# Patient Record
Sex: FEMALE | Race: BLACK OR AFRICAN AMERICAN | Employment: OTHER | URBAN - METROPOLITAN AREA
[De-identification: names, ages, dates, MRNs, and addresses within clinical notes are randomized per-mention and may not be internally consistent; named-entity substitution may affect disease eponyms.]

---

## 2018-03-06 ENCOUNTER — OFFICE VISIT (OUTPATIENT)
Dept: PULMONOLOGY | Age: 58
End: 2018-03-06
Payer: MEDICARE

## 2018-03-06 VITALS
TEMPERATURE: 98 F | BODY MASS INDEX: 25.3 KG/M2 | HEIGHT: 61 IN | OXYGEN SATURATION: 95 % | WEIGHT: 134 LBS | DIASTOLIC BLOOD PRESSURE: 28 MMHG | SYSTOLIC BLOOD PRESSURE: 130 MMHG | HEART RATE: 108 BPM

## 2018-03-06 DIAGNOSIS — F17.200 SMOKING: ICD-10-CM

## 2018-03-06 DIAGNOSIS — J44.9 CHRONIC OBSTRUCTIVE PULMONARY DISEASE, UNSPECIFIED COPD TYPE (HCC): Primary | ICD-10-CM

## 2018-03-06 DIAGNOSIS — R09.02 HYPOXIA: ICD-10-CM

## 2018-03-06 PROCEDURE — G8419 CALC BMI OUT NRM PARAM NOF/U: HCPCS | Performed by: INTERNAL MEDICINE

## 2018-03-06 PROCEDURE — G8926 SPIRO NO PERF OR DOC: HCPCS | Performed by: INTERNAL MEDICINE

## 2018-03-06 PROCEDURE — 99214 OFFICE O/P EST MOD 30 MIN: CPT | Performed by: INTERNAL MEDICINE

## 2018-03-06 PROCEDURE — 3023F SPIROM DOC REV: CPT | Performed by: INTERNAL MEDICINE

## 2018-03-06 PROCEDURE — 3017F COLORECTAL CA SCREEN DOC REV: CPT | Performed by: INTERNAL MEDICINE

## 2018-03-06 PROCEDURE — G8484 FLU IMMUNIZE NO ADMIN: HCPCS | Performed by: INTERNAL MEDICINE

## 2018-03-06 PROCEDURE — 1036F TOBACCO NON-USER: CPT | Performed by: INTERNAL MEDICINE

## 2018-03-06 PROCEDURE — G8427 DOCREV CUR MEDS BY ELIG CLIN: HCPCS | Performed by: INTERNAL MEDICINE

## 2018-03-06 PROCEDURE — 3014F SCREEN MAMMO DOC REV: CPT | Performed by: INTERNAL MEDICINE

## 2018-03-06 RX ORDER — ALBUTEROL SULFATE 90 UG/1
2 AEROSOL, METERED RESPIRATORY (INHALATION) EVERY 6 HOURS PRN
Qty: 1 INHALER | Refills: 3 | Status: SHIPPED | OUTPATIENT
Start: 2018-03-06 | End: 2020-04-03 | Stop reason: SDUPTHER

## 2018-03-06 RX ORDER — ALBUTEROL SULFATE 2.5 MG/3ML
2.5 SOLUTION RESPIRATORY (INHALATION) EVERY 6 HOURS PRN
Qty: 120 EACH | Refills: 5 | Status: SHIPPED | OUTPATIENT
Start: 2018-03-06 | End: 2020-07-09 | Stop reason: SDUPTHER

## 2018-03-06 RX ORDER — BUDESONIDE AND FORMOTEROL FUMARATE DIHYDRATE 160; 4.5 UG/1; UG/1
2 AEROSOL RESPIRATORY (INHALATION) 2 TIMES DAILY
Qty: 1 INHALER | Refills: 3 | Status: SHIPPED | OUTPATIENT
Start: 2018-03-06 | End: 2018-10-26 | Stop reason: SDUPTHER

## 2018-03-06 ASSESSMENT — ENCOUNTER SYMPTOMS
VOICE CHANGE: 0
VOMITING: 0
DIARRHEA: 0
EYE ITCHING: 0
SHORTNESS OF BREATH: 1
TROUBLE SWALLOWING: 0
ABDOMINAL PAIN: 0
SINUS PRESSURE: 0
SORE THROAT: 0
EYE DISCHARGE: 0
WHEEZING: 0
RHINORRHEA: 0
CHEST TIGHTNESS: 0
COUGH: 0
NAUSEA: 0

## 2018-06-11 ENCOUNTER — HOSPITAL ENCOUNTER (OUTPATIENT)
Dept: CT IMAGING | Age: 58
Discharge: HOME | End: 2018-06-13
Payer: MEDICARE

## 2018-06-11 DIAGNOSIS — Q65.89 DYSPLASIA OF ACETABULUM: ICD-10-CM

## 2018-06-11 PROCEDURE — 72192 CT PELVIS W/O DYE: CPT

## 2018-08-08 ENCOUNTER — HOSPITAL ENCOUNTER (OUTPATIENT)
Dept: PREADMISSION TESTING | Age: 58
Discharge: HOME | End: 2018-08-12
Payer: MEDICARE

## 2018-08-08 VITALS
HEIGHT: 61 IN | TEMPERATURE: 98.1 F | BODY MASS INDEX: 23.03 KG/M2 | HEART RATE: 97 BPM | OXYGEN SATURATION: 97 % | RESPIRATION RATE: 16 BRPM | DIASTOLIC BLOOD PRESSURE: 53 MMHG | SYSTOLIC BLOOD PRESSURE: 114 MMHG | WEIGHT: 122 LBS

## 2018-08-08 DIAGNOSIS — M14.60 CHARCOT'S ARTHROPATHY: ICD-10-CM

## 2018-08-08 LAB
ABO/RH: NORMAL
ANION GAP SERPL CALCULATED.3IONS-SCNC: 10 MEQ/L (ref 7–13)
ANTIBODY SCREEN: NORMAL
APTT: 24.3 SEC (ref 21.6–35.4)
BILIRUBIN URINE: NEGATIVE
BLOOD, URINE: NEGATIVE
BUN BLDV-MCNC: 9 MG/DL (ref 8–23)
CALCIUM SERPL-MCNC: 9.7 MG/DL (ref 8.6–10.2)
CHLORIDE BLD-SCNC: 96 MEQ/L (ref 98–107)
CLARITY: CLEAR
CO2: 29 MEQ/L (ref 22–29)
COLOR: YELLOW
CREAT SERPL-MCNC: 0.69 MG/DL (ref 0.5–0.9)
EKG ATRIAL RATE: 81 BPM
EKG P AXIS: 81 DEGREES
EKG P-R INTERVAL: 150 MS
EKG Q-T INTERVAL: 384 MS
EKG QRS DURATION: 78 MS
EKG QTC CALCULATION (BAZETT): 446 MS
EKG R AXIS: 83 DEGREES
EKG T AXIS: 56 DEGREES
EKG VENTRICULAR RATE: 81 BPM
GFR AFRICAN AMERICAN: >60
GFR NON-AFRICAN AMERICAN: >60
GLUCOSE BLD-MCNC: 116 MG/DL (ref 74–109)
GLUCOSE URINE: NEGATIVE MG/DL
HCT VFR BLD CALC: 46.9 % (ref 37–47)
HEMOGLOBIN: 15.8 G/DL (ref 12–16)
INR BLD: 1
KETONES, URINE: NEGATIVE MG/DL
LEUKOCYTE ESTERASE, URINE: NEGATIVE
MCH RBC QN AUTO: 33.4 PG (ref 27–31.3)
MCHC RBC AUTO-ENTMCNC: 33.7 % (ref 33–37)
MCV RBC AUTO: 99.1 FL (ref 82–100)
NITRITE, URINE: NEGATIVE
PDW BLD-RTO: 15.4 % (ref 11.5–14.5)
PH UA: 6 (ref 5–9)
PLATELET # BLD: 216 K/UL (ref 130–400)
POTASSIUM SERPL-SCNC: 4.3 MEQ/L (ref 3.5–5.1)
PROTEIN UA: NEGATIVE MG/DL
PROTHROMBIN TIME: 10.1 SEC (ref 9.6–12.3)
RBC # BLD: 4.73 M/UL (ref 4.2–5.4)
SODIUM BLD-SCNC: 135 MEQ/L (ref 132–144)
SPECIFIC GRAVITY UA: 1.01 (ref 1–1.03)
URINE REFLEX TO CULTURE: NORMAL
UROBILINOGEN, URINE: 1 E.U./DL
WBC # BLD: 6.1 K/UL (ref 4.8–10.8)

## 2018-08-08 PROCEDURE — 81003 URINALYSIS AUTO W/O SCOPE: CPT

## 2018-08-08 PROCEDURE — 85610 PROTHROMBIN TIME: CPT

## 2018-08-08 PROCEDURE — 86850 RBC ANTIBODY SCREEN: CPT

## 2018-08-08 PROCEDURE — 93005 ELECTROCARDIOGRAM TRACING: CPT

## 2018-08-08 PROCEDURE — 86901 BLOOD TYPING SEROLOGIC RH(D): CPT

## 2018-08-08 PROCEDURE — 86923 COMPATIBILITY TEST ELECTRIC: CPT

## 2018-08-08 PROCEDURE — 80048 BASIC METABOLIC PNL TOTAL CA: CPT

## 2018-08-08 PROCEDURE — 86900 BLOOD TYPING SEROLOGIC ABO: CPT

## 2018-08-08 PROCEDURE — 85027 COMPLETE CBC AUTOMATED: CPT

## 2018-08-08 PROCEDURE — 85730 THROMBOPLASTIN TIME PARTIAL: CPT

## 2018-08-08 RX ORDER — VITAMIN E (DL,TOCOPHERYL ACET) 180 MG
1 CAPSULE ORAL 2 TIMES DAILY
COMMUNITY

## 2018-08-08 RX ORDER — FLUTICASONE PROPIONATE 50 MCG
1 SPRAY, SUSPENSION (ML) NASAL DAILY
Status: ON HOLD | COMMUNITY
End: 2018-08-27 | Stop reason: HOSPADM

## 2018-08-08 RX ORDER — SODIUM CHLORIDE 0.9 % (FLUSH) 0.9 %
10 SYRINGE (ML) INJECTION EVERY 12 HOURS SCHEDULED
Status: CANCELLED | OUTPATIENT
Start: 2018-08-08

## 2018-08-08 RX ORDER — LIDOCAINE HYDROCHLORIDE 10 MG/ML
1 INJECTION, SOLUTION EPIDURAL; INFILTRATION; INTRACAUDAL; PERINEURAL
Status: CANCELLED | OUTPATIENT
Start: 2018-08-08 | End: 2018-08-08

## 2018-08-08 RX ORDER — SIMVASTATIN 10 MG
10 TABLET ORAL NIGHTLY
COMMUNITY

## 2018-08-08 RX ORDER — SODIUM CHLORIDE 0.9 % (FLUSH) 0.9 %
10 SYRINGE (ML) INJECTION PRN
Status: CANCELLED | OUTPATIENT
Start: 2018-08-08

## 2018-08-08 RX ORDER — OXYBUTYNIN CHLORIDE 10 MG/1
10 TABLET, EXTENDED RELEASE ORAL DAILY
COMMUNITY

## 2018-08-08 RX ORDER — DOCUSATE SODIUM 100 MG/1
200 CAPSULE, LIQUID FILLED ORAL 2 TIMES DAILY
COMMUNITY
End: 2019-05-20 | Stop reason: ALTCHOICE

## 2018-08-08 RX ORDER — SENNA PLUS 8.6 MG/1
1 TABLET ORAL DAILY
COMMUNITY
End: 2019-05-20 | Stop reason: ALTCHOICE

## 2018-08-08 RX ORDER — M-VIT,TX,IRON,MINS/CALC/FOLIC 27MG-0.4MG
1 TABLET ORAL DAILY
COMMUNITY

## 2018-08-08 RX ORDER — SODIUM CHLORIDE, SODIUM LACTATE, POTASSIUM CHLORIDE, CALCIUM CHLORIDE 600; 310; 30; 20 MG/100ML; MG/100ML; MG/100ML; MG/100ML
INJECTION, SOLUTION INTRAVENOUS CONTINUOUS
Status: CANCELLED | OUTPATIENT
Start: 2018-08-08

## 2018-08-08 NOTE — PATIENT CARE CONFERENCE
Contraindications to TXA Therapy- per patient  no  1) Hypersensitivity to tranexamic acid?    no  2) Defective color vision at baseline?    no  3) Chronic degenerative conjunctivitis with fibrin deposits?    no  4) Blood clot WITHIN the vessels of the eye?     no  5) Recent hx of thromboembolism (DVT, PE, MI, CABG, stents, CVA,    etc.)?     no  6) Heart valve disease?    no  7) Hemorrhage in the subarachnoid space of brain?  no  8) Fluid accumulation in the brain?  no  9) Recent hx of seizures (well controlled seizures on meds are not a    contraindication)?  no  10) Hypercoagulable state?  no  11) Kidney impairment? Patients with serum creat gr than 1.5 will receive ONLY a single dose of TXA in O.R      Additional question for DVT prophylaxis    no         Do you have a recent history of cancer? no         Recent chemotherapy?  no         Low Ejection Fraction? no         Pulmonary Hypertension?     Pt is on pain management- she is on Percocet 7.5 mg tabs form Dr Narayanan Mention   she does see pain management monthly and she is scheduled to see then om 8/17- we will call and discuss post op pain meds and rx- and reschedule pt for the following week   pt lives home alone- but she plans to stay with her boyfriend post op- and [facility] for therapy

## 2018-08-09 PROCEDURE — 93010 ELECTROCARDIOGRAM REPORT: CPT | Performed by: INTERNAL MEDICINE

## 2018-08-13 ENCOUNTER — HOSPITAL ENCOUNTER (INPATIENT)
Age: 58
LOS: 1 days | Discharge: NURSING, REHAB OR LONG-TERM CARE | DRG: 470 | End: 2018-08-14
Attending: ORTHOPAEDIC SURGERY | Admitting: ORTHOPAEDIC SURGERY
Payer: MEDICARE

## 2018-08-13 ENCOUNTER — APPOINTMENT (OUTPATIENT)
Dept: GENERAL RADIOLOGY | Age: 58
DRG: 470 | End: 2018-08-13
Attending: ORTHOPAEDIC SURGERY
Payer: MEDICARE

## 2018-08-13 ENCOUNTER — ANESTHESIA EVENT (OUTPATIENT)
Dept: OPERATING ROOM | Age: 58
DRG: 470 | End: 2018-08-13
Payer: MEDICARE

## 2018-08-13 ENCOUNTER — HOSPITAL ENCOUNTER (OUTPATIENT)
Dept: GENERAL RADIOLOGY | Age: 58
Setting detail: SURGERY ADMIT
Discharge: HOME | DRG: 470 | End: 2018-08-15
Attending: ORTHOPAEDIC SURGERY
Payer: MEDICARE

## 2018-08-13 ENCOUNTER — ANESTHESIA (OUTPATIENT)
Dept: OPERATING ROOM | Age: 58
DRG: 470 | End: 2018-08-13
Payer: MEDICARE

## 2018-08-13 VITALS — DIASTOLIC BLOOD PRESSURE: 58 MMHG | TEMPERATURE: 97 F | OXYGEN SATURATION: 100 % | SYSTOLIC BLOOD PRESSURE: 105 MMHG

## 2018-08-13 DIAGNOSIS — R52 PAIN: ICD-10-CM

## 2018-08-13 PROBLEM — M16.12 OSTEOARTHRITIS OF LEFT HIP: Status: ACTIVE | Noted: 2018-08-13

## 2018-08-13 PROCEDURE — 3700000001 HC ADD 15 MINUTES (ANESTHESIA): Performed by: ORTHOPAEDIC SURGERY

## 2018-08-13 PROCEDURE — C1776 JOINT DEVICE (IMPLANTABLE): HCPCS | Performed by: ORTHOPAEDIC SURGERY

## 2018-08-13 PROCEDURE — 6360000002 HC RX W HCPCS: Performed by: ANESTHESIOLOGY

## 2018-08-13 PROCEDURE — C1713 ANCHOR/SCREW BN/BN,TIS/BN: HCPCS | Performed by: ORTHOPAEDIC SURGERY

## 2018-08-13 PROCEDURE — 88311 DECALCIFY TISSUE: CPT

## 2018-08-13 PROCEDURE — 2580000003 HC RX 258: Performed by: NURSE PRACTITIONER

## 2018-08-13 PROCEDURE — 1210000000 HC MED SURG R&B

## 2018-08-13 PROCEDURE — 3209999900 FLUORO FOR SURGICAL PROCEDURES

## 2018-08-13 PROCEDURE — 6360000002 HC RX W HCPCS: Performed by: ORTHOPAEDIC SURGERY

## 2018-08-13 PROCEDURE — 2500000003 HC RX 250 WO HCPCS: Performed by: NURSE ANESTHETIST, CERTIFIED REGISTERED

## 2018-08-13 PROCEDURE — 7100000001 HC PACU RECOVERY - ADDTL 15 MIN: Performed by: ORTHOPAEDIC SURGERY

## 2018-08-13 PROCEDURE — 2709999900 HC NON-CHARGEABLE SUPPLY: Performed by: ORTHOPAEDIC SURGERY

## 2018-08-13 PROCEDURE — 6360000002 HC RX W HCPCS: Performed by: NURSE PRACTITIONER

## 2018-08-13 PROCEDURE — 0SRB0J9 REPLACEMENT OF LEFT HIP JOINT WITH SYNTHETIC SUBSTITUTE, CEMENTED, OPEN APPROACH: ICD-10-PCS | Performed by: ORTHOPAEDIC SURGERY

## 2018-08-13 PROCEDURE — 73501 X-RAY EXAM HIP UNI 1 VIEW: CPT

## 2018-08-13 PROCEDURE — 6370000000 HC RX 637 (ALT 250 FOR IP): Performed by: NURSE PRACTITIONER

## 2018-08-13 PROCEDURE — 7100000000 HC PACU RECOVERY - FIRST 15 MIN: Performed by: ORTHOPAEDIC SURGERY

## 2018-08-13 PROCEDURE — 73502 X-RAY EXAM HIP UNI 2-3 VIEWS: CPT

## 2018-08-13 PROCEDURE — 6370000000 HC RX 637 (ALT 250 FOR IP): Performed by: ORTHOPAEDIC SURGERY

## 2018-08-13 PROCEDURE — C1773 RET DEV, INSERTABLE: HCPCS | Performed by: ORTHOPAEDIC SURGERY

## 2018-08-13 PROCEDURE — 3600000014 HC SURGERY LEVEL 4 ADDTL 15MIN: Performed by: ORTHOPAEDIC SURGERY

## 2018-08-13 PROCEDURE — 2500000003 HC RX 250 WO HCPCS: Performed by: NURSE PRACTITIONER

## 2018-08-13 PROCEDURE — 2580000003 HC RX 258: Performed by: ORTHOPAEDIC SURGERY

## 2018-08-13 PROCEDURE — 6360000002 HC RX W HCPCS: Performed by: NURSE ANESTHETIST, CERTIFIED REGISTERED

## 2018-08-13 PROCEDURE — 3600000004 HC SURGERY LEVEL 4 BASE: Performed by: ORTHOPAEDIC SURGERY

## 2018-08-13 PROCEDURE — 2700000000 HC OXYGEN THERAPY PER DAY

## 2018-08-13 PROCEDURE — 3700000000 HC ANESTHESIA ATTENDED CARE: Performed by: ORTHOPAEDIC SURGERY

## 2018-08-13 PROCEDURE — 94760 N-INVAS EAR/PLS OXIMETRY 1: CPT

## 2018-08-13 PROCEDURE — 94640 AIRWAY INHALATION TREATMENT: CPT

## 2018-08-13 PROCEDURE — 88304 TISSUE EXAM BY PATHOLOGIST: CPT

## 2018-08-13 DEVICE — SCREW BNE L20MM DIA6.5MM ACET TI ALLY DOME 2 MOBILITY LO: Type: IMPLANTABLE DEVICE | Site: HIP | Status: FUNCTIONAL

## 2018-08-13 DEVICE — SCREW BNE L25MM DIA6.5MM TI ALLY DOME 2 MOBILITY LO PROF G7: Type: IMPLANTABLE DEVICE | Site: HIP | Status: FUNCTIONAL

## 2018-08-13 DEVICE — IMPLANTABLE DEVICE: Type: IMPLANTABLE DEVICE | Site: HIP | Status: FUNCTIONAL

## 2018-08-13 DEVICE — IMPL HIP VERSYS DISTAL CENTRALIZER 10MM: Type: IMPLANTABLE DEVICE | Site: HIP | Status: FUNCTIONAL

## 2018-08-13 DEVICE — SHELL ACET SZ G DIA58MM MH OSSEOTI 2 MOBILITY G7: Type: IMPLANTABLE DEVICE | Site: HIP | Status: FUNCTIONAL

## 2018-08-13 DEVICE — CEMENT BNE RADIOPAQUE FAST SET ACRYL RESIN HI VISC SIMPLEXHV: Type: IMPLANTABLE DEVICE | Site: HIP | Status: FUNCTIONAL

## 2018-08-13 DEVICE — IMPLANTABLE DEVICE
Type: IMPLANTABLE DEVICE | Site: HIP | Status: FUNCTIONAL
Brand: TRABECULAR METAL™

## 2018-08-13 DEVICE — HEAD FEMORAL COCR 12/14 28MM -3.5: Type: IMPLANTABLE DEVICE | Site: HIP | Status: FUNCTIONAL

## 2018-08-13 DEVICE — BEARING ACET OD46MM ID28MM HIP E1 2 MOBILITY ACT ARTC: Type: IMPLANTABLE DEVICE | Site: HIP | Status: FUNCTIONAL

## 2018-08-13 DEVICE — CEMENT BNE 40 GM RADIOPAQUE BA SIMPLEX P: Type: IMPLANTABLE DEVICE | Site: HIP | Status: FUNCTIONAL

## 2018-08-13 DEVICE — G7 DUAL MOBILITY LINER 46MM G: Type: IMPLANTABLE DEVICE | Site: HIP | Status: FUNCTIONAL

## 2018-08-13 RX ORDER — OXYCODONE HCL 10 MG/1
10 TABLET, FILM COATED, EXTENDED RELEASE ORAL ONCE
Status: COMPLETED | OUTPATIENT
Start: 2018-08-13 | End: 2018-08-13

## 2018-08-13 RX ORDER — FLUTICASONE PROPIONATE 50 MCG
1 SPRAY, SUSPENSION (ML) NASAL DAILY
Status: DISCONTINUED | OUTPATIENT
Start: 2018-08-13 | End: 2018-08-14 | Stop reason: HOSPADM

## 2018-08-13 RX ORDER — DULOXETIN HYDROCHLORIDE 60 MG/1
60 CAPSULE, DELAYED RELEASE ORAL DAILY
Status: DISCONTINUED | OUTPATIENT
Start: 2018-08-13 | End: 2018-08-14 | Stop reason: HOSPADM

## 2018-08-13 RX ORDER — M-VIT,TX,IRON,MINS/CALC/FOLIC 27MG-0.4MG
1 TABLET ORAL DAILY
Status: DISCONTINUED | OUTPATIENT
Start: 2018-08-13 | End: 2018-08-14 | Stop reason: HOSPADM

## 2018-08-13 RX ORDER — OXYCODONE HYDROCHLORIDE 5 MG/1
5 TABLET ORAL EVERY 4 HOURS PRN
Status: DISCONTINUED | OUTPATIENT
Start: 2018-08-13 | End: 2018-08-14 | Stop reason: HOSPADM

## 2018-08-13 RX ORDER — ASPIRIN 81 MG/1
81 TABLET ORAL 2 TIMES DAILY
Status: DISCONTINUED | OUTPATIENT
Start: 2018-08-14 | End: 2018-08-14 | Stop reason: HOSPADM

## 2018-08-13 RX ORDER — LIDOCAINE HYDROCHLORIDE 20 MG/ML
INJECTION, SOLUTION INFILTRATION; PERINEURAL PRN
Status: DISCONTINUED | OUTPATIENT
Start: 2018-08-13 | End: 2018-08-13 | Stop reason: SDUPTHER

## 2018-08-13 RX ORDER — PROPOFOL 10 MG/ML
INJECTION, EMULSION INTRAVENOUS PRN
Status: DISCONTINUED | OUTPATIENT
Start: 2018-08-13 | End: 2018-08-13 | Stop reason: SDUPTHER

## 2018-08-13 RX ORDER — TRAMADOL HYDROCHLORIDE 50 MG/1
50 TABLET ORAL EVERY 6 HOURS SCHEDULED
Status: DISCONTINUED | OUTPATIENT
Start: 2018-08-13 | End: 2018-08-14 | Stop reason: HOSPADM

## 2018-08-13 RX ORDER — ACETAMINOPHEN 500 MG
1000 TABLET ORAL ONCE
Status: COMPLETED | OUTPATIENT
Start: 2018-08-13 | End: 2018-08-13

## 2018-08-13 RX ORDER — ROCURONIUM BROMIDE 10 MG/ML
INJECTION, SOLUTION INTRAVENOUS PRN
Status: DISCONTINUED | OUTPATIENT
Start: 2018-08-13 | End: 2018-08-13 | Stop reason: SDUPTHER

## 2018-08-13 RX ORDER — ACETAMINOPHEN 325 MG/1
650 TABLET ORAL EVERY 6 HOURS
Status: DISCONTINUED | OUTPATIENT
Start: 2018-08-13 | End: 2018-08-14 | Stop reason: HOSPADM

## 2018-08-13 RX ORDER — SENNA PLUS 8.6 MG/1
1 TABLET ORAL DAILY
Status: DISCONTINUED | OUTPATIENT
Start: 2018-08-13 | End: 2018-08-13 | Stop reason: SDUPTHER

## 2018-08-13 RX ORDER — SIMVASTATIN 10 MG
10 TABLET ORAL NIGHTLY
Status: DISCONTINUED | OUTPATIENT
Start: 2018-08-13 | End: 2018-08-14 | Stop reason: HOSPADM

## 2018-08-13 RX ORDER — MAGNESIUM HYDROXIDE 1200 MG/15ML
LIQUID ORAL CONTINUOUS PRN
Status: COMPLETED | OUTPATIENT
Start: 2018-08-13 | End: 2018-08-13

## 2018-08-13 RX ORDER — HYDROCODONE BITARTRATE AND ACETAMINOPHEN 5; 325 MG/1; MG/1
2 TABLET ORAL PRN
Status: DISCONTINUED | OUTPATIENT
Start: 2018-08-13 | End: 2018-08-13 | Stop reason: HOSPADM

## 2018-08-13 RX ORDER — ONDANSETRON 2 MG/ML
4 INJECTION INTRAMUSCULAR; INTRAVENOUS
Status: DISCONTINUED | OUTPATIENT
Start: 2018-08-13 | End: 2018-08-13 | Stop reason: HOSPADM

## 2018-08-13 RX ORDER — FENTANYL CITRATE 50 UG/ML
50 INJECTION, SOLUTION INTRAMUSCULAR; INTRAVENOUS EVERY 10 MIN PRN
Status: DISCONTINUED | OUTPATIENT
Start: 2018-08-13 | End: 2018-08-13 | Stop reason: HOSPADM

## 2018-08-13 RX ORDER — GRANISETRON HYDROCHLORIDE 1 MG/ML
INJECTION INTRAVENOUS PRN
Status: DISCONTINUED | OUTPATIENT
Start: 2018-08-13 | End: 2018-08-13 | Stop reason: SDUPTHER

## 2018-08-13 RX ORDER — SODIUM CHLORIDE 0.9 % (FLUSH) 0.9 %
10 SYRINGE (ML) INJECTION PRN
Status: DISCONTINUED | OUTPATIENT
Start: 2018-08-13 | End: 2018-08-13 | Stop reason: HOSPADM

## 2018-08-13 RX ORDER — SODIUM CHLORIDE 9 MG/ML
INJECTION, SOLUTION INTRAVENOUS CONTINUOUS
Status: DISCONTINUED | OUTPATIENT
Start: 2018-08-13 | End: 2018-08-14 | Stop reason: HOSPADM

## 2018-08-13 RX ORDER — MEPERIDINE HYDROCHLORIDE 25 MG/ML
12.5 INJECTION INTRAMUSCULAR; INTRAVENOUS; SUBCUTANEOUS EVERY 5 MIN PRN
Status: DISCONTINUED | OUTPATIENT
Start: 2018-08-13 | End: 2018-08-13 | Stop reason: HOSPADM

## 2018-08-13 RX ORDER — LABETALOL HYDROCHLORIDE 5 MG/ML
INJECTION, SOLUTION INTRAVENOUS PRN
Status: DISCONTINUED | OUTPATIENT
Start: 2018-08-13 | End: 2018-08-13 | Stop reason: SDUPTHER

## 2018-08-13 RX ORDER — SODIUM CHLORIDE, SODIUM LACTATE, POTASSIUM CHLORIDE, CALCIUM CHLORIDE 600; 310; 30; 20 MG/100ML; MG/100ML; MG/100ML; MG/100ML
INJECTION, SOLUTION INTRAVENOUS CONTINUOUS
Status: DISCONTINUED | OUTPATIENT
Start: 2018-08-13 | End: 2018-08-13

## 2018-08-13 RX ORDER — SODIUM CHLORIDE 0.9 % (FLUSH) 0.9 %
10 SYRINGE (ML) INJECTION PRN
Status: DISCONTINUED | OUTPATIENT
Start: 2018-08-13 | End: 2018-08-14 | Stop reason: HOSPADM

## 2018-08-13 RX ORDER — ALBUTEROL SULFATE 90 UG/1
2 AEROSOL, METERED RESPIRATORY (INHALATION) EVERY 6 HOURS PRN
Status: DISCONTINUED | OUTPATIENT
Start: 2018-08-13 | End: 2018-08-14

## 2018-08-13 RX ORDER — SENNA AND DOCUSATE SODIUM 50; 8.6 MG/1; MG/1
1 TABLET, FILM COATED ORAL DAILY
Status: DISCONTINUED | OUTPATIENT
Start: 2018-08-13 | End: 2018-08-14 | Stop reason: HOSPADM

## 2018-08-13 RX ORDER — WOUND DRESSING ADHESIVE - LIQUID
LIQUID MISCELLANEOUS PRN
Status: DISCONTINUED | OUTPATIENT
Start: 2018-08-13 | End: 2018-08-13 | Stop reason: HOSPADM

## 2018-08-13 RX ORDER — OXYBUTYNIN CHLORIDE 5 MG/1
10 TABLET, EXTENDED RELEASE ORAL DAILY
Status: DISCONTINUED | OUTPATIENT
Start: 2018-08-13 | End: 2018-08-14 | Stop reason: HOSPADM

## 2018-08-13 RX ORDER — FENTANYL CITRATE 50 UG/ML
INJECTION, SOLUTION INTRAMUSCULAR; INTRAVENOUS PRN
Status: DISCONTINUED | OUTPATIENT
Start: 2018-08-13 | End: 2018-08-13 | Stop reason: SDUPTHER

## 2018-08-13 RX ORDER — MORPHINE SULFATE 4 MG/ML
4 INJECTION, SOLUTION INTRAMUSCULAR; INTRAVENOUS
Status: DISCONTINUED | OUTPATIENT
Start: 2018-08-13 | End: 2018-08-14

## 2018-08-13 RX ORDER — MORPHINE SULFATE 2 MG/ML
2 INJECTION, SOLUTION INTRAMUSCULAR; INTRAVENOUS
Status: DISCONTINUED | OUTPATIENT
Start: 2018-08-13 | End: 2018-08-14

## 2018-08-13 RX ORDER — KETOROLAC TROMETHAMINE 30 MG/ML
30 INJECTION, SOLUTION INTRAMUSCULAR; INTRAVENOUS EVERY 6 HOURS
Status: COMPLETED | OUTPATIENT
Start: 2018-08-13 | End: 2018-08-14

## 2018-08-13 RX ORDER — SODIUM CHLORIDE 0.9 % (FLUSH) 0.9 %
10 SYRINGE (ML) INJECTION EVERY 12 HOURS SCHEDULED
Status: DISCONTINUED | OUTPATIENT
Start: 2018-08-13 | End: 2018-08-13 | Stop reason: HOSPADM

## 2018-08-13 RX ORDER — DOCUSATE SODIUM 100 MG/1
100 CAPSULE, LIQUID FILLED ORAL 2 TIMES DAILY
Status: DISCONTINUED | OUTPATIENT
Start: 2018-08-13 | End: 2018-08-13 | Stop reason: SDUPTHER

## 2018-08-13 RX ORDER — DEXAMETHASONE SODIUM PHOSPHATE 10 MG/ML
INJECTION INTRAMUSCULAR; INTRAVENOUS PRN
Status: DISCONTINUED | OUTPATIENT
Start: 2018-08-13 | End: 2018-08-13 | Stop reason: SDUPTHER

## 2018-08-13 RX ORDER — ALBUTEROL SULFATE 2.5 MG/3ML
2.5 SOLUTION RESPIRATORY (INHALATION) EVERY 6 HOURS PRN
Status: DISCONTINUED | OUTPATIENT
Start: 2018-08-13 | End: 2018-08-14

## 2018-08-13 RX ORDER — METOCLOPRAMIDE HYDROCHLORIDE 5 MG/ML
10 INJECTION INTRAMUSCULAR; INTRAVENOUS
Status: DISCONTINUED | OUTPATIENT
Start: 2018-08-13 | End: 2018-08-13 | Stop reason: HOSPADM

## 2018-08-13 RX ORDER — CELECOXIB 200 MG/1
400 CAPSULE ORAL ONCE
Status: COMPLETED | OUTPATIENT
Start: 2018-08-13 | End: 2018-08-13

## 2018-08-13 RX ORDER — LIDOCAINE HYDROCHLORIDE 10 MG/ML
1 INJECTION, SOLUTION EPIDURAL; INFILTRATION; INTRACAUDAL; PERINEURAL
Status: COMPLETED | OUTPATIENT
Start: 2018-08-13 | End: 2018-08-13

## 2018-08-13 RX ORDER — DIPHENHYDRAMINE HYDROCHLORIDE 50 MG/ML
12.5 INJECTION INTRAMUSCULAR; INTRAVENOUS
Status: DISCONTINUED | OUTPATIENT
Start: 2018-08-13 | End: 2018-08-13 | Stop reason: HOSPADM

## 2018-08-13 RX ORDER — ONDANSETRON 2 MG/ML
4 INJECTION INTRAMUSCULAR; INTRAVENOUS EVERY 6 HOURS PRN
Status: DISCONTINUED | OUTPATIENT
Start: 2018-08-13 | End: 2018-08-14 | Stop reason: HOSPADM

## 2018-08-13 RX ORDER — SODIUM CHLORIDE 0.9 % (FLUSH) 0.9 %
10 SYRINGE (ML) INJECTION EVERY 12 HOURS SCHEDULED
Status: DISCONTINUED | OUTPATIENT
Start: 2018-08-13 | End: 2018-08-14 | Stop reason: HOSPADM

## 2018-08-13 RX ORDER — DIAZEPAM 5 MG/1
5 TABLET ORAL EVERY 6 HOURS PRN
Status: DISCONTINUED | OUTPATIENT
Start: 2018-08-13 | End: 2018-08-14 | Stop reason: HOSPADM

## 2018-08-13 RX ORDER — BACLOFEN 10 MG/1
10 TABLET ORAL 3 TIMES DAILY
Status: DISCONTINUED | OUTPATIENT
Start: 2018-08-13 | End: 2018-08-14 | Stop reason: HOSPADM

## 2018-08-13 RX ORDER — MELOXICAM 7.5 MG/1
15 TABLET ORAL DAILY
Status: DISCONTINUED | OUTPATIENT
Start: 2018-08-14 | End: 2018-08-14

## 2018-08-13 RX ORDER — MELOXICAM 15 MG/1
1 TABLET ORAL DAILY
Status: DISCONTINUED | OUTPATIENT
Start: 2018-08-13 | End: 2018-08-13 | Stop reason: SDUPTHER

## 2018-08-13 RX ORDER — DOCUSATE SODIUM 100 MG/1
200 CAPSULE, LIQUID FILLED ORAL 2 TIMES DAILY
Status: DISCONTINUED | OUTPATIENT
Start: 2018-08-13 | End: 2018-08-14 | Stop reason: HOSPADM

## 2018-08-13 RX ORDER — HYDROCODONE BITARTRATE AND ACETAMINOPHEN 5; 325 MG/1; MG/1
1 TABLET ORAL PRN
Status: DISCONTINUED | OUTPATIENT
Start: 2018-08-13 | End: 2018-08-13 | Stop reason: HOSPADM

## 2018-08-13 RX ADMIN — CEFAZOLIN SODIUM 2 G: 1 INJECTION, SOLUTION INTRAVENOUS at 22:13

## 2018-08-13 RX ADMIN — PHENYLEPHRINE HYDROCHLORIDE 40 MCG: 10 INJECTION INTRAVENOUS at 17:15

## 2018-08-13 RX ADMIN — OXYBUTYNIN CHLORIDE 10 MG: 5 TABLET, FILM COATED, EXTENDED RELEASE ORAL at 22:03

## 2018-08-13 RX ADMIN — Medication 2 PUFF: at 22:28

## 2018-08-13 RX ADMIN — Medication 10 ML: at 22:04

## 2018-08-13 RX ADMIN — SODIUM CHLORIDE: 9 INJECTION, SOLUTION INTRAVENOUS at 22:04

## 2018-08-13 RX ADMIN — DOCUSATE SODIUM 200 MG: 100 CAPSULE, LIQUID FILLED ORAL at 22:03

## 2018-08-13 RX ADMIN — HYDROMORPHONE HYDROCHLORIDE 0.5 MG: 1 INJECTION, SOLUTION INTRAMUSCULAR; INTRAVENOUS; SUBCUTANEOUS at 18:52

## 2018-08-13 RX ADMIN — PHENYLEPHRINE HYDROCHLORIDE 160 MCG: 10 INJECTION INTRAVENOUS at 16:37

## 2018-08-13 RX ADMIN — DEXAMETHASONE SODIUM PHOSPHATE 10 MG: 10 INJECTION INTRAMUSCULAR; INTRAVENOUS at 14:43

## 2018-08-13 RX ADMIN — CEFAZOLIN SODIUM 2 G: 1 INJECTION, SOLUTION INTRAVENOUS at 14:13

## 2018-08-13 RX ADMIN — LIDOCAINE HYDROCHLORIDE 0.1 ML: 10 INJECTION, SOLUTION EPIDURAL; INFILTRATION; INTRACAUDAL; PERINEURAL at 12:23

## 2018-08-13 RX ADMIN — LIDOCAINE HYDROCHLORIDE 60 MG: 20 INJECTION, SOLUTION INFILTRATION; PERINEURAL at 14:19

## 2018-08-13 RX ADMIN — ACETAMINOPHEN 650 MG: 325 TABLET ORAL at 22:03

## 2018-08-13 RX ADMIN — HYDROMORPHONE HYDROCHLORIDE 0.4 MG: 1 INJECTION, SOLUTION INTRAMUSCULAR; INTRAVENOUS; SUBCUTANEOUS at 16:22

## 2018-08-13 RX ADMIN — ACETAMINOPHEN 1000 MG: 500 TABLET ORAL at 12:25

## 2018-08-13 RX ADMIN — ALBUTEROL SULFATE 2.5 MG: 2.5 SOLUTION RESPIRATORY (INHALATION) at 22:28

## 2018-08-13 RX ADMIN — CELECOXIB 400 MG: 200 CAPSULE ORAL at 12:24

## 2018-08-13 RX ADMIN — GRANISETRON HYDROCHLORIDE 1 MG: 1 INJECTION INTRAVENOUS at 17:42

## 2018-08-13 RX ADMIN — SODIUM CHLORIDE, POTASSIUM CHLORIDE, SODIUM LACTATE AND CALCIUM CHLORIDE: 600; 310; 30; 20 INJECTION, SOLUTION INTRAVENOUS at 17:20

## 2018-08-13 RX ADMIN — HYDROMORPHONE HYDROCHLORIDE 0.4 MG: 1 INJECTION, SOLUTION INTRAMUSCULAR; INTRAVENOUS; SUBCUTANEOUS at 14:48

## 2018-08-13 RX ADMIN — ROCURONIUM BROMIDE 50 MG: 10 INJECTION INTRAVENOUS at 14:19

## 2018-08-13 RX ADMIN — HYDROMORPHONE HYDROCHLORIDE 0.2 MG: 1 INJECTION, SOLUTION INTRAMUSCULAR; INTRAVENOUS; SUBCUTANEOUS at 14:41

## 2018-08-13 RX ADMIN — SODIUM CHLORIDE, POTASSIUM CHLORIDE, SODIUM LACTATE AND CALCIUM CHLORIDE: 600; 310; 30; 20 INJECTION, SOLUTION INTRAVENOUS at 15:10

## 2018-08-13 RX ADMIN — TRANEXAMIC ACID 1000 MG: 1 INJECTION, SOLUTION INTRAVENOUS at 14:31

## 2018-08-13 RX ADMIN — Medication 5 MG: at 14:26

## 2018-08-13 RX ADMIN — HYDROMORPHONE HYDROCHLORIDE 0.5 MG: 1 INJECTION, SOLUTION INTRAMUSCULAR; INTRAVENOUS; SUBCUTANEOUS at 18:42

## 2018-08-13 RX ADMIN — PHENYLEPHRINE HYDROCHLORIDE 100 MCG: 10 INJECTION INTRAVENOUS at 16:35

## 2018-08-13 RX ADMIN — OXYCODONE HYDROCHLORIDE 10 MG: 10 TABLET, FILM COATED, EXTENDED RELEASE ORAL at 12:24

## 2018-08-13 RX ADMIN — HYDROMORPHONE HYDROCHLORIDE 0.5 MG: 1 INJECTION, SOLUTION INTRAMUSCULAR; INTRAVENOUS; SUBCUTANEOUS at 19:10

## 2018-08-13 RX ADMIN — KETOROLAC TROMETHAMINE 30 MG: 30 INJECTION, SOLUTION INTRAMUSCULAR at 22:04

## 2018-08-13 RX ADMIN — HYDROMORPHONE HYDROCHLORIDE 0.2 MG: 1 INJECTION, SOLUTION INTRAMUSCULAR; INTRAVENOUS; SUBCUTANEOUS at 14:43

## 2018-08-13 RX ADMIN — SENNOSIDES AND DOCUSATE SODIUM 1 TABLET: 8.6; 5 TABLET ORAL at 22:03

## 2018-08-13 RX ADMIN — PHENYLEPHRINE HYDROCHLORIDE 160 MCG: 10 INJECTION INTRAVENOUS at 16:39

## 2018-08-13 RX ADMIN — TRANEXAMIC ACID 1000 MG: 1 INJECTION, SOLUTION INTRAVENOUS at 18:01

## 2018-08-13 RX ADMIN — FENTANYL CITRATE 100 MCG: 50 INJECTION, SOLUTION INTRAMUSCULAR; INTRAVENOUS at 14:19

## 2018-08-13 RX ADMIN — PHENYLEPHRINE HYDROCHLORIDE 40 MCG: 10 INJECTION INTRAVENOUS at 16:42

## 2018-08-13 RX ADMIN — SODIUM CHLORIDE, POTASSIUM CHLORIDE, SODIUM LACTATE AND CALCIUM CHLORIDE: 600; 310; 30; 20 INJECTION, SOLUTION INTRAVENOUS at 16:10

## 2018-08-13 RX ADMIN — Medication 400 MG: at 22:03

## 2018-08-13 RX ADMIN — SODIUM CHLORIDE, POTASSIUM CHLORIDE, SODIUM LACTATE AND CALCIUM CHLORIDE: 600; 310; 30; 20 INJECTION, SOLUTION INTRAVENOUS at 12:24

## 2018-08-13 RX ADMIN — BACLOFEN 10 MG: 10 TABLET ORAL at 22:03

## 2018-08-13 RX ADMIN — SIMVASTATIN 10 MG: 10 TABLET, FILM COATED ORAL at 22:03

## 2018-08-13 RX ADMIN — PROPOFOL 200 MG: 10 INJECTION, EMULSION INTRAVENOUS at 14:19

## 2018-08-13 ASSESSMENT — PULMONARY FUNCTION TESTS
PIF_VALUE: 24
PIF_VALUE: 25
PIF_VALUE: 21
PIF_VALUE: 2
PIF_VALUE: 23
PIF_VALUE: 23
PIF_VALUE: 29
PIF_VALUE: 21
PIF_VALUE: 24
PIF_VALUE: 0
PIF_VALUE: 21
PIF_VALUE: 24
PIF_VALUE: 22
PIF_VALUE: 31
PIF_VALUE: 21
PIF_VALUE: 1
PIF_VALUE: 2
PIF_VALUE: 22
PIF_VALUE: 25
PIF_VALUE: 3
PIF_VALUE: 21
PIF_VALUE: 3
PIF_VALUE: 3
PIF_VALUE: 21
PIF_VALUE: 26
PIF_VALUE: 24
PIF_VALUE: 25
PIF_VALUE: 24
PIF_VALUE: 2
PIF_VALUE: 1
PIF_VALUE: 20
PIF_VALUE: 28
PIF_VALUE: 22
PIF_VALUE: 21
PIF_VALUE: 22
PIF_VALUE: 24
PIF_VALUE: 22
PIF_VALUE: 31
PIF_VALUE: 24
PIF_VALUE: 24
PIF_VALUE: 23
PIF_VALUE: 24
PIF_VALUE: 22
PIF_VALUE: 25
PIF_VALUE: 22
PIF_VALUE: 27
PIF_VALUE: 23
PIF_VALUE: 24
PIF_VALUE: 24
PIF_VALUE: 22
PIF_VALUE: 32
PIF_VALUE: 25
PIF_VALUE: 23
PIF_VALUE: 21
PIF_VALUE: 25
PIF_VALUE: 25
PIF_VALUE: 23
PIF_VALUE: 23
PIF_VALUE: 22
PIF_VALUE: 2
PIF_VALUE: 22
PIF_VALUE: 20
PIF_VALUE: 25
PIF_VALUE: 21
PIF_VALUE: 31
PIF_VALUE: 22
PIF_VALUE: 23
PIF_VALUE: 21
PIF_VALUE: 23
PIF_VALUE: 26
PIF_VALUE: 23
PIF_VALUE: 21
PIF_VALUE: 21
PIF_VALUE: 24
PIF_VALUE: 28
PIF_VALUE: 23
PIF_VALUE: 25
PIF_VALUE: 3
PIF_VALUE: 23
PIF_VALUE: 24
PIF_VALUE: 24
PIF_VALUE: 1
PIF_VALUE: 2
PIF_VALUE: 25
PIF_VALUE: 21
PIF_VALUE: 27
PIF_VALUE: 21
PIF_VALUE: 24
PIF_VALUE: 0
PIF_VALUE: 24
PIF_VALUE: 5
PIF_VALUE: 22
PIF_VALUE: 23
PIF_VALUE: 23
PIF_VALUE: 32
PIF_VALUE: 22
PIF_VALUE: 24
PIF_VALUE: 24
PIF_VALUE: 33
PIF_VALUE: 25
PIF_VALUE: 22
PIF_VALUE: 30
PIF_VALUE: 24
PIF_VALUE: 23
PIF_VALUE: 22
PIF_VALUE: 2
PIF_VALUE: 26
PIF_VALUE: 22
PIF_VALUE: 21
PIF_VALUE: 22
PIF_VALUE: 25
PIF_VALUE: 22
PIF_VALUE: 28
PIF_VALUE: 23
PIF_VALUE: 12
PIF_VALUE: 21
PIF_VALUE: 25
PIF_VALUE: 32
PIF_VALUE: 22
PIF_VALUE: 24
PIF_VALUE: 22
PIF_VALUE: 29
PIF_VALUE: 16
PIF_VALUE: 23
PIF_VALUE: 25
PIF_VALUE: 24
PIF_VALUE: 22
PIF_VALUE: 22
PIF_VALUE: 21
PIF_VALUE: 2
PIF_VALUE: 21
PIF_VALUE: 22
PIF_VALUE: 1
PIF_VALUE: 24
PIF_VALUE: 22
PIF_VALUE: 25
PIF_VALUE: 22
PIF_VALUE: 21
PIF_VALUE: 24
PIF_VALUE: 24
PIF_VALUE: 23
PIF_VALUE: 27
PIF_VALUE: 24
PIF_VALUE: 23
PIF_VALUE: 23
PIF_VALUE: 27
PIF_VALUE: 29
PIF_VALUE: 22
PIF_VALUE: 23
PIF_VALUE: 21
PIF_VALUE: 23
PIF_VALUE: 21
PIF_VALUE: 23
PIF_VALUE: 26
PIF_VALUE: 22
PIF_VALUE: 21
PIF_VALUE: 20
PIF_VALUE: 25
PIF_VALUE: 18
PIF_VALUE: 23
PIF_VALUE: 19
PIF_VALUE: 22
PIF_VALUE: 27
PIF_VALUE: 26
PIF_VALUE: 28
PIF_VALUE: 2
PIF_VALUE: 25
PIF_VALUE: 16
PIF_VALUE: 24
PIF_VALUE: 24
PIF_VALUE: 23
PIF_VALUE: 23
PIF_VALUE: 24
PIF_VALUE: 21
PIF_VALUE: 22
PIF_VALUE: 20
PIF_VALUE: 23
PIF_VALUE: 27
PIF_VALUE: 21
PIF_VALUE: 24
PIF_VALUE: 22
PIF_VALUE: 24
PIF_VALUE: 22
PIF_VALUE: 26
PIF_VALUE: 23
PIF_VALUE: 1
PIF_VALUE: 21
PIF_VALUE: 22
PIF_VALUE: 23
PIF_VALUE: 24
PIF_VALUE: 23
PIF_VALUE: 26
PIF_VALUE: 25
PIF_VALUE: 22
PIF_VALUE: 21
PIF_VALUE: 24
PIF_VALUE: 21
PIF_VALUE: 24
PIF_VALUE: 21
PIF_VALUE: 21
PIF_VALUE: 24
PIF_VALUE: 25
PIF_VALUE: 22
PIF_VALUE: 21
PIF_VALUE: 24
PIF_VALUE: 27
PIF_VALUE: 25
PIF_VALUE: 23
PIF_VALUE: 21
PIF_VALUE: 23

## 2018-08-13 ASSESSMENT — PAIN SCALES - GENERAL
PAINLEVEL_OUTOF10: 6
PAINLEVEL_OUTOF10: 6
PAINLEVEL_OUTOF10: 7
PAINLEVEL_OUTOF10: 8
PAINLEVEL_OUTOF10: 6
PAINLEVEL_OUTOF10: 7
PAINLEVEL_OUTOF10: 4
PAINLEVEL_OUTOF10: 4

## 2018-08-13 ASSESSMENT — PAIN DESCRIPTION - PAIN TYPE: TYPE: SURGICAL PAIN

## 2018-08-13 ASSESSMENT — PAIN DESCRIPTION - ORIENTATION: ORIENTATION: LEFT

## 2018-08-13 ASSESSMENT — PAIN DESCRIPTION - LOCATION: LOCATION: HIP

## 2018-08-13 NOTE — ANESTHESIA POSTPROCEDURE EVALUATION
Department of Anesthesiology  Postprocedure Note    Patient: Pippa Amezcua  MRN: [id number]  YOB: 1960  Date of evaluation: 8/13/2018  Time:  6:03 PM     Procedure Summary     Date:  08/13/18 Room / Location:  [place] [address] / [facility] OR    Anesthesia Start:  5170 Anesthesia Stop:  1800    Procedure:  LEFT HIP TOTAL HIP ARTHROPLASTY (Left Hip) Diagnosis:  (LEFT HIP [address])    Surgeon:  John Hayes MD Responsible Provider:  Maryfrances Phoenix, MD    Anesthesia Type:  spinal ASA Status:  3          Anesthesia Type: spinal    Lesa Phase I: Lesa Score: 8    Lesa Phase II:      Last vitals: Reviewed and per EMR flowsheets.        Anesthesia Post Evaluation    Patient location during evaluation: PACU  Patient participation: complete - patient participated  Level of consciousness: sleepy but conscious  Airway patency: patent  Nausea & Vomiting: no nausea and no vomiting  Complications: no  Cardiovascular status: blood pressure returned to baseline and hemodynamically stable  Respiratory status: acceptable and face mask  Hydration status: euvolemic

## 2018-08-13 NOTE — ANESTHESIA PRE PROCEDURE
times daily 3/6/18  Yes Sharda Hunt MD   albuterol (PROVENTIL) (2.5 MG/3ML) 0.083% nebulizer solution Take 3 mLs by nebulization every 6 hours as needed for Wheezing 3/6/18 8/13/18 Yes Sharda Hunt MD   DULoxetine (CYMBALTA) 60 MG capsule  5/2/16  Yes Historical Provider, MD   naproxen (NAPROSYN) 500 MG tablet TAKE 1 TABLET BY MOUTH TWICE DAILY WITH MEALS 6/8/18   Sridevi Gayle MD   meloxicam (MOBIC) 15 MG tablet TAKE 1 TABLET BY MOUTH DAILY 4/2/18 7/1/18  Sridevi Gayle MD   meloxicam (MOBIC) 15 MG tablet TAKE 1 TABLET BY MOUTH DAILY 2/2/18 5/3/18  Sridevi Gayle MD       Current medications:    Current Facility-Administered Medications   Medication Dose Route Frequency Provider Last Rate Last Dose    tranexamic acid (CYKLOKAPRON) 1,000 mg in sodium chloride 0.9 % 50 mL IVPB  1,000 mg Intravenous On Call to [address] [facility] [address], APRJAVI - CNP        tranexamic acid (CYKLOKAPRON) 1,000 mg in sodium chloride 0.9 % 50 mL IVPB  1,000 mg Intravenous On Call to [address] [facility] [address], APRJAVI - CNP        lactated ringers infusion   Intravenous Continuous CHIRAG Amanda  mL/hr at 08/13/18 1224      sodium chloride flush 0.9 % injection 10 mL  10 mL Intravenous 2 times per day CHIRAG Renee - CNP        sodium chloride flush 0.9 % injection 10 mL  10 mL Intravenous PRN CHIRAG Amanda CNP        ceFAZolin (ANCEF) 2 g in dextrose 5 % 100 mL IVPB  2 g Intravenous Once CHIRAG Renee CNP           Allergies:  No Known Allergies    Problem List:    Patient Active Problem List   Diagnosis Code    Cervical spondylosis without myelopathy M47.812    Osteoarthrosis involving lower leg M17.10    Spinal stenosis, lumbar region, without neurogenic claudication M48.061    Chronic pain syndrome G89.4    Charcot's arthropathy: left hip M14.60       Past Medical History:        Diagnosis Date    Arthritis     left hip    COPD (chronic obstructive pulmonary PROT 6.4 05/16/2016    CALCIUM 9.7 08/08/2018    BILITOT 0.4 05/16/2016    ALKPHOS 88 05/16/2016    AST 17 05/16/2016    ALT 11 05/16/2016       POC Tests: No results for input(s): POCGLU, POCNA, POCK, POCCL, POCBUN, POCHEMO, POCHCT in the last 72 hours. Coags:   Lab Results   Component Value Date    PROTIME 10.1 08/08/2018    INR 1.0 08/08/2018    APTT 24.3 08/08/2018       HCG (If Applicable): No results found for: PREGTESTUR, PREGSERUM, HCG, HCGQUANT     ABGs: No results found for: PHART, PO2ART, CKY4IPR, TFZ2EAV, BEART, T2CEWDXU     Type & Screen (If Applicable):  No results found for: LABABO, [address]    Anesthesia Evaluation  Patient summary reviewed and Nursing notes reviewed no history of anesthetic complications:   Airway: Mallampati: II  TM distance: >3 FB   Neck ROM: full  Mouth opening: > = 3 FB Dental:    (+) partials      Pulmonary:   (+) COPD:                             Cardiovascular:Negative CV ROS          ECG reviewed               Beta Blocker:  Not on Beta Blocker         Neuro/Psych:   Negative Neuro/Psych ROS              GI/Hepatic/Renal: Neg GI/Hepatic/Renal ROS            Endo/Other:    (+) : arthritis: OA., .                 Abdominal:           Vascular:                                      Anesthesia Plan      spinal     ASA 3           MIPS: Postoperative opioids intended and Prophylactic antiemetics administered. Anesthetic plan and risks discussed with patient. Plan discussed with CRNA.     Attending anesthesiologist reviewed and agrees with Nubia Lima MD   8/13/2018

## 2018-08-13 NOTE — OP NOTE
Orthopaedic Surgery Operative Note         Date of Surgery: 8/13/2018    Admit Date: 8/13/2018    Location: [place] [address]    Performing Service: Orthopaedic Surgery    Patient Name:  Lawrence Sanchez    Preoperative Diagnosis:  Left hip osteoarthritis, possible Charcot arthropathy    Postoperative Diagnosis:  Same    Operative Procedure:  Left total hip arthroplasty    Surgeon:  Juan Campbell MD    Assistant:  Karson Delgado PA-C    Anesthesia: General    Estimated Blood Loss: 550 mL    Specimens:   ID Type Source Tests Collected by Time Destination   A :  Tissue Joint, Hip SURGICAL PATHOLOGY Fei Santiago MD 8/13/2018 1451    B :  Tissue Joint, Hip SURGICAL PATHOLOGY Fei Santiago MD 8/13/2018 1501    C :  Tissue Joint, Hip SURGICAL PATHOLOGY Fei Santiago MD 8/13/2018 1633         Drains: None    Implants:   Implant Name Type Inv.  Item Serial No.  Lot No. LRB No. Used Action   [id number] - [id number]   [id number]  [id number] Left 1 Implanted   [id number] - [id number]   [id number]  [id number] Left 1 Implanted   IMPL CEMENT SIMPLEX HV Shoulder/Arm/Wrist/Hand IMPL CEMENT SIMPLEX HV  KENNEDY: ORTHOPAEDICS [id number] Left 1 Implanted   IMPL HIP ACET AUG TRABECULAR MTL [id number] - [id number] Hip IMPL HIP ACET AUG TRABECULAR MTL [id number] [employer] [id number] Left 1 Implanted   SCREW 40MM 6.5MM SELF   TAP HGP II HIP - [id number] Screw/Plate/Nail/Eric SCREW 40MM 6.5MM SELF   TAP HGP II HIP [id number] [employer] [id number] Left 1 Implanted   [id number] - [id number]   [id number]  [id number] Left 1 Implanted   IMPL HIP HGP II ACETABULAR CUP BONE SCREW SELF TAP 6.5X30MM - [id number] Hip IMPL HIP HGP II ACETABULAR CUP BONE SCREW SELF TAP 6.5X30MM [id number] [employer] [id number] Left 1 Implanted   [id number] - [id number]   [id number] [employer] [id number] Left 1 Implanted   [id number] - [id number]   [id number] [employer] [id number] Left 1 Implanted   [id number] - [id number]   [id number] [employer] [id number] Left 2 Implanted patient was identified in the preoperative holding area where the surgical site was marked with indelible ink. All preoperative questions were addressed and answered to the patient's satisfaction. The patient was then transferred to the operating room and positioned appropriately on the operative bed. General anesthetic was administered by the anesthesiologist. Preoperative antibiotics were given for prophylaxis. Tranexamic acid was provided preoperatively. The patient was placed into the right lateral decubitus position and bony prominences were well-padded. An axillary roll was placed. The pelvis was secured. The left lower extremity was prepped and draped in a standard sterile fashion. Preoperative timeout was called and correct patient, correct procedure, correct operative site, and correct surgeon were confirmed by all present. Posterior approach to the hip was utilized. Dissected sharply through skin and Bovie electrocautery was used for hemostasis. The fascia was encountered and split in line with the incision. Charnley retractor was placed. External rotators were identified and dissected off the femur and a posterior capsulotomy was performed. A tag suture was placed. The hip was dislocated. There was significant erosion of the femoral head with significant flattening and marginal osteophyte formation. Femoral neck cut was performed using the cutting guide. There were multiple cysts located within the femoral neck extending into the intertrochanteric region. Version of the femur did appear to be normal.  The femoral head was sent as a pathology specimen in addition to surrounding capsular tissue. The acetabulum was then exposed and the anterior and posterior inferior acetabular retractors were placed. The labrum and foveal tissue were removed. A large portion of the posterior superior rim was a large separate fragment which was debrided.   Due to the significant wear, there was no superior dome

## 2018-08-14 ENCOUNTER — HOSPITAL ENCOUNTER (INPATIENT)
Age: 58
LOS: 14 days | Discharge: HOME HEALTH | DRG: 560 | End: 2018-08-28
Attending: PHYSICAL MEDICINE & REHABILITATION | Admitting: PHYSICAL MEDICINE & REHABILITATION
Payer: MEDICARE

## 2018-08-14 VITALS
HEIGHT: 61 IN | SYSTOLIC BLOOD PRESSURE: 113 MMHG | RESPIRATION RATE: 18 BRPM | BODY MASS INDEX: 23.03 KG/M2 | DIASTOLIC BLOOD PRESSURE: 45 MMHG | HEART RATE: 79 BPM | WEIGHT: 122 LBS | OXYGEN SATURATION: 100 % | TEMPERATURE: 98.2 F

## 2018-08-14 DIAGNOSIS — G89.4 CHRONIC PAIN SYNDROME: ICD-10-CM

## 2018-08-14 DIAGNOSIS — M48.061 SPINAL STENOSIS, LUMBAR REGION, WITHOUT NEUROGENIC CLAUDICATION: ICD-10-CM

## 2018-08-14 DIAGNOSIS — M47.812 CERVICAL SPONDYLOSIS WITHOUT MYELOPATHY: ICD-10-CM

## 2018-08-14 PROBLEM — R26.9 ABNORMALITY OF GAIT AND MOBILITY: Status: ACTIVE | Noted: 2018-08-14

## 2018-08-14 PROBLEM — G56.93 NEUROPATHY OF BOTH UPPER EXTREMITIES: Status: ACTIVE | Noted: 2018-08-14

## 2018-08-14 PROBLEM — J44.9 COPD (CHRONIC OBSTRUCTIVE PULMONARY DISEASE) (HCC): Status: ACTIVE | Noted: 2018-08-14

## 2018-08-14 PROBLEM — Z90.710 H/O: HYSTERECTOMY: Status: ACTIVE | Noted: 2018-08-14

## 2018-08-14 PROBLEM — G56.03 CARPAL TUNNEL SYNDROME, BILATERAL: Status: ACTIVE | Noted: 2018-08-14

## 2018-08-14 PROBLEM — Z87.891 FORMER SMOKER: Status: ACTIVE | Noted: 2017-09-08

## 2018-08-14 PROBLEM — E78.5 HYPERLIPIDEMIA: Status: ACTIVE | Noted: 2018-08-14

## 2018-08-14 PROBLEM — Q28.8 AVM (ARTERIOVENOUS MALFORMATION) SPINE: Status: ACTIVE | Noted: 2018-08-14

## 2018-08-14 PROBLEM — F34.1 DYSTHYMIA: Status: ACTIVE | Noted: 2018-08-14

## 2018-08-14 PROBLEM — M19.90 OA (OSTEOARTHRITIS): Status: ACTIVE | Noted: 2018-08-14

## 2018-08-14 PROBLEM — E66.9 OBESITY: Status: ACTIVE | Noted: 2018-08-14

## 2018-08-14 PROBLEM — M54.12 CERVICAL MYELOPATHY WITH CERVICAL RADICULOPATHY (HCC): Status: ACTIVE | Noted: 2018-08-14

## 2018-08-14 PROBLEM — Z98.1 HISTORY OF FUSION OF CERVICAL SPINE: Status: ACTIVE | Noted: 2018-08-14

## 2018-08-14 PROBLEM — D12.6 HIGH GRADE DYSPLASIA IN COLONIC ADENOMA: Status: ACTIVE | Noted: 2017-12-13

## 2018-08-14 PROBLEM — G95.9 CERVICAL MYELOPATHY WITH CERVICAL RADICULOPATHY (HCC): Status: ACTIVE | Noted: 2018-08-14

## 2018-08-14 PROBLEM — N32.81 OAB (OVERACTIVE BLADDER): Status: ACTIVE | Noted: 2018-08-14

## 2018-08-14 PROBLEM — I10 HYPERTENSION: Status: ACTIVE | Noted: 2018-08-14

## 2018-08-14 PROBLEM — M21.371 FOOT DROP, RIGHT: Status: ACTIVE | Noted: 2018-08-14

## 2018-08-14 LAB
ANION GAP SERPL CALCULATED.3IONS-SCNC: 11 MEQ/L (ref 7–13)
BILIRUBIN URINE: NEGATIVE
BLOOD, URINE: NEGATIVE
BUN BLDV-MCNC: 10 MG/DL (ref 8–23)
CALCIUM SERPL-MCNC: 8.2 MG/DL (ref 8.6–10.2)
CHLORIDE BLD-SCNC: 108 MEQ/L (ref 98–107)
CLARITY: CLEAR
CO2: 24 MEQ/L (ref 22–29)
COLOR: YELLOW
CREAT SERPL-MCNC: 0.58 MG/DL (ref 0.5–0.9)
GFR AFRICAN AMERICAN: >60
GFR NON-AFRICAN AMERICAN: >60
GLUCOSE BLD-MCNC: 152 MG/DL (ref 74–109)
GLUCOSE URINE: NEGATIVE MG/DL
HCT VFR BLD CALC: 33.3 % (ref 37–47)
HEMOGLOBIN: 10.9 G/DL (ref 12–16)
KETONES, URINE: NEGATIVE MG/DL
LEUKOCYTE ESTERASE, URINE: NEGATIVE
MCH RBC QN AUTO: 32.7 PG (ref 27–31.3)
MCHC RBC AUTO-ENTMCNC: 32.7 % (ref 33–37)
MCV RBC AUTO: 100 FL (ref 82–100)
NITRITE, URINE: NEGATIVE
PDW BLD-RTO: 14.4 % (ref 11.5–14.5)
PH UA: 6 (ref 5–9)
PLATELET # BLD: 180 K/UL (ref 130–400)
POTASSIUM REFLEX MAGNESIUM: 4.4 MEQ/L (ref 3.5–5.1)
PROTEIN UA: NEGATIVE MG/DL
RBC # BLD: 3.33 M/UL (ref 4.2–5.4)
SODIUM BLD-SCNC: 143 MEQ/L (ref 132–144)
SPECIFIC GRAVITY UA: 1 (ref 1–1.03)
UROBILINOGEN, URINE: 0.2 E.U./DL
WBC # BLD: 11.2 K/UL (ref 4.8–10.8)

## 2018-08-14 PROCEDURE — 94761 N-INVAS EAR/PLS OXIMETRY MLT: CPT

## 2018-08-14 PROCEDURE — 6360000002 HC RX W HCPCS: Performed by: ORTHOPAEDIC SURGERY

## 2018-08-14 PROCEDURE — 94664 DEMO&/EVAL PT USE INHALER: CPT

## 2018-08-14 PROCEDURE — G8988 SELF CARE GOAL STATUS: HCPCS

## 2018-08-14 PROCEDURE — 85027 COMPLETE CBC AUTOMATED: CPT

## 2018-08-14 PROCEDURE — 2700000000 HC OXYGEN THERAPY PER DAY

## 2018-08-14 PROCEDURE — 97535 SELF CARE MNGMENT TRAINING: CPT

## 2018-08-14 PROCEDURE — G8978 MOBILITY CURRENT STATUS: HCPCS

## 2018-08-14 PROCEDURE — 97167 OT EVAL HIGH COMPLEX 60 MIN: CPT

## 2018-08-14 PROCEDURE — 2580000003 HC RX 258: Performed by: INTERNAL MEDICINE

## 2018-08-14 PROCEDURE — 80048 BASIC METABOLIC PNL TOTAL CA: CPT

## 2018-08-14 PROCEDURE — 87086 URINE CULTURE/COLONY COUNT: CPT

## 2018-08-14 PROCEDURE — 6370000000 HC RX 637 (ALT 250 FOR IP): Performed by: NURSE PRACTITIONER

## 2018-08-14 PROCEDURE — 97110 THERAPEUTIC EXERCISES: CPT

## 2018-08-14 PROCEDURE — G8979 MOBILITY GOAL STATUS: HCPCS

## 2018-08-14 PROCEDURE — 97162 PT EVAL MOD COMPLEX 30 MIN: CPT

## 2018-08-14 PROCEDURE — 6370000000 HC RX 637 (ALT 250 FOR IP): Performed by: ORTHOPAEDIC SURGERY

## 2018-08-14 PROCEDURE — 36415 COLL VENOUS BLD VENIPUNCTURE: CPT

## 2018-08-14 PROCEDURE — 2580000003 HC RX 258: Performed by: PHYSICIAN ASSISTANT

## 2018-08-14 PROCEDURE — 81003 URINALYSIS AUTO W/O SCOPE: CPT

## 2018-08-14 PROCEDURE — 94640 AIRWAY INHALATION TREATMENT: CPT

## 2018-08-14 PROCEDURE — 1180000000 HC REHAB R&B

## 2018-08-14 PROCEDURE — G8987 SELF CARE CURRENT STATUS: HCPCS

## 2018-08-14 RX ORDER — TRAMADOL HYDROCHLORIDE 50 MG/1
50 TABLET ORAL EVERY 6 HOURS SCHEDULED
Status: DISCONTINUED | OUTPATIENT
Start: 2018-08-15 | End: 2018-08-28 | Stop reason: HOSPADM

## 2018-08-14 RX ORDER — ALBUTEROL SULFATE 2.5 MG/3ML
2.5 SOLUTION RESPIRATORY (INHALATION)
Status: DISCONTINUED | OUTPATIENT
Start: 2018-08-14 | End: 2018-08-14 | Stop reason: HOSPADM

## 2018-08-14 RX ORDER — SIMVASTATIN 10 MG
10 TABLET ORAL NIGHTLY
Status: CANCELLED | OUTPATIENT
Start: 2018-08-14

## 2018-08-14 RX ORDER — FLUTICASONE PROPIONATE 50 MCG
1 SPRAY, SUSPENSION (ML) NASAL DAILY
Status: DISCONTINUED | OUTPATIENT
Start: 2018-08-15 | End: 2018-08-28 | Stop reason: HOSPADM

## 2018-08-14 RX ORDER — OXYBUTYNIN CHLORIDE 5 MG/1
10 TABLET, EXTENDED RELEASE ORAL DAILY
Status: DISCONTINUED | OUTPATIENT
Start: 2018-08-15 | End: 2018-08-28 | Stop reason: HOSPADM

## 2018-08-14 RX ORDER — ASPIRIN 81 MG/1
81 TABLET ORAL 2 TIMES DAILY
Status: DISCONTINUED | OUTPATIENT
Start: 2018-08-14 | End: 2018-08-28 | Stop reason: HOSPADM

## 2018-08-14 RX ORDER — SENNA AND DOCUSATE SODIUM 50; 8.6 MG/1; MG/1
1 TABLET, FILM COATED ORAL DAILY
Status: CANCELLED | OUTPATIENT
Start: 2018-08-15

## 2018-08-14 RX ORDER — ONDANSETRON 2 MG/ML
4 INJECTION INTRAMUSCULAR; INTRAVENOUS EVERY 6 HOURS PRN
Status: CANCELLED | OUTPATIENT
Start: 2018-08-14

## 2018-08-14 RX ORDER — ACETAMINOPHEN 325 MG/1
650 TABLET ORAL EVERY 6 HOURS
Status: CANCELLED | OUTPATIENT
Start: 2018-08-14

## 2018-08-14 RX ORDER — ALBUTEROL SULFATE 2.5 MG/3ML
2.5 SOLUTION RESPIRATORY (INHALATION)
Status: CANCELLED | OUTPATIENT
Start: 2018-08-14

## 2018-08-14 RX ORDER — BACLOFEN 10 MG/1
10 TABLET ORAL 3 TIMES DAILY
Status: CANCELLED | OUTPATIENT
Start: 2018-08-14

## 2018-08-14 RX ORDER — M-VIT,TX,IRON,MINS/CALC/FOLIC 27MG-0.4MG
1 TABLET ORAL DAILY
Status: DISCONTINUED | OUTPATIENT
Start: 2018-08-15 | End: 2018-08-28 | Stop reason: HOSPADM

## 2018-08-14 RX ORDER — ALBUTEROL SULFATE 2.5 MG/3ML
2.5 SOLUTION RESPIRATORY (INHALATION) EVERY 4 HOURS PRN
Status: DISCONTINUED | OUTPATIENT
Start: 2018-08-14 | End: 2018-08-28 | Stop reason: HOSPADM

## 2018-08-14 RX ORDER — DULOXETIN HYDROCHLORIDE 60 MG/1
60 CAPSULE, DELAYED RELEASE ORAL DAILY
Status: DISCONTINUED | OUTPATIENT
Start: 2018-08-15 | End: 2018-08-28 | Stop reason: HOSPADM

## 2018-08-14 RX ORDER — FLUTICASONE PROPIONATE 50 MCG
1 SPRAY, SUSPENSION (ML) NASAL DAILY
Status: CANCELLED | OUTPATIENT
Start: 2018-08-15

## 2018-08-14 RX ORDER — ASPIRIN 81 MG/1
81 TABLET ORAL 2 TIMES DAILY
Status: CANCELLED | OUTPATIENT
Start: 2018-08-14

## 2018-08-14 RX ORDER — OXYCODONE HYDROCHLORIDE 5 MG/1
5 TABLET ORAL EVERY 4 HOURS PRN
Status: CANCELLED | OUTPATIENT
Start: 2018-08-14

## 2018-08-14 RX ORDER — DULOXETIN HYDROCHLORIDE 60 MG/1
60 CAPSULE, DELAYED RELEASE ORAL DAILY
Status: CANCELLED | OUTPATIENT
Start: 2018-08-15

## 2018-08-14 RX ORDER — M-VIT,TX,IRON,MINS/CALC/FOLIC 27MG-0.4MG
1 TABLET ORAL DAILY
Status: CANCELLED | OUTPATIENT
Start: 2018-08-15

## 2018-08-14 RX ORDER — ALBUTEROL SULFATE 2.5 MG/3ML
2.5 SOLUTION RESPIRATORY (INHALATION) 3 TIMES DAILY
Status: CANCELLED | OUTPATIENT
Start: 2018-08-14

## 2018-08-14 RX ORDER — OXYCODONE HYDROCHLORIDE 5 MG/1
5 TABLET ORAL EVERY 4 HOURS PRN
Status: DISCONTINUED | OUTPATIENT
Start: 2018-08-14 | End: 2018-08-28 | Stop reason: HOSPADM

## 2018-08-14 RX ORDER — 0.9 % SODIUM CHLORIDE 0.9 %
500 INTRAVENOUS SOLUTION INTRAVENOUS ONCE
Status: COMPLETED | OUTPATIENT
Start: 2018-08-14 | End: 2018-08-14

## 2018-08-14 RX ORDER — ONDANSETRON 2 MG/ML
4 INJECTION INTRAMUSCULAR; INTRAVENOUS EVERY 6 HOURS PRN
Status: DISCONTINUED | OUTPATIENT
Start: 2018-08-14 | End: 2018-08-28 | Stop reason: HOSPADM

## 2018-08-14 RX ORDER — ALBUTEROL SULFATE 2.5 MG/3ML
2.5 SOLUTION RESPIRATORY (INHALATION) 3 TIMES DAILY
Status: DISCONTINUED | OUTPATIENT
Start: 2018-08-14 | End: 2018-08-14

## 2018-08-14 RX ORDER — TRAMADOL HYDROCHLORIDE 50 MG/1
50 TABLET ORAL EVERY 6 HOURS SCHEDULED
Status: CANCELLED | OUTPATIENT
Start: 2018-08-14

## 2018-08-14 RX ORDER — DOCUSATE SODIUM 100 MG/1
200 CAPSULE, LIQUID FILLED ORAL 2 TIMES DAILY
Status: CANCELLED | OUTPATIENT
Start: 2018-08-14

## 2018-08-14 RX ORDER — DOCUSATE SODIUM 100 MG/1
200 CAPSULE, LIQUID FILLED ORAL 2 TIMES DAILY
Status: DISCONTINUED | OUTPATIENT
Start: 2018-08-14 | End: 2018-08-28 | Stop reason: HOSPADM

## 2018-08-14 RX ORDER — ALBUTEROL SULFATE 2.5 MG/3ML
2.5 SOLUTION RESPIRATORY (INHALATION)
Status: DISCONTINUED | OUTPATIENT
Start: 2018-08-14 | End: 2018-08-14

## 2018-08-14 RX ORDER — SIMVASTATIN 5 MG
10 TABLET ORAL NIGHTLY
Status: DISCONTINUED | OUTPATIENT
Start: 2018-08-14 | End: 2018-08-28 | Stop reason: HOSPADM

## 2018-08-14 RX ORDER — SENNA AND DOCUSATE SODIUM 50; 8.6 MG/1; MG/1
1 TABLET, FILM COATED ORAL DAILY
Status: DISCONTINUED | OUTPATIENT
Start: 2018-08-15 | End: 2018-08-28 | Stop reason: HOSPADM

## 2018-08-14 RX ORDER — BACLOFEN 10 MG/1
10 TABLET ORAL 3 TIMES DAILY
Status: DISCONTINUED | OUTPATIENT
Start: 2018-08-14 | End: 2018-08-28 | Stop reason: HOSPADM

## 2018-08-14 RX ORDER — ALBUTEROL SULFATE 2.5 MG/3ML
2.5 SOLUTION RESPIRATORY (INHALATION) 3 TIMES DAILY
Status: DISCONTINUED | OUTPATIENT
Start: 2018-08-14 | End: 2018-08-14 | Stop reason: HOSPADM

## 2018-08-14 RX ORDER — OXYBUTYNIN CHLORIDE 5 MG/1
10 TABLET, EXTENDED RELEASE ORAL DAILY
Status: CANCELLED | OUTPATIENT
Start: 2018-08-15

## 2018-08-14 RX ORDER — ACETAMINOPHEN 325 MG/1
650 TABLET ORAL EVERY 6 HOURS
Status: DISCONTINUED | OUTPATIENT
Start: 2018-08-14 | End: 2018-08-28 | Stop reason: HOSPADM

## 2018-08-14 RX ADMIN — ALBUTEROL SULFATE 2.5 MG: 2.5 SOLUTION RESPIRATORY (INHALATION) at 05:32

## 2018-08-14 RX ADMIN — SODIUM CHLORIDE: 9 INJECTION, SOLUTION INTRAVENOUS at 12:53

## 2018-08-14 RX ADMIN — Medication 400 MG: at 22:03

## 2018-08-14 RX ADMIN — ASPIRIN 81 MG: 81 TABLET, COATED ORAL at 22:03

## 2018-08-14 RX ADMIN — DOCUSATE SODIUM 200 MG: 100 CAPSULE, LIQUID FILLED ORAL at 22:03

## 2018-08-14 RX ADMIN — OXYBUTYNIN CHLORIDE 10 MG: 5 TABLET, FILM COATED, EXTENDED RELEASE ORAL at 09:00

## 2018-08-14 RX ADMIN — ACETAMINOPHEN 650 MG: 325 TABLET ORAL at 09:00

## 2018-08-14 RX ADMIN — TRAMADOL HYDROCHLORIDE 50 MG: 50 TABLET, FILM COATED ORAL at 06:39

## 2018-08-14 RX ADMIN — KETOROLAC TROMETHAMINE 30 MG: 30 INJECTION, SOLUTION INTRAMUSCULAR at 04:12

## 2018-08-14 RX ADMIN — DOCUSATE SODIUM 200 MG: 100 CAPSULE, LIQUID FILLED ORAL at 09:00

## 2018-08-14 RX ADMIN — MULTIPLE VITAMINS W/ MINERALS TAB 1 TABLET: TAB at 09:00

## 2018-08-14 RX ADMIN — ALBUTEROL SULFATE 2.5 MG: 2.5 SOLUTION RESPIRATORY (INHALATION) at 13:38

## 2018-08-14 RX ADMIN — TRAMADOL HYDROCHLORIDE 50 MG: 50 TABLET, FILM COATED ORAL at 12:51

## 2018-08-14 RX ADMIN — TRAMADOL HYDROCHLORIDE 50 MG: 50 TABLET, FILM COATED ORAL at 18:08

## 2018-08-14 RX ADMIN — ASPIRIN 81 MG: 81 TABLET, COATED ORAL at 09:00

## 2018-08-14 RX ADMIN — CEFAZOLIN SODIUM 2 G: 1 INJECTION, SOLUTION INTRAVENOUS at 06:39

## 2018-08-14 RX ADMIN — SODIUM CHLORIDE: 9 INJECTION, SOLUTION INTRAVENOUS at 03:04

## 2018-08-14 RX ADMIN — OXYCODONE HYDROCHLORIDE 5 MG: 5 TABLET ORAL at 22:05

## 2018-08-14 RX ADMIN — Medication 400 MG: at 09:00

## 2018-08-14 RX ADMIN — OXYCODONE HYDROCHLORIDE 5 MG: 5 TABLET ORAL at 09:53

## 2018-08-14 RX ADMIN — TRAMADOL HYDROCHLORIDE 50 MG: 50 TABLET, FILM COATED ORAL at 00:35

## 2018-08-14 RX ADMIN — SODIUM CHLORIDE 500 ML: 9 INJECTION, SOLUTION INTRAVENOUS at 02:07

## 2018-08-14 RX ADMIN — ACETAMINOPHEN 650 MG: 325 TABLET ORAL at 22:04

## 2018-08-14 RX ADMIN — SIMVASTATIN 10 MG: 5 TABLET, FILM COATED ORAL at 22:04

## 2018-08-14 RX ADMIN — OXYCODONE HYDROCHLORIDE 5 MG: 5 TABLET ORAL at 15:13

## 2018-08-14 RX ADMIN — ACETAMINOPHEN 650 MG: 325 TABLET ORAL at 04:12

## 2018-08-14 RX ADMIN — BACLOFEN 10 MG: 10 TABLET ORAL at 15:13

## 2018-08-14 RX ADMIN — DULOXETINE HYDROCHLORIDE 60 MG: 60 CAPSULE, DELAYED RELEASE ORAL at 09:00

## 2018-08-14 RX ADMIN — BACLOFEN 10 MG: 10 TABLET ORAL at 22:03

## 2018-08-14 RX ADMIN — Medication 2 PUFF: at 05:32

## 2018-08-14 RX ADMIN — BACLOFEN 10 MG: 10 TABLET ORAL at 09:00

## 2018-08-14 RX ADMIN — ACETAMINOPHEN 650 MG: 325 TABLET ORAL at 15:16

## 2018-08-14 ASSESSMENT — PAIN SCALES - GENERAL
PAINLEVEL_OUTOF10: 3
PAINLEVEL_OUTOF10: 3
PAINLEVEL_OUTOF10: 6
PAINLEVEL_OUTOF10: 3
PAINLEVEL_OUTOF10: 7
PAINLEVEL_OUTOF10: 3
PAINLEVEL_OUTOF10: 4
PAINLEVEL_OUTOF10: 4
PAINLEVEL_OUTOF10: 6
PAINLEVEL_OUTOF10: 4
PAINLEVEL_OUTOF10: 4
PAINLEVEL_OUTOF10: 3
PAINLEVEL_OUTOF10: 2
PAINLEVEL_OUTOF10: 3

## 2018-08-14 ASSESSMENT — PAIN DESCRIPTION - PAIN TYPE
TYPE: SURGICAL PAIN
TYPE: SURGICAL PAIN

## 2018-08-14 ASSESSMENT — PAIN DESCRIPTION - ORIENTATION
ORIENTATION: LEFT
ORIENTATION: LEFT

## 2018-08-14 ASSESSMENT — PAIN DESCRIPTION - FREQUENCY: FREQUENCY: CONTINUOUS

## 2018-08-14 ASSESSMENT — PAIN DESCRIPTION - LOCATION
LOCATION: HIP
LOCATION: HIP

## 2018-08-14 NOTE — CARE COORDINATION
8/14/18 HELEN LOPRESTI CALLED STATING PT REHAB ROOM CHANGED  AND MARKIE SAID SHE IS CALLING THE UNIT TO TELL THEM.

## 2018-08-14 NOTE — DISCHARGE INSTR - DIET

## 2018-08-14 NOTE — PROGRESS NOTES
[facility] Respiratory Therapy Evaluation   Current Order:  ALBUTEROL 2 PUFFS Q6 PRN,ALBUTEROL NEB Q6 PRN      Home Regimen: PRN      Ordering Physician: DOLORES  Re-evaluation Date:  8/17     Diagnosis: LEFT TOTAL HIP ARTHROPLASTY     Patient Status: Stable / Unstable + Physician notified    The following MDI Criteria must be met in order to convert aerosol to MDI with spacer.  If unable to meet, MDI will be converted to aerosol:  []  Patient able to demonstrate the ability to use MDI effectively  []  Patient alert and cooperative  []  Patient able to take deep breath with 5-10 second hold  []  Medication(s) available in this delivery method   []  Peak flow greater than or equal to 200 ml/min            Current Order Substituted To  (same drug, same frequency)   Aerosol to MDI [] Albuterol Sulfate 0.083% unit dose by aerosol Albuterol Sulfate MDI 2 puffs by inhalation with spacer    [] Levalbuterol 1.25 mg unit dose by aerosol Levalbuterol MDI 2 puffs by inhalation with spacer    [] Levalbuterol 0.63 mg unit dose by aerosol Levalbuterol MDI 2 puffs by inhalation with spacer    [] Ipratropium Bromide 0.02% unit dose by aerosol Ipratropium Bromide MDI 2 puffs by inhalation with spacer    [] Duoneb (Ipratropium + Albuterol) unit dose by aerosol Ipratropium MDI + Albuterol MDI 2 puffs by inhalation w/spacer   MDI to Aerosol [x] Albuterol Sulfate MDI Albuterol Sulfate 0.083% unit dose by aerosol    [] Levalbuterol MDI 2 puffs by inhalation Levalbuterol 1.25 mg unit dose by aerosol    [] Ipratropium Bromide MDI by inhalation Ipratropium Bromide 0.02% unit dose by aerosol    [] Combivent (Ipratropium + Albuterol) MDI by inhalation Duoneb (Ipratropium + Albuterol) unit dose by aerosol   Treatment Assessment [Frequency/Schedule]:  Change frequency to: ____ALBUTEROL  TID AND Q2 PRN______________________________________________per Protocol, P&T, [facility]      Points 0 1 2 3 4   Pulmonary Status  Non-Smoker  []   Smoking history   <

## 2018-08-14 NOTE — PROGRESS NOTES
site with as few architectural barriers as possible. Treatment Plan will consist of:   [x] ADL Training   [x] Strengthening   [x] Endurance   [x] Transfer Training   [x]  DME ed      [x] HEP  [] Manual Therapy   [] AROM/PROM    [] Coordination   [] Cognitive Training   [x]Safety training   [] Other :    Patient Goal: Rehab then home  Discussed and agreed upon: [x] Yes   [] No Comment:     Assessment/Discharge Disposition:     Performance deficits / Impairments: Decreased functional mobility , Decreased ADL status, Decreased safe awareness, Decreased strength, Decreased endurance, Decreased high-level IADLs, Decreased balance, Decreased coordination  Prognosis: Good  Discharge Recommendations: Continue to assess pending progress  History: multi comorb  Exam: 8 deficits  Assistance / Modification: Max A      Barriers to Improvement:  25% WB      Discharge Recommendations:  Therapy (R)    Six Click Score  How much help for putting on and taking off regular lower body clothing?: Total  How much help for Bathing?: A Lot  How much help for Toileting?: A Little  How much help for putting on and taking off regular upper body clothing?: A Little  How much help for taking care of personal grooming?: A Little  How much help for eating meals?: None  AM-[facility] Inpatient Daily Activity Raw Score: 16  AM-PAC Inpatient ADL T-Scale Score : 35.96  ADL Inpatient CMS 0-100% Score: 53.32    Recommended DME:  [x] W/W   [] Martin Segovia   [] Rollator   [] W/C   [x] Santos Carmona  [] Shower Chair   [x]Dressing AD []  BSC  [] Other:    Plan:Times per week: 1-3x,      G-Codes:  OT G-codes  Functional Limitation: Self care  Self Care Current Status (): At least 60 percent but less than 80 percent impaired, limited or restricted  Self Care Goal Status ():  At least 1 percent but less than 20 percent impaired, limited or restricted    Time in:  9:30  Time out:  10:10  Total minutes:  40  Timed treatment minutes:  10  Tx :Pt instructed in AD needs and use for safety in bathroom transfers and LE ADLs secondary to [facility].   Pt reported fait understanding of AD needs and will bemefit from further OT training    Electronically signed by:    RICH Arango/L  8/14/2018, 10:10 AM

## 2018-08-14 NOTE — PROGRESS NOTES
Patient arrived to  via bed. Patient lethargic hard to keep awake. Bp low [place] Mcfar[place] stated to change NS to 100/hr and monitor patient. Family at patients bedside. Patient hooked up to Q15 min vitals will continue to monitor patient.

## 2018-08-14 NOTE — PROGRESS NOTES
Department of Internal Medicine  Progress Note      SUBJECTIVE: No complains  No acute events overnight.        ROS:  All 12 systems reviewed and negative except mentioned in HPI and Assessment and plan    MEDICATIONS:  Current Facility-Administered Medications   Medication Dose Route Frequency Provider Last Rate Last Dose    albuterol (PROVENTIL) nebulizer solution 2.5 mg  2.5 mg Nebulization Q2H PRN Adela Hoffmann MD        albuterol (PROVENTIL) nebulizer solution 2.5 mg  2.5 mg Nebulization TID Adela Hoffmann MD   2.5 mg at 08/14/18 1338    DULoxetine (CYMBALTA) extended release capsule 60 mg  60 mg Oral Daily Derald Day, MD   60 mg at 08/14/18 0900    mometasone-formoterol (DULERA) 200-5 MCG/ACT inhaler 2 puff  2 puff Inhalation BID Derald Day, MD   2 puff at 08/14/18 0532    baclofen (LIORESAL) tablet 10 mg  10 mg Oral TID Derald Day, MD   10 mg at 08/14/18 0900    therapeutic multivitamin-minerals 1 tablet  1 tablet Oral Daily Derald Day, MD   1 tablet at 08/14/18 0900    magnesium oxide (MAG-OX) tablet 400 mg  400 mg Oral BID Derald Day, MD   400 mg at 08/14/18 0900    docusate sodium (COLACE) capsule 200 mg  200 mg Oral BID Derald Day, MD   200 mg at 08/14/18 0900    oxybutynin (DITROPAN-XL) extended release tablet 10 mg  10 mg Oral Daily Derald Day, MD   10 mg at 08/14/18 0900    simvastatin (ZOCOR) tablet 10 mg  10 mg Oral Nightly Adela Hoffmann MD   10 mg at 08/13/18 2203    fluticasone (FLONASE) 50 MCG/ACT nasal spray 1 spray  1 spray Nasal Daily Adela Hoffmann MD   Stopped at 08/13/18 2202    0.9 % sodium chloride infusion   Intravenous Continuous SCOTT Bustillos 100 mL/hr at 08/14/18 1253      sodium chloride flush 0.9 % injection 10 mL  10 mL Intravenous 2 times per day Adela Hoffmann MD   Stopped at 08/14/18 0901    sodium chloride flush 0.9 % injection 10 mL  10 mL Intravenous PRN dAela Hoffmann MD        acetaminophen (TYLENOL) tablet 650 mg cervical spine [Z98.1] 08/14/2018    H/O: hysterectomy [Z90.710] 08/14/2018    BMI 23.0-23.9, adult [facility] 08/14/2018    Abnormality of gait and mobility do to Left Hip Osteoarthritis S/P Left Total Hip Arthroplasty. [facility] admit 08/14/18.  [R26.9] 08/14/2018    Osteoarthritis of left hip [M16.12] 08/13/2018    Charcot's arthropathy: left hip [M14.60] 08/08/2018    Chronic pain syndrome [G89.4] 09/30/2015    Spinal stenosis, lumbar region, without neurogenic claudication [M48.061] 09/30/2015     S/p left ROSMERY per Dr. Arturo Guerrero on 8/13/18  COPD without exacerbation: duo nebs prn, o2 therapy prn  HLD    DVT prophylaxis and pain management as per primary    Shena Moura MD  Pager : [phone]

## 2018-08-14 NOTE — PROGRESS NOTES
Physical Therapy Med Surg Daily Treatment Note  Fac[facility]/Department: [facility]  Room: N223/N223-       NAME: Savanna Cui  : 1960 (59 y.o.)  MRN: [id number]  CODE STATUS: Full Code    Date of Service: 2018    Patient Diagnosis(es): Osteoarthritis of left hip, unspecified osteoarthritis type [M16.12]   No chief complaint on file. Patient Active Problem List    Diagnosis Date Noted    COPD (chronic obstructive pulmonary disease) ([facility] [address] 2018    Hyperlipidemia 2018    OA (osteoarthritis) 2018    History of fusion of cervical spine 2018    H/O: hysterectomy 2018    BMI 23.0-23.9, adult 2018    Abnormality of gait and mobility do to Left Hip Osteoarthritis S/P Left Total Hip Arthroplasty.   [facility] admit 18. 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 2017    Former smoker 2017    Cervical spondylosis without myelopathy 2015    Osteoarthrosis involving lower leg 2015    Spinal stenosis, lumbar region, without neurogenic claudication 2015    Chronic pain syndrome 2015    Open wound of right elbow 2015    Constipation, chronic 2014        Past Medical History:   Diagnosis Date    Arthritis     left hip    COPD (chronic obstructive pulmonary disease) (HCC)     uses inhalers x 4 yrs    Hyperlipidemia     on meds x 10yrs    Hypertension 2018     Past Surgical History:   Procedure Laterality Date    BREAST BIOPSY Right     benign    CERVICAL FUSION  2016    CERVICAL SPINE SURGERY  2004    AVM removal, fusion, embolism removal      SECTION  1979    COLONOSCOPY  2018    HYSTERECTOMY            Restrictions:  Restrictions/Precautions: Fall Risk  Position Activity Restriction  Hip Precautions: Posterior hip precautions  Other position/activity restrictions: Partial Weight Bearing L LE    SUBJECTIVE:  Subjective  Subjective: I've been waiting for you all day. I just got back into bed. General Comment  Comments: POD #1 L ROSMERY; Post Hip Precautions; 25% PWB    Pre Pain Assessment:  Pre Treatment Pain Screening  Pain at present: 3  Intervention List: Patient able to continue with treatment          Post Pain Assessment:   Pain Assessment  Pain Level: 3  Pain Type: Surgical pain  Pain Location: Hip  Pain Orientation: Left       OBJECTIVE:         Bed mobility  Rolling to Right: Contact guard assistance  Supine to Sit: Contact guard assistance  Sit to Supine: Minimal assistance  Comment: vc's for step by step sequencing; vc's for hip precaution adherence prior to mobility    Transfers  Sit to Stand: Contact guard assistance  Stand to sit: Contact guard assistance  Comment: vc's for technique and hand placement; demo for improved technique and hip precautions; STS x 4; extensive cueing for PWB status     Ambulation 1  Comments: NT - d/t increased fatigue this session          Exercises  Quad Sets: x10  Gluteal Sets: x10  Ankle Pumps: x10  Comments: Written HEP given and performed                     ASSESSMENT:  Body structures, Functions, Activity limitations: Decreased functional mobility ; Decreased balance;Decreased coordination;Decreased strength;Decreased ADL status; Decreased ROM    Assessment: Good participation, 1/3 hip precautions recalled;  Reviewed hip precaution and PWB status; increased fatigue    Activity Tolerance  Activity Tolerance: Patient Tolerated treatment well;Patient limited by fatigue       Discharge

## 2018-08-14 NOTE — PROGRESS NOTES
Physical Therapy Med Surg Initial Assessment  Facility/Department: [facility]  Room: N223/N223-01       NAME: Ary Dumont  : 1960 (59 y.o.)  MRN: [id number]  CODE STATUS: Full Code    Date of Service: 2018    Patient Diagnosis(es): Osteoarthritis of left hip, unspecified osteoarthritis type [M16.12]   No chief complaint on file. Patient Active Problem List    Diagnosis Date Noted    Osteoarthritis of left hip 2018    Charcot's arthropathy: left hip 2018    Cervical spondylosis without myelopathy 2015    Osteoarthrosis involving lower leg 2015    Spinal stenosis, lumbar region, without neurogenic claudication 2015    Chronic pain syndrome 2015        Past Medical History:   Diagnosis Date    Arthritis     left hip    COPD (chronic obstructive pulmonary disease) ([facility] [address]     uses inhalers x 4 yrs    Hyperlipidemia     on meds x 10yrs     Past Surgical History:   Procedure Laterality Date    BREAST BIOPSY Right 2003    benign    CERVICAL FUSION  2016    CERVICAL SPINE SURGERY  2004    AVM removal, fusion, embolism removal      SECTION  1979    COLONOSCOPY  2018    HYSTERECTOMY         Chart Reviewed: Yes  Family / Caregiver Present: No  General Comment  Comments: Pt resting in bed - agreeable to PT evaluation    Restrictions:  Restrictions/Precautions: Fall Risk  Position Activity Restriction  Hip Precautions: Posterior hip precautions  Other position/activity restrictions: Partial Weight Bearing L LE     SUBJECTIVE: Subjective: \"I'm been dozing in and out. \"  Pre Treatment Pain Screening  Pain at present: 4  Scale Used: Numeric Score  Intervention List: Patient able to continue with treatment  Comments / Details: hip    Post Treatment Pain Screening:   Pain Assessment  Pain Assessment:  (6/10 )    Prior Level of Function:  Social/Functional History  Lives With: Significant other  Type of Home: House  Home Layout: One level  Home Access: Stairs to enter with rails  Entrance Stairs - Number of Steps: 6  Home Equipment: Rolling walker, Cane, Wheelchair-manual (BSC, leg )  ADL Assistance: Independent  Ambulation Assistance: Independent (with [employer])  Transfer Assistance: Independent  Additional Comments: Multiple neck surgeries with residual spasticity R UE/LE    OBJECTIVE:   Vision/Hearing:  Vision: Within Functional Limits  Hearing: Within functional limits    Cognition:  Overall Orientation Status: Within Functional Limits  Follows Commands: Within Functional Limits    Observation/Palpation  Observation: no acute distress noted; ABD pillow removed for assessment; incision dressed and intact    ROM:  RLE PROM: WFL  RLE General PROM: within hip precuation range; pain limiting  LLE PROM: WFL    Strength:  Strength RLE  Strength RLE: WFL  Strength LLE  Comment: pain limiting  Strength RUE  Strength RUE: WFL  Strength LUE  Strength LUE: WFL  Strength Other  Other: Functionally assessed. Trunk limited by pain, however pt able to partially lift L LE up against gravity. Neuro:  Balance  Comments: Limited assessment d/t orthopedic restrictions. Pt demonstrates awareness of her limitations. Motor Control  Gross Motor?:  (noted spasticity R LE)  Sensation  Overall Sensation Status: WFL    Bed mobility  Bridging: Minimal assistance  Rolling to Left: Unable to assess  Rolling to Right: Unable to assess  Supine to Sit: Minimal assistance  Scooting: Minimal assistance  Comment: Step by step sequencing for effective completion. Pt instructed to bridge and scoot to EOB utilizing R LE. Pt following through well, however attempting to sit up premaurely requiring added guarding for L hip flexion and IR. Pt then able to safely pivot to EOB utilizing UEs     Transfers  Sit to Stand: Minimal Assistance  Stand to sit: Contact guard assistance  Bed to Chair: Contact guard assistance;Minimal assistance  Comment: Steadying and lifting assist provided.  Pt instructed in safe completion while maintaining WBS and hip precautions. Ambulation 1  Device: Rolling Walker  Assistance: Minimal assistance  Quality of Gait: Pt tends to lag L LE during advancement, resulting in IR of L hip. Guarded for safety. Steadying assist occasionally. Pt instructed in safe positioning and holding to maintain 25% weight bearing ([facility] at this time)  Distance: 3ft  Stairs/Curb  Stairs?: No    Activity Tolerance  Activity Tolerance: Patient Tolerated treatment well    Exercises  Comments: Instructed in seated AP/LAQ/GS. Instruction provided for form, frequency, and pacing. ASSESSMENT:   Body structures, Functions, Activity limitations: Decreased functional mobility ; Decreased balance;Decreased coordination;Decreased strength;Decreased ADL status; Decreased ROM  Decision Making: Medium Complexity  History: High  Exam: Med  Clinical Presentation: Med    Prognosis: Good  Patient Education: PT POC; DC recs; role of PT in the hosp; therex/transfers/gait training as above  Barriers to Learning: None identified    DISCHARGE RECOMMENDATIONS:  Discharge Recommendations: Continue to assess pending progress, Patient would benefit from continued therapy after discharge    Assessment: Continued PT indicated to progress/instruct in safe functional mobility, educate in HEP, and improve balance and strength to ensure optimal postoperative outcomes and DC at highest level of indep and safety. Pt with mutliple comorbidities and limitations which contribute to her functional decline at this time. Rec therapy stay prior to DC home for safest DC plan. nursing notified.    REQUIRES PT FOLLOW UP: Yes      PLAN OF CARE:  Plan  Times per week: 5-7  Times per day: Twice a day  Current Treatment Recommendations: Strengthening, Transfer Training, Neuromuscular Re-education, Patient/Caregiver Education & Training, Equipment Evaluation, Education, & procurement, Home Exercise Program, Safety Education & Training, Functional Mobility Training, Balance Training, ADL/Self-care Training, Gait Training, Stair training, Cognitive/Perceptual Training  Safety Devices  Type of devices: All fall risk precautions in place    G-Code:  PT G-Codes  Functional Assessment Tool Used: Clinical Judgement  Functional Limitation: Mobility: Walking and moving around  Mobility: Walking and Moving Around Current Status (): At least 40 percent but less than 60 percent impaired, limited or restricted  Mobility: Walking and Moving Around Goal Status ():  At least 1 percent but less than 20 percent impaired, limited or restricted    Goals:  Short term goals  Short term goal 1: Pt to complete HEP with indep  Short term goal 2: Pt to state and maintain all hip precautions and 25% weight bearing L LE during following activities without cueing  Long term goals  Long term goal 1: Pt to complete all bed mobility with indep  Long term goal 2: Pt to complete all transfers with indep  Long term goal 3: Pt to ambulate 50ft with [employer] and supervision  Long term goal 4: Pt to manage 4 steps with HR and [place]    AMPAC (6 CLICK) BASIC MOBILITY  AM-PAC Inpatient Mobility Raw Score : 16     Therapy Time:   Individual   Time In 0920   Time Out 1000   Minutes 40   Timed Code Treatment Minutes: 23 Minutes (gait 5min; therex 5min; tranfers 13min)       Benny Harris PT, 08/14/18 at 10:12 AM

## 2018-08-14 NOTE — CONSULTS
Inpatient consult to Hospitalist  Consult performed by: Opal Leach ordered by: Javed HERNANDEZ        Consult Note    Reason for Consult:  Management of OA, COPD, and HLD    Requesting Physician:  Winston Lezama MD    HISTORY OF PRESENT ILLNESS:    The patient is a 59 y.o. female who is s/p left ROSMERY per Dr. Desire Carreno on 18. Patient is lethargic, quickly arouses to verbal stimuli. Denies cp sob ha rash anx n v d f      Past Medical History:   Diagnosis Date    Arthritis     left hip    COPD (chronic obstructive pulmonary disease) ([address]     uses inhalers x 4 yrs    Hyperlipidemia     on meds x 10yrs       Past Surgical History:   Procedure Laterality Date    BREAST BIOPSY Right 2003    benign    CERVICAL FUSION  2016    CERVICAL SPINE SURGERY  2004    AVM removal, fusion, embolism removal      SECTION  1979    COLONOSCOPY  2018   [address]       Prior to Admission medications    Medication Sig Start Date End Date Taking? Authorizing Provider   Multiple Vitamins-Minerals (THERAPEUTIC MULTIVITAMIN-MINERALS) tablet Take 1 tablet by mouth daily   Yes Historical Provider, MD   Magnesium Oxide 500 MG CAPS Take 1 capsule by mouth 2 times daily   Yes Historical Provider, MD   docusate sodium (COLACE) 100 MG capsule Take 200 mg by mouth 2 times daily   Yes Historical Provider, MD   oxybutynin (DITROPAN-XL) 10 MG extended release tablet Take 10 mg by mouth daily   Yes Historical Provider, MD   simvastatin (ZOCOR) 10 MG tablet Take 10 mg by mouth nightly   Yes Historical Provider, MD   fluticasone (FLONASE) 50 MCG/ACT nasal spray 1 spray by Nasal route daily   Yes Historical Provider, MD   senna (SENOKOT) 8.6 MG tablet Take 1 tablet by mouth daily   Yes Historical Provider, MD   Oxygen Concentrator Inhale 2 L into the lungs nightly   Yes Historical Provider, MD   oxyCODONE-acetaminophen (PERCOCET) 7.5-325 MG per tablet 1tab PO Q6-8 hrs PRN pain for 30 days. . 18 Yes chloride flush, oxyCODONE, morphine **OR** morphine, magnesium hydroxide, ondansetron, diazepam    No Known Allergies    Social History     Social History    Marital status: Single     Spouse name: N/A    Number of children: N/A    Years of education: N/A     Occupational History    Not on file.      Social History Main Topics    Smoking status: Current Every Day Smoker     Packs/day: 0.50     Years: 1.00     Start date: 8/8/2017    Smokeless tobacco: Never Used      Comment: smoked at 15yrs old x 30 yrs 0.5ppd     Alcohol use No    Drug use: No    Sexual activity: Not on file     Other Topics Concern    Not on file     Social History Narrative    No narrative on file       Family History   Problem Relation Age of Onset    Breast Cancer Mother     Cancer Mother         liver ca    No Known Problems Brother     Obesity Son        Review Of Systems:   CONSTITUTIONAL:  negative for  fevers, chills and sweats  HEENT:  negative for  hearing loss, tinnitus and ear drainage  RESPIRATORY:  negative for  dry cough, cough with sputum and dyspnea  CARDIOVASCULAR:  negative for  chest pain, dyspnea, palpitations  GASTROINTESTINAL:  negative for nausea, vomiting and change in bowel habits  MUSCULOSKELETAL:  negative for  myalgias, arthralgias and pain  NEUROLOGICAL:  negative for headaches, dizziness and seizures  BEHAVIOR/PSYCH:  negative for poor appetite, increased appetite and decreased sleep    Physical Exam:  Vitals:    08/13/18 1855 08/13/18 1900 08/13/18 1915 08/13/18 1920   BP: (!) 119/56 (!) 135/55 135/70 137/61   Pulse: 81 77 72 72   Resp: 15 12 12 14   Temp:       TempSrc:       SpO2: 95% 99% 100% 98%   Weight:       Height:           CONSTITUTIONAL:  awake, alert, cooperative, no apparent distress, and appears stated age  NECK:  Supple, symmetrical, trachea midline, no adenopathy, thyroid symmetric, not enlarged and no tenderness, skin normal  BACK:  Symmetric, no curvature, spinous processes are

## 2018-08-15 PROBLEM — N39.46 MIXED STRESS AND URGE URINARY INCONTINENCE: Status: ACTIVE | Noted: 2018-08-15

## 2018-08-15 PROBLEM — E11.65 TYPE 2 DIABETES MELLITUS WITH HYPERGLYCEMIA, WITHOUT LONG-TERM CURRENT USE OF INSULIN (HCC): Status: ACTIVE | Noted: 2018-08-15

## 2018-08-15 LAB
BLOOD BANK DISPENSE STATUS: NORMAL
BLOOD BANK DISPENSE STATUS: NORMAL
BLOOD BANK PRODUCT CODE: NORMAL
BLOOD BANK PRODUCT CODE: NORMAL
BPU ID: NORMAL
BPU ID: NORMAL
DESCRIPTION BLOOD BANK: NORMAL
DESCRIPTION BLOOD BANK: NORMAL

## 2018-08-15 PROCEDURE — 97110 THERAPEUTIC EXERCISES: CPT

## 2018-08-15 PROCEDURE — 1180000000 HC REHAB R&B

## 2018-08-15 PROCEDURE — 6370000000 HC RX 637 (ALT 250 FOR IP): Performed by: NURSE PRACTITIONER

## 2018-08-15 PROCEDURE — 6370000000 HC RX 637 (ALT 250 FOR IP): Performed by: PHYSICAL MEDICINE & REHABILITATION

## 2018-08-15 PROCEDURE — 97162 PT EVAL MOD COMPLEX 30 MIN: CPT

## 2018-08-15 PROCEDURE — 99223 1ST HOSP IP/OBS HIGH 75: CPT | Performed by: PHYSICAL MEDICINE & REHABILITATION

## 2018-08-15 PROCEDURE — 97535 SELF CARE MNGMENT TRAINING: CPT

## 2018-08-15 PROCEDURE — 2700000000 HC OXYGEN THERAPY PER DAY

## 2018-08-15 PROCEDURE — 97116 GAIT TRAINING THERAPY: CPT

## 2018-08-15 PROCEDURE — 94640 AIRWAY INHALATION TREATMENT: CPT

## 2018-08-15 PROCEDURE — 6360000002 HC RX W HCPCS: Performed by: PHYSICAL MEDICINE & REHABILITATION

## 2018-08-15 PROCEDURE — 97167 OT EVAL HIGH COMPLEX 60 MIN: CPT

## 2018-08-15 PROCEDURE — 97140 MANUAL THERAPY 1/> REGIONS: CPT

## 2018-08-15 RX ORDER — SODIUM CHLORIDE 9 MG/ML
INJECTION, SOLUTION INTRAVENOUS CONTINUOUS
Status: DISPENSED | OUTPATIENT
Start: 2018-08-15 | End: 2018-08-16

## 2018-08-15 RX ORDER — FENTANYL 25 UG/H
1 PATCH TRANSDERMAL
Status: DISCONTINUED | OUTPATIENT
Start: 2018-08-15 | End: 2018-08-16

## 2018-08-15 RX ORDER — BISACODYL 10 MG
10 SUPPOSITORY, RECTAL RECTAL DAILY PRN
Status: DISCONTINUED | OUTPATIENT
Start: 2018-08-15 | End: 2018-08-20 | Stop reason: SDUPTHER

## 2018-08-15 RX ORDER — ACETAMINOPHEN 325 MG/1
650 TABLET ORAL EVERY 4 HOURS PRN
Status: DISCONTINUED | OUTPATIENT
Start: 2018-08-15 | End: 2018-08-28 | Stop reason: HOSPADM

## 2018-08-15 RX ORDER — POLYETHYLENE GLYCOL 3350 17 G/17G
17 POWDER, FOR SOLUTION ORAL DAILY PRN
Status: DISCONTINUED | OUTPATIENT
Start: 2018-08-15 | End: 2018-08-28 | Stop reason: HOSPADM

## 2018-08-15 RX ORDER — SODIUM PHOSPHATE, DIBASIC AND SODIUM PHOSPHATE, MONOBASIC 7; 19 G/133ML; G/133ML
1 ENEMA RECTAL
Status: DISPENSED | OUTPATIENT
Start: 2018-08-15 | End: 2018-08-15

## 2018-08-15 RX ORDER — OXYMETAZOLINE HYDROCHLORIDE 0.05 G/100ML
2 SPRAY NASAL 2 TIMES DAILY
Status: DISCONTINUED | OUTPATIENT
Start: 2018-08-15 | End: 2018-08-28 | Stop reason: HOSPADM

## 2018-08-15 RX ORDER — ERGOCALCIFEROL 1.25 MG/1
50000 CAPSULE ORAL DAILY
Status: COMPLETED | OUTPATIENT
Start: 2018-08-15 | End: 2018-08-17

## 2018-08-15 RX ADMIN — Medication 400 MG: at 21:18

## 2018-08-15 RX ADMIN — TRAMADOL HYDROCHLORIDE 50 MG: 50 TABLET, FILM COATED ORAL at 00:16

## 2018-08-15 RX ADMIN — DULOXETINE HYDROCHLORIDE 60 MG: 60 CAPSULE, DELAYED RELEASE ORAL at 11:09

## 2018-08-15 RX ADMIN — OXYMETAZOLINE HYDROCHLORIDE 2 SPRAY: 5 SPRAY NASAL at 21:17

## 2018-08-15 RX ADMIN — ASPIRIN 81 MG: 81 TABLET, COATED ORAL at 11:09

## 2018-08-15 RX ADMIN — TRAMADOL HYDROCHLORIDE 50 MG: 50 TABLET, FILM COATED ORAL at 13:36

## 2018-08-15 RX ADMIN — BACLOFEN 10 MG: 10 TABLET ORAL at 11:09

## 2018-08-15 RX ADMIN — BACLOFEN 10 MG: 10 TABLET ORAL at 17:29

## 2018-08-15 RX ADMIN — DOCUSATE SODIUM 200 MG: 100 CAPSULE, LIQUID FILLED ORAL at 21:18

## 2018-08-15 RX ADMIN — OXYCODONE HYDROCHLORIDE 5 MG: 5 TABLET ORAL at 02:46

## 2018-08-15 RX ADMIN — DOCUSATE SODIUM 200 MG: 100 CAPSULE, LIQUID FILLED ORAL at 11:11

## 2018-08-15 RX ADMIN — ERGOCALCIFEROL 50000 UNITS: 1.25 CAPSULE ORAL at 11:09

## 2018-08-15 RX ADMIN — ACETAMINOPHEN 650 MG: 325 TABLET ORAL at 17:30

## 2018-08-15 RX ADMIN — MULTIPLE VITAMINS W/ MINERALS TAB 1 TABLET: TAB at 11:09

## 2018-08-15 RX ADMIN — SENNOSIDES AND DOCUSATE SODIUM 1 TABLET: 8.6; 5 TABLET ORAL at 11:08

## 2018-08-15 RX ADMIN — TRAMADOL HYDROCHLORIDE 50 MG: 50 TABLET, FILM COATED ORAL at 06:50

## 2018-08-15 RX ADMIN — TRAMADOL HYDROCHLORIDE 50 MG: 50 TABLET, FILM COATED ORAL at 17:30

## 2018-08-15 RX ADMIN — ACETAMINOPHEN 650 MG: 325 TABLET ORAL at 13:35

## 2018-08-15 RX ADMIN — SIMVASTATIN 10 MG: 5 TABLET, FILM COATED ORAL at 21:18

## 2018-08-15 RX ADMIN — Medication 2 PUFF: at 16:22

## 2018-08-15 RX ADMIN — Medication 400 MG: at 11:09

## 2018-08-15 RX ADMIN — OXYBUTYNIN CHLORIDE 10 MG: 5 TABLET, EXTENDED RELEASE ORAL at 11:08

## 2018-08-15 RX ADMIN — ASPIRIN 81 MG: 81 TABLET, COATED ORAL at 21:18

## 2018-08-15 RX ADMIN — ALBUTEROL SULFATE 2.5 MG: 2.5 SOLUTION RESPIRATORY (INHALATION) at 11:14

## 2018-08-15 RX ADMIN — ACETAMINOPHEN 650 MG: 325 TABLET ORAL at 06:50

## 2018-08-15 RX ADMIN — BACLOFEN 10 MG: 10 TABLET ORAL at 21:18

## 2018-08-15 RX ADMIN — OXYCODONE HYDROCHLORIDE 5 MG: 5 TABLET ORAL at 11:12

## 2018-08-15 RX ADMIN — Medication 2 PUFF: at 05:12

## 2018-08-15 ASSESSMENT — PAIN DESCRIPTION - PAIN TYPE
TYPE: SURGICAL PAIN;CHRONIC PAIN
TYPE: SURGICAL PAIN
TYPE: SURGICAL PAIN

## 2018-08-15 ASSESSMENT — PAIN DESCRIPTION - ORIENTATION
ORIENTATION: LEFT

## 2018-08-15 ASSESSMENT — PAIN SCALES - GENERAL
PAINLEVEL_OUTOF10: 4
PAINLEVEL_OUTOF10: 2
PAINLEVEL_OUTOF10: 6
PAINLEVEL_OUTOF10: 6
PAINLEVEL_OUTOF10: 4
PAINLEVEL_OUTOF10: 0
PAINLEVEL_OUTOF10: 7
PAINLEVEL_OUTOF10: 3
PAINLEVEL_OUTOF10: 7
PAINLEVEL_OUTOF10: 4
PAINLEVEL_OUTOF10: 3

## 2018-08-15 ASSESSMENT — PAIN DESCRIPTION - LOCATION
LOCATION: HIP
LOCATION: HIP
LOCATION: HIP;BACK;KNEE
LOCATION: HIP

## 2018-08-15 ASSESSMENT — ENCOUNTER SYMPTOMS
FACIAL SWELLING: 0
EYE PAIN: 0
EYE REDNESS: 0
CHEST TIGHTNESS: 0
CONSTIPATION: 1
CHOKING: 0
ANAL BLEEDING: 0
VOMITING: 0
TROUBLE SWALLOWING: 0
ABDOMINAL DISTENTION: 0
NAUSEA: 0
COLOR CHANGE: 0
WHEEZING: 0
SHORTNESS OF BREATH: 0
PHOTOPHOBIA: 0
BLOOD IN STOOL: 0
COUGH: 0
ABDOMINAL PAIN: 0

## 2018-08-15 ASSESSMENT — PAIN DESCRIPTION - FREQUENCY: FREQUENCY: CONTINUOUS

## 2018-08-15 ASSESSMENT — PULMONARY FUNCTION TESTS: PEFR_L/MIN: 18

## 2018-08-15 NOTE — PROGRESS NOTES
Occupational Therapy   Occupational Therapy Initial Assessment  Date: 8/15/2018   Patient Name: Murali Childress  MRN: [id number]     : 1960    Date of Service: 8/15/2018    Discharge Recommendations:  Continue to assess pending progress         Patient Diagnosis(es): There were no encounter diagnoses. has a past medical history of Arthritis; COPD (chronic obstructive pulmonary disease) ([facility] [address] Hyperlipidemia; Hypertension; and Type 2 diabetes mellitus with hyperglycemia, without long-term current use of insulin ([facility] [address] has a past surgical history that includes Colonoscopy ();  section (); Hysterectomy (); Breast biopsy (Right, ); Cervical spine surgery (); cervical fusion (); and pr total hip arthroplasty (Left, 2018). Treatment Diagnosis: L hip osteoarthritis s/p L ROSMERY      Restrictions  Restrictions/Precautions  Restrictions/Precautions: Fall Risk  Position Activity Restriction  Hip Precautions: Posterior hip precautions  Other position/activity restrictions: 25% Partial Weight Bearing L LE, JUAN hose and abdominal binder     Subjective   Pain Assessment  Pain Assessment:  (4/10 unchanged)  Pain Level: 6  Pain Type: Surgical pain, Chronic pain  Pain Location: Hip, Back, Knee  Pain Orientation: Left  Pain Frequency: Continuous  Pain Intervention(s): Repositioned  Response to Pain Intervention: Patient Satisfied  Pre Treatment Pain Screening  Intervention List: Patient able to continue with treatment  Comments / Details: Nursing in to give meds at end of session  Social/Functional History  Social/Functional History  Lives With: Significant other (Living with significant other following d/c from hospital.  )  Type of Home: House (Pt will be living with sig other following d/c, but will be returning to her own home which is a single story home with 5 steps to enter, and a tub/shower combo.   Pt is taking all her equipment to sig other's home. )  Home Layout: One level  Home assistance  UE Dressing: Setup  LE Dressing: Maximum assistance  Toileting: Unable to assess(comment) (Toileting did not occur )  Tone RUE  RUE Tone: Normotonic  Tone LUE  LUE Tone: Normotonic  Coordination  Coordination and Movement description: Gross motor impairments;Decreased speed;Decreased accuracy  Quality of Movement Other  Comment: Spasticity in RLE affecting gait and coordinated movement with ambulation and transfers. Cognition  Overall Cognitive Status: WNL  Perception  Overall Perceptual Status: WFL     Sensation  Overall Sensation Status: Impaired (Numbness/tingling in B UE and B LUEs)        LUE AROM (degrees)  LUE AROM : Exceptions (Limited shoulder internal rotation- pt states when donning a bra she clasps in the front and rotates around her body.)  RUE AROM (degrees)  RUE AROM : Exceptions (Limited shoulder internal rotation- pt states when donning a bra she clasps in the front and rotates around her body.)  LUE Strength  L Hand Grasp: 4/5  L Hand Release: 4/5  RUE Strength  R Hand Grasp: 3+/5  R Hand Release: 3+/5     Hand Dominance  Hand Dominance: Right            Assessment   Performance deficits / Impairments: Decreased functional mobility ; Decreased ADL status; Decreased ROM; Decreased strength;Decreased endurance;Decreased sensation;Decreased balance;Decreased vision/visual deficit; Decreased high-level IADLs;Decreased coordination  Assessment: Pt is a 59year old female, s/p L ROSMERY as a result of L hip osteoarthritis. Pts hip pain had been increasing and interfering with her mobility and ADL's. Pt was using a manual w/c at home d/t pain. Pt lives alone, but will be staying with sig other following d/c from rehab for additonal assistance. Pt has a shower/tub at home as well as sig other's home with a shower chair. Pt presents with decreased mobility of RLE d/t past neck surgeries (presents as spasticity). Pt is 25% WB on LLE.  Pt presents with decreased functional mobility and

## 2018-08-15 NOTE — PROGRESS NOTES
Attempted two IV sticks unsuccessful. Will have ICU nurse come and try when available. Will notify following RN.   Electronically signed by Kassie Galvan RN on 8/15/2018 at 6:43 PM

## 2018-08-15 NOTE — CONSULTS
Consult to Hospitalist  Consult performed by: Reji Pal ordered by: Griffith Cranker        Consult Note    Reason for Consult:  Management of DM2, HTN, HLD and COPD    Requesting Physician:  Jerson Shankar DO    HISTORY OF PRESENT ILLNESS:    The patient is a 59 y.o. female who is admitted to [facility] rehab floor for gait instability and immobility secondary to left hip arthoplasty. Voicing no new complaints. Past Medical History:   Diagnosis Date    Arthritis     left hip    COPD (chronic obstructive pulmonary disease) ([address]     uses inhalers x 4 yrs    Hyperlipidemia     on meds x 10yrs    Hypertension 2018    Type 2 diabetes mellitus with hyperglycemia, without long-term current use of insulin ([address] 8/15/2018       Past Surgical History:   Procedure Laterality Date    BREAST BIOPSY Right 2003    benign    CERVICAL FUSION  2016    CERVICAL SPINE SURGERY  2004    AVM removal, fusion, embolism removal      SECTION  1979    COLONOSCOPY  2018    HYSTERECTOMY  1997    ID TOTAL HIP ARTHROPLASTY Left 2018    LEFT HIP TOTAL HIP ARTHROPLASTY performed [facility] Judith Rivas [facility]       Prior to Admission medications    Medication Sig Start Date End Date Taking?  Authorizing Provider   Multiple Vitamins-Minerals (THERAPEUTIC MULTIVITAMIN-MINERALS) tablet Take 1 tablet by mouth daily    Historical Provider, MD   Magnesium Oxide 500 MG CAPS Take 1 capsule by mouth 2 times daily    Historical Provider, MD   docusate sodium (COLACE) 100 MG capsule Take 200 mg by mouth 2 times daily    Historical Provider, MD   oxybutynin (DITROPAN-XL) 10 MG extended release tablet Take 10 mg by mouth daily    Historical Provider, MD   simvastatin (ZOCOR) 10 MG tablet Take 10 mg by mouth nightly    Historical Provider, MD   fluticasone (FLONASE) 50 MCG/ACT nasal spray 1 spray by Nasal route daily    Historical Provider, MD   senna (SENOKOT) 8.6 MG tablet Take 1 tablet by mouth daily Historical Provider, MD   Oxygen Concentrator Inhale 2 L into the lungs nightly    Historical Provider, MD   oxyCODONE-acetaminophen (PERCOCET) 7.5-325 MG per tablet 1tab PO Q6-8 hrs PRN pain for 30 days. . 7/18/18 8/18/18  Jenna Peña MD   baclofen (LIORESAL) 10 MG tablet Take 1 tablet by mouth 3 times daily 7/18/18   Jenna Peña MD   naproxen (NAPROSYN) 500 MG tablet TAKE 1 TABLET BY MOUTH TWICE DAILY WITH MEALS 6/8/18   Jenna Peña MD   naproxen (NAPROSYN) 500 MG tablet TAKE 1 TABLET BY MOUTH TWICE DAILY WITH MEALS 5/16/18   Jenna Peña MD   meloxicam (MOBIC) 15 MG tablet TAKE 1 TABLET BY MOUTH DAILY 4/2/18 7/1/18  Jenna Peña MD   albuterol sulfate HFA (VENTOLIN HFA) 108 (90 Base) MCG/ACT inhaler Inhale 2 puffs into the lungs every 6 hours as needed for Wheezing 3/6/18   Kelsy Prado MD   budesonide-formoterol (SYMBICORT) 160-4.5 MCG/ACT AERO Inhale 2 puffs into the lungs 2 times daily 3/6/18   Kelsy Prado MD   albuterol (PROVENTIL) (2.5 MG/3ML) 0.083% nebulizer solution Take 3 mLs by nebulization every 6 hours as needed for Wheezing 3/6/18 8/13/18  Kelsy Prado MD   meloxicam (MOBIC) 15 MG tablet TAKE 1 TABLET BY MOUTH DAILY 2/2/18 5/3/18  Jenna Peña MD   DULoxetine (CYMBALTA) 60 MG capsule  5/2/16   Historical Provider, MD       Scheduled Meds:   oxymetazoline  2 spray Each Nare BID    vitamin D  50,000 Units Oral Daily    fentaNYL  1 patch Transdermal Q72H    acetaminophen  650 mg Oral Q6H    aspirin  81 mg Oral BID    sennosides-docusate sodium  1 tablet Oral Daily    baclofen  10 mg Oral TID    docusate sodium  200 mg Oral BID    DULoxetine  60 mg Oral Daily    fluticasone  1 spray Nasal Daily    magnesium oxide  400 mg Oral BID    mometasone-formoterol  2 puff Inhalation BID    oxybutynin  10 mg Oral Daily    simvastatin  10 mg Oral Nightly    therapeutic multivitamin-minerals  1 tablet Oral Daily    traMADol  50 mg Oral 4 times per day     Continuous Infusions:  PRN Meds:acetaminophen, bisacodyl, fleet, polyethylene glycol, magnesium hydroxide, ondansetron, oxyCODONE, albuterol    No Known Allergies    Social History     Social History    Marital status: Single     Spouse name: N/A    Number of children: N/A    Years of education: N/A     Occupational History    Not on file.      Social History Main Topics    Smoking status: Current Every Day Smoker     Packs/day: 0.50     Years: 1.00     Start date: 8/8/2017    Smokeless tobacco: Never Used      Comment: smoked at 15yrs old x 30 yrs 0.5ppd     Alcohol use No    Drug use: No    Sexual activity: Not on file     Other Topics Concern    Not on file     Social History Narrative         Lives With: Significant other    Type of Home: House    Home Layout: One level    Home Access: Stairs to enter with rails    Entrance Stairs - Number of Steps: 6    Home Equipment: Rolling walker, Cane, Wheelchair-manual (BSC, leg )    ADL Assistance: Independent    Ambulation Assistance: Independent (with [employer])    Transfer Assistance: Independent    Additional Comments: Multiple neck surgeries with residual spasticity R UE/LE           Family History   Problem Relation Age of Onset    Breast Cancer Mother     Cancer Mother         liver ca    No Known Problems Brother     Obesity Son        Review Of Systems:   CONSTITUTIONAL:  negative for  fevers, chills and sweats  HEENT:  negative for  hearing loss, tinnitus and ear drainage  RESPIRATORY:  negative for  dry cough, cough with sputum and dyspnea  CARDIOVASCULAR:  negative for  chest pain, dyspnea, palpitations  GASTROINTESTINAL:  negative for nausea, vomiting and diarrhea  MUSCULOSKELETAL:  negative for  myalgias, arthralgias and stiff joints  NEUROLOGICAL:  negative for headaches, dizziness and seizures  BEHAVIOR/PSYCH:  negative for poor appetite, increased appetite and decreased sleep    Physical Exam:  Vitals:    08/14/18 2349 08/15/18 1182 08/15/18 0518 08/15/18 1226   BP: 127/69  (!) 155/72 105/63   Pulse: 96  99 109   Resp: 16 16 18   Temp: 97 °F (36.1 °C)  98 °F (36.7 °C) 97 °F (36.1 °C)   TempSrc:    Oral   SpO2: 96% 96% 93% 93%   Weight:       Height:           CONSTITUTIONAL:  awake, alert, cooperative, no apparent distress, and appears stated age  NECK:  Supple, symmetrical, trachea midline, no adenopathy, thyroid symmetric, not enlarged and no tenderness, skin normal  BACK:  Symmetric, no curvature, spinous processes are non-tender on palpation, paraspinous muscles are non-tender on palpation, no costal vertebral tenderness  LUNGS:  No increased work of breathing, good air exchange, clear to auscultation bilaterally, no crackles or wheezing  CARDIOVASCULAR:  Normal apical impulse, regular rate and rhythm, normal S1 and S2, no S3 or S4, and no murmur noted  ABDOMEN:  No scars, normal bowel sounds, soft, non-distended, non-tender, no masses palpated, no hepatosplenomegally  MUSCULOSKELETAL:  There is no redness, warmth, or swelling of the joints. Full range of motion noted. Motor strength is 5 out of 5 all extremities bilaterally. Tone is normal.  NEUROLOGIC:  Awake, alert, oriented to name, place and time. Cranial nerves II-XII are grossly intact. Motor is 5 out of 5 bilaterally. Cerebellar finger to nose, heel to shin intact. Sensory is intact.   Babinski down going, Romberg negative, and gait is normal.  SKIN:  no bruising or bleeding, no lesions and no jaundice    Labs:  Recent Results (from the past 24 hour(s))   Urinalysis    Collection Time: 08/14/18 10:02 PM   Result Value Ref Range    Color, UA Yellow Straw/Yellow    Clarity, UA Clear Clear    Glucose, Ur Negative Negative mg/dL    Bilirubin Urine Negative Negative    Ketones, Urine Negative Negative mg/dL    Specific Gravity, UA 1.004 1.005 - 1.030    Blood, Urine Negative Negative    pH, UA 6.0 5.0 - 9.0    Protein, UA Negative Negative mg/dL    Urobilinogen, Urine 0.2 <2.0 E. U./dL    Nitrite, Urine Negative Negative    Leukocyte Esterase, Urine Negative Negative     Assessment/Plan:  1. Gait instability and immobility secondary to left total hip arthoplasty on 8/13/18  2. DM2-accuchecks with ss coverage, dm diet  3. HTN-continue antihypertensive meds  4. HLD  5. COPD without exac  6.  OA    Electronically signed by SCOTT Lucas on 8/15/18 at 1:44 PM

## 2018-08-15 NOTE — PROGRESS NOTES
ROM;Decreased endurance  Assessment: reviewed hip precautions. patient able to recall 3/3. requires cuing to avoid breaking hip precautions with transfers on/off toilet. Prognosis: Good  Patient Education: PT POC; DC recs; role of PT in the hosp; transfers/gait training as above    PLAN OF CARE/Safety:   Safety Devices  Type of devices:  All fall risk precautions in place      Therapy Time:   Individual   Time In 1400   Time Out 1500   Minutes 60     Minutes:60      Transfer/Bed mobility training:15      Gait trainin      Neuro re education:     Therapeutic ex:25      Markos Paris PTA, 08/15/18 at 4:00 PM

## 2018-08-15 NOTE — PROGRESS NOTES
[address] [facility]  Rehabilitation  RECREATIONAL THERAPY  Initial Evaluation    Date:  8/15/2018        Patient Name: Maribell Wyatt       MRN: [id number]        YOB: 1960 (59 y.o.)       Gender: female          RESTRICTIONS/PRECAUTIONS:  Restrictions/Precautions: Fall Risk2  Vision: Impaired  Hearing: Within functional limits    Patient seen at: 1330 20 min  [x]Referral received  [x]Chart reviewed   []Visitor present     SUBJECTIVE: Patient reports the reason for their presence on the acute rehab unit as her hip. Prior to admission, patient engaged in leisure/recreation on a regular basis:  []always  [x]sometimes  []never []rarely []Not applicable  Patient reports he/she is comfortable in:  [] socializing with others  [] spending time alone [x]comfortable with both scenarios   Patient reports pain is a  [x]new current issue  []chronic issue  []Not an issue at the moment []other:      OBJECTIVE:  Pt is [x]up in wheelchair []standard sitting chair  []in bed.      Oxygen []yes   [x]no      Ability to provide information regarding leisure and recreation interests:   [x]No issues              []Issues with cognition (memory, confusion)              []Issues with communication (speech, hearing)              []Issues with mood    Leisure Interest Survey:  Solitaire              Sports/Physical Activity      Community    [] Creative Arts   [] Exercise bike at home  [] [employer]    [] Jessenia   [] Swimming/water exercise    [x] Shopping    [x] Computers/Internet  [] [place] Grand Prix Championship                         [] Clubs/Organizations    [] Gardening/yardwork          [] Golf               [x] Darrion Robles    [] HealthRally    [] Baby World Language            [] Oriental orthodox/Cheondoism activities    [] Reading    []Exercise class               []  Gambling/Casino    [x] Word searches/Crosswords      Ramon Bhat                        [] Volunteer Work              [] [address] puzzles                      [] Bicycling         [] Movie Theatre       [x] Likes music                          [] Walking                       [] Musicals/Theater    [x] Likes animals                        []Billiards/Darts              [] Museums     [] Relaxation Techniques         []Outdoor activities         [][employer]   [] Writing                                []Yoga/Stewart Chi                 [x]Local webb         [] [employer]                      []Home exercise routine   [x]Concerts         [x]Video games    [x]Tablet/smartphone   [x]Television  []indoor/outdoor plants   []Spending time outdoors     Social Activities   [x] Card games            [x] Board games   [] Games (other)   [] [employer] games   [] Holiday/Special events   [] Sight-seeing/Car rides   []  [employer]    [x] Family/friend visits   [] Dancing    [] Traveling    Other interests: enjoys going to [employer], likes magazines, enjoys going to [employer], has a dog named Nils Rordiguez, likes playing games on her tablet like Trist, spends time with her grandkids and they play games  Past interests:   Future interests:     Comments:     Emotional   Observed/noted:    [] Homesick  [] Anger   [] Depressed   [] Anxious    [x] Cooperative   [] Isolated   [x] Pleasant       [] Agitated    [] Poor self-image   [] Excessive emotional response            [] Withholds emotional response   [] Patient spoke about their feelings        [] Critical of self or others  Affect:   [x]Appropriate [] Bright    [] Flat [] Labile  [] Superficial [] Bizarre[] Blunted  Comments:     [employer] [employer] Session included:  (Check ALL that apply)    [x] Increased Socialization     [x] Provided active listening   [] Offered emotional support                                                                                [x] Benefits of the patient's leisure and recreation pursuits being utilized as stress relievers    Recommendations to utilize Recreation Therapy services and its supported services and [x]COMPLETE []ONGOING  Patient will be provided with an independent leisure activity/coping skills tool for in room use.   []COMPLETE []ONGOING Patient encouraged to use their own personal independent leisure activity for in room use. He/she brought    [x]COMPLETE []ONGOING  Patient will be provided with a cognitive/orientation/puzzle handout 5x/week.   []COMPLETE []ONGOING  Patient will be provided with local community resources to maintain a healthy  active lifestyle after discharge.   []COMPLETE []ONGOING  Patient will be educated on adaptive leisure/recreation equipment. [x]COMPLETE []ONGOING Patient will be educated on various alternative pain management techniques which  can include both techniques done independently and/or in group programs.    [x]COMPLETE []ONGOING Patient will express interest in participating in Music Therapy during his hospitalization.  []COMPLETE []ONGOING Patient will identify the recreation therapy supported groups he has interest in attending.  Yanet Osman []ONGOING Patient will be    []COMPLETE []ONGOING Patient will     Patient provided with the following accessible and independently used recreation/leisure resources when in hospital room:   [x] 5 days week large print orientation/puzzle activity handout  [x] Word searches, crossword puzzles  [] Creative art independent use kit  [] Journaling kit  [] Creative writing kit  [] Deck of Cards  []     EDUCATION   Was new education provided:  [x] Yes     [] No - Continued review of prior education  Learner:    [x] Patient     [] Spouse/Significant Other     [] Family     [] Other:  Education provided:     [x][facility] Support [facility]   []Community Resources:    []Doximity Adaptive Sports and Recreation Program   []Stress Management/Relaxation techniques   []Other:  Method of Education: [x] Discussion      [] Demonstration     [] Handouts     [] Other  Evaluation of Patients Response to Education:   [x] Verbalized Understanding            []

## 2018-08-15 NOTE — PROGRESS NOTES
Ortho VS done and patient is orthostatic. Patient denies lightheadedness. Has thigh paulino hose in room, so I will get abd binder.
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
## 2018-08-15 NOTE — PROGRESS NOTES
continue to vault with B UEs for offload, however limited follow through  Distance: 15ft  Stairs/Curb  Stairs?: No (safety concerns)  Propulsion 1  Propulsion: Manual  Level: Level Tile  Level of Assistance: Modified independent  Description/ Details: No concerns. Increased time to complete  Distance: 100ft straight path    Activity Tolerance  Activity Tolerance: Patient Tolerated treatment well;Patient limited by fatigue          Car transfers: Not tested  Walk 10 ft: Contact Guard Assist  Walk 50 ft with 2 turns: Not tested  Walk 150 ft in corridor: Not tested  Walking 10 ft on unlevel surface: Not tested  Picking up objects: Not tested  Wheelchair Mobility: Yes     ASSESSMENT:  Body structures, Functions, Activity limitations: Decreased functional mobility ; Decreased balance;Decreased coordination;Decreased strength;Decreased ADL status; Decreased ROM; Decreased endurance  Decision Making: Medium Complexity  History: High  Exam: Med  Clinical Presentation: Med    Prognosis: Good  Patient Education: PT POC; DC recs; role of PT in the hosp; transfers/gait training as above  Barriers to Learning: none identied    CLINICAL IMPRESSION: Continued PT indicated in order to progress/instruct in safe functional mobility, improve strength L LE for optimal postoperative outcomes and ensure DC at highest level of indep and safety. D/t pt's orthopedic restrictions at this time, WC will be primary mode of locomotion upon DC, however small distance ambulation will be focused for strengthening, balance, and ease of managing home environment.      PLAN OF CARE:  Frequency: 1-2 treatment sessions per day, 5-7 days per week     Current Treatment Recommendations: Strengthening, Neuromuscular Re-education, Patient/Caregiver Education & Training, Equipment Evaluation, Education, & procurement, Home Exercise Program, Safety Education & Training, Functional Mobility Training, Balance Training, ADL/Self-care Training, Gait Training, Stair training, Cognitive/Perceptual Training, Wheelchair Mobility Training, Endurance Training, Transfer Training, Modalities, Pain Management, Positioning    Patient's Goal:  Get better so I can go home    GOALS:  Short term goals  Short term goal 1: Pt to complete HEP with indep  Short term goal 2: Pt to state and maintain all hip precautions and 25% weight bearing L LE during following activities without cueing  Long term goals  Long term goal 1: Pt to complete all bed mobility with indep  Long term goal 2: Pt to complete all transfers with indep  Long term goal 3: Pt to ambulate 25-50ft with [employer] and indep to manage short distances within home  Long term goal 4: Pt to manage 4 steps with HR and CGA  Long term goal 5: Pt to manage and propel WC 150ft with indep    ELOS:   Plan weeks: 1.5    Therapy Time:    Individual   Time In 0900   Time Out 0945   Minutes 45     23 Minutes (transfers 15min; gait 12min)       Claudine Serrano, PT, 08/15/18 at 1:16 PM

## 2018-08-16 LAB
HCT VFR BLD CALC: 27.4 % (ref 37–47)
HEMOGLOBIN: 9.3 G/DL (ref 12–16)
MCH RBC QN AUTO: 33.2 PG (ref 27–31.3)
MCHC RBC AUTO-ENTMCNC: 33.8 % (ref 33–37)
MCV RBC AUTO: 98.2 FL (ref 82–100)
PDW BLD-RTO: 14.2 % (ref 11.5–14.5)
PLATELET # BLD: 179 K/UL (ref 130–400)
RBC # BLD: 2.79 M/UL (ref 4.2–5.4)
URINE CULTURE, ROUTINE: NORMAL
WBC # BLD: 7.5 K/UL (ref 4.8–10.8)

## 2018-08-16 PROCEDURE — 97116 GAIT TRAINING THERAPY: CPT

## 2018-08-16 PROCEDURE — 6360000002 HC RX W HCPCS: Performed by: PHYSICAL MEDICINE & REHABILITATION

## 2018-08-16 PROCEDURE — 1180000000 HC REHAB R&B

## 2018-08-16 PROCEDURE — 94760 N-INVAS EAR/PLS OXIMETRY 1: CPT

## 2018-08-16 PROCEDURE — 85027 COMPLETE CBC AUTOMATED: CPT

## 2018-08-16 PROCEDURE — 97535 SELF CARE MNGMENT TRAINING: CPT

## 2018-08-16 PROCEDURE — 94640 AIRWAY INHALATION TREATMENT: CPT

## 2018-08-16 PROCEDURE — 99233 SBSQ HOSP IP/OBS HIGH 50: CPT | Performed by: PHYSICAL MEDICINE & REHABILITATION

## 2018-08-16 PROCEDURE — 94761 N-INVAS EAR/PLS OXIMETRY MLT: CPT

## 2018-08-16 PROCEDURE — 97110 THERAPEUTIC EXERCISES: CPT

## 2018-08-16 PROCEDURE — 97112 NEUROMUSCULAR REEDUCATION: CPT

## 2018-08-16 PROCEDURE — 36415 COLL VENOUS BLD VENIPUNCTURE: CPT

## 2018-08-16 PROCEDURE — 6370000000 HC RX 637 (ALT 250 FOR IP): Performed by: PHYSICAL MEDICINE & REHABILITATION

## 2018-08-16 PROCEDURE — 2700000000 HC OXYGEN THERAPY PER DAY

## 2018-08-16 PROCEDURE — 6370000000 HC RX 637 (ALT 250 FOR IP): Performed by: NURSE PRACTITIONER

## 2018-08-16 RX ORDER — ONDANSETRON 2 MG/ML
4 INJECTION INTRAMUSCULAR; INTRAVENOUS EVERY 8 HOURS SCHEDULED
Status: DISCONTINUED | OUTPATIENT
Start: 2018-08-16 | End: 2018-08-17

## 2018-08-16 RX ADMIN — ACETAMINOPHEN 650 MG: 325 TABLET ORAL at 00:10

## 2018-08-16 RX ADMIN — ACETAMINOPHEN 650 MG: 325 TABLET ORAL at 17:19

## 2018-08-16 RX ADMIN — BACLOFEN 10 MG: 10 TABLET ORAL at 07:54

## 2018-08-16 RX ADMIN — ACETAMINOPHEN 650 MG: 325 TABLET ORAL at 06:07

## 2018-08-16 RX ADMIN — ASPIRIN 81 MG: 81 TABLET, COATED ORAL at 07:54

## 2018-08-16 RX ADMIN — ERGOCALCIFEROL 50000 UNITS: 1.25 CAPSULE ORAL at 07:52

## 2018-08-16 RX ADMIN — Medication 400 MG: at 07:53

## 2018-08-16 RX ADMIN — DOCUSATE SODIUM 200 MG: 100 CAPSULE, LIQUID FILLED ORAL at 21:54

## 2018-08-16 RX ADMIN — SENNOSIDES AND DOCUSATE SODIUM 1 TABLET: 8.6; 5 TABLET ORAL at 07:54

## 2018-08-16 RX ADMIN — ACETAMINOPHEN 650 MG: 325 TABLET ORAL at 12:28

## 2018-08-16 RX ADMIN — ACETAMINOPHEN 650 MG: 325 TABLET ORAL at 23:51

## 2018-08-16 RX ADMIN — Medication 2 PUFF: at 17:53

## 2018-08-16 RX ADMIN — SIMVASTATIN 10 MG: 5 TABLET, FILM COATED ORAL at 21:56

## 2018-08-16 RX ADMIN — TRAMADOL HYDROCHLORIDE 50 MG: 50 TABLET, FILM COATED ORAL at 23:51

## 2018-08-16 RX ADMIN — Medication 400 MG: at 22:00

## 2018-08-16 RX ADMIN — Medication 2 PUFF: at 04:59

## 2018-08-16 RX ADMIN — BACLOFEN 10 MG: 10 TABLET ORAL at 21:56

## 2018-08-16 RX ADMIN — BACLOFEN 10 MG: 10 TABLET ORAL at 12:28

## 2018-08-16 RX ADMIN — MULTIPLE VITAMINS W/ MINERALS TAB 1 TABLET: TAB at 07:53

## 2018-08-16 RX ADMIN — OXYBUTYNIN CHLORIDE 10 MG: 5 TABLET, EXTENDED RELEASE ORAL at 07:53

## 2018-08-16 RX ADMIN — TRAMADOL HYDROCHLORIDE 50 MG: 50 TABLET, FILM COATED ORAL at 00:11

## 2018-08-16 RX ADMIN — ONDANSETRON 4 MG: 2 INJECTION INTRAMUSCULAR; INTRAVENOUS at 22:00

## 2018-08-16 RX ADMIN — TRAMADOL HYDROCHLORIDE 50 MG: 50 TABLET, FILM COATED ORAL at 12:28

## 2018-08-16 RX ADMIN — DOCUSATE SODIUM 200 MG: 100 CAPSULE, LIQUID FILLED ORAL at 07:54

## 2018-08-16 RX ADMIN — TRAMADOL HYDROCHLORIDE 50 MG: 50 TABLET, FILM COATED ORAL at 06:06

## 2018-08-16 RX ADMIN — TRAMADOL HYDROCHLORIDE 50 MG: 50 TABLET, FILM COATED ORAL at 17:19

## 2018-08-16 RX ADMIN — ACETAMINOPHEN 650 MG: 325 TABLET ORAL at 00:11

## 2018-08-16 RX ADMIN — DULOXETINE HYDROCHLORIDE 60 MG: 60 CAPSULE, DELAYED RELEASE ORAL at 07:54

## 2018-08-16 RX ADMIN — ASPIRIN 81 MG: 81 TABLET, COATED ORAL at 21:56

## 2018-08-16 ASSESSMENT — PAIN SCALES - GENERAL
PAINLEVEL_OUTOF10: 4
PAINLEVEL_OUTOF10: 2
PAINLEVEL_OUTOF10: 0
PAINLEVEL_OUTOF10: 0
PAINLEVEL_OUTOF10: 4
PAINLEVEL_OUTOF10: 4
PAINLEVEL_OUTOF10: 0
PAINLEVEL_OUTOF10: 3

## 2018-08-16 ASSESSMENT — PAIN DESCRIPTION - ORIENTATION: ORIENTATION: LEFT

## 2018-08-16 ASSESSMENT — PAIN DESCRIPTION - DESCRIPTORS: DESCRIPTORS: CONSTANT

## 2018-08-16 ASSESSMENT — PAIN DESCRIPTION - LOCATION: LOCATION: HIP

## 2018-08-16 NOTE — PROGRESS NOTES
Occupational Therapy  Fac[facility]/Department: [facility]  Daily Treatment Note  NAME: Rashmi Handy  : 1960  MRN: [id number]    Date of Service: 2018    Discharge Recommendations:  Continue to assess pending progress       Patient Diagnosis(es): There were no encounter diagnoses. has a past medical history of Arthritis; COPD (chronic obstructive pulmonary disease) ([facility] [address] Hyperlipidemia; Hypertension; and Type 2 diabetes mellitus with hyperglycemia, without long-term current use of insulin ([facility] [address] has a past surgical history that includes Colonoscopy ();  section (); Hysterectomy (); Breast biopsy (Right, ); Cervical spine surgery (); cervical fusion (); and pr total hip arthroplasty (Left, 2018). Restrictions  Restrictions/Precautions  Restrictions/Precautions: Fall Risk  Position Activity Restriction  Hip Precautions: Posterior hip precautions  Other position/activity restrictions: 25% Partial Weight Bearing L LE, JUAN hose and abdominal binder     Subjective \"I would love education on equipment to help me get dressed. \"   General  Chart Reviewed: Yes  Patient assessed for rehabilitation services?: Yes  Response to previous treatment: Patient with no complaints from previous session  Family / Caregiver Present: No  Pain Assessment  Patient Currently in Pain: No  Pain Assessment: 0-10  Pain Level: 0  Vital Signs  Patient Currently in Pain: No     Orientation  Orientation  Overall Orientation Status: Within Normal Limits    Objective    ADL  Additional Comments: Educated pt on AE provided in hip kit. Provided pt with physical demonstration on how to correctly use sock aid to don B socks, how to use reacher and dressing stick to doff B socks, how to use reacher to thread legs into pants/undear, how to use shoe horn to don tennis shoes, role and purpse of elastic shoe laces, and use of long handled bath sponge within parameters of hip precautions.  Pt independently

## 2018-08-16 NOTE — PROGRESS NOTES
Physical Therapy Rehab Treatment Note  Facility/Department: [facility]  [facility]       NAME: Angelica Loomis  : 1960 (59 y.o.)  MRN: [id number]  CODE STATUS: Full Code    Date of Service: 2018    Chart Reviewed: Yes  Family / Caregiver Present: No  General Comment  Comments: PT initiated in room. Pt sound asleep however agreeable to PT tx once awakened    Restrictions:  Restrictions/Precautions: Fall Risk  Position Activity Restriction  Hip Precautions: Posterior hip precautions  Other position/activity restrictions: 25% Partial Weight Bearing L LE, JUAN hose and abdominal binder      SUBJECTIVE: Subjective: \"I just can't sleep at night. But don't change my schedule. I like it how it is. \"     Pre Treatment Pain Screening  Comments / Details: My pain is not bad right now. I'm good. Post Treatment Pain Screening:  Pain Assessment  Pain Assessment:  (unchanged)    OBJECTIVE:      Neuromuscular Education  Neuromuscular Comments: Focus on neuromuscular control of R knee flexion in seated with ball roll outs and slides. Muscle tapping for improved function. Pt encouraged to complete on her own. Bed mobility  Rolling to Left: Supervision  Rolling to Right: Supervision  Supine to Sit: Stand by assistance  Sit to Supine: Stand by assistance  Scooting: Stand by assistance  Comment: Pt completes en block motion - verbal cues for maintaining 90degree restrictions when scooting    Transfers  Sit to Stand: Contact guard assistance;Minimal Assistance  Stand to sit: Contact guard assistance  Bed to Chair: Contact guard assistance  Comment: pt with at times requiring anterior weight shift d/t inadequate R foot placement. Block practice for accurate positioning with improved performance. Ambulation 1  Device: Rolling Walker  Assistance: Contact guard assistance;Stand by assistance  Quality of Gait: good ability to maintain PWB on L LE, continues to require cuing to avoid internal rotation.  Increased time and effort to cover distance. Still reluctant to place full foot on floor. Distance: 15feetPropulsion 1  Propulsion: Manual  Method: RUE;ROHANE  Level of Assistance: Modified independent  Description/ Details: No concerns  Distance: 100ft straight path on tile and carpet surfaces and in busy environment  Stairs  # Steps : 1  Stairs Height: 6\"  Rails: Right ascending  Device: Crutches  Assistance: Dependent/Total  Comment: +2 assist. Significant lifting assist to ascend step with pt unable to extend to full stand. Pt with poor control of R LE and inability to manipulate on/off step without assist.     Activity Tolerance  Activity Tolerance: Patient Tolerated treatment well;Patient limited by fatigue        ASSESSMENT:  Assessment: Reviewed plan for steps with pt stating that her S.O. could assist. Will continue to follow. Pt limited mainly by poor neuromotor function of R LE    PLAN OF CARE:   Plan Comment: cont per POC  Safety Devices  Type of devices:  All fall risk precautions in place    Short term goals  Short term goal 1: Pt to complete HEP with indep  Short term goal 2: Pt to state and maintain all hip precautions and 25% weight bearing L LE during following activities without cueing  Long term goals  Long term goal 1: Pt to complete all bed mobility with indep  Long term goal 2: Pt to complete all transfers with indep  Long term goal 3: Pt to ambulate 25-50ft with [employer] and indep to manage short distances within home  Long term goal 4: Pt to manage 4 steps with HR and CGA  Long term goal 5: Pt to manage and propel WC 150ft with indep    Therapy Time:   Individual   Time In 0900   Time Out 1000   Minutes 60     Timed Code Treatment Minutes: 60 Minutes (15min NMR; 5min WC; 15min transfers; 25min gait)         Joy Juan, PT, 08/16/18 at 12:43 PM

## 2018-08-16 NOTE — PROGRESS NOTES
home care PT OT RN 8 and  with a rolling versus willed walker as well as a wheelchair for long distances. Her 25% weightbearing is her biggest obstacle as well as the spasticity in her right lower extremity as a result of previous spinal cord injuries. Weekly team meeting every  Thursday's to assess progress towards goals, discuss and address social, psychological and medical comorbidities and to address difficulties they may be having progressing in therapy. Patient and family education is in progress. The patient is to follow-up with their family physician after discharge. Complex Active General Medical Issues that complicate care Assess & Plan:    1. 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 Osteoarthrosis involving lower leg,Spinal stenosis, lumbar region, without neurogenic claudication, Chronic pain syndrome  3. COPD (chronic obstructive pulmonary disease) -pulse ox checks every shift titrate nasal cannula O2-add Xolair and Flonase, and Proventil  4. Hyperlipidemia,   Hypertension-vital signs every shift, titrate Zocor-add antihypertensive as needed, consult medical hospitalist for backup medical  5. History of fusion of cervical spine Cervical myelopathy with cervical myelopathy with residual right-sided spasticity  6. Constipation, chronic-with active exacerbation secondary to increased opiate use, titrate mag oxide, Colace  7. Dysthymia-add rec therapy rehabilitation psychology and Cymbalta  8. Neuropathy of both upper extremities-avoid toxic medications titrate membrane stabilizing medication such as Cymbalta and/or Topamax  9.    OAB (overactive bladder) Mixed stress and urge urinary incontinence-Ditropan and monitor postvoid residuals, add frequent

## 2018-08-16 NOTE — PROGRESS NOTES
Occupational Therapy  Facility/Department: [facility]  Daily Treatment Note  NAME: Kala Connelly  : 1960  MRN: [id number]    Date of Service: 2018    Discharge Recommendations:  Continue to assess pending progress       Patient Diagnosis(es): There were no encounter diagnoses. has a past medical history of Arthritis; COPD (chronic obstructive pulmonary disease) ([facility] [address] Hyperlipidemia; Hypertension; and Type 2 diabetes mellitus with hyperglycemia, without long-term current use of insulin ([facility] [address] has a past surgical history that includes Colonoscopy ();  section (); Hysterectomy (); Breast biopsy (Right, ); Cervical spine surgery (); cervical fusion (); and pr total hip arthroplasty (Left, 2018). Restrictions  Restrictions/Precautions  Restrictions/Precautions: Fall Risk  Position Activity Restriction  Hip Precautions: Posterior hip precautions  Other position/activity restrictions: 25% Partial Weight Bearing L LE, JUAN hose and abdominal binder   Subjective   General  Chart Reviewed: Yes  Patient assessed for rehabilitation services?: Yes  Response to previous treatment: Patient with no complaints from previous session  Family / Caregiver Present: No  Pre Treatment Pain Screening  Pain at present: 2  Intervention List: Patient able to continue with treatment  Pain Assessment  Pain Level: 4  Pain Location: Hip  Pain Orientation: Left  Pain Descriptors: Constant   Orientation     Objective      Tx session began 10 minutes late 2° pt on phone taking care of personal matter upon therapist arrival    ADL  Grooming: Independent  UE Bathing: Setup  LE Bathing: Moderate assistance  UE Dressing: Setup  LE Dressing: Maximum assistance        Shower Transfers  Shower - Transfer From: [facility] - Transfer Type: To and From  Shower - Transfer To:  Shower seat with back  Shower - Technique: Ambulating  Shower Transfers: Minimal assistance            Assessment      Activity Tolerance  Activity Tolerance: Patient Tolerated treatment well  Safety Devices  Safety Devices in place: Yes  Type of devices:  All fall risk precautions in place          Plan   Plan  Times per week: 5-7x/week\, 15-60 minutes per day   Times per day: Daily  Plan weeks: 2 weeks   Current Treatment Recommendations: Strengthening, ROM, Balance Training, Functional Mobility Training, Endurance Training, Stair training, Pain Management, Safety Education & Training, Patient/Caregiver Education & Training, Equipment Evaluation, Education, & procurement, Self-Care / ADL, Home Management Training  Plan Comment: Continue per OT POC   G-Code     OutComes Score                                           AM-PAC Score             Goals  Patient Goals   Patient goals : \"I want to be more mobile\"       Therapy Time   Individual Concurrent Group Co-treatment   Time In 1040         Time Out 1135         Minutes 55              Tx session began 10 minutes late 2° pt on phone taking care of personal matter upon therapist arrival    Electronically signed by ANA Zimmer on 8/16/2018 at 1:23 PM  ANA Zimmer

## 2018-08-16 NOTE — PROGRESS NOTES
Physical Therapy Rehab Treatment Note  Facility[facility]Department: [facility]  [facility]       NAME: Golden Fry  : 1960 (59 y.o.)  MRN: [id number]  CODE STATUS: Full Code    Date of Service: 2018  Chart Reviewed: Yes  Patient assessed for rehabilitation services?: Yes  Family / Caregiver Present: No  General Comment  Comments: PT initiated in room. Pt sound asleep however agreeable to PT tx once awakened    Restrictions:  Restrictions/Precautions: Fall Risk  Position Activity Restriction  Hip Precautions: Posterior hip precautions  Other position/activity restrictions: 25% Partial Weight Bearing L LE, JUAN hose and abdominal binder        SUBJECTIVE: Subjective: I'm good on pain right now, no pain 0/10. Post Treatment Pain Screenin/10       OBJECTIVE:                      Bed mobility  Rolling to Left: Supervision  Rolling to Right: Supervision  Supine to Sit: Stand by assistance  Sit to Supine: Stand by assistance  Scooting: Stand by assistance  Comment: Extra time needed to complete with pt able to perfome Bed mobility with Supervision/SBA     Transfers  Sit to Stand: Contact guard assistance;Minimal Assistance  Stand to sit: Contact guard assistance  Bed to Chair: Contact guard assistance  Comment: Pt completed sit-stand from variety of surfaces with good carry over and proper sequencing with good follow through during this tx session. Ambulation  Ambulation?: Yes  Ambulation 1  Surface: carpet  Device: Rolling Walker  Assistance: Contact guard assistance;Stand by assistance  Quality of Gait: Pt able to maintain PWB restricitons this tx session, minimal cues needed as pt able to perfom safely at this time.    Distance: 15 ft x3        Activity Tolerance  Activity Tolerance: Patient Tolerated treatment well;Patient limited by fatigue    Exercises  Hamstring Sets: x15  Quad Sets: x15  Heelslides: x15  Gluteal Sets: x15  Knee Long Arc Quad: x15  Knee Short Arc Quad: x15  Ankle Pumps:

## 2018-08-17 PROCEDURE — 97116 GAIT TRAINING THERAPY: CPT

## 2018-08-17 PROCEDURE — 97530 THERAPEUTIC ACTIVITIES: CPT

## 2018-08-17 PROCEDURE — 97150 GROUP THERAPEUTIC PROCEDURES: CPT

## 2018-08-17 PROCEDURE — 99232 SBSQ HOSP IP/OBS MODERATE 35: CPT | Performed by: PHYSICAL MEDICINE & REHABILITATION

## 2018-08-17 PROCEDURE — 94640 AIRWAY INHALATION TREATMENT: CPT

## 2018-08-17 PROCEDURE — 97535 SELF CARE MNGMENT TRAINING: CPT

## 2018-08-17 PROCEDURE — 6370000000 HC RX 637 (ALT 250 FOR IP): Performed by: NURSE PRACTITIONER

## 2018-08-17 PROCEDURE — 94761 N-INVAS EAR/PLS OXIMETRY MLT: CPT

## 2018-08-17 PROCEDURE — 97110 THERAPEUTIC EXERCISES: CPT

## 2018-08-17 PROCEDURE — 6370000000 HC RX 637 (ALT 250 FOR IP): Performed by: PHYSICAL MEDICINE & REHABILITATION

## 2018-08-17 PROCEDURE — 1180000000 HC REHAB R&B

## 2018-08-17 RX ORDER — OXYCODONE HCL 10 MG/1
10 TABLET, FILM COATED, EXTENDED RELEASE ORAL EVERY 12 HOURS SCHEDULED
Status: DISCONTINUED | OUTPATIENT
Start: 2018-08-17 | End: 2018-08-28 | Stop reason: HOSPADM

## 2018-08-17 RX ADMIN — BACLOFEN 10 MG: 10 TABLET ORAL at 08:56

## 2018-08-17 RX ADMIN — Medication 400 MG: at 08:55

## 2018-08-17 RX ADMIN — TRAMADOL HYDROCHLORIDE 50 MG: 50 TABLET, FILM COATED ORAL at 05:46

## 2018-08-17 RX ADMIN — OXYBUTYNIN CHLORIDE 10 MG: 5 TABLET, EXTENDED RELEASE ORAL at 08:55

## 2018-08-17 RX ADMIN — SIMVASTATIN 10 MG: 5 TABLET, FILM COATED ORAL at 19:37

## 2018-08-17 RX ADMIN — ACETAMINOPHEN 650 MG: 325 TABLET ORAL at 23:34

## 2018-08-17 RX ADMIN — DOCUSATE SODIUM 200 MG: 100 CAPSULE, LIQUID FILLED ORAL at 19:37

## 2018-08-17 RX ADMIN — ASPIRIN 81 MG: 81 TABLET, COATED ORAL at 19:37

## 2018-08-17 RX ADMIN — Medication 2 PUFF: at 04:51

## 2018-08-17 RX ADMIN — NALOXEGOL OXALATE 12.5 MG: 12.5 TABLET, FILM COATED ORAL at 15:08

## 2018-08-17 RX ADMIN — POLYETHYLENE GLYCOL 3350 17 G: 17 POWDER, FOR SOLUTION ORAL at 20:06

## 2018-08-17 RX ADMIN — OXYCODONE HYDROCHLORIDE 5 MG: 5 TABLET ORAL at 08:55

## 2018-08-17 RX ADMIN — ASPIRIN 81 MG: 81 TABLET, COATED ORAL at 08:55

## 2018-08-17 RX ADMIN — OXYCODONE HYDROCHLORIDE 5 MG: 5 TABLET ORAL at 15:07

## 2018-08-17 RX ADMIN — DOCUSATE SODIUM 200 MG: 100 CAPSULE, LIQUID FILLED ORAL at 08:55

## 2018-08-17 RX ADMIN — TRAMADOL HYDROCHLORIDE 50 MG: 50 TABLET, FILM COATED ORAL at 12:17

## 2018-08-17 RX ADMIN — ACETAMINOPHEN 650 MG: 325 TABLET ORAL at 05:47

## 2018-08-17 RX ADMIN — SENNOSIDES AND DOCUSATE SODIUM 1 TABLET: 8.6; 5 TABLET ORAL at 08:55

## 2018-08-17 RX ADMIN — DULOXETINE HYDROCHLORIDE 60 MG: 60 CAPSULE, DELAYED RELEASE ORAL at 08:55

## 2018-08-17 RX ADMIN — ACETAMINOPHEN 650 MG: 325 TABLET ORAL at 18:27

## 2018-08-17 RX ADMIN — MULTIPLE VITAMINS W/ MINERALS TAB 1 TABLET: TAB at 08:55

## 2018-08-17 RX ADMIN — Medication 2 PUFF: at 16:28

## 2018-08-17 RX ADMIN — TRAMADOL HYDROCHLORIDE 50 MG: 50 TABLET, FILM COATED ORAL at 18:27

## 2018-08-17 RX ADMIN — ACETAMINOPHEN 650 MG: 325 TABLET ORAL at 12:17

## 2018-08-17 RX ADMIN — BACLOFEN 10 MG: 10 TABLET ORAL at 19:38

## 2018-08-17 RX ADMIN — BACLOFEN 10 MG: 10 TABLET ORAL at 15:08

## 2018-08-17 RX ADMIN — ERGOCALCIFEROL 50000 UNITS: 1.25 CAPSULE ORAL at 08:55

## 2018-08-17 RX ADMIN — TRAMADOL HYDROCHLORIDE 50 MG: 50 TABLET, FILM COATED ORAL at 23:36

## 2018-08-17 RX ADMIN — Medication 400 MG: at 19:38

## 2018-08-17 RX ADMIN — OXYCODONE HYDROCHLORIDE 10 MG: 10 TABLET, FILM COATED, EXTENDED RELEASE ORAL at 19:38

## 2018-08-17 ASSESSMENT — PAIN DESCRIPTION - ORIENTATION
ORIENTATION: RIGHT;LEFT;LOWER
ORIENTATION: LEFT
ORIENTATION: RIGHT;LEFT;LOWER
ORIENTATION: LEFT
ORIENTATION: RIGHT;LEFT;LOWER
ORIENTATION: LEFT
ORIENTATION: RIGHT;LEFT;LOWER

## 2018-08-17 ASSESSMENT — PAIN DESCRIPTION - PAIN TYPE
TYPE: SURGICAL PAIN
TYPE: SURGICAL PAIN;ACUTE PAIN

## 2018-08-17 ASSESSMENT — PAIN DESCRIPTION - LOCATION
LOCATION: BACK;HIP;KNEE
LOCATION: HIP;BACK;KNEE
LOCATION: HIP
LOCATION: BACK;HIP
LOCATION: BACK;HIP;KNEE
LOCATION: BACK;HIP;KNEE
LOCATION: HIP
LOCATION: BACK;HIP;KNEE
LOCATION: HIP

## 2018-08-17 ASSESSMENT — PAIN SCALES - GENERAL
PAINLEVEL_OUTOF10: 2
PAINLEVEL_OUTOF10: 4
PAINLEVEL_OUTOF10: 4
PAINLEVEL_OUTOF10: 5
PAINLEVEL_OUTOF10: 4
PAINLEVEL_OUTOF10: 2
PAINLEVEL_OUTOF10: 3

## 2018-08-17 ASSESSMENT — PAIN DESCRIPTION - FREQUENCY
FREQUENCY: CONTINUOUS
FREQUENCY: CONTINUOUS

## 2018-08-17 ASSESSMENT — PAIN DESCRIPTION - DESCRIPTORS
DESCRIPTORS: ACHING

## 2018-08-17 NOTE — PROGRESS NOTES
Physical Therapy Rehab Treatment Note  [facility]  Room: I795/C478-12       NAME: Makayla Vidales  : 1960 (59 y.o.)  MRN: [id number]  CODE STATUS: Full Code    Date of Service: 2018  Chart Reviewed: Yes  Family / Caregiver Present: No    Restrictions:  Restrictions/Precautions: Fall Risk  Position Activity Restriction  Hip Precautions: Posterior hip precautions  Other position/activity restrictions: 25% Partial Weight Bearing L LE, JUAN hose and abdominal binder        SUBJECTIVE: Subjective: Pt stated she is very fatigue at this time from the morning therapy session but willing to try and see what she can do. Pain level reported as 5/10 at this time. Response To Previous Treatment: Patient reporting fatigue but able to participate. Pain Screening  Patient Currently in Pain: Yes       Post Treatment Pain Screenin/10  Pain Assessment  Pain Assessment: 0-10  Pain Level: 5  Pain Type: Surgical pain  Pain Location: Back;Hip;Knee  Pain Orientation: Right;Left;Lower    OBJECTIVE:                      Bed mobility  Rolling to Left: Supervision  Rolling to Right: Supervision  Supine to Sit: Stand by assistance  Sit to Supine: Stand by assistance  Scooting: Stand by assistance  Comment: Not Tested in PM tx session. Transfers  Sit to Stand: Stand by assistance;Contact guard assistance  Stand to sit: Stand by assistance  Bed to Chair: Contact guard assistance  Stand Pivot Transfers: Contact guard assistance  Comment: Sit-stand incrased to CGA in PM due to increased fatigue levels. Ambulation  Ambulation?: Yes  Ambulation 1  Surface: carpet  Device: Rolling Walker  Assistance: Contact guard assistance;Stand by assistance  Quality of Gait: Pt increased step to gait pattern with maintaining PWB status. VCs to avoid hyper extension of R knee with gait cycle with good carry over.    Distance: 30ft x2   Stairs/Curb  Stairs?: Yes   Stairs  # Steps : 6  Stairs Height: 4\"  Assistance: Minimal

## 2018-08-17 NOTE — PROGRESS NOTES
incorporated into activity. Assessment   Activity Tolerance  Activity Tolerance: Patient Tolerated treatment well  Safety Devices  Safety Devices in place: Yes  Type of devices:  All fall risk precautions in place          Plan   Plan  Times per week: 5-7x/week\, 15-60 minutes per day   Times per day: Daily  Plan weeks: 2 weeks   Current Treatment Recommendations: Strengthening, ROM, Balance Training, Functional Mobility Training, Endurance Training, Stair training, Pain Management, Safety Education & Training, Patient/Caregiver Education & Training, Equipment Evaluation, Education, & procurement, Self-Care / ADL, Home Management Training  Plan Comment: Continue per OT POC for d/c on 8-28-18    Goals  Patient Goals   Patient goals : \"I want to be more mobile\"       Therapy Time   Individual Concurrent Group Co-treatment   Time In 1130         Time Out 1200         Minutes 30           Electronically signed by ANA Tillman on 8/17/2018 at 12:00 PM  ANA Tillman

## 2018-08-17 NOTE — PROGRESS NOTES
X 10 reps in various planes. Pt socially appropriate helping the other pt find the bingo matches on her board and initiating and keeping count for the exercises for the other pt. Assessment      Activity Tolerance  Activity Tolerance: Patient Tolerated treatment well  Safety Devices  Safety Devices in place: Yes  Type of devices:  All fall risk precautions in place          Plan   Plan  Times per week: 5-7x/week\, 15-60 minutes per day   Times per day: Daily  Plan weeks: 2 weeks   Current Treatment Recommendations: Strengthening, ROM, Balance Training, Functional Mobility Training, Endurance Training, Stair training, Pain Management, Safety Education & Training, Patient/Caregiver Education & Training, Equipment Evaluation, Education, & procurement, Self-Care / ADL, Home Management Training  Plan Comment: Continue per OT POC for d/c on 8-28-18  G-Code     OutComes Score                                           AM-PAC Score             Goals  Patient Goals   Patient goals : \"I want to be more mobile\"       Therapy Time   Individual Concurrent Group Co-treatment   Time In    1330     Time Out    1400     Minutes    30           Electronically signed by ANA Chou on 8/17/2018 at [address] [facility]

## 2018-08-17 NOTE — PLAN OF CARE
Problem: Falls - Risk of:  Goal: Absence of physical injury  Absence of physical injury   Outcome: Ongoing      Problem: Risk for Impaired Skin Integrity  Goal: Tissue integrity - skin and mucous membranes  Structural intactness and normal physiological function of skin and  mucous membranes.    Outcome: Ongoing

## 2018-08-17 NOTE — PROGRESS NOTES
Physical Therapy Rehab Treatment Note  Facility/Department: [facility]  Room: [facility]/N660-43       NAME: Juan Carlos Johnson  : 1960 (59 y.o.)  MRN: [id number]  CODE STATUS: Full Code    Date of Service: 2018  Chart Reviewed: Yes  Family / Caregiver Present: No    Restrictions:  Restrictions/Precautions: Fall Risk  Position Activity Restriction  Hip Precautions: Posterior hip precautions  Other position/activity restrictions: 25% Partial Weight Bearing L LE, JUAN hose and abdominal binder        SUBJECTIVE: Subjective: Pt reports L hip pain of 4/10 in L Hip, didn't have a very good night of sleep. Pain Screening  Patient Currently in Pain: Yes       Post Treatment Pain Screenin/10  Pain Assessment  Pain Assessment: 0-10  Pain Level: 4  Pain Type: Surgical pain  Pain Location: Hip  Pain Orientation: Left    OBJECTIVE:                      Bed mobility  Rolling to Left: Supervision  Rolling to Right: Supervision  Supine to Sit: Stand by assistance  Sit to Supine: Stand by assistance  Scooting: Stand by assistance  Comment: SBA for maintaining hip percautions only. Transfers  Sit to Stand: Stand by assistance  Stand to sit: Stand by assistance  Bed to Chair: Contact guard assistance  Stand Pivot Transfers: Contact guard assistance  Comment: CGA for improved safety with turns    Ambulation  Ambulation?: Yes  Ambulation 1  Surface: carpet  Device: Rolling Walker  Assistance: Contact guard assistance;Stand by assistance  Quality of Gait: Pt maintainn PWB with ambualtion as pt walks with toe touch and flexed knee on L LE, R knee hyper extends with ambulation with pt limited active hip and knee flexion.    Distance: 15 ft x3        Activity Tolerance  Activity Tolerance: Patient Tolerated treatment well    Exercises  Hamstring Sets: x15  Quad Sets: x15  Heelslides: x15, Eccentric control focus of R LE heel slides  Gluteal Sets: x15  Knee Long Arc Quad: x15  Knee Short Arc Quad: x15  Ankle Pumps: x15  Other

## 2018-08-17 NOTE — PROGRESS NOTES
Subjective: The patient complains of severe  acute  left hip pain partially relieved by  rest, medications, ice and Lidoderm and exacerbated by palpation range of motion weightbearing. I am concerned about patients  frail bony structure therefore only 25% weightbearing. She also complains of active chronic right-sided spasticity only partially relieved with baclofen. I am also concerned about her severe nausea and vomiting likely secondary to Duragesic. Her pain has gone weight higher now that she is off the Duragesic patch. She would like to trial oxycodone products a higher dose as she notes she tolerates them and it works. Promises she is severely constipated she states that she can go 6 days at home without having a bowel movement. I will add his stool motility stimulator--as this is probably a chronic problempossibly  from her spinal cord injuries and I will also add continue the stool softener. I will also discontinue her Hep-Lock. ROS x10: The patient also complains of severely impaired mobility and activities of daily living. Otherwise no new problems with vision, hearing, nose, mouth, throat, dermal, cardiovascular, GI, , pulmonary, musculoskeletal, psychiatric or neurological. See Rehab H&P on Rehab chart dated . Vital signs:  BP (!) 154/83   Pulse 106   Temp 98 °F (36.7 °C) (Oral)   Resp 18   Ht 5' 1\" (1.549 m)   Wt 129 lb 3 oz (58.6 kg)   LMP 08/08/1997   SpO2 96%   BMI 24.41 kg/m²   I/O:   PO/Intake:  Good to fair PO intake,   no nausea and vomiting    Bowel/Bladder:  continent,  active chronic constipation  General:  Patient is well developed, adequately nourished, non-obese and     well kempt. HEENT:    PERRLA, hearing intact to loud voice, external inspection of ear     and nose benign. Inspection of lips, tongue and gums benign  Musculoskeletal: No significant change in strength or tone. All joints stable.       Inspection and palpation of digits and nails rehabilitation program including PT/OT to improve balance, ambulation, ADLs, and to improve the P/AROM. Therapeutic modifications regarding activities in therapies, place, amount of time per day and intensity of therapy made daily. In bed therapies or bedside therapies prn.   2. Bowel Severe opiate-related constipation and Bladder dysfunction:  frequent toileting, ambulate to bathroom with assistance, check post void residuals. Check for C.difficile x1 if >2 loose stools in 24 hours, continue bowel & bladder program.  Monitor bowel and bladder function. Lactinex 2 PO every AC. MOM prn, Brown Bomb prn, Glycerin suppository prn, enema prn. Movantyk  3. Severe postop left hip pain, active chronic neck mid back and low back pain generalized OA pain: reassess pain every shift and prior to and after each therapy session, give prn Tylenol and  Norco, modalities prn in therapy, Lidoderm, K-pad prn. Trial Duragesic-DC'd due to nausea-trial low-dose OxyContin and titrate. 4. Skin healing left hip incision and breakdown risk especially bilateral heels and sacrum:  continue pressure relief program.  Daily skin exams and reports from nursing. Add egg crate mattress and side-to-side turns  5. Severe Fatigue due to nutritional and hydration deficiency:  continue to monitor I&Os, calorie counts prn, dietary consult prn.  6. Acute episodic insomnia with situational adjustment disorder:  prn Ambien, monitor for day time sedation. 7. Falls risk elevated:  patient to use call light to get nursing assistance to get up, bed and chair alarm. 8. Elevated DVT risk: progressive activities in PT, continue prophylaxis JUAN hose, elevation. 9. Complex discharge planning:  Discharge 8/28/18 home with her significant other and home care PT OT RN 8 and  with a rolling versus willed walker as well as a wheelchair for long distances.   Her 25% weightbearing is her biggest obstacle as well as the spasticity in her right lower extremity as a result of previous spinal cord injuries. Weekly team meeting every  Thursday's to assess progress towards goals, discuss and address social, psychological and medical comorbidities and to address difficulties they may be having progressing in therapy. Patient and family education is in progress. The patient is to follow-up with their family physician after discharge. Complex Active General Medical Issues that complicate care Assess & Plan:    1. 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 Osteoarthrosis involving lower leg,Spinal stenosis, lumbar region, without neurogenic claudication, Chronic pain syndrome  3. COPD (chronic obstructive pulmonary disease) -pulse ox checks every shift titrate nasal cannula O2-add Xolair and Flonase, and Proventil  4. Hyperlipidemia,   Hypertension-vital signs every shift, titrate Zocor-add antihypertensive as needed, consult medical hospitalist for backup medical  5. History of fusion of cervical spine Cervical myelopathy with cervical myelopathy with residual right-sided spasticity  6. Constipation, chronic-with active exacerbation secondary to increased opiate use, titrate mag oxide, Colace  7. Dysthymia-add rec therapy rehabilitation psychology and Cymbalta  8. Neuropathy of both upper extremities-avoid toxic medications titrate membrane stabilizing medication such as Cymbalta and/or Topamax  9. OAB (overactive bladder) Mixed stress and urge urinary incontinence-Ditropan and monitor postvoid residuals, add frequent toileting  10. Type 2 diabetes mellitus with hyperglycemia, without long-term current use of insulin (HCC)  11.  Nausea  severe-IV fluids and scheduled transition to as needed as needed, dose Zofran scheduled transition to

## 2018-08-18 PROCEDURE — 97110 THERAPEUTIC EXERCISES: CPT

## 2018-08-18 PROCEDURE — 6370000000 HC RX 637 (ALT 250 FOR IP): Performed by: PHYSICAL MEDICINE & REHABILITATION

## 2018-08-18 PROCEDURE — 94760 N-INVAS EAR/PLS OXIMETRY 1: CPT

## 2018-08-18 PROCEDURE — 1180000000 HC REHAB R&B

## 2018-08-18 PROCEDURE — 97530 THERAPEUTIC ACTIVITIES: CPT

## 2018-08-18 PROCEDURE — 97116 GAIT TRAINING THERAPY: CPT

## 2018-08-18 PROCEDURE — 94640 AIRWAY INHALATION TREATMENT: CPT

## 2018-08-18 PROCEDURE — 97535 SELF CARE MNGMENT TRAINING: CPT

## 2018-08-18 PROCEDURE — 99232 SBSQ HOSP IP/OBS MODERATE 35: CPT | Performed by: PHYSICAL MEDICINE & REHABILITATION

## 2018-08-18 PROCEDURE — 6370000000 HC RX 637 (ALT 250 FOR IP): Performed by: NURSE PRACTITIONER

## 2018-08-18 RX ADMIN — ASPIRIN 81 MG: 81 TABLET, COATED ORAL at 21:46

## 2018-08-18 RX ADMIN — TRAMADOL HYDROCHLORIDE 50 MG: 50 TABLET, FILM COATED ORAL at 23:48

## 2018-08-18 RX ADMIN — OXYCODONE HYDROCHLORIDE 10 MG: 10 TABLET, FILM COATED, EXTENDED RELEASE ORAL at 07:56

## 2018-08-18 RX ADMIN — BACLOFEN 10 MG: 10 TABLET ORAL at 07:57

## 2018-08-18 RX ADMIN — TRAMADOL HYDROCHLORIDE 50 MG: 50 TABLET, FILM COATED ORAL at 17:33

## 2018-08-18 RX ADMIN — OXYBUTYNIN CHLORIDE 10 MG: 5 TABLET, EXTENDED RELEASE ORAL at 07:56

## 2018-08-18 RX ADMIN — SENNOSIDES AND DOCUSATE SODIUM 1 TABLET: 8.6; 5 TABLET ORAL at 07:57

## 2018-08-18 RX ADMIN — Medication 2 PUFF: at 18:38

## 2018-08-18 RX ADMIN — ACETAMINOPHEN 650 MG: 325 TABLET ORAL at 17:33

## 2018-08-18 RX ADMIN — DULOXETINE HYDROCHLORIDE 60 MG: 60 CAPSULE, DELAYED RELEASE ORAL at 07:56

## 2018-08-18 RX ADMIN — MAGESIUM CITRATE 296 ML: 1.75 LIQUID ORAL at 17:34

## 2018-08-18 RX ADMIN — BACLOFEN 10 MG: 10 TABLET ORAL at 12:50

## 2018-08-18 RX ADMIN — Medication 400 MG: at 21:47

## 2018-08-18 RX ADMIN — DOCUSATE SODIUM 200 MG: 100 CAPSULE, LIQUID FILLED ORAL at 07:56

## 2018-08-18 RX ADMIN — OXYCODONE HYDROCHLORIDE 10 MG: 10 TABLET, FILM COATED, EXTENDED RELEASE ORAL at 21:46

## 2018-08-18 RX ADMIN — Medication 2 PUFF: at 05:21

## 2018-08-18 RX ADMIN — ACETAMINOPHEN 650 MG: 325 TABLET ORAL at 12:50

## 2018-08-18 RX ADMIN — TRAMADOL HYDROCHLORIDE 50 MG: 50 TABLET, FILM COATED ORAL at 12:50

## 2018-08-18 RX ADMIN — Medication 400 MG: at 07:57

## 2018-08-18 RX ADMIN — TRAMADOL HYDROCHLORIDE 50 MG: 50 TABLET, FILM COATED ORAL at 05:37

## 2018-08-18 RX ADMIN — DOCUSATE SODIUM 200 MG: 100 CAPSULE, LIQUID FILLED ORAL at 21:47

## 2018-08-18 RX ADMIN — SIMVASTATIN 10 MG: 5 TABLET, FILM COATED ORAL at 21:46

## 2018-08-18 RX ADMIN — BACLOFEN 10 MG: 10 TABLET ORAL at 21:46

## 2018-08-18 RX ADMIN — ACETAMINOPHEN 650 MG: 325 TABLET ORAL at 05:35

## 2018-08-18 RX ADMIN — MULTIPLE VITAMINS W/ MINERALS TAB 1 TABLET: TAB at 07:57

## 2018-08-18 RX ADMIN — ASPIRIN 81 MG: 81 TABLET, COATED ORAL at 07:56

## 2018-08-18 RX ADMIN — NALOXEGOL OXALATE 12.5 MG: 12.5 TABLET, FILM COATED ORAL at 07:56

## 2018-08-18 RX ADMIN — ACETAMINOPHEN 650 MG: 325 TABLET ORAL at 23:48

## 2018-08-18 ASSESSMENT — PAIN SCALES - GENERAL
PAINLEVEL_OUTOF10: 4
PAINLEVEL_OUTOF10: 4
PAINLEVEL_OUTOF10: 6
PAINLEVEL_OUTOF10: 4
PAINLEVEL_OUTOF10: 3
PAINLEVEL_OUTOF10: 4
PAINLEVEL_OUTOF10: 3
PAINLEVEL_OUTOF10: 0
PAINLEVEL_OUTOF10: 3
PAINLEVEL_OUTOF10: 4

## 2018-08-18 ASSESSMENT — PAIN DESCRIPTION - FREQUENCY
FREQUENCY: CONTINUOUS
FREQUENCY: CONTINUOUS

## 2018-08-18 ASSESSMENT — PAIN DESCRIPTION - DESCRIPTORS
DESCRIPTORS: ACHING

## 2018-08-18 ASSESSMENT — PAIN DESCRIPTION - ORIENTATION
ORIENTATION: LEFT

## 2018-08-18 ASSESSMENT — PAIN DESCRIPTION - LOCATION
LOCATION: HIP
LOCATION: HIP;BACK
LOCATION: HIP;BACK

## 2018-08-18 ASSESSMENT — PAIN DESCRIPTION - PAIN TYPE
TYPE: SURGICAL PAIN

## 2018-08-18 NOTE — PROGRESS NOTES
Physical Therapy Rehab Treatment Note  Facility/Department: [facility]  Room: G837/O987-56       NAME: Leslye Reyes  : 1960 (59 y.o.)  MRN: [id number]  CODE STATUS: Full Code    Date of Service: 2018     Restrictions:     SUBJECTIVE: Subjective: Pt reports she is fatigued, and R LE is sore from yesterdays treatment, but is willing to participate  Response To Previous Treatment: Patient reporting fatigue but able to participate. Pain Screening  Patient Currently in Pain: Yes     Post Treatment Pain Screenin/10  Pain Assessment  Pain Assessment: 0-10  Pain Level: 3  Pain Type: Surgical pain  Pain Location: Hip  Pain Orientation: Left  Pain Descriptors: Aching  Pain Frequency: Continuous    OBJECTIVE:                 Transfers  Sit to Stand: Contact guard assistance;Stand by assistance  Stand to sit: Stand by assistance  Stand Pivot Transfers: Contact guard assistance  Comment: vcs for safe stand pivot transfer to toilet, decreased safety awareness as pt was rushing    Ambulation 1  Surface: carpet  Device: Rolling Walker  Assistance: Contact guard assistance;Stand by assistance  Quality of Gait: pt required cueing to prevent R knee hyperextension, good ability to maintain WB status  Distance: 30ftx2   Stairs  Comment: not tested am 2nd to pt reporting fatigue and increased R knee pain, will attempt in PM    Activity Tolerance  Activity Tolerance: Patient Tolerated treatment well;Patient limited by fatigue;Patient limited by pain  Activity Tolerance: pt reporting increasing R knee pain as treatment session continues    Exercises  Heelslides: HS curls YTB x20  Gluteal Sets: x20  Hip Flexion:  R hip x20  Hip Abduction: YTB x20, add with ball x20  Knee Long Arc Quad: x20  Ankle Pumps: x20  Other exercises  Other exercises 2: sit to stand x5, with focus on safety, pt able to stand 1 min x2     ASSESSMENT/COMMENTS:     PLAN OF CARE/Safety:   Safety Devices  Type of devices:  All fall risk precautions in

## 2018-08-18 NOTE — PROGRESS NOTES
nasal cannula O2-add Xolair and Flonase, and Proventil  4. Hyperlipidemia,   Hypertension-vital signs every shift, titrate Zocor-add antihypertensive as needed, consult medical hospitalist for backup medical  5. History of fusion of cervical spine Cervical myelopathy with cervical myelopathy with residual right-sided spasticity  6. Constipation, chronic-with active exacerbation secondary to increased opiate use, titrate mag oxide, Colace  7. Dysthymia-add rec therapy rehabilitation psychology and Cymbalta  8. Neuropathy of both upper extremities-avoid toxic medications titrate membrane stabilizing medication such as Cymbalta and/or Topamax  9. OAB (overactive bladder) Mixed stress and urge urinary incontinence-Ditropan and monitor postvoid residuals, add frequent toileting  10. Type 2 diabetes mellitus with hyperglycemia, without long-term current use of insulin ([facility])  11.  Nausea  severe-IV fluids and scheduled transition to as needed as needed, dose Zofran scheduled transition to as needed             Jackie Londono D.O., PM&R     Attending    [address]

## 2018-08-18 NOTE — PROGRESS NOTES
Occupational Therapy  Facility/Department: [facility]  Daily Treatment Note  NAME: Christi Contreras  : 1960  MRN: [id number]    Date of Service: 2018    Discharge Recommendations:  Continue to assess pending progress       Patient Diagnosis(es): There were no encounter diagnoses. has a past medical history of Arthritis; COPD (chronic obstructive pulmonary disease) ([facility] [address] Hyperlipidemia; Hypertension; and Type 2 diabetes mellitus with hyperglycemia, without long-term current use of insulin ([facility] [address] has a past surgical history that includes Colonoscopy ();  section (); Hysterectomy (); Breast biopsy (Right, ); Cervical spine surgery (); cervical fusion (); and pr total hip arthroplasty (Left, 2018). Restrictions  Restrictions/Precautions  Restrictions/Precautions: Fall Risk  Position Activity Restriction  Hip Precautions: Posterior hip precautions  Other position/activity restrictions: 25% Partial Weight Bearing L ANNEMARIE, JUAN hose and abdominal binder   Subjective   General  Chart Reviewed: Yes  Patient assessed for rehabilitation services?: Yes  Response to previous treatment: Patient with no complaints from previous session  Family / Caregiver Present: No  Pre Treatment Pain Screening  Pain at present: 4  Scale Used: Numeric Score  Intervention List: Patient able to continue with treatment  Pain Assessment  Patient Currently in Pain: Yes  Pain Assessment: 0-10  Pain Level: 4  Pain Type: Surgical pain  Pain Location: Hip;Back  Pain Orientation: Left  Pain Descriptors: Aching  Vital Signs  Patient Currently in Pain: Yes   Orientation     Objective  Pt continued with 1# wt therabar wrist flex/ext, and shoulder circumduction in forward and reverse motion, shoulder press, and shoulder ab/adduction 20 reps each with shoulder exercises 10 reps x2. Pt worked on placing and removing wt. Clothespins on and off leveled metal dowel rods.  Pt opened/closed household containers

## 2018-08-18 NOTE — PROGRESS NOTES
Occupational Therapy  Facility/Department: [facility]  Daily Treatment Note  NAME: Kala Connelly  : 1960  MRN: [id number]    Date of Service: 2018    Discharge Recommendations:  Continue to assess pending progress       Patient Diagnosis(es): There were no encounter diagnoses. has a past medical history of Arthritis; COPD (chronic obstructive pulmonary disease) ([facility] [address] Hyperlipidemia; Hypertension; and Type 2 diabetes mellitus with hyperglycemia, without long-term current use of insulin ([facility] [address] has a past surgical history that includes Colonoscopy ();  section (); Hysterectomy (); Breast biopsy (Right, ); Cervical spine surgery (); cervical fusion (); and pr total hip arthroplasty (Left, 2018). Restrictions  Restrictions/Precautions  Restrictions/Precautions: Fall Risk  Position Activity Restriction  Hip Precautions: Posterior hip precautions  Other position/activity restrictions: 25% Partial Weight Bearing L LE, JUAN hose and abdominal binder   Subjective   General  Chart Reviewed: Yes  Patient assessed for rehabilitation services?: Yes  Response to previous treatment: Patient with no complaints from previous session  Family / Caregiver Present: No      Pt reported 6/10 pain in back and hips initial; 4/10 post treatment pain following hot pack application    Orientation   OX3    Objective     Applied hot pack to lumbar and hip region for pain reduction x10 minutes. Pt performed upper extremity conditioning exercises 3 sets of 10 through all joint motions using a 1 pound dowel. Assessment    Pt tolerated and completed all exercises well without unnecessary fatigue. Pt tolerated the hot pack well.              Plan   Plan  Times per week: 5-7x/week\, 15-60 minutes per day   Times per day: Daily  Plan weeks: 2 weeks   Current Treatment Recommendations: Strengthening, ROM, Balance Training, Functional Mobility Training, Endurance Training, Stair training, Pain Management, Safety Education & Training, Patient/Caregiver Education & Training, Equipment Evaluation, Education, & procurement, Self-Care / ADL, Home Management Training  Plan Comment: Continue per OT POC for d/c on 8-28-18  G-Code     OutComes Score                                           AM-PAC Score             Goals  Patient Goals   Patient goals : \"I want to be more mobile\"       Therapy Time   Individual Concurrent Group Co-treatment   Time In  1000         Time Out  1030         Minutes  Sadie 1, OT

## 2018-08-19 PROCEDURE — 94640 AIRWAY INHALATION TREATMENT: CPT

## 2018-08-19 PROCEDURE — 97116 GAIT TRAINING THERAPY: CPT

## 2018-08-19 PROCEDURE — 97535 SELF CARE MNGMENT TRAINING: CPT

## 2018-08-19 PROCEDURE — 6370000000 HC RX 637 (ALT 250 FOR IP): Performed by: NURSE PRACTITIONER

## 2018-08-19 PROCEDURE — 94760 N-INVAS EAR/PLS OXIMETRY 1: CPT

## 2018-08-19 PROCEDURE — 6370000000 HC RX 637 (ALT 250 FOR IP): Performed by: PHYSICAL MEDICINE & REHABILITATION

## 2018-08-19 PROCEDURE — 94761 N-INVAS EAR/PLS OXIMETRY MLT: CPT

## 2018-08-19 PROCEDURE — 1180000000 HC REHAB R&B

## 2018-08-19 RX ADMIN — Medication 2 PUFF: at 19:07

## 2018-08-19 RX ADMIN — SENNOSIDES AND DOCUSATE SODIUM 1 TABLET: 8.6; 5 TABLET ORAL at 10:17

## 2018-08-19 RX ADMIN — OXYCODONE HYDROCHLORIDE 10 MG: 10 TABLET, FILM COATED, EXTENDED RELEASE ORAL at 21:46

## 2018-08-19 RX ADMIN — Medication 400 MG: at 21:47

## 2018-08-19 RX ADMIN — TRAMADOL HYDROCHLORIDE 50 MG: 50 TABLET, FILM COATED ORAL at 18:07

## 2018-08-19 RX ADMIN — MAGESIUM CITRATE 296 ML: 1.75 LIQUID ORAL at 18:20

## 2018-08-19 RX ADMIN — ASPIRIN 81 MG: 81 TABLET, COATED ORAL at 10:17

## 2018-08-19 RX ADMIN — NALOXEGOL OXALATE 12.5 MG: 12.5 TABLET, FILM COATED ORAL at 06:16

## 2018-08-19 RX ADMIN — Medication 2 PUFF: at 04:47

## 2018-08-19 RX ADMIN — ACETAMINOPHEN 650 MG: 325 TABLET ORAL at 18:06

## 2018-08-19 RX ADMIN — ACETAMINOPHEN 650 MG: 325 TABLET ORAL at 06:16

## 2018-08-19 RX ADMIN — Medication 400 MG: at 10:17

## 2018-08-19 RX ADMIN — MULTIPLE VITAMINS W/ MINERALS TAB 1 TABLET: TAB at 10:17

## 2018-08-19 RX ADMIN — DOCUSATE SODIUM 200 MG: 100 CAPSULE, LIQUID FILLED ORAL at 21:44

## 2018-08-19 RX ADMIN — BACLOFEN 10 MG: 10 TABLET ORAL at 21:47

## 2018-08-19 RX ADMIN — TRAMADOL HYDROCHLORIDE 50 MG: 50 TABLET, FILM COATED ORAL at 06:16

## 2018-08-19 RX ADMIN — SIMVASTATIN 10 MG: 5 TABLET, FILM COATED ORAL at 21:44

## 2018-08-19 RX ADMIN — ACETAMINOPHEN 650 MG: 325 TABLET ORAL at 12:44

## 2018-08-19 RX ADMIN — ASPIRIN 81 MG: 81 TABLET, COATED ORAL at 21:47

## 2018-08-19 RX ADMIN — BACLOFEN 10 MG: 10 TABLET ORAL at 15:20

## 2018-08-19 RX ADMIN — BACLOFEN 10 MG: 10 TABLET ORAL at 10:16

## 2018-08-19 RX ADMIN — OXYBUTYNIN CHLORIDE 10 MG: 5 TABLET, EXTENDED RELEASE ORAL at 10:16

## 2018-08-19 RX ADMIN — DOCUSATE SODIUM 200 MG: 100 CAPSULE, LIQUID FILLED ORAL at 10:17

## 2018-08-19 RX ADMIN — OXYCODONE HYDROCHLORIDE 5 MG: 5 TABLET ORAL at 12:44

## 2018-08-19 RX ADMIN — DULOXETINE HYDROCHLORIDE 60 MG: 60 CAPSULE, DELAYED RELEASE ORAL at 10:17

## 2018-08-19 RX ADMIN — OXYCODONE HYDROCHLORIDE 10 MG: 10 TABLET, FILM COATED, EXTENDED RELEASE ORAL at 10:16

## 2018-08-19 ASSESSMENT — PAIN SCALES - GENERAL
PAINLEVEL_OUTOF10: 7
PAINLEVEL_OUTOF10: 2
PAINLEVEL_OUTOF10: 7
PAINLEVEL_OUTOF10: 5
PAINLEVEL_OUTOF10: 0
PAINLEVEL_OUTOF10: 3

## 2018-08-19 ASSESSMENT — PAIN DESCRIPTION - LOCATION: LOCATION: HIP;BACK

## 2018-08-19 ASSESSMENT — PAIN DESCRIPTION - DESCRIPTORS: DESCRIPTORS: ACHING

## 2018-08-19 ASSESSMENT — PAIN DESCRIPTION - ORIENTATION: ORIENTATION: LEFT

## 2018-08-19 NOTE — PROGRESS NOTES
one arm only  Dressing-Lower: 4 - Requires assist with buttons/zippers/shoelaces and/or assist with shoes only  Toiletin - Requires steadying assistance only  Toilet Transfer: 4 - Requires steadying assistance only < 25% assist  Tub Transfer:  (sponge bath)  Shower Transfer:  (sponge bath),  , Assessment: Pt is a 59year old female, s/p L ROSMERY as a result of L hip osteoarthritis. Pts hip pain had been increasing and interfering with her mobility and ADL's. Pt was using a manual w/c at home d/t pain. Pt lives alone, but will be staying with sig other following d/c from rehab for additonal assistance. Pt has a shower/tub at home as well as sig other's home with a shower chair. Pt presents with decreased mobility of RLE d/t past neck surgeries (presents as spasticity). Pt is 25% WB on LLE. Pt presents with decreased functional mobility and ADL/IADLs and would benefit from occupational therapy services to address areas of need and improve independence decreased burden of care upon d/c.     Speech therapy: FIMS: Comprehension: 5 - Patient understands basic needs (hungry/hot/pain)  Expression: 5 - Expresses basic ideas/needs only (hungry/hot/pain)  Social Interaction: 5 - Patient is appropriate with supervision/cues  Problem Solvin - Patient able to solve simple/routine tasks  Memory: 5 - Patient requires prompting with stress/unfamiliar situations      Lab/X-ray studies reviewed, analyzed and discussed with patient and staff:   No results found for this or any previous visit (from the past 24 hour(s)). Xr Hip Left (1 View) Result Date: 2018   UNREMARKABLE LEFT HIP RADIOGRAPHS DURING AND AFTER TOTAL LEFT HIP ARTHROPLASTY PLACEMENT. Xr Hip Left (2-3 Views) Result Date: 2018   UNREMARKABLE LEFT HIP RADIOGRAPHS DURING AND AFTER TOTAL LEFT HIP ARTHROPLASTY PLACEMENT. Previous extensive, complex labs, notes and diagnostics reviewed and analyzed.      ALLERGIES:    Allergies as of

## 2018-08-19 NOTE — PROGRESS NOTES
Functions, Activity limitations: Decreased functional mobility ; Decreased balance;Decreased coordination;Decreased strength;Decreased ADL status; Decreased ROM; Decreased endurance  Assessment: Pt able to recall 3/3 hip precautions with prompting. Frequent VCs to improve gait quality as pt demo's Rt knee hyperextension, VCs for appropriate WBing and Lt foot placement. Fatigues quickly with standing activities. Prognosis: Good  REQUIRES PT FOLLOW UP: Yes    PLAN OF CARE/Safety:   Safety Devices  Type of devices:  All fall risk precautions in place      Therapy Time:   Individual   Time In 1101   Time Out 1136   Minutes 35     Minutes:      Transfer/Bed mobility training: 15      Gait training: 10      Neuro re education:      Therapeutic ex: [address] KIARRA Aleman, 08/19/18 at 12:30 PM     Electronically signed by Farhad Mckeon PTA on 8/19/2018 at 12:30 PM

## 2018-08-20 PROCEDURE — 94761 N-INVAS EAR/PLS OXIMETRY MLT: CPT

## 2018-08-20 PROCEDURE — 6370000000 HC RX 637 (ALT 250 FOR IP): Performed by: PHYSICAL MEDICINE & REHABILITATION

## 2018-08-20 PROCEDURE — 1180000000 HC REHAB R&B

## 2018-08-20 PROCEDURE — 6370000000 HC RX 637 (ALT 250 FOR IP): Performed by: NURSE PRACTITIONER

## 2018-08-20 PROCEDURE — 97110 THERAPEUTIC EXERCISES: CPT

## 2018-08-20 PROCEDURE — 97150 GROUP THERAPEUTIC PROCEDURES: CPT

## 2018-08-20 PROCEDURE — 97112 NEUROMUSCULAR REEDUCATION: CPT

## 2018-08-20 PROCEDURE — 94640 AIRWAY INHALATION TREATMENT: CPT

## 2018-08-20 PROCEDURE — 97542 WHEELCHAIR MNGMENT TRAINING: CPT

## 2018-08-20 PROCEDURE — 2700000000 HC OXYGEN THERAPY PER DAY

## 2018-08-20 PROCEDURE — 97116 GAIT TRAINING THERAPY: CPT

## 2018-08-20 PROCEDURE — 97535 SELF CARE MNGMENT TRAINING: CPT

## 2018-08-20 PROCEDURE — 97530 THERAPEUTIC ACTIVITIES: CPT

## 2018-08-20 RX ORDER — LACTULOSE 10 G/15ML
20 SOLUTION ORAL 2 TIMES DAILY PRN
Status: DISCONTINUED | OUTPATIENT
Start: 2018-08-20 | End: 2018-08-28 | Stop reason: HOSPADM

## 2018-08-20 RX ORDER — BISACODYL 10 MG
10 SUPPOSITORY, RECTAL RECTAL DAILY PRN
Status: DISCONTINUED | OUTPATIENT
Start: 2018-08-20 | End: 2018-08-28 | Stop reason: HOSPADM

## 2018-08-20 RX ADMIN — ASPIRIN 81 MG: 81 TABLET, COATED ORAL at 08:44

## 2018-08-20 RX ADMIN — TRAMADOL HYDROCHLORIDE 50 MG: 50 TABLET, FILM COATED ORAL at 05:41

## 2018-08-20 RX ADMIN — Medication 2 PUFF: at 16:38

## 2018-08-20 RX ADMIN — BACLOFEN 10 MG: 10 TABLET ORAL at 20:42

## 2018-08-20 RX ADMIN — BACLOFEN 10 MG: 10 TABLET ORAL at 08:44

## 2018-08-20 RX ADMIN — ACETAMINOPHEN 650 MG: 325 TABLET ORAL at 05:40

## 2018-08-20 RX ADMIN — MULTIPLE VITAMINS W/ MINERALS TAB 1 TABLET: TAB at 08:44

## 2018-08-20 RX ADMIN — Medication 400 MG: at 08:44

## 2018-08-20 RX ADMIN — DOCUSATE SODIUM 200 MG: 100 CAPSULE, LIQUID FILLED ORAL at 20:42

## 2018-08-20 RX ADMIN — ACETAMINOPHEN 650 MG: 325 TABLET ORAL at 13:15

## 2018-08-20 RX ADMIN — DULOXETINE HYDROCHLORIDE 60 MG: 60 CAPSULE, DELAYED RELEASE ORAL at 08:44

## 2018-08-20 RX ADMIN — Medication 2 PUFF: at 04:40

## 2018-08-20 RX ADMIN — ACETAMINOPHEN 650 MG: 325 TABLET ORAL at 00:06

## 2018-08-20 RX ADMIN — OXYCODONE HYDROCHLORIDE 5 MG: 5 TABLET ORAL at 10:22

## 2018-08-20 RX ADMIN — SENNOSIDES AND DOCUSATE SODIUM 1 TABLET: 8.6; 5 TABLET ORAL at 08:44

## 2018-08-20 RX ADMIN — BACLOFEN 10 MG: 10 TABLET ORAL at 13:14

## 2018-08-20 RX ADMIN — TRAMADOL HYDROCHLORIDE 50 MG: 50 TABLET, FILM COATED ORAL at 00:06

## 2018-08-20 RX ADMIN — ACETAMINOPHEN 650 MG: 325 TABLET ORAL at 17:50

## 2018-08-20 RX ADMIN — Medication 400 MG: at 20:42

## 2018-08-20 RX ADMIN — ASPIRIN 81 MG: 81 TABLET, COATED ORAL at 20:42

## 2018-08-20 RX ADMIN — NALOXEGOL OXALATE 12.5 MG: 12.5 TABLET, FILM COATED ORAL at 05:40

## 2018-08-20 RX ADMIN — TRAMADOL HYDROCHLORIDE 50 MG: 50 TABLET, FILM COATED ORAL at 17:49

## 2018-08-20 RX ADMIN — OXYCODONE HYDROCHLORIDE 5 MG: 5 TABLET ORAL at 20:07

## 2018-08-20 RX ADMIN — SIMVASTATIN 10 MG: 5 TABLET, FILM COATED ORAL at 20:42

## 2018-08-20 RX ADMIN — LACTULOSE 20 G: 20 SOLUTION ORAL at 23:01

## 2018-08-20 RX ADMIN — OXYBUTYNIN CHLORIDE 10 MG: 5 TABLET, EXTENDED RELEASE ORAL at 08:44

## 2018-08-20 RX ADMIN — TRAMADOL HYDROCHLORIDE 50 MG: 50 TABLET, FILM COATED ORAL at 13:14

## 2018-08-20 RX ADMIN — DOCUSATE SODIUM 200 MG: 100 CAPSULE, LIQUID FILLED ORAL at 08:45

## 2018-08-20 RX ADMIN — OXYCODONE HYDROCHLORIDE 10 MG: 10 TABLET, FILM COATED, EXTENDED RELEASE ORAL at 08:44

## 2018-08-20 ASSESSMENT — PAIN DESCRIPTION - ORIENTATION
ORIENTATION: LEFT
ORIENTATION: LEFT

## 2018-08-20 ASSESSMENT — PAIN SCALES - GENERAL
PAINLEVEL_OUTOF10: 1
PAINLEVEL_OUTOF10: 7
PAINLEVEL_OUTOF10: 7
PAINLEVEL_OUTOF10: 1
PAINLEVEL_OUTOF10: 7
PAINLEVEL_OUTOF10: 2
PAINLEVEL_OUTOF10: 7
PAINLEVEL_OUTOF10: 4
PAINLEVEL_OUTOF10: 6

## 2018-08-20 ASSESSMENT — PAIN DESCRIPTION - LOCATION
LOCATION: HIP
LOCATION: HIP;BACK

## 2018-08-20 ASSESSMENT — PAIN DESCRIPTION - DESCRIPTORS
DESCRIPTORS: ACHING
DESCRIPTORS: ACHING

## 2018-08-20 NOTE — PROGRESS NOTES
Balance  Time: able to stand up to 3 min 20 sec with 1-2 UE support X multiple trials, 0 LOB  Activity: Headbandz               Assessment      Activity Tolerance  Activity Tolerance: Patient Tolerated treatment well  Safety Devices  Safety Devices in place: Yes  Type of devices:  All fall risk precautions in place          Plan   Plan  Times per week: 5-7x/week\, 15-60 minutes per day   Times per day: Daily  Plan weeks: 2 weeks   Current Treatment Recommendations: Strengthening, ROM, Balance Training, Functional Mobility Training, Endurance Training, Stair training, Pain Management, Safety Education & Training, Patient/Caregiver Education & Training, Equipment Evaluation, Education, & procurement, Self-Care / ADL, Home Management Training  Plan Comment: Continue per OT POC for d/c on 8-28-18  G-Code     OutComes Score                                           AM-PAC Score             Goals  Patient Goals   Patient goals : \"I want to be more mobile\"       Therapy Time   Individual Concurrent Group Co-treatment   Time In 1500        Time Out 1530        Minutes 30              Electronically signed by ANA Lezama on 8/20/2018 at 4:20 PM  ANA Lezama

## 2018-08-20 NOTE — PROGRESS NOTES
Occupational Therapy  [facility]Department: [facility]  Daily Treatment Note  NAME: Patricio Elizabeth  : 1960  MRN: [id number]    Date of Service: 2018    Discharge Recommendations:  Continue to assess pending progress       Patient Diagnosis(es): There were no encounter diagnoses. has a past medical history of Arthritis; COPD (chronic obstructive pulmonary disease) ([facility] [address] Hyperlipidemia; Hypertension; and Type 2 diabetes mellitus with hyperglycemia, without long-term current use of insulin ([facility] [address] has a past surgical history that includes Colonoscopy ();  section (); Hysterectomy (); Breast biopsy (Right, ); Cervical spine surgery (); cervical fusion (); and pr total hip arthroplasty (Left, 2018). Restrictions  Restrictions/Precautions  Restrictions/Precautions: Fall Risk  Position Activity Restriction  Hip Precautions: Posterior hip precautions  Other position/activity restrictions: 25% Partial Weight Bearing L LE, JUAN hose and abdominal binder   Subjective   General  Chart Reviewed: Yes  Patient assessed for rehabilitation services?: Yes  Response to previous treatment: Patient with no complaints from previous session  Family / Caregiver Present: No  Pre Treatment Pain Screening  Pain at present: 7  Pain Assessment  Pain Level: 7  Pain Location: Hip;Back  Pain Orientation: Left  Pain Descriptors: Aching   Orientation     Objective        OT Therapeutic Groups  OT Therapeutic Group: Yes  UE therapeutic activity group: Pt participated intherapeutic group Tx session to increase FM coordination, B UE ROM/strengthening, cognition and social interaction for ADL's, IADL's and community re-entry. Pt donned B 1# wrist wts, able to reach in various planes while participating in game of Jott. Pt with 0 difficulty with FM manipulation of Dominoes. Pt with 0 errors with DomMillion-2-1 game. Pt req'ed 0 v/c's following initial instruction of DomPerfect Audiencees rules.  Pt socially appropriate during activity. Assessment      Activity Tolerance  Activity Tolerance: Patient Tolerated treatment well  Safety Devices  Safety Devices in place: Yes  Type of devices:  All fall risk precautions in place          Plan   Plan  Times per week: 5-7x/week\, 15-60 minutes per day   Times per day: Daily  Plan weeks: 2 weeks   Current Treatment Recommendations: Strengthening, ROM, Balance Training, Functional Mobility Training, Endurance Training, Stair training, Pain Management, Safety Education & Training, Patient/Caregiver Education & Training, Equipment Evaluation, Education, & procurement, Self-Care / ADL, Home Management Training  Plan Comment: Continue per OT POC for d/c on 8-28-18  G-Code     OutComes Score                                           AM-PAC Score             Goals  Patient Goals   Patient goals : \"I want to be more mobile\"       Therapy Time   Individual Concurrent Group Co-treatment   Time In 1000         Time Out 1030         Minutes 30               Electronically signed by ANA Oliveros on 8/20/2018 at 10:56 AM  ANA Oliveros

## 2018-08-20 NOTE — PROGRESS NOTES
concerns)        Activity Tolerance  Activity Tolerance: Patient Tolerated treatment well    Exercises  Comments: Supine R LE bridges X 20; seated heel raises, Hip ABD, R LE marches, R LE ADD with Red Tband and R LE LAQ with 2lb weight, unweighted L LE LAQ X 20 each; standing R LE squats X 5     ASSESSMENT:  Assessment: Motivated, however still becomes limited by spastic R LE with poor control during functional transfers. Focus on building hip strength for improved stability. PLAN OF CARE:   Plan Comment: cont per POC  Safety Devices  Type of devices:  All fall risk precautions in place    Short term goals  Short term goal 1: Pt to complete HEP with indep  Short term goal 2: Pt to state and maintain all hip precautions and 25% weight bearing L LE during following activities without cueing  Long term goals  Long term goal 1: Pt to complete all bed mobility with indep  Long term goal 2: Pt to complete all transfers with indep  Long term goal 3: Pt to ambulate 25-50ft with [employer] and indep to manage short distances within home  Long term goal 4: Pt to manage 4 steps with HR and CGA  Long term goal 5: Pt to manage and propel WC 150ft with indep    Therapy Time:   Individual   Time In 0900   Time Out 1000   Minutes 60     Timed Code Treatment Minutes: 60 Minutes (therex 25min; transfers 15min; 20min gait)         Criselda Sloan PT, 08/20/18 at 12:09 PM

## 2018-08-21 PROCEDURE — 6370000000 HC RX 637 (ALT 250 FOR IP): Performed by: NURSE PRACTITIONER

## 2018-08-21 PROCEDURE — 6370000000 HC RX 637 (ALT 250 FOR IP): Performed by: PHYSICAL MEDICINE & REHABILITATION

## 2018-08-21 PROCEDURE — 97535 SELF CARE MNGMENT TRAINING: CPT

## 2018-08-21 PROCEDURE — 97112 NEUROMUSCULAR REEDUCATION: CPT

## 2018-08-21 PROCEDURE — 94760 N-INVAS EAR/PLS OXIMETRY 1: CPT

## 2018-08-21 PROCEDURE — 97110 THERAPEUTIC EXERCISES: CPT

## 2018-08-21 PROCEDURE — 1180000000 HC REHAB R&B

## 2018-08-21 PROCEDURE — 94640 AIRWAY INHALATION TREATMENT: CPT

## 2018-08-21 RX ADMIN — NALOXEGOL OXALATE 12.5 MG: 12.5 TABLET, FILM COATED ORAL at 06:15

## 2018-08-21 RX ADMIN — Medication 400 MG: at 08:45

## 2018-08-21 RX ADMIN — TRAMADOL HYDROCHLORIDE 50 MG: 50 TABLET, FILM COATED ORAL at 17:42

## 2018-08-21 RX ADMIN — ACETAMINOPHEN 650 MG: 325 TABLET ORAL at 23:50

## 2018-08-21 RX ADMIN — OXYCODONE HYDROCHLORIDE 10 MG: 10 TABLET, FILM COATED, EXTENDED RELEASE ORAL at 08:46

## 2018-08-21 RX ADMIN — Medication 2 PUFF: at 05:55

## 2018-08-21 RX ADMIN — DOCUSATE SODIUM 200 MG: 100 CAPSULE, LIQUID FILLED ORAL at 08:45

## 2018-08-21 RX ADMIN — TRAMADOL HYDROCHLORIDE 50 MG: 50 TABLET, FILM COATED ORAL at 23:49

## 2018-08-21 RX ADMIN — BACLOFEN 10 MG: 10 TABLET ORAL at 08:45

## 2018-08-21 RX ADMIN — OXYCODONE HYDROCHLORIDE 10 MG: 10 TABLET, FILM COATED, EXTENDED RELEASE ORAL at 00:14

## 2018-08-21 RX ADMIN — OXYCODONE HYDROCHLORIDE 10 MG: 10 TABLET, FILM COATED, EXTENDED RELEASE ORAL at 20:04

## 2018-08-21 RX ADMIN — OXYBUTYNIN CHLORIDE 10 MG: 5 TABLET, EXTENDED RELEASE ORAL at 08:45

## 2018-08-21 RX ADMIN — Medication 2 PUFF: at 17:46

## 2018-08-21 RX ADMIN — ASPIRIN 81 MG: 81 TABLET, COATED ORAL at 20:03

## 2018-08-21 RX ADMIN — MULTIPLE VITAMINS W/ MINERALS TAB 1 TABLET: TAB at 08:44

## 2018-08-21 RX ADMIN — DOCUSATE SODIUM 200 MG: 100 CAPSULE, LIQUID FILLED ORAL at 20:04

## 2018-08-21 RX ADMIN — OXYCODONE HYDROCHLORIDE 5 MG: 5 TABLET ORAL at 12:27

## 2018-08-21 RX ADMIN — MAGNESIUM HYDROXIDE 30 ML: 400 SUSPENSION ORAL at 13:49

## 2018-08-21 RX ADMIN — ACETAMINOPHEN 650 MG: 325 TABLET ORAL at 17:42

## 2018-08-21 RX ADMIN — TRAMADOL HYDROCHLORIDE 50 MG: 50 TABLET, FILM COATED ORAL at 06:15

## 2018-08-21 RX ADMIN — BACLOFEN 10 MG: 10 TABLET ORAL at 20:04

## 2018-08-21 RX ADMIN — SENNOSIDES AND DOCUSATE SODIUM 1 TABLET: 8.6; 5 TABLET ORAL at 08:44

## 2018-08-21 RX ADMIN — BISACODYL 10 MG: 10 SUPPOSITORY RECTAL at 22:04

## 2018-08-21 RX ADMIN — SIMVASTATIN 10 MG: 5 TABLET, FILM COATED ORAL at 20:04

## 2018-08-21 RX ADMIN — ASPIRIN 81 MG: 81 TABLET, COATED ORAL at 08:44

## 2018-08-21 RX ADMIN — ACETAMINOPHEN 650 MG: 325 TABLET ORAL at 12:27

## 2018-08-21 RX ADMIN — TRAMADOL HYDROCHLORIDE 50 MG: 50 TABLET, FILM COATED ORAL at 00:15

## 2018-08-21 RX ADMIN — DULOXETINE HYDROCHLORIDE 60 MG: 60 CAPSULE, DELAYED RELEASE ORAL at 08:45

## 2018-08-21 RX ADMIN — Medication 400 MG: at 20:04

## 2018-08-21 RX ADMIN — BACLOFEN 10 MG: 10 TABLET ORAL at 17:16

## 2018-08-21 RX ADMIN — ACETAMINOPHEN 650 MG: 325 TABLET ORAL at 06:16

## 2018-08-21 ASSESSMENT — PAIN SCALES - GENERAL
PAINLEVEL_OUTOF10: 5
PAINLEVEL_OUTOF10: 7
PAINLEVEL_OUTOF10: 4
PAINLEVEL_OUTOF10: 5
PAINLEVEL_OUTOF10: 1
PAINLEVEL_OUTOF10: 6
PAINLEVEL_OUTOF10: 3
PAINLEVEL_OUTOF10: 4
PAINLEVEL_OUTOF10: 4
PAINLEVEL_OUTOF10: 5
PAINLEVEL_OUTOF10: 1

## 2018-08-21 ASSESSMENT — PAIN DESCRIPTION - LOCATION: LOCATION: HIP

## 2018-08-21 ASSESSMENT — PAIN DESCRIPTION - DESCRIPTORS: DESCRIPTORS: ACHING

## 2018-08-21 ASSESSMENT — PAIN DESCRIPTION - ORIENTATION: ORIENTATION: LEFT

## 2018-08-21 NOTE — PROGRESS NOTES
(sponge bath),  , Assessment: Pt is a 59year old female, s/p L ROSMERY as a result of L hip osteoarthritis. Pts hip pain had been increasing and interfering with her mobility and ADL's. Pt was using a manual w/c at home d/t pain. Pt lives alone, but will be staying with sig other following d/c from rehab for additonal assistance. Pt has a shower/tub at home as well as sig other's home with a shower chair. Pt presents with decreased mobility of RLE d/t past neck surgeries (presents as spasticity). Pt is 25% WB on LLE. Pt presents with decreased functional mobility and ADL/IADLs and would benefit from occupational therapy services to address areas of need and improve independence decreased burden of care upon d/c.     Speech therapy: FIMS: Comprehension: 6 - Complex ideas 90% or device (hearing aid/glasses)  Expression: 6 - Device used to express complex ideas/needs  Social Interaction: 6 - Patient requires medication for mood and/or effect  Problem Solvin - Independent with device (e.g. notes, schedules)  Memory: 6 - Patient requires device to recall (e.g. memory book)      Lab/X-ray studies reviewed, analyzed and discussed with patient and staff:   No results found for this or any previous visit (from the past 24 hour(s)). Xr Hip Left (1 View) Result Date: 2018   UNREMARKABLE LEFT HIP RADIOGRAPHS DURING AND AFTER TOTAL LEFT HIP ARTHROPLASTY PLACEMENT. Xr Hip Left (2-3 Views) Result Date: 2018   UNREMARKABLE LEFT HIP RADIOGRAPHS DURING AND AFTER TOTAL LEFT HIP ARTHROPLASTY PLACEMENT. Previous extensive, complex labs, notes and diagnostics reviewed and analyzed.      ALLERGIES:    Allergies as of 2018    (No Known Allergies)      (please also verify by checking MAR)      I have encouraged patient to attend their adjustment to rehabilitation support group with rec therapy and rehabilitation psychology in order to improve their adjustments, well-being, and help their spiritual and call light to get nursing assistance to get up, bed and chair alarm. 8. Elevated DVT risk: progressive activities in PT, continue prophylaxis JUAN hose, elevation. 9. Complex discharge planning:  Discharge 8/28/18 home with her significant other and home care PT OT RN 8 and  with a rolling versus willed walker as well as a wheelchair for long distances. Her 25% weightbearing is her biggest obstacle as well as the spasticity in her right lower extremity as a result of previous spinal cord injuries. Weekly team meeting every  Thursday's to assess progress towards goals, discuss and address social, psychological and medical comorbidities and to address difficulties they may be having progressing in therapy. Patient and family education is in progress. The patient is to follow-up with their family physician after discharge. Complex Active General Medical Issues that complicate care Assess & Plan:    1. 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 Osteoarthrosis involving lower leg,Spinal stenosis, lumbar region, without neurogenic claudication, Chronic pain syndrome  3. COPD (chronic obstructive pulmonary disease) -pulse ox checks every shift titrate nasal cannula O2-add Xolair and Flonase, and Proventil  4. Hyperlipidemia,   Hypertension-vital signs every shift, titrate Zocor-add antihypertensive as needed, consult medical hospitalist for backup medical  5. History of fusion of cervical spine Cervical myelopathy with cervical myelopathy with residual right-sided spasticity  6. Constipation, chronic-with active exacerbation secondary to increased opiate use, titrate mag oxide, Colace  7. Dysthymia-add rec therapy rehabilitation psychology and Cymbalta  8.    Neuropathy of both upper extremities-avoid toxic medications titrate membrane stabilizing medication such as Cymbalta and/or Topamax  9. OAB (overactive bladder) Mixed stress and urge urinary incontinence-Ditropan and monitor postvoid residuals, add frequent toileting  10. Type 2 diabetes mellitus with hyperglycemia, without long-term current use of insulin (HCC)  11. Nausea  severe-IV fluids and scheduled transition to as needed as needed, dose Zofran scheduled transition to as needed  12.  See team note       Perri David MD covering      Daniel Marcelo D.O., PM&R     Attending    [facility]

## 2018-08-21 NOTE — PROGRESS NOTES
Subjective: The patient complains of severe  acute  left hip pain partially relieved by  rest, medications, ice and Lidoderm and exacerbated by palpation range of motion weightbearing. I am concerned about patients  frail bony structure therefore only 25% weightbearing. She also complains of active chronic right-sided spasticity only partially relieved with baclofen. ROS x10: The patient also complains of severely impaired mobility and activities of daily living. Otherwise no new problems with vision, hearing, nose, mouth, throat, dermal, cardiovascular, GI, , pulmonary, musculoskeletal, psychiatric or neurological. See Rehab H&P on Rehab chart dated . Vital signs:  BP (!) 124/58   Pulse 86   Temp 97 °F (36.1 °C) (Oral)   Resp 18   Ht 5' 1\" (1.549 m)   Wt 129 lb 3 oz (58.6 kg)   LMP 08/08/1997   SpO2 95%   BMI 24.41 kg/m²   I/O:   PO/Intake:  Good to fair PO intake,   no nausea and vomiting    Bowel/Bladder:  continent,  active chronic constipation  General:  Patient is well developed, adequately nourished, non-obese and     well kempt. HEENT:    PERRLA, hearing intact to loud voice, external inspection of ear     and nose benign. Inspection of lips, tongue and gums benign  Musculoskeletal: No significant change in strength or tone. All joints stable. Inspection and palpation of digits and nails show no clubbing,       cyanosis or inflammatory conditions. Neuro/Psychiatric: Affect: flat but pleasant. Alert and oriented to person, place and     situation. No significant change in deep tendon reflexes or     sensation right-sided spasticity  Lungs:  Diminished, CTA-B. Respiration effort is normal at rest.     Heart:   S1 = S2, RRR. No loud murmurs. Abdomen:  Soft, non-tender, no enlargement of liver or spleen. Extremities:  No significant lower extremity edema or tenderness.   Skin:   Intact to general survey, no visualized or palpated problems. Rehabilitation:  Physical therapy: FIMS:  Bed Mobility: Scooting: Stand by assistance    Transfers: Sit to Stand: Contact guard assistance, Minimal Assistance  Stand to sit: Stand by assistance  Bed to Chair: Stand by assistance  Stand Pivot Transfers: Contact guard assistance, Ambulation 1  Surface: carpet  Device: Rolling Walker  Assistance: Stand by assistance  Quality of Gait: VC for foot flat L LE, which pt able to follow through with minimally with standing pause. Verbal cues to prevent R knee from snapping into extension. Distance: 25ft  Comments: distance limited by reported fatigue and increased back pain, Stairs  # Steps : 6  Stairs Height: 4\"  Rails: Right ascending  Device: Crutches  Assistance: Minimal assistance, Moderate assistance  Comment: pt declined attempting stairs today 2nd to increased back pain with activity    FIMS: Bed, Chair, Wheel Chair: 4 - Requires steadying assistance only <25% assist  and/or requires assist with one leg only  Walk: 1 - Total Assistance Walks/operates wheelchair < 50 feet OR requires two or more people OR patient performs < 25% of locomotion effort  Distance Walked: 25  Wheel Chair: 6 - Modified [place] Walks/operates wheelchair at least 150 feet with an ambulatory device, orthosis or prosthesis OR requires extra amount of time OR there is concern for safety  Stairs: 0 - Activity Does not Occur ( 0 only for the admission assessment),  , Assessment: Motivated, however still becomes limited by spastic R LE with poor control during functional transfers. Focus on building hip strength for improved stability.      Occupational therapy: FIMS:  Eatin - Feeds self with adaptive equipment/dentures and/or feeds self with modified diet and/or performs own tube feeding  Groomin - Requires setup/cues to do all tasks  Bathin - Able to bathe 8-9 areas  Dressing-Upper: 4 - Requires assist with buttons/zippers only and/or requires assist with one arm (please also verify by checking MAR)      I have encouraged patient to attend their adjustment to rehabilitation support group with rec therapy and rehabilitation psychology in order to improve their adjustments, well-being, and help their spiritual and cognitive recovery. Complex Physical Medicine & Rehab Issues Assess & Plan:   1. Severe abnormality of gait and mobility and impaired self-care and ADL's secondary to  Left hip replacement secondary to severe osteoarthritis . Functional and medical status reassessed regarding patients ability to participate in therapies and patient found to be able to participate in acute intensive comprehensive inpatient rehabilitation program including PT/OT to improve balance, ambulation, ADLs, and to improve the P/AROM. Therapeutic modifications regarding activities in therapies, place, amount of time per day and intensity of therapy made daily. In bed therapies or bedside therapies prn.   2. Bowel Severe opiate-related constipation and Bladder dysfunction:  frequent toileting, ambulate to bathroom with assistance, check post void residuals. Check for C.difficile x1 if >2 loose stools in 24 hours, continue bowel & bladder program.  Monitor bowel and bladder function. Lactinex 2 PO every AC. MOM prn, Brown Bomb prn, Glycerin suppository prn, enema prn. Movantyk-no help MiraLAX no help --add Mag Citrate  3. Severe postop left hip pain, active chronic neck mid back and low back pain generalized OA pain: reassess pain every shift and prior to and after each therapy session, give prn Tylenol and  Norco, modalities prn in therapy, Lidoderm, K-pad prn. Trial Duragesic-DC'd due to nausea-trial low-dose OxyContin and titrate. 4. Skin healing left hip incision and breakdown risk especially bilateral heels and sacrum:  continue pressure relief program.  Daily skin exams and reports from nursing. Add egg crate mattress and side-to-side turns  5.  Severe Fatigue due to

## 2018-08-21 NOTE — PROGRESS NOTES
ASSESSMENT:  Assessment: Pt with increased fatigue in R knee during ambulatory activities this AM. Improved transfers. Discussed having pt's S.O. come in for stair training. PLAN OF CARE:   Plan Comment: cont per POC  Safety Devices  Type of devices:  All fall risk precautions in place    Short term goals  Short term goal 1: Pt to complete HEP with indep  Short term goal 2: Pt to state and maintain all hip precautions and 25% weight bearing L LE during following activities without cueing  Long term goals  Long term goal 1: Pt to complete all bed mobility with indep  Long term goal 2: Pt to complete all transfers with indep  Long term goal 3: Pt to ambulate 25-50ft with [employer] and indep to manage short distances within home  Long term goal 4: Pt to manage 4 steps with HR and CGA  Long term goal 5: Pt to manage and propel WC 150ft with indep    Therapy Time:   Individual   Time In 0900   Time Out 1000   Minutes 60     Timed Code Treatment Minutes: 60 Minutes (therex 15min; NMR 25min; 5min gait; 15min transfers)         Eladio Burr, PT, 08/21/18 at 10:04 AM

## 2018-08-21 NOTE — PROGRESS NOTES
pivot transfer WC<>bed    Activity Tolerance  Activity Tolerance: Intensifying indigestion limiting session and performance. Exercises  Comments: Supine bridges X 10 (2 sets). Seated LAQ and R LE marches X 10 each (2 sets)     ASSESSMENT:  Assessment: Pt with c/o increasing indigestion throughout session requiring early return to her room. RN notified and medications administered. Pt has not had BM in 9 days. PLAN OF CARE:   Plan Comment: cont per POC  Safety Devices  Type of devices:  All fall risk precautions in place    Short term goals  Short term goal 1: Pt to complete HEP with indep  Short term goal 2: Pt to state and maintain all hip precautions and 25% weight bearing L LE during following activities without cueing  Long term goals  Long term goal 1: Pt to complete all bed mobility with indep  Long term goal 2: Pt to complete all transfers with indep  Long term goal 3: Pt to ambulate 25-50ft with [employer] and indep to manage short distances within home  Long term goal 4: Pt to manage 4 steps with HR and CGA  Long term goal 5: Pt to manage and propel WC 150ft with indep    Therapy Time:   Individual   Time In 1300   Time Out 1345   Minutes 45     Timed Code Treatment Minutes: 45 Minutes (transfers 15min; NMR 15min; therex 15min)         Tory Yo, PT, 08/21/18 at 3:42 PM

## 2018-08-21 NOTE — FLOWSHEET NOTE
Patient was very tired and had a lot of discomfort. Had pain level of 5 out of 10. Administered pain medication to help relieve her discomfort. Removed aqualcell. Only old, dried sanginous drainage was noted. Patient has 16 steristrips. Incision looks well approximated. Dr. Cecilio Watts took a look while doing rounds and said it looked good. Since patient hasn't has a BM since 8/12/18, Dr. Cecilio Watts ordered lactulose and a duculax suppository. Gave patient lactulose around 2200. Patient hasn't had a BM yet tonight. Dr. Cecilio Watts suggested we administer suppository late tomorrow afternoon (after therapy) if the patient hasn't had a BM.

## 2018-08-22 PROCEDURE — 6370000000 HC RX 637 (ALT 250 FOR IP): Performed by: NURSE PRACTITIONER

## 2018-08-22 PROCEDURE — 97116 GAIT TRAINING THERAPY: CPT

## 2018-08-22 PROCEDURE — 97535 SELF CARE MNGMENT TRAINING: CPT

## 2018-08-22 PROCEDURE — 2700000000 HC OXYGEN THERAPY PER DAY

## 2018-08-22 PROCEDURE — 6360000002 HC RX W HCPCS: Performed by: PHYSICAL MEDICINE & REHABILITATION

## 2018-08-22 PROCEDURE — 1180000000 HC REHAB R&B

## 2018-08-22 PROCEDURE — 6370000000 HC RX 637 (ALT 250 FOR IP): Performed by: PHYSICAL MEDICINE & REHABILITATION

## 2018-08-22 PROCEDURE — 94761 N-INVAS EAR/PLS OXIMETRY MLT: CPT

## 2018-08-22 PROCEDURE — 97542 WHEELCHAIR MNGMENT TRAINING: CPT

## 2018-08-22 PROCEDURE — 94760 N-INVAS EAR/PLS OXIMETRY 1: CPT

## 2018-08-22 PROCEDURE — 94640 AIRWAY INHALATION TREATMENT: CPT

## 2018-08-22 PROCEDURE — 97110 THERAPEUTIC EXERCISES: CPT

## 2018-08-22 RX ORDER — POLYETHYLENE GLYCOL 3350 17 G/17G
17 POWDER, FOR SOLUTION ORAL DAILY
Status: DISCONTINUED | OUTPATIENT
Start: 2018-08-22 | End: 2018-08-28 | Stop reason: HOSPADM

## 2018-08-22 RX ADMIN — Medication 2 PUFF: at 17:51

## 2018-08-22 RX ADMIN — OXYCODONE HYDROCHLORIDE 5 MG: 5 TABLET ORAL at 12:53

## 2018-08-22 RX ADMIN — ACETAMINOPHEN 650 MG: 325 TABLET ORAL at 12:54

## 2018-08-22 RX ADMIN — Medication 400 MG: at 08:42

## 2018-08-22 RX ADMIN — BACLOFEN 10 MG: 10 TABLET ORAL at 12:54

## 2018-08-22 RX ADMIN — TRAMADOL HYDROCHLORIDE 50 MG: 50 TABLET, FILM COATED ORAL at 18:25

## 2018-08-22 RX ADMIN — NALOXEGOL OXALATE 12.5 MG: 12.5 TABLET, FILM COATED ORAL at 05:03

## 2018-08-22 RX ADMIN — ALBUTEROL SULFATE 2.5 MG: 2.5 SOLUTION RESPIRATORY (INHALATION) at 10:49

## 2018-08-22 RX ADMIN — OXYCODONE HYDROCHLORIDE 10 MG: 10 TABLET, FILM COATED, EXTENDED RELEASE ORAL at 21:14

## 2018-08-22 RX ADMIN — MULTIPLE VITAMINS W/ MINERALS TAB 1 TABLET: TAB at 08:43

## 2018-08-22 RX ADMIN — ACETAMINOPHEN 650 MG: 325 TABLET ORAL at 18:25

## 2018-08-22 RX ADMIN — Medication 400 MG: at 21:14

## 2018-08-22 RX ADMIN — SENNOSIDES AND DOCUSATE SODIUM 1 TABLET: 8.6; 5 TABLET ORAL at 08:41

## 2018-08-22 RX ADMIN — SIMVASTATIN 10 MG: 5 TABLET, FILM COATED ORAL at 21:14

## 2018-08-22 RX ADMIN — BACLOFEN 10 MG: 10 TABLET ORAL at 08:42

## 2018-08-22 RX ADMIN — OXYCODONE HYDROCHLORIDE 10 MG: 10 TABLET, FILM COATED, EXTENDED RELEASE ORAL at 08:42

## 2018-08-22 RX ADMIN — ASPIRIN 81 MG: 81 TABLET, COATED ORAL at 08:42

## 2018-08-22 RX ADMIN — DOCUSATE SODIUM 200 MG: 100 CAPSULE, LIQUID FILLED ORAL at 08:42

## 2018-08-22 RX ADMIN — DULOXETINE HYDROCHLORIDE 60 MG: 60 CAPSULE, DELAYED RELEASE ORAL at 08:42

## 2018-08-22 RX ADMIN — DOCUSATE SODIUM 200 MG: 100 CAPSULE, LIQUID FILLED ORAL at 21:14

## 2018-08-22 RX ADMIN — BACLOFEN 10 MG: 10 TABLET ORAL at 21:14

## 2018-08-22 RX ADMIN — ACETAMINOPHEN 650 MG: 325 TABLET ORAL at 05:03

## 2018-08-22 RX ADMIN — TRAMADOL HYDROCHLORIDE 50 MG: 50 TABLET, FILM COATED ORAL at 05:04

## 2018-08-22 RX ADMIN — Medication 2 PUFF: at 04:55

## 2018-08-22 RX ADMIN — OXYBUTYNIN CHLORIDE 10 MG: 5 TABLET, EXTENDED RELEASE ORAL at 08:41

## 2018-08-22 RX ADMIN — ASPIRIN 81 MG: 81 TABLET, COATED ORAL at 21:14

## 2018-08-22 ASSESSMENT — PAIN SCALES - GENERAL
PAINLEVEL_OUTOF10: 4
PAINLEVEL_OUTOF10: 6
PAINLEVEL_OUTOF10: 3
PAINLEVEL_OUTOF10: 5
PAINLEVEL_OUTOF10: 4
PAINLEVEL_OUTOF10: 3
PAINLEVEL_OUTOF10: 3
PAINLEVEL_OUTOF10: 6
PAINLEVEL_OUTOF10: 4

## 2018-08-22 ASSESSMENT — PAIN DESCRIPTION - ORIENTATION
ORIENTATION: LEFT
ORIENTATION: RIGHT;LEFT;LOWER
ORIENTATION: RIGHT;LEFT;LOWER

## 2018-08-22 ASSESSMENT — PAIN DESCRIPTION - DESCRIPTORS
DESCRIPTORS: ACHING

## 2018-08-22 ASSESSMENT — PAIN DESCRIPTION - LOCATION
LOCATION: BACK;HIP;KNEE
LOCATION: HIP
LOCATION: BACK;HIP;KNEE

## 2018-08-22 NOTE — PROGRESS NOTES
Subjective: The patient complains of severe  acute  left hip pain partially relieved by  rest, medications, ice and Lidoderm and exacerbated by palpation range of motion weightbearing. I am concerned about patients  frail bony structure therefore only 25% weightbearing. She also complains of active chronic right-sided spasticity only partially relieved with baclofen. ROS x10: The patient also complains of severely impaired mobility and activities of daily living. Otherwise no new problems with vision, hearing, nose, mouth, throat, dermal, cardiovascular, GI, , pulmonary, musculoskeletal, psychiatric or neurological. See Rehab H&P on Rehab chart dated . Vital signs:  BP (!) 101/54   Pulse 106   Temp 98 °F (36.7 °C) (Oral)   Resp 18   Ht 5' 1\" (1.549 m)   Wt 129 lb 3 oz (58.6 kg)   LMP 08/08/1997   SpO2 98%   BMI 24.41 kg/m²   I/O:   PO/Intake:  Good to fair PO intake,   no nausea and vomiting    Bowel/Bladder:  continent,  active chronic constipation  General:  Patient is well developed, adequately nourished, non-obese and     well kempt. HEENT:    PERRLA, hearing intact to loud voice, external inspection of ear     and nose benign. Inspection of lips, tongue and gums benign  Musculoskeletal: No significant change in strength or tone. All joints stable. Inspection and palpation of digits and nails show no clubbing,       cyanosis or inflammatory conditions. Neuro/Psychiatric: Affect: flat but pleasant. Alert and oriented to person, place and     situation. No significant change in deep tendon reflexes or     sensation right-sided spasticity  Lungs:  Diminished, CTA-B. Respiration effort is normal at rest.     Heart:   S1 = S2, RRR. No loud murmurs. Abdomen:  Soft, non-tender, no enlargement of liver or spleen. Extremities:  No significant lower extremity edema or tenderness.   Skin:   Intact to general survey, no visualized or palpated MAR)      I have encouraged patient to attend their adjustment to rehabilitation support group with rec therapy and rehabilitation psychology in order to improve their adjustments, well-being, and help their spiritual and cognitive recovery. Complex Physical Medicine & Rehab Issues Assess & Plan:   1. Severe abnormality of gait and mobility and impaired self-care and ADL's secondary to  Left hip replacement secondary to severe osteoarthritis . Functional and medical status reassessed regarding patients ability to participate in therapies and patient found to be able to participate in acute intensive comprehensive inpatient rehabilitation program including PT/OT to improve balance, ambulation, ADLs, and to improve the P/AROM. Therapeutic modifications regarding activities in therapies, place, amount of time per day and intensity of therapy made daily. In bed therapies or bedside therapies prn.   2. Bowel Severe opiate-related constipation and Bladder dysfunction:  frequent toileting, ambulate to bathroom with assistance, check post void residuals. Check for C.difficile x1 if >2 loose stools in 24 hours, continue bowel & bladder program.  Monitor bowel and bladder function. Lactinex 2 PO every AC. MOM prn, Brown Bomb prn, Glycerin suppository prn, enema prn. Movantyk-no help MiraLAX no help --add Mag Citrate  3. Severe postop left hip pain, active chronic neck mid back and low back pain generalized OA pain: reassess pain every shift and prior to and after each therapy session, give prn Tylenol and  Norco, modalities prn in therapy, Lidoderm, K-pad prn. Trial Duragesic-DC'd due to nausea-trial low-dose OxyContin and titrate. 4. Skin healing left hip incision and breakdown risk especially bilateral heels and sacrum:  continue pressure relief program.  Daily skin exams and reports from nursing. Add egg crate mattress and side-to-side turns  5.  Severe Fatigue due to nutritional and hydration deficiency: continue to monitor I&Os, calorie counts prn, dietary consult prn.  6. Acute episodic insomnia with situational adjustment disorder:  prn Ambien, monitor for day time sedation. 7. Falls risk elevated:  patient to use call light to get nursing assistance to get up, bed and chair alarm. 8. Elevated DVT risk: progressive activities in PT, continue prophylaxis JUAN hose, elevation. 9. Complex discharge planning:  Discharge 8/28/18 home with her significant other and home care PT OT RN 8 and  with a rolling versus willed walker as well as a wheelchair for long distances. Her 25% weightbearing is her biggest obstacle as well as the spasticity in her right lower extremity as a result of previous spinal cord injuries. Weekly team meeting every  Thursday's to assess progress towards goals, discuss and address social, psychological and medical comorbidities and to address difficulties they may be having progressing in therapy. Patient and family education is in progress. The patient is to follow-up with their family physician after discharge. Complex Active General Medical Issues that complicate care Assess & Plan:    1. 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 Osteoarthrosis involving lower leg,Spinal stenosis, lumbar region, without neurogenic claudication, Chronic pain syndrome  3. COPD (chronic obstructive pulmonary disease) -pulse ox checks every shift titrate nasal cannula O2-add Xolair and Flonase, and Proventil  4. Hyperlipidemia,   Hypertension-vital signs every shift, titrate Zocor-add antihypertensive as needed, consult medical hospitalist for backup medical  5.    History of fusion of cervical spine Cervical myelopathy with cervical myelopathy with residual right-sided

## 2018-08-22 NOTE — PROGRESS NOTES
Occupational Therapy  Facility/Department: [facility]  Daily Treatment Note  NAME: Ja Gordon  : 1960  MRN: [id number]    Date of Service: 2018    Discharge Recommendations:  Continue to assess pending progress       Patient Diagnosis(es): There were no encounter diagnoses. has a past medical history of Arthritis; COPD (chronic obstructive pulmonary disease) ([facility] [address] Hyperlipidemia; Hypertension; and Type 2 diabetes mellitus with hyperglycemia, without long-term current use of insulin ([facility] [address] has a past surgical history that includes Colonoscopy ();  section (); Hysterectomy (); Breast biopsy (Right, ); Cervical spine surgery (); cervical fusion (); and pr total hip arthroplasty (Left, 2018). Restrictions  Restrictions/Precautions  Restrictions/Precautions: Fall Risk  Position Activity Restriction  Hip Precautions: Posterior hip precautions  Other position/activity restrictions: 25% Partial Weight Bearing L LE, JUAN hose and abdominal binder   Subjective   General  Chart Reviewed: Yes  Patient assessed for rehabilitation services?: Yes  Response to previous treatment: Patient with no complaints from previous session  Family / Caregiver Present: No  Pre Treatment Pain Screening  Pain at present: 4  Intervention List: Patient able to continue with treatment  Pain Assessment  Pain Level: 6  Pain Location: Hip  Pain Orientation: Left  Pain Descriptors: Aching   Orientation     Objective      ADL  Grooming: Independent  UE Bathing: Setup  LE Bathing: Minimal assistance (buttocks)  UE Dressing: Setup  LE Dressing: Minimal assistance (don 1/2 compression socks, don 1/2 hospital socks)  Toileting: Minimal assistance (pull up X 1/2)  Additional Comments: Pt completed LB Dressing with AE req'ing increased time. Pt able to thread B compression socks on to sock aid and able to pull up 1/2 compression socks.         Toilet Transfers  Toilet - Technique: Ambulating  Equipment Used: Grab bars  Toilet Transfer: Stand by assistance  Toilet Transfers Comments: ww; following toileting pt returned back to room in w/c 2° 2 c/o lightheadedness during ADL and low BP of 95/58    Shower Transfers  Shower - Transfer From: Mammie Deems - Transfer Type: To and From  Shower - Transfer To: Shower seat with back  Shower - Technique: Ambulating  Shower Transfers: Stand by assistance            Assessment      Activity Tolerance  Activity Tolerance: Patient Tolerated treatment well  Safety Devices  Safety Devices in place: Yes  Type of devices:  All fall risk precautions in place          Plan   Plan  Times per week: 5-7x/week\, 15-60 minutes per day   Times per day: Daily  Plan weeks: 2 weeks   Current Treatment Recommendations: Strengthening, ROM, Balance Training, Functional Mobility Training, Endurance Training, Stair training, Pain Management, Safety Education & Training, Patient/Caregiver Education & Training, Equipment Evaluation, Education, & procurement, Self-Care / ADL, Home Management Training  Plan Comment: Continue per OT POC for d/c on 8-28-18  G-Code     OutComes Score                                           AM-PAC Score             Goals  Patient Goals   Patient goals : \"I want to be more mobile\"       Therapy Time   Individual Concurrent Group Co-treatment   Time In 1100         Time Out 1228         Minutes 88               Electronically signed by ANA Lofton on 8/22/2018 at 12:54 PM  ANA Lofton

## 2018-08-22 NOTE — FLOWSHEET NOTE
Pt refused bowel management during the day, however, after explaining that Dr. Madonna Cisneros was concerned about her not having a BM since 8/12/18, the pt. Decided to try a suppository. This nurse administered the suppository at bedtime. No results yet. [id number] pt had large BM.

## 2018-08-22 NOTE — PROGRESS NOTES
and LOB posteriorly. Improved greatly with shortened WW height. Pt improves with practice, requiring only occasional verbal cues when transferring to different surfaces. Ambulation 1  Surface: carpet  Device: Rolling Walker ((shortened height))  Assistance: Stand by assistance;Supervision  Quality of Gait: Pt with improved stance and R LE control with shortened walker height. Verbal cues occasionally for maintaining R LE posterior to COM for continued control of R knee. Distance: 55ft        Activity Tolerance  Activity Tolerance: Patient Tolerated treatment well        ASSESSMENT:  Assessment: Pt with improved transfers and ambulation tolerance with shortened walker this AM.    PLAN OF CARE:   Plan Comment: cont per POC  Safety Devices  Type of devices:  All fall risk precautions in place    Short term goals  Short term goal 1: Pt to complete HEP with indep  Short term goal 2: Pt to state and maintain all hip precautions and 25% weight bearing L LE during following activities without cueing  Long term goals  Long term goal 1: Pt to complete all bed mobility with indep  Long term goal 2: Pt to complete all transfers with indep  Long term goal 3: Pt to ambulate 25-50ft with [employer] and indep to manage short distances within home  Long term goal 4: Pt to manage 4 steps with HR and CGA  Long term goal 5: Pt to manage and propel WC 150ft with indep    Therapy Time:   Individual   Time In 0900   Time Out 1000   Minutes 60     Timed Code Treatment Minutes: 60 Minutes (50min transfers; 10min gait)         Excell Fleischer, PT, 08/22/18 at 10:54 AM

## 2018-08-23 PROCEDURE — 6370000000 HC RX 637 (ALT 250 FOR IP): Performed by: NURSE PRACTITIONER

## 2018-08-23 PROCEDURE — 1180000000 HC REHAB R&B

## 2018-08-23 PROCEDURE — 94761 N-INVAS EAR/PLS OXIMETRY MLT: CPT

## 2018-08-23 PROCEDURE — 6370000000 HC RX 637 (ALT 250 FOR IP): Performed by: PHYSICAL MEDICINE & REHABILITATION

## 2018-08-23 PROCEDURE — 97110 THERAPEUTIC EXERCISES: CPT

## 2018-08-23 PROCEDURE — 97116 GAIT TRAINING THERAPY: CPT

## 2018-08-23 PROCEDURE — 97535 SELF CARE MNGMENT TRAINING: CPT

## 2018-08-23 PROCEDURE — 97530 THERAPEUTIC ACTIVITIES: CPT

## 2018-08-23 PROCEDURE — 94640 AIRWAY INHALATION TREATMENT: CPT

## 2018-08-23 RX ADMIN — NALOXEGOL OXALATE 12.5 MG: 12.5 TABLET, FILM COATED ORAL at 06:24

## 2018-08-23 RX ADMIN — ACETAMINOPHEN 650 MG: 325 TABLET ORAL at 12:35

## 2018-08-23 RX ADMIN — SIMVASTATIN 10 MG: 5 TABLET, FILM COATED ORAL at 22:41

## 2018-08-23 RX ADMIN — DULOXETINE HYDROCHLORIDE 60 MG: 60 CAPSULE, DELAYED RELEASE ORAL at 09:07

## 2018-08-23 RX ADMIN — OXYCODONE HYDROCHLORIDE 5 MG: 5 TABLET ORAL at 12:35

## 2018-08-23 RX ADMIN — SENNOSIDES AND DOCUSATE SODIUM 1 TABLET: 8.6; 5 TABLET ORAL at 09:07

## 2018-08-23 RX ADMIN — ASPIRIN 81 MG: 81 TABLET, COATED ORAL at 22:41

## 2018-08-23 RX ADMIN — Medication 400 MG: at 09:08

## 2018-08-23 RX ADMIN — DOCUSATE SODIUM 200 MG: 100 CAPSULE, LIQUID FILLED ORAL at 22:41

## 2018-08-23 RX ADMIN — OXYCODONE HYDROCHLORIDE 10 MG: 10 TABLET, FILM COATED, EXTENDED RELEASE ORAL at 22:40

## 2018-08-23 RX ADMIN — POLYETHYLENE GLYCOL 3350 17 G: 17 POWDER, FOR SOLUTION ORAL at 12:34

## 2018-08-23 RX ADMIN — BACLOFEN 10 MG: 10 TABLET ORAL at 22:40

## 2018-08-23 RX ADMIN — ASPIRIN 81 MG: 81 TABLET, COATED ORAL at 09:08

## 2018-08-23 RX ADMIN — BACLOFEN 10 MG: 10 TABLET ORAL at 14:24

## 2018-08-23 RX ADMIN — DOCUSATE SODIUM 200 MG: 100 CAPSULE, LIQUID FILLED ORAL at 09:07

## 2018-08-23 RX ADMIN — MULTIPLE VITAMINS W/ MINERALS TAB 1 TABLET: TAB at 09:07

## 2018-08-23 RX ADMIN — TRAMADOL HYDROCHLORIDE 50 MG: 50 TABLET, FILM COATED ORAL at 06:25

## 2018-08-23 RX ADMIN — Medication 2 PUFF: at 17:49

## 2018-08-23 RX ADMIN — BACLOFEN 10 MG: 10 TABLET ORAL at 09:08

## 2018-08-23 RX ADMIN — OXYCODONE HYDROCHLORIDE 5 MG: 5 TABLET ORAL at 18:03

## 2018-08-23 RX ADMIN — OXYCODONE HYDROCHLORIDE 10 MG: 10 TABLET, FILM COATED, EXTENDED RELEASE ORAL at 09:08

## 2018-08-23 RX ADMIN — ACETAMINOPHEN 650 MG: 325 TABLET ORAL at 06:24

## 2018-08-23 RX ADMIN — Medication 400 MG: at 22:41

## 2018-08-23 RX ADMIN — TRAMADOL HYDROCHLORIDE 50 MG: 50 TABLET, FILM COATED ORAL at 23:34

## 2018-08-23 RX ADMIN — OXYBUTYNIN CHLORIDE 10 MG: 5 TABLET, EXTENDED RELEASE ORAL at 09:07

## 2018-08-23 RX ADMIN — ACETAMINOPHEN 650 MG: 325 TABLET ORAL at 23:34

## 2018-08-23 RX ADMIN — TRAMADOL HYDROCHLORIDE 50 MG: 50 TABLET, FILM COATED ORAL at 00:02

## 2018-08-23 RX ADMIN — ACETAMINOPHEN 650 MG: 325 TABLET ORAL at 00:02

## 2018-08-23 RX ADMIN — ACETAMINOPHEN 650 MG: 325 TABLET ORAL at 18:03

## 2018-08-23 RX ADMIN — Medication 2 PUFF: at 04:46

## 2018-08-23 ASSESSMENT — PAIN DESCRIPTION - PAIN TYPE
TYPE: SURGICAL PAIN
TYPE: CHRONIC PAIN

## 2018-08-23 ASSESSMENT — PAIN DESCRIPTION - FREQUENCY
FREQUENCY: INTERMITTENT
FREQUENCY: CONTINUOUS

## 2018-08-23 ASSESSMENT — PAIN SCALES - GENERAL
PAINLEVEL_OUTOF10: 6
PAINLEVEL_OUTOF10: 3
PAINLEVEL_OUTOF10: 6
PAINLEVEL_OUTOF10: 4
PAINLEVEL_OUTOF10: 7
PAINLEVEL_OUTOF10: 6
PAINLEVEL_OUTOF10: 7
PAINLEVEL_OUTOF10: 4
PAINLEVEL_OUTOF10: 7
PAINLEVEL_OUTOF10: 7

## 2018-08-23 ASSESSMENT — PAIN DESCRIPTION - ORIENTATION: ORIENTATION: RIGHT;LEFT

## 2018-08-23 ASSESSMENT — PAIN DESCRIPTION - LOCATION
LOCATION: HIP;KNEE
LOCATION: BACK

## 2018-08-23 ASSESSMENT — PAIN DESCRIPTION - DESCRIPTORS: DESCRIPTORS: ACHING

## 2018-08-23 ASSESSMENT — PAIN DESCRIPTION - PROGRESSION: CLINICAL_PROGRESSION: NOT CHANGED

## 2018-08-23 NOTE — PLAN OF CARE
Problem: Risk for Impaired Skin Integrity  Goal: Tissue integrity - skin and mucous membranes  Structural intactness and normal physiological function of skin and  mucous membranes. Outcome: Ongoing  Patient turns self.  Incision ANA, well approximated

## 2018-08-23 NOTE — PROGRESS NOTES
Physical Therapy Rehab Treatment Note  Facility/Department: [facility]  Room: F707/U701-03       NAME: Leslye Reyes  : 1960 (59 y.o.)  MRN: [id number]  CODE STATUS: Full Code    Date of Service: 2018    Chart Reviewed: Yes  Family / Caregiver Present: No  General Comment  Comments: Presenting to therapy gym. Restrictions:  Restrictions/Precautions: Fall Risk  Position Activity Restriction  Hip Precautions: Posterior hip precautions  Other position/activity restrictions: 25% Partial Weight Bearing L LE, JUAN hose and abdominal binder      SUBJECTIVE: Subjective: \"I want to try the steps. \"  Response To Previous Treatment: Patient reporting fatigue but able to participate. Pre Treatment Pain Screening  Pain at present: 4  Scale Used: Numeric Score  Intervention List: Patient able to continue with treatment  Comments / Details: L hip    Post Treatment Pain Screening:  Pain Assessment  Pain Assessment:  (unchanged)    OBJECTIVE:     Transfers  Sit to Stand: Modified independent;Supervision  Stand to sit: Modified independent  Bed to Chair: Modified independent  Car Transfer: Contact guard assistance  Comment: Pt with verbal cues required for extending L hip to maintain hip precautions, especially with low seat surfaces (i.e. car). Ambulation 1  Surface: carpet  Device: Rolling Walker  Other Apparatus:  (knee wrap)  Assistance: Supervision  Quality of Gait: Focus on small functional distances incorporating transfers and ambulation  Distance: distance not focus     Stairs  # Steps : 1  Stairs Height: 6\"  Rails: Right ascending  Curbs: 6\"  Device: Crutches (in L UE)  Assistance: Minimal assistance  Comment: +2 assist for safety.  Pt able to stabilize well, however unable to flex R hip adequately to clear step requiring [place] to flex hip and knee    Activity Tolerance  Activity Tolerance: excellent motivation    Exercises  Comments: Seated: AP/LAQ/Marches/HS curls for isolated strengthing B LEs (2lb

## 2018-08-23 NOTE — PROGRESS NOTES
Physical Therapy Rehab Treatment Note  Facility/Department: [facility]  Room: [facility]/D424-25       NAME: Savanna Cui  : 1960 (59 y.o.)  MRN: [id number]  CODE STATUS: Full Code    Date of Service: 2018  Chart Reviewed: Yes  Family / Caregiver Present: No  General Comment  Comments: agreeable to tx    Restrictions:  Restrictions/Precautions: Fall Risk  Position Activity Restriction  Hip Precautions: Posterior hip precautions  Other position/activity restrictions: 25% Partial Weight Bearing L LE, JUAN hose and abdominal binder        SUBJECTIVE: Subjective: \"i didn't get my pain meds yet\"  Response To Previous Treatment: Patient reporting fatigue but able to participate. Pain Screening  Patient Currently in Pain: Yes  Pre Treatment Pain Screening  Pain at present: 6  Scale Used: Numeric Score  Intervention List: Patient able to continue with treatment    Post Treatment Pain Screenin  Pain Assessment  Pain Assessment: 0-10  Pain Level: 6  Pain Type: Surgical pain  Pain Location: Hip;Knee (left hip)  Pain Orientation: Right;Left  Pain Descriptors: Aching  Pain Frequency: Continuous    OBJECTIVE:   Overall Orientation Status: Within Normal Limits  Neuromuscular Education  NDT Treatment: Standing  Neuromuscular Comments: static standing with personal ww. pt able to maintain pwb and straighten out left knee. Transfers  Sit to Stand: Supervision  Stand to sit: Supervision        Activity Tolerance  Activity Tolerance: Patient limited by endurance; Patient Tolerated treatment well    Exercises  Physio/Swiss Ball: longseated position for stretching. gastroc stretches bilaterally. ASSESSMENT/COMMENTS:pt tolerated static standing at ww and managed her pwb. PLAN OF CARE/Safety:   Safety Devices  Type of devices: All fall risk precautions in place      Therapy Time:   Individual   Time In 0910   Time Out 0930   Minutes 20   Pt delayed 10 minutes with medication administration.    Minutes:20 Transfer/Bed mobility trainin      Gait trainin      Neuro re education:5     Therapeutic ex:10      Red Dandy, PTA, 18 at 9:34 AM

## 2018-08-23 NOTE — PROGRESS NOTES
from her hinge brace to prevent hyperextension of R knee, however pt states that since she's lost weight, it doesn't fit appropriately. Pt states that she has multiple sleeve-type braces. Request for pt to have S. O. bring them in to trial if he can. Stairs  Curbs: 6\"  Device: Rolling walker  Assistance: Minimal assistance  Comment: +2 assist for safety. Verbal cues to flex at knee and hip to prevent extension synergy, and to spring from ankle. Pt completes with [place] to steady and heavy bracing with UEs. Activity Tolerance  Activity Tolerance: excellent motivation        ASSESSMENT:  Assessment: Pt progressing well. Reminded pt to have S.O. plan to come in for stair training on Saturday. Will continue to focus on management of steps and ballistic movements. PLAN OF CARE:   Plan Comment: cont per POC  Safety Devices  Type of devices:  All fall risk precautions in place    Short term goals  Short term goal 1: Pt to complete HEP with indep  Short term goal 2: Pt to state and maintain all hip precautions and 25% weight bearing L LE during following activities without cueing  Long term goals  Long term goal 1: Pt to complete all bed mobility with indep  Long term goal 2: Pt to complete all transfers with indep  Long term goal 3: Pt to ambulate 25-50ft with [facility] and indep to manage short distances within home  Long term goal 4: Pt to manage 4 steps with HR and CGA  Long term goal 5: Pt to manage and propel WC 150ft with indep    Therapy Time:   Individual   Time In 0930   Time Out 1000   Minutes 30     Timed Code Treatment Minutes: 30 Minutes (gait 20min; gait 10min)         Joy Juan, PT, 08/23/18 at 10:53 AM

## 2018-08-24 PROCEDURE — 6370000000 HC RX 637 (ALT 250 FOR IP): Performed by: NURSE PRACTITIONER

## 2018-08-24 PROCEDURE — 97535 SELF CARE MNGMENT TRAINING: CPT

## 2018-08-24 PROCEDURE — 6370000000 HC RX 637 (ALT 250 FOR IP): Performed by: PHYSICAL MEDICINE & REHABILITATION

## 2018-08-24 PROCEDURE — 97530 THERAPEUTIC ACTIVITIES: CPT

## 2018-08-24 PROCEDURE — 97116 GAIT TRAINING THERAPY: CPT

## 2018-08-24 PROCEDURE — 97110 THERAPEUTIC EXERCISES: CPT

## 2018-08-24 PROCEDURE — 94640 AIRWAY INHALATION TREATMENT: CPT

## 2018-08-24 PROCEDURE — 1180000000 HC REHAB R&B

## 2018-08-24 RX ADMIN — ASPIRIN 81 MG: 81 TABLET, COATED ORAL at 20:47

## 2018-08-24 RX ADMIN — DULOXETINE HYDROCHLORIDE 60 MG: 60 CAPSULE, DELAYED RELEASE ORAL at 08:49

## 2018-08-24 RX ADMIN — OXYCODONE HYDROCHLORIDE 5 MG: 5 TABLET ORAL at 12:27

## 2018-08-24 RX ADMIN — SIMVASTATIN 10 MG: 5 TABLET, FILM COATED ORAL at 20:47

## 2018-08-24 RX ADMIN — MULTIPLE VITAMINS W/ MINERALS TAB 1 TABLET: TAB at 08:49

## 2018-08-24 RX ADMIN — TRAMADOL HYDROCHLORIDE 50 MG: 50 TABLET, FILM COATED ORAL at 05:52

## 2018-08-24 RX ADMIN — POLYETHYLENE GLYCOL 3350 17 G: 17 POWDER, FOR SOLUTION ORAL at 08:49

## 2018-08-24 RX ADMIN — NALOXEGOL OXALATE 12.5 MG: 12.5 TABLET, FILM COATED ORAL at 05:52

## 2018-08-24 RX ADMIN — Medication 2 PUFF: at 04:19

## 2018-08-24 RX ADMIN — DOCUSATE SODIUM 200 MG: 100 CAPSULE, LIQUID FILLED ORAL at 20:46

## 2018-08-24 RX ADMIN — BACLOFEN 10 MG: 10 TABLET ORAL at 08:49

## 2018-08-24 RX ADMIN — ACETAMINOPHEN 650 MG: 325 TABLET ORAL at 23:56

## 2018-08-24 RX ADMIN — DOCUSATE SODIUM 200 MG: 100 CAPSULE, LIQUID FILLED ORAL at 08:49

## 2018-08-24 RX ADMIN — ASPIRIN 81 MG: 81 TABLET, COATED ORAL at 08:49

## 2018-08-24 RX ADMIN — ACETAMINOPHEN 650 MG: 325 TABLET ORAL at 05:52

## 2018-08-24 RX ADMIN — Medication 400 MG: at 20:46

## 2018-08-24 RX ADMIN — OXYBUTYNIN CHLORIDE 10 MG: 5 TABLET, EXTENDED RELEASE ORAL at 08:49

## 2018-08-24 RX ADMIN — BACLOFEN 10 MG: 10 TABLET ORAL at 14:50

## 2018-08-24 RX ADMIN — OXYCODONE HYDROCHLORIDE 5 MG: 5 TABLET ORAL at 16:54

## 2018-08-24 RX ADMIN — OXYCODONE HYDROCHLORIDE 10 MG: 10 TABLET, FILM COATED, EXTENDED RELEASE ORAL at 08:49

## 2018-08-24 RX ADMIN — Medication 400 MG: at 08:51

## 2018-08-24 RX ADMIN — SENNOSIDES AND DOCUSATE SODIUM 1 TABLET: 8.6; 5 TABLET ORAL at 08:49

## 2018-08-24 RX ADMIN — TRAMADOL HYDROCHLORIDE 50 MG: 50 TABLET, FILM COATED ORAL at 23:56

## 2018-08-24 RX ADMIN — ACETAMINOPHEN 650 MG: 325 TABLET ORAL at 12:27

## 2018-08-24 RX ADMIN — ACETAMINOPHEN 650 MG: 325 TABLET ORAL at 16:54

## 2018-08-24 RX ADMIN — Medication 2 PUFF: at 16:27

## 2018-08-24 RX ADMIN — OXYCODONE HYDROCHLORIDE 10 MG: 10 TABLET, FILM COATED, EXTENDED RELEASE ORAL at 20:49

## 2018-08-24 RX ADMIN — BACLOFEN 10 MG: 10 TABLET ORAL at 20:47

## 2018-08-24 ASSESSMENT — PAIN DESCRIPTION - DESCRIPTORS
DESCRIPTORS: ACHING

## 2018-08-24 ASSESSMENT — PAIN DESCRIPTION - PAIN TYPE
TYPE: CHRONIC PAIN;SURGICAL PAIN
TYPE: CHRONIC PAIN;SURGICAL PAIN

## 2018-08-24 ASSESSMENT — PAIN SCALES - GENERAL
PAINLEVEL_OUTOF10: 7
PAINLEVEL_OUTOF10: 7
PAINLEVEL_OUTOF10: 3
PAINLEVEL_OUTOF10: 7
PAINLEVEL_OUTOF10: 6
PAINLEVEL_OUTOF10: 7
PAINLEVEL_OUTOF10: 2
PAINLEVEL_OUTOF10: 5
PAINLEVEL_OUTOF10: 7
PAINLEVEL_OUTOF10: 3
PAINLEVEL_OUTOF10: 7

## 2018-08-24 ASSESSMENT — PAIN DESCRIPTION - PROGRESSION: CLINICAL_PROGRESSION: NOT CHANGED

## 2018-08-24 ASSESSMENT — PAIN DESCRIPTION - FREQUENCY: FREQUENCY: INTERMITTENT

## 2018-08-24 ASSESSMENT — PAIN DESCRIPTION - ORIENTATION
ORIENTATION: LEFT
ORIENTATION: RIGHT;LEFT
ORIENTATION: RIGHT;LEFT

## 2018-08-24 ASSESSMENT — PAIN DESCRIPTION - LOCATION
LOCATION: BACK;HIP;KNEE;LEG
LOCATION: HIP
LOCATION: BACK;HIP;KNEE;LEG
LOCATION: BACK

## 2018-08-24 NOTE — PROGRESS NOTES
Occupational Therapy  Facility/Department: [facility]  Daily Treatment Note  NAME: Wilber Monae  : 1960  MRN: [id number]    Date of Service: 2018    Discharge Recommendations:  Continue to assess pending progress       Patient Diagnosis(es): There were no encounter diagnoses. has a past medical history of Arthritis; COPD (chronic obstructive pulmonary disease) ([facility] [address] Hyperlipidemia; Hypertension; and Type 2 diabetes mellitus with hyperglycemia, without long-term current use of insulin ([facility] [address] has a past surgical history that includes Colonoscopy ();  section (); Hysterectomy (); Breast biopsy (Right, ); Cervical spine surgery (); cervical fusion (); and pr total hip arthroplasty (Left, 2018).     Restrictions  Restrictions/Precautions  Restrictions/Precautions: Fall Risk  Position Activity Restriction  Hip Precautions: Posterior hip precautions  Other position/activity restrictions: 25% Partial Weight Bearing L LE, JUAN hose and abdominal binder     Subjective   General  Chart Reviewed: Yes  Patient assessed for rehabilitation services?: Yes  Response to previous treatment: Patient with no complaints from previous session  Family / Caregiver Present: No  Pre Treatment Pain Screening  Pain at present: 3  Scale Used: Numeric Score  Intervention List: Patient able to continue with treatment  Pain Assessment  Patient Currently in Pain: Yes  Pain Assessment: 0-10  Pain Level: 3  Pain Type: Chronic pain;Surgical pain  Pain Location: Back;Hip;Knee;Leg  Pain Orientation: Right;Left  Pain Descriptors: Aching  Pain Frequency: Intermittent  Clinical Progression: Not changed  Pain Intervention(s): Repositioned  Vital Signs  Patient Currently in Pain: Yes        Objective    Standing Balance  Time: Pt stood for 5'30\" with intermittent unilateral UE support on table  Activity: reaching OBOS to complete 16 piece puzzle  Comment: pt required cues for correct orientation of 2

## 2018-08-24 NOTE — PROGRESS NOTES
therapy: FIMS:  Bed Mobility: Scooting: Modified independent    Transfers: Sit to Stand: Modified independent, Supervision  Stand to sit: Modified independent  Bed to Chair: Modified independent  Stand Pivot Transfers: Contact guard assistance, Ambulation 1  Surface: carpet  Device: Rolling Walker  Other Apparatus:  (knee wrap)  Assistance: Supervision  Quality of Gait: Focus on small functional distances incorporating transfers and ambulation  Distance: distance not focus  Comments: Donned R knee wrap with Ace for added stability. Pt with increased stability in knee noted. Discussed types of knee braces pt owns at home. ideally, pt would benefit from her hinge brace to prevent hyperextension of R knee, however pt states that since she's lost weight, it doesn't fit appropriately. Pt states that she has multiple sleeve-type braces. Request for pt to have S. O. bring them in to trial if he can. , Stairs  # Steps : 1  Stairs Height: 6\"  Rails: Right ascending  Curbs: 6\"  Device: Crutches (in L UE)  Assistance: Minimal assistance  Comment: +2 assist for safety. Pt able to stabilize well, however unable to flex R hip adequately to clear step requiring [place] to flex hip and knee    FIMS: Bed, Chair, Wheel Chair: 6 - Requires assistive device (slide rail)  Walk: 2 - Maximal Assistance Requires up to [address] requires assistance of one person to walk/operate wheelchair between  feet (Patient performs 25-49% of locomotion effort or goes between  feet)  Distance Walked: [id number] [address] [place]: 6 - Modified [place] Walks/operates wheelchair at least 150 feet with an ambulatory device, orthosis or prosthesis OR requires extra amount of time OR there is concern for safety  Stairs: 1- Total Assistance perfoms less than 25% of the effort, or requirs the assistance of two people, or goes up and down fewer than 4 stairs,  , Assessment: Pt progressing well.  Reminded pt to have S.O. plan to come in for stair HIP ARTHROPLASTY PLACEMENT. Xr Hip Left (2-3 Views) Result Date: 8/14/2018   UNREMARKABLE LEFT HIP RADIOGRAPHS DURING AND AFTER TOTAL LEFT HIP ARTHROPLASTY PLACEMENT. Previous extensive, complex labs, notes and diagnostics reviewed and analyzed. ALLERGIES:    Allergies as of 08/14/2018    (No Known Allergies)      (please also verify by checking MAR)      I have encouraged patient to attend their adjustment to rehabilitation support group with rec therapy and rehabilitation psychology in order to improve their adjustments, well-being, and help their spiritual and cognitive recovery. Complex Physical Medicine & Rehab Issues Assess & Plan:   1. Severe abnormality of gait and mobility and impaired self-care and ADL's secondary to  Left hip replacement secondary to severe osteoarthritis . Functional and medical status reassessed regarding patients ability to participate in therapies and patient found to be able to participate in acute intensive comprehensive inpatient rehabilitation program including PT/OT to improve balance, ambulation, ADLs, and to improve the P/AROM. Therapeutic modifications regarding activities in therapies, place, amount of time per day and intensity of therapy made daily. In bed therapies or bedside therapies prn.   2. Bowel Severe opiate-related constipation and Bladder dysfunction:  frequent toileting, ambulate to bathroom with assistance, check post void residuals. Check for C.difficile x1 if >2 loose stools in 24 hours, continue bowel & bladder program.  Monitor bowel and bladder function. Lactinex 2 PO every AC. MOM prn, Brown Bomb prn, Glycerin suppository prn, enema prn. Movantyk-no help MiraLAX no help --add Mag Citrate  3.  Severe postop left hip pain, active chronic neck mid back and low back pain generalized OA pain: reassess pain every shift and prior to and after each therapy session, give prn Tylenol and  Norco, modalities prn in therapy, Lidoderm, K-pad prn. Trial Duragesic-DC'd due to nausea-trial low-dose OxyContin and titrate. 4. Skin healing left hip incision and breakdown risk especially bilateral heels and sacrum:  continue pressure relief program.  Daily skin exams and reports from nursing. Add egg crate mattress and side-to-side turns  5. Severe Fatigue due to nutritional and hydration deficiency:  continue to monitor I&Os, calorie counts prn, dietary consult prn.  6. Acute episodic insomnia with situational adjustment disorder:  prn Ambien, monitor for day time sedation. 7. Falls risk elevated:  patient to use call light to get nursing assistance to get up, bed and chair alarm. 8. Elevated DVT risk: progressive activities in PT, continue prophylaxis JUNA hose, elevation. 9. Complex discharge planning:  Discharge 8/28/18 home with her significant other and home care PT OT RN 8 and  with a rolling versus willed walker as well as a wheelchair for long distances. Her 25% weightbearing is her biggest obstacle as well as the spasticity in her right lower extremity as a result of previous spinal cord injuries. Weekly team meeting every  Thursday's to assess progress towards goals, discuss and address social, psychological and medical comorbidities and to address difficulties they may be having progressing in therapy. Patient and family education is in progress. The patient is to follow-up with their family physician after discharge. Complex Active General Medical Issues that complicate care Assess & Plan:    1. 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 Osteoarthrosis involving lower leg,Spinal stenosis, lumbar region, without neurogenic claudication, Chronic pain syndrome  3.    COPD (chronic obstructive pulmonary

## 2018-08-24 NOTE — PROGRESS NOTES
Physical Therapy Rehab Treatment Note  Facility/Department: [facility]  Room: P702/Y325-61       NAME: Makayla Vidales  : 1960 (59 y.o.)  MRN: [id number]  CODE STATUS: Full Code    Date of Service: 2018  Chart Reviewed: Yes  Patient assessed for rehabilitation services?: Yes  Family / Caregiver Present: No    Restrictions:  Restrictions/Precautions: Fall Risk  Position Activity Restriction  Hip Precautions: Posterior hip precautions  Other position/activity restrictions: 25% Partial Weight Bearing L LE, JUAN hose and abdominal binder        SUBJECTIVE: Subjective: I'm having pain in my left him, low back and Right knee today, 7/10 but I just took some pain meds. Pain Screening  Patient Currently in Pain: Yes       Post Treatment Pain Screenin/10  Pain Assessment  Pain Assessment: 0-10  Pain Level: 7  Pain Type: Chronic pain;Surgical pain  Pain Location: Back;Hip;Knee;Leg  Pain Orientation: Right;Left  Pain Descriptors: Aching    OBJECTIVE:               Neuromuscular Education  NDT Treatment: Standing  Neuromuscular Comments: Static standing with WW x 4 min with x1 UE support for balance and maintain of PWB. No LOB and No lateral sway noted at this time. Bed mobility  Bridging: Modified independent   Rolling to Right: Modified independent  Supine to Sit: Modified independent  Sit to Supine: Modified independent  Comment: Educated pt on increased Active use of L hip and minimal use of UE to assit as tolerated while maintaing hip percautions. Transfers  Sit to Stand: Modified independent;Supervision  Stand to sit: Modified independent  Bed to Chair: Modified independent  Car Transfer: Supervision  Comment: Supervision/ SBA with Car transfers maintaing hip percautions at lower seated levels. Ambulation  Ambulation?: Yes  Ambulation 1  Surface: level tile  Device: Rolling Walker  Assistance: Supervision  Quality of Gait: Step to gait pattern with maintaining WB restirictions.    Distance: 30ft

## 2018-08-24 NOTE — PROGRESS NOTES
O2-add Xolair and Flonase, and Proventil  4. Hyperlipidemia,   Hypertension-vital signs every shift, titrate Zocor-add antihypertensive as needed, consult medical hospitalist for backup medical  5. History of fusion of cervical spine Cervical myelopathy with cervical myelopathy with residual right-sided spasticity  6. Constipation, chronic-with active exacerbation secondary to increased opiate use, titrate mag oxide, Colace  7. Dysthymia-add rec therapy rehabilitation psychology and Cymbalta  8. Neuropathy of both upper extremities-avoid toxic medications titrate membrane stabilizing medication such as Cymbalta and/or Topamax  9. OAB (overactive bladder) Mixed stress and urge urinary incontinence-Ditropan and monitor postvoid residuals, add frequent toileting  10. Type 2 diabetes mellitus with hyperglycemia, without long-term current use of insulin (HCC)  11. Nausea  severe-IV fluids and scheduled transition to as needed as needed, dose Zofran scheduled transition to as needed  12.  See team note       Destini Hua MD covering      Margarette Castillo D.O., PM&R     Attending    [address]

## 2018-08-24 NOTE — PROGRESS NOTES
Physical Therapy Rehab Treatment Note  Facility/Department: [facility]  Room: Z108/D568-24       NAME: Mariella Zepeda  : 1960 (59 y.o.)  MRN: [id number]  CODE STATUS: Full Code    Date of Service: 2018    Comments: Pt presenting to therapy gym. Restrictions:  Restrictions/Precautions: Fall Risk  Position Activity Restriction  Hip Precautions: Posterior hip precautions  Other position/activity restrictions: 25% Partial Weight Bearing L LE, JUAN hose and abdominal binder   Body mass index is 24.41 kg/m². SUBJECTIVE: \"My son will be the one coming in tomorrow to train at 9am.\"    Pain: Pt describes 7/10 pain initially, which improved over session to 5/10. Recently medicated. OBJECTIVE:   NMR: Targeted/isolated facilitation of R knee flexion and hip flexion in seated position. Instructed to complete without UE support for added challenge. Pt instructed to tap targets on floor for additional challenge with rotational components. Ball roll outs R LE (ant/post and laterally with AAROM). Therex: LAQ/HS/hip flexion B LE X 10 (2 sets)    ASSESSMENT:  Focus on NMR and control of R LE.      PLAN OF CARE:    Cont per POC    Short term goals  Short term goal 1: Pt to complete HEP with indep  Short term goal 2: Pt to state and maintain all hip precautions and 25% weight bearing L LE during following activities without cueing  Long term goals  Long term goal 1: Pt to complete all bed mobility with indep  Long term goal 2: Pt to complete all transfers with indep  Long term goal 3: Pt to ambulate 25-50ft with [employer] and indep to manage short distances within home  Long term goal 4: Pt to manage 4 steps with HR and CGA  Long term goal 5: Pt to manage and propel WC 150ft with indep    Therapy Time:   Individual   Time In 30   Time Out 1000   Minutes 30     Timed Code Treatment Minutes: 30 Minutes (20min NMR; 10min therex)         Orion Moncada PT, 18 at 9:55 AM

## 2018-08-25 PROCEDURE — 1180000000 HC REHAB R&B

## 2018-08-25 PROCEDURE — 97110 THERAPEUTIC EXERCISES: CPT

## 2018-08-25 PROCEDURE — 6370000000 HC RX 637 (ALT 250 FOR IP): Performed by: NURSE PRACTITIONER

## 2018-08-25 PROCEDURE — 97116 GAIT TRAINING THERAPY: CPT

## 2018-08-25 PROCEDURE — 6370000000 HC RX 637 (ALT 250 FOR IP): Performed by: PHYSICAL MEDICINE & REHABILITATION

## 2018-08-25 PROCEDURE — 97112 NEUROMUSCULAR REEDUCATION: CPT

## 2018-08-25 PROCEDURE — 97535 SELF CARE MNGMENT TRAINING: CPT

## 2018-08-25 PROCEDURE — 94761 N-INVAS EAR/PLS OXIMETRY MLT: CPT

## 2018-08-25 PROCEDURE — 94640 AIRWAY INHALATION TREATMENT: CPT

## 2018-08-25 RX ADMIN — OXYMETAZOLINE HYDROCHLORIDE 2 SPRAY: 5 SPRAY NASAL at 08:33

## 2018-08-25 RX ADMIN — Medication 2 PUFF: at 06:26

## 2018-08-25 RX ADMIN — SENNOSIDES AND DOCUSATE SODIUM 1 TABLET: 8.6; 5 TABLET ORAL at 08:34

## 2018-08-25 RX ADMIN — Medication 400 MG: at 21:06

## 2018-08-25 RX ADMIN — DOCUSATE SODIUM 200 MG: 100 CAPSULE, LIQUID FILLED ORAL at 21:04

## 2018-08-25 RX ADMIN — TRAMADOL HYDROCHLORIDE 50 MG: 50 TABLET, FILM COATED ORAL at 18:25

## 2018-08-25 RX ADMIN — ASPIRIN 81 MG: 81 TABLET, COATED ORAL at 21:07

## 2018-08-25 RX ADMIN — TRAMADOL HYDROCHLORIDE 50 MG: 50 TABLET, FILM COATED ORAL at 23:55

## 2018-08-25 RX ADMIN — BACLOFEN 10 MG: 10 TABLET ORAL at 14:29

## 2018-08-25 RX ADMIN — TRAMADOL HYDROCHLORIDE 50 MG: 50 TABLET, FILM COATED ORAL at 06:48

## 2018-08-25 RX ADMIN — ACETAMINOPHEN 650 MG: 325 TABLET ORAL at 12:21

## 2018-08-25 RX ADMIN — FLUTICASONE PROPIONATE 1 SPRAY: 50 SPRAY, METERED NASAL at 08:33

## 2018-08-25 RX ADMIN — NALOXEGOL OXALATE 12.5 MG: 12.5 TABLET, FILM COATED ORAL at 06:47

## 2018-08-25 RX ADMIN — ACETAMINOPHEN 650 MG: 325 TABLET ORAL at 23:55

## 2018-08-25 RX ADMIN — OXYBUTYNIN CHLORIDE 10 MG: 5 TABLET, EXTENDED RELEASE ORAL at 08:34

## 2018-08-25 RX ADMIN — ASPIRIN 81 MG: 81 TABLET, COATED ORAL at 08:34

## 2018-08-25 RX ADMIN — Medication 400 MG: at 08:34

## 2018-08-25 RX ADMIN — ACETAMINOPHEN 650 MG: 325 TABLET ORAL at 18:25

## 2018-08-25 RX ADMIN — BACLOFEN 10 MG: 10 TABLET ORAL at 21:04

## 2018-08-25 RX ADMIN — BACLOFEN 10 MG: 10 TABLET ORAL at 08:34

## 2018-08-25 RX ADMIN — DULOXETINE HYDROCHLORIDE 60 MG: 60 CAPSULE, DELAYED RELEASE ORAL at 08:34

## 2018-08-25 RX ADMIN — ACETAMINOPHEN 650 MG: 325 TABLET ORAL at 06:48

## 2018-08-25 RX ADMIN — SIMVASTATIN 10 MG: 5 TABLET, FILM COATED ORAL at 21:04

## 2018-08-25 RX ADMIN — POLYETHYLENE GLYCOL 3350 17 G: 17 POWDER, FOR SOLUTION ORAL at 08:33

## 2018-08-25 RX ADMIN — Medication 2 PUFF: at 17:35

## 2018-08-25 RX ADMIN — MULTIPLE VITAMINS W/ MINERALS TAB 1 TABLET: TAB at 08:34

## 2018-08-25 RX ADMIN — DOCUSATE SODIUM 200 MG: 100 CAPSULE, LIQUID FILLED ORAL at 08:34

## 2018-08-25 RX ADMIN — TRAMADOL HYDROCHLORIDE 50 MG: 50 TABLET, FILM COATED ORAL at 12:19

## 2018-08-25 RX ADMIN — OXYCODONE HYDROCHLORIDE 10 MG: 10 TABLET, FILM COATED, EXTENDED RELEASE ORAL at 08:34

## 2018-08-25 RX ADMIN — OXYCODONE HYDROCHLORIDE 10 MG: 10 TABLET, FILM COATED, EXTENDED RELEASE ORAL at 21:04

## 2018-08-25 ASSESSMENT — PAIN DESCRIPTION - PAIN TYPE: TYPE: SURGICAL PAIN

## 2018-08-25 ASSESSMENT — PAIN SCALES - GENERAL
PAINLEVEL_OUTOF10: 4
PAINLEVEL_OUTOF10: 4
PAINLEVEL_OUTOF10: 3
PAINLEVEL_OUTOF10: 6
PAINLEVEL_OUTOF10: 5
PAINLEVEL_OUTOF10: 5
PAINLEVEL_OUTOF10: 4
PAINLEVEL_OUTOF10: 2
PAINLEVEL_OUTOF10: 7

## 2018-08-25 ASSESSMENT — PAIN DESCRIPTION - ORIENTATION: ORIENTATION: LEFT

## 2018-08-25 ASSESSMENT — PAIN DESCRIPTION - LOCATION: LOCATION: HIP

## 2018-08-25 ASSESSMENT — PAIN DESCRIPTION - DESCRIPTORS: DESCRIPTORS: ACHING

## 2018-08-25 ASSESSMENT — PAIN DESCRIPTION - FREQUENCY: FREQUENCY: INTERMITTENT

## 2018-08-25 NOTE — PROGRESS NOTES
Subjective: The patient complains of severe  acute  left hip pain partially relieved by  rest, medications, ice and Lidoderm and exacerbated by palpation range of motion weightbearing. I am concerned about patients  frail bony structure therefore only 25% weightbearing. She also complains of active chronic right-sided spasticity only partially relieved with baclofen. ROS x10: The patient also complains of severely impaired mobility and activities of daily living. Otherwise no new problems with vision, hearing, nose, mouth, throat, dermal, cardiovascular, GI, , pulmonary, musculoskeletal, psychiatric or neurological. See Rehab H&P on Rehab chart dated . Vital signs:  BP (!) 105/59   Pulse 100   Temp 98 °F (36.7 °C) (Oral)   Resp 18   Ht 5' 1\" (1.549 m)   Wt 129 lb 3 oz (58.6 kg)   LMP 08/08/1997   SpO2 96%   BMI 24.41 kg/m²   I/O:   PO/Intake:  Good to fair PO intake,   no nausea and vomiting    Bowel/Bladder:  continent,  active chronic constipation  General:  Patient is well developed, adequately nourished, non-obese and     well kempt. HEENT:    PERRLA, hearing intact to loud voice, external inspection of ear     and nose benign. Inspection of lips, tongue and gums benign  Musculoskeletal: No significant change in strength or tone. All joints stable. Inspection and palpation of digits and nails show no clubbing,       cyanosis or inflammatory conditions. Neuro/Psychiatric: Affect: flat but pleasant. Alert and oriented to person, place and     situation. No significant change in deep tendon reflexes or     sensation right-sided spasticity  Lungs:  Diminished, CTA-B. Respiration effort is normal at rest.     Heart:   S1 = S2, RRR. No loud murmurs. Abdomen:  Soft, non-tender, no enlargement of liver or spleen. Extremities:  No significant lower extremity edema or tenderness.   Skin:   Intact to general survey, no visualized or palpated problems. Rehabilitation:  Physical therapy: FIMS:  Bed Mobility: Scooting: Modified independent    Transfers: Sit to Stand: Modified independent, Supervision  Stand to sit: Modified independent  Bed to Chair: Modified independent  Stand Pivot Transfers: Contact guard assistance, Ambulation 1  Surface: level tile  Device: Rolling Walker  Other Apparatus:  (knee wrap)  Assistance: Supervision  Quality of Gait: Step to gait pattern with maintaining WB restirictions. Distance: 30ft x3   Comments: Donned R knee wrap with Ace for added stability. Pt with increased stability in knee noted. Discussed types of knee braces pt owns at home. ideally, pt would benefit from her hinge brace to prevent hyperextension of R knee, however pt states that since she's lost weight, it doesn't fit appropriately. Pt states that she has multiple sleeve-type braces. Request for pt to have S. O. bring them in to trial if he can. , Stairs  # Steps : 4  Stairs Height: 4\"  Rails: Right ascending  Curbs: 6\"  Device: Rolling walker  Assistance: Minimal assistance, Contact guard assistance  Comment: Performed inside [facility] [employer] with 4 inch box step. FIMS: Bed, Chair, Wheel Chair: 0 - Did not occur (pt did not get up on night shift)  Walk: 2 - Maximal Assistance Requires up to [address] requires assistance of one person to walk/operate wheelchair between  feet (Patient performs 25-49% of locomotion effort or goes between  feet)  Distance Walked: [address] [address]: 6 - Modified [place] [address]ks/operates wheelchair at least 150 feet with an ambulatory device, orthosis or prosthesis OR requires extra amount of time OR there is concern for safety  Stairs: 1- Total Assistance perfoms less than 25% of the effort, or requirs the assistance of two people, or goes up and down fewer than 4 stairs,  , Assessment: Pt displayed good safety with transfers, able to maintain hip percautions with all transfers.  Pt limited by L hip WB prn.  6. Acute episodic insomnia with situational adjustment disorder:  prn Ambien, monitor for day time sedation. 7. Falls risk elevated:  patient to use call light to get nursing assistance to get up, bed and chair alarm. 8. Elevated DVT risk: progressive activities in PT, continue prophylaxis JUAN hose, elevation. 9. Complex discharge planning:  Discharge 8/28/18 home with her significant other and home care PT OT RN 8 and  with a rolling versus willed walker as well as a wheelchair for long distances. Her 25% weightbearing is her biggest obstacle as well as the spasticity in her right lower extremity as a result of previous spinal cord injuries. Weekly team meeting every  Thursday's to assess progress towards goals, discuss and address social, psychological and medical comorbidities and to address difficulties they may be having progressing in therapy. Patient and family education is in progress. The patient is to follow-up with their family physician after discharge. Complex Active General Medical Issues that complicate care Assess & Plan:    1. 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 Osteoarthrosis involving lower leg,Spinal stenosis, lumbar region, without neurogenic claudication, Chronic pain syndrome  3. COPD (chronic obstructive pulmonary disease) -pulse ox checks every shift titrate nasal cannula O2-add Xolair and Flonase, and Proventil  4. Hyperlipidemia,   Hypertension-vital signs every shift, titrate Zocor-add antihypertensive as needed, consult medical hospitalist for backup medical  5. History of fusion of cervical spine Cervical myelopathy with cervical myelopathy with residual right-sided spasticity  6.    Constipation, chronic-with active exacerbation secondary to increased opiate use, titrate mag oxide, Colace  7. Dysthymia-add rec therapy rehabilitation psychology and Cymbalta  8. Neuropathy of both upper extremities-avoid toxic medications titrate membrane stabilizing medication such as Cymbalta and/or Topamax  9. OAB (overactive bladder) Mixed stress and urge urinary incontinence-Ditropan and monitor postvoid residuals, add frequent toileting  10. Type 2 diabetes mellitus with hyperglycemia, without long-term current use of insulin (HCC)  11. Nausea  severe-IV fluids and scheduled transition to as needed as needed, dose Zofran scheduled transition to as needed  12.  See team note       Leonard Sterling MD covering      Chantel Ramirez D.O., PM&R     Attending    [address]

## 2018-08-25 NOTE — PLAN OF CARE
Problem: Falls - Risk of:  Goal: Will remain free from falls  Will remain free from falls   Outcome: Ongoing  Pt remains free from falls, bed in lowest position, wheels locked, non skid footwear worn, hourly rounding completed, call light within reach, pt instructed to use call light.

## 2018-08-26 PROCEDURE — 97112 NEUROMUSCULAR REEDUCATION: CPT

## 2018-08-26 PROCEDURE — 94760 N-INVAS EAR/PLS OXIMETRY 1: CPT

## 2018-08-26 PROCEDURE — 97116 GAIT TRAINING THERAPY: CPT

## 2018-08-26 PROCEDURE — 6370000000 HC RX 637 (ALT 250 FOR IP): Performed by: PHYSICAL MEDICINE & REHABILITATION

## 2018-08-26 PROCEDURE — 99232 SBSQ HOSP IP/OBS MODERATE 35: CPT | Performed by: PHYSICAL MEDICINE & REHABILITATION

## 2018-08-26 PROCEDURE — 1180000000 HC REHAB R&B

## 2018-08-26 PROCEDURE — 94640 AIRWAY INHALATION TREATMENT: CPT

## 2018-08-26 PROCEDURE — 6370000000 HC RX 637 (ALT 250 FOR IP): Performed by: NURSE PRACTITIONER

## 2018-08-26 RX ADMIN — MULTIPLE VITAMINS W/ MINERALS TAB 1 TABLET: TAB at 08:42

## 2018-08-26 RX ADMIN — ACETAMINOPHEN 650 MG: 325 TABLET ORAL at 06:23

## 2018-08-26 RX ADMIN — TRAMADOL HYDROCHLORIDE 50 MG: 50 TABLET, FILM COATED ORAL at 18:26

## 2018-08-26 RX ADMIN — BACLOFEN 10 MG: 10 TABLET ORAL at 08:42

## 2018-08-26 RX ADMIN — OXYCODONE HYDROCHLORIDE 10 MG: 10 TABLET, FILM COATED, EXTENDED RELEASE ORAL at 22:02

## 2018-08-26 RX ADMIN — DOCUSATE SODIUM 200 MG: 100 CAPSULE, LIQUID FILLED ORAL at 08:42

## 2018-08-26 RX ADMIN — DOCUSATE SODIUM 200 MG: 100 CAPSULE, LIQUID FILLED ORAL at 22:03

## 2018-08-26 RX ADMIN — TRAMADOL HYDROCHLORIDE 50 MG: 50 TABLET, FILM COATED ORAL at 06:23

## 2018-08-26 RX ADMIN — OXYCODONE HYDROCHLORIDE 10 MG: 10 TABLET, FILM COATED, EXTENDED RELEASE ORAL at 08:42

## 2018-08-26 RX ADMIN — SIMVASTATIN 10 MG: 5 TABLET, FILM COATED ORAL at 22:04

## 2018-08-26 RX ADMIN — Medication 2 PUFF: at 16:04

## 2018-08-26 RX ADMIN — DULOXETINE HYDROCHLORIDE 60 MG: 60 CAPSULE, DELAYED RELEASE ORAL at 08:43

## 2018-08-26 RX ADMIN — BACLOFEN 10 MG: 10 TABLET ORAL at 15:22

## 2018-08-26 RX ADMIN — OXYCODONE HYDROCHLORIDE 5 MG: 5 TABLET ORAL at 15:22

## 2018-08-26 RX ADMIN — Medication 400 MG: at 08:43

## 2018-08-26 RX ADMIN — Medication 2 PUFF: at 05:15

## 2018-08-26 RX ADMIN — ACETAMINOPHEN 650 MG: 325 TABLET ORAL at 18:26

## 2018-08-26 RX ADMIN — NALOXEGOL OXALATE 12.5 MG: 12.5 TABLET, FILM COATED ORAL at 06:24

## 2018-08-26 RX ADMIN — POLYETHYLENE GLYCOL 3350 17 G: 17 POWDER, FOR SOLUTION ORAL at 08:43

## 2018-08-26 RX ADMIN — ACETAMINOPHEN 650 MG: 325 TABLET ORAL at 12:03

## 2018-08-26 RX ADMIN — ASPIRIN 81 MG: 81 TABLET, COATED ORAL at 22:03

## 2018-08-26 RX ADMIN — ASPIRIN 81 MG: 81 TABLET, COATED ORAL at 08:42

## 2018-08-26 RX ADMIN — Medication 400 MG: at 22:04

## 2018-08-26 RX ADMIN — BACLOFEN 10 MG: 10 TABLET ORAL at 22:07

## 2018-08-26 RX ADMIN — TRAMADOL HYDROCHLORIDE 50 MG: 50 TABLET, FILM COATED ORAL at 12:03

## 2018-08-26 RX ADMIN — OXYBUTYNIN CHLORIDE 10 MG: 5 TABLET, EXTENDED RELEASE ORAL at 08:42

## 2018-08-26 RX ADMIN — SENNOSIDES AND DOCUSATE SODIUM 1 TABLET: 8.6; 5 TABLET ORAL at 08:43

## 2018-08-26 ASSESSMENT — PAIN DESCRIPTION - DESCRIPTORS
DESCRIPTORS: ACHING

## 2018-08-26 ASSESSMENT — PAIN DESCRIPTION - ORIENTATION
ORIENTATION: RIGHT;LEFT;LOWER

## 2018-08-26 ASSESSMENT — PAIN DESCRIPTION - LOCATION
LOCATION: BACK;HIP;KNEE

## 2018-08-26 ASSESSMENT — PAIN SCALES - GENERAL
PAINLEVEL_OUTOF10: 3
PAINLEVEL_OUTOF10: 4
PAINLEVEL_OUTOF10: 3
PAINLEVEL_OUTOF10: 7
PAINLEVEL_OUTOF10: 4
PAINLEVEL_OUTOF10: 7
PAINLEVEL_OUTOF10: 5

## 2018-08-26 NOTE — PROGRESS NOTES
Hospitalist Progress Note      PCP: Millie Lazcano DO    Date of Admission: 8/14/2018    Chief Complaint:  No acute events,afebrile,stable HD      Medications:  Reviewed    Infusion Medications   Scheduled Medications    polyethylene glycol  17 g Oral Daily    naloxegol  12.5 mg Oral QAM    oxyCODONE  10 mg Oral 2 times per day    oxymetazoline  2 spray Each Nare BID    acetaminophen  650 mg Oral Q6H    aspirin  81 mg Oral BID    sennosides-docusate sodium  1 tablet Oral Daily    baclofen  10 mg Oral TID    docusate sodium  200 mg Oral BID    DULoxetine  60 mg Oral Daily    fluticasone  1 spray Nasal Daily    magnesium oxide  400 mg Oral BID    mometasone-formoterol  2 puff Inhalation BID    oxybutynin  10 mg Oral Daily    simvastatin  10 mg Oral Nightly    therapeutic multivitamin-minerals  1 tablet Oral Daily    traMADol  50 mg Oral 4 times per day     PRN Meds: lactulose, bisacodyl, acetaminophen, polyethylene glycol, magnesium hydroxide, ondansetron, oxyCODONE, albuterol    No intake or output data in the 24 hours ending 08/26/18 1255    Exam:    BP (!) 106/54   Pulse 75   Temp 97 °F (36.1 °C) (Oral)   Resp 18   Ht 5' 1\" (1.549 m)   Wt 129 lb 3 oz (58.6 kg)   LMP 08/08/1997   SpO2 100%   BMI 24.41 kg/m²     General appearance: No apparent distress, appears stated age and cooperative. Respiratory:  clear to auscultation, bilaterally without Rales/Wheezes/Rhonchi. Cardiovascular: Regular rate and rhythm with normal S1/S2   Abdomen: Soft, non-tender, non-distended with normal bowel sounds. Musculoskeletal: No clubbing, cyanosis or edema bilaterally. Labs:   No results for input(s): WBC, HGB, HCT, PLT in the last 72 hours. No results for input(s): NA, K, CL, CO2, BUN, CREATININE, CALCIUM, PHOS in the last 72 hours. Invalid input(s): MAGNES  No results for input(s): AST, ALT, BILIDIR, BILITOT, ALKPHOS in the last 72 hours. No results for input(s): INR in the last 72 hours.   No results for input(s): Norma Sagastume in the last 72 hours.     Urinalysis:      Lab Results   Component Value Date    NITRU Negative 08/14/2018    BLOODU Negative 08/14/2018    SPECGRAV 1.004 08/14/2018    GLUCOSEU Negative 08/14/2018       Radiology:  No orders to display           Assessment/Plan:    Gait instability and immobility   - secondary to left total hip arthoplasty on 8/13/18  - continue PT/OT  - management per primary service    Hyperlipidemia-continue statin therapy    COPD - continue albuterol PRN                 Electronically signed by Karrie Mccarthy MD on 8/26/2018 at 12:55 PM

## 2018-08-26 NOTE — PROGRESS NOTES
Subjective: The patient complains of severe  acute  on chronic upper and lower extremity weakness secondary to cervical myelopathy as well as generalized increased weakness after left total hip replacement partially relieved by  PT, OT, rest and exacerbated by  overexertion. I am concerned about patients low blood pressure and lightheadedness partially relieved by increased by mouth intake and abdominal binder. I will make sure they continue to check orthostatic blood pressures. ROS x10: The patient also complains of severely impaired mobility and activities of daily living. Otherwise no new problems with vision, hearing, nose, mouth, throat, dermal, cardiovascular, GI, , pulmonary, musculoskeletal, psychiatric or neurological. See Rehab H&P on Rehab chart dated . Vital signs:  BP (!) 106/54   Pulse 75   Temp 97 °F (36.1 °C) (Oral)   Resp 18   Ht 5' 1\" (1.549 m)   Wt 129 lb 3 oz (58.6 kg)   LMP 08/08/1997   SpO2 100%   BMI 24.41 kg/m²   I/O:   PO/Intake:  fair PO intake, no problems observed or reported. Bowel/Bladder:  continent, no problems noted. General:  Patient is well developed, adequately nourished, non-obese and     well kempt. HEENT:    PERRLA, hearing intact to loud voice, external inspection of ear     and nose benign. Inspection of lips, tongue and gums benign  Musculoskeletal: No significant change in strength or tone. All joints stable. Inspection and palpation of digits and nails show no clubbing,       cyanosis or inflammatory conditions. Neuro/Psychiatric: Affect: flat but pleasant. Alert and oriented to person, place and     situation. No significant change in deep tendon reflexes or     sensation  Lungs:  CTA-B. Respiration effort is normal at rest.     Heart:   S1 = S2, RRR. No loud murmurs. Abdomen:  Soft, non-tender, no enlargement of liver or spleen. Extremities:   significant left lower extremity edema and tenderness.   Skin:   Intact left hip incision to general survey, no visualized or palpated problems. Rehabilitation:  Physical therapy: FIMS:  Bed Mobility: Scooting: Modified independent    Transfers: Sit to Stand: Supervision, Modified independent  Stand to sit: Supervision, Modified independent  Bed to Chair: Modified independent  Stand Pivot Transfers: Contact guard assistance, Ambulation 1  Surface: carpet  Device: Rolling Walker  Other Apparatus:  (knee wrap)  Assistance: Stand by assistance  Quality of Gait: VCs for foot flat as pt demo's Lt hip flexion with WBing through toes. No LOB. Fair knee stability  Distance: 100ft  Comments: pt denied right knee wrap for stability this date. tends to ambulate using toes on left foot to help keep pwb maintenance, Stairs  # Steps : 4  Stairs Height: 4\"  Rails: Right ascending  Curbs: 6\"  Device: Rolling walker  Assistance: Minimal assistance, Contact guard assistance  Comment: demonstrated to pt and son how to bump up steps in wc. son able to demonstrate this without the pt in the wc. son states he will not be the one with her. pt states her s.o. is proficient in getting her in the house just fine. she reports they take off leg rests.     FIMS: Bed, Chair, Wheel Chair: 4 - Requires steadying assistance only <25% assist  and/or requires assist with one leg only  Walk: 2 - Maximal Assistance Requires up to [address] requires assistance of one person to walk/operate wheelchair between  feet (Patient performs 25-49% of locomotion effort or goes between  feet)  Distance Walked: [address] [address]: 6 - Modified [place] [address]ks/operates wheelchair at least 150 feet with an ambulatory device, orthosis or prosthesis OR requires extra amount of time OR there is concern for safety  Stairs: 1- Total Assistance perfoms less than 25% of the effort, or requirs the assistance of two people, or goes up and down fewer than 4 stairs,  , Assessment: pt safe with all activities that she was with their family physician after discharge. Complex Active General Medical Issues that complicate care Assess & Plan:    1. 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 Osteoarthrosis involving lower leg,Spinal stenosis, lumbar region, without neurogenic claudication, Chronic pain syndrome  3. COPD (chronic obstructive pulmonary disease) -pulse ox checks every shift titrate nasal cannula O2-add Xolair and Flonase, and Proventil  4. Hyperlipidemia,   Hypertension-vital signs every shift, titrate Zocor-add antihypertensive as needed, consult medical hospitalist for backup medical  5. History of fusion of cervical spine Cervical myelopathy with cervical myelopathy with residual right-sided spasticity  6. Constipation, chronic-with active exacerbation secondary to increased opiate use, titrate mag oxide, Colace  7. Dysthymia-add rec therapy rehabilitation psychology and Cymbalta  8. Neuropathy of both upper extremities-avoid toxic medications titrate membrane stabilizing medication such as Cymbalta and/or Topamax  9. OAB (overactive bladder) Mixed stress and urge urinary incontinence-Ditropan and monitor postvoid residuals, add frequent toileting  10. Type 2 diabetes mellitus with hyperglycemia, without long-term current use of insulin (HCC)  11.  Status post recent Nausea  severe-discontinue IV fluids and scheduled transition to as needed as needed, dose Zofran scheduled transition to as needed      Princess Jorden D.O., PM&R     Attending    [address]

## 2018-08-27 ENCOUNTER — APPOINTMENT (OUTPATIENT)
Dept: GENERAL RADIOLOGY | Age: 58
DRG: 560 | End: 2018-08-27
Attending: PHYSICAL MEDICINE & REHABILITATION
Payer: MEDICARE

## 2018-08-27 PROCEDURE — 2700000000 HC OXYGEN THERAPY PER DAY

## 2018-08-27 PROCEDURE — 1180000000 HC REHAB R&B

## 2018-08-27 PROCEDURE — 94761 N-INVAS EAR/PLS OXIMETRY MLT: CPT

## 2018-08-27 PROCEDURE — 6370000000 HC RX 637 (ALT 250 FOR IP): Performed by: NURSE PRACTITIONER

## 2018-08-27 PROCEDURE — 99232 SBSQ HOSP IP/OBS MODERATE 35: CPT | Performed by: PHYSICAL MEDICINE & REHABILITATION

## 2018-08-27 PROCEDURE — 97530 THERAPEUTIC ACTIVITIES: CPT

## 2018-08-27 PROCEDURE — 97535 SELF CARE MNGMENT TRAINING: CPT

## 2018-08-27 PROCEDURE — 73502 X-RAY EXAM HIP UNI 2-3 VIEWS: CPT

## 2018-08-27 PROCEDURE — 97116 GAIT TRAINING THERAPY: CPT

## 2018-08-27 PROCEDURE — 97110 THERAPEUTIC EXERCISES: CPT

## 2018-08-27 PROCEDURE — 94640 AIRWAY INHALATION TREATMENT: CPT

## 2018-08-27 PROCEDURE — 6370000000 HC RX 637 (ALT 250 FOR IP): Performed by: PHYSICAL MEDICINE & REHABILITATION

## 2018-08-27 PROCEDURE — 97542 WHEELCHAIR MNGMENT TRAINING: CPT

## 2018-08-27 RX ADMIN — SIMVASTATIN 10 MG: 5 TABLET, FILM COATED ORAL at 21:03

## 2018-08-27 RX ADMIN — DOCUSATE SODIUM 200 MG: 100 CAPSULE, LIQUID FILLED ORAL at 08:16

## 2018-08-27 RX ADMIN — MULTIPLE VITAMINS W/ MINERALS TAB 1 TABLET: TAB at 08:15

## 2018-08-27 RX ADMIN — ASPIRIN 81 MG: 81 TABLET, COATED ORAL at 08:17

## 2018-08-27 RX ADMIN — ACETAMINOPHEN 650 MG: 325 TABLET ORAL at 18:55

## 2018-08-27 RX ADMIN — Medication 2 PUFF: at 05:32

## 2018-08-27 RX ADMIN — Medication 400 MG: at 21:04

## 2018-08-27 RX ADMIN — BACLOFEN 10 MG: 10 TABLET ORAL at 08:17

## 2018-08-27 RX ADMIN — DOCUSATE SODIUM 200 MG: 100 CAPSULE, LIQUID FILLED ORAL at 21:03

## 2018-08-27 RX ADMIN — TRAMADOL HYDROCHLORIDE 50 MG: 50 TABLET, FILM COATED ORAL at 05:49

## 2018-08-27 RX ADMIN — OXYCODONE HYDROCHLORIDE 10 MG: 10 TABLET, FILM COATED, EXTENDED RELEASE ORAL at 08:17

## 2018-08-27 RX ADMIN — OXYCODONE HYDROCHLORIDE 10 MG: 10 TABLET, FILM COATED, EXTENDED RELEASE ORAL at 21:03

## 2018-08-27 RX ADMIN — Medication 400 MG: at 08:16

## 2018-08-27 RX ADMIN — TRAMADOL HYDROCHLORIDE 50 MG: 50 TABLET, FILM COATED ORAL at 23:49

## 2018-08-27 RX ADMIN — BACLOFEN 10 MG: 10 TABLET ORAL at 14:40

## 2018-08-27 RX ADMIN — TRAMADOL HYDROCHLORIDE 50 MG: 50 TABLET, FILM COATED ORAL at 00:03

## 2018-08-27 RX ADMIN — ACETAMINOPHEN 650 MG: 325 TABLET ORAL at 00:03

## 2018-08-27 RX ADMIN — NALOXEGOL OXALATE 12.5 MG: 12.5 TABLET, FILM COATED ORAL at 05:49

## 2018-08-27 RX ADMIN — OXYBUTYNIN CHLORIDE 10 MG: 5 TABLET, EXTENDED RELEASE ORAL at 08:16

## 2018-08-27 RX ADMIN — Medication 2 PUFF: at 17:45

## 2018-08-27 RX ADMIN — OXYMETAZOLINE HYDROCHLORIDE 2 SPRAY: 5 SPRAY NASAL at 21:06

## 2018-08-27 RX ADMIN — ACETAMINOPHEN 650 MG: 325 TABLET ORAL at 23:48

## 2018-08-27 RX ADMIN — ACETAMINOPHEN 650 MG: 325 TABLET ORAL at 14:41

## 2018-08-27 RX ADMIN — BACLOFEN 10 MG: 10 TABLET ORAL at 21:04

## 2018-08-27 RX ADMIN — DULOXETINE HYDROCHLORIDE 60 MG: 60 CAPSULE, DELAYED RELEASE ORAL at 08:16

## 2018-08-27 RX ADMIN — OXYCODONE HYDROCHLORIDE 5 MG: 5 TABLET ORAL at 18:55

## 2018-08-27 RX ADMIN — ACETAMINOPHEN 650 MG: 325 TABLET ORAL at 05:49

## 2018-08-27 RX ADMIN — SENNOSIDES AND DOCUSATE SODIUM 1 TABLET: 8.6; 5 TABLET ORAL at 08:17

## 2018-08-27 RX ADMIN — POLYETHYLENE GLYCOL 3350 17 G: 17 POWDER, FOR SOLUTION ORAL at 08:14

## 2018-08-27 RX ADMIN — ASPIRIN 81 MG: 81 TABLET, COATED ORAL at 21:03

## 2018-08-27 RX ADMIN — OXYCODONE HYDROCHLORIDE 5 MG: 5 TABLET ORAL at 14:41

## 2018-08-27 ASSESSMENT — PAIN SCALES - GENERAL
PAINLEVEL_OUTOF10: 7
PAINLEVEL_OUTOF10: 3
PAINLEVEL_OUTOF10: 4
PAINLEVEL_OUTOF10: 7
PAINLEVEL_OUTOF10: 4
PAINLEVEL_OUTOF10: 5
PAINLEVEL_OUTOF10: 7
PAINLEVEL_OUTOF10: 4

## 2018-08-27 ASSESSMENT — PAIN DESCRIPTION - PAIN TYPE
TYPE: SURGICAL PAIN
TYPE: SURGICAL PAIN

## 2018-08-27 ASSESSMENT — PAIN DESCRIPTION - ORIENTATION
ORIENTATION: LEFT
ORIENTATION: RIGHT;LEFT;LOWER

## 2018-08-27 ASSESSMENT — PAIN DESCRIPTION - PROGRESSION: CLINICAL_PROGRESSION: NOT CHANGED

## 2018-08-27 ASSESSMENT — PAIN DESCRIPTION - DESCRIPTORS
DESCRIPTORS: ACHING
DESCRIPTORS: ACHING

## 2018-08-27 ASSESSMENT — PAIN DESCRIPTION - FREQUENCY: FREQUENCY: INTERMITTENT

## 2018-08-27 ASSESSMENT — PAIN DESCRIPTION - LOCATION
LOCATION: HIP
LOCATION: BACK;HIP;KNEE

## 2018-08-27 NOTE — PROGRESS NOTES
increased thoroughness for the bottom of her feet )  UE Dressing: Independent  LE Dressing: Modified independent   Toileting: Supervision (Per pt report. Pt able to don/doff own underwear and pants )        Standing Balance  Sit to stand: Supervision (increased time and effort )  Stand to sit: Supervision  Toilet Transfers  Toilet Transfers Comments: N/T. Pt declined need to go. Tub Transfers  Tub - Transfer From: Walker  Tub - Transfer Type: To and From  Tub - Transfer To: Shower seat with back (portable grab bar )  Tub - Technique: Ambulating  Tub Transfers: Supervision  Tub Transfers Comments: Pt utilized towel as a leg  to bring her LLE over side of tub. Shower Transfers  Shower - Transfer From: Susana Givens - Transfer Type: To and From  Shower - Transfer To: Shower seat with back  Shower - Technique: Ambulating  Shower Transfers: Supervision     Transfers  Stand Step Transfers: Supervision (FWW)  Sit to stand: Supervision (increased time and effort )  Stand to sit: Supervision        Coordination  Fine Motor: Large Nuts and Bolts: Pt connected/disconnected nuts to their corresponding bolt with no difficulty. To improve hand fine motor coordination for mgmt of ADL containers/clothing fasteners in a timely manner. Problem Solving incorporated into activity. Cognition  Overall Cognitive Status: WNL  Cognition Comment: Comprehension: MI., Expression: MI., Social Interaction: Ind., Problem Solving: MI.  Memory: MI    LUE AROM (degrees)  LUE AROM : Exceptions (limited shoulder internal rotation )  Left Hand AROM (degrees)  Left Hand AROM: WFL  RUE AROM (degrees)  RUE AROM : Exceptions (limited shoulder internal rotation )  Right Hand AROM (degrees)  Right Hand AROM: WFL     Assessment   Activity Tolerance  Activity Tolerance: Patient Tolerated treatment well  Safety Devices  Safety Devices in place: Yes  Type of devices:  All fall risk precautions in place  Restraints  Initially in place: No

## 2018-08-27 NOTE — PROGRESS NOTES
exercises 5: Supine Hip Abduction x 10  Other exercises 6: Supine Heel Slides x 10  Other exercises 7: Standing L hip abduction x 20  Other exercises 8: Standing L knee flexion x 20  Other exercises 9: Standing R mini squat x 20  Other exercises 10: Standing L hip flexion x20     ASSESSMENT/COMMENTS:  Assessment: Patient shows Tate with current HEP in seated supine and standing with handouts. PLAN OF CARE/Safety:   Plan Comment: cont per POC  Safety Devices  Type of devices:  All fall risk precautions in place      Therapy Time:   Individual   Time In 1500   Time Out 1600   Minutes 60     Minutes: 60      Transfer/Bed mobility trainin      Gait training: 10       Therapeutic ex: [facility], [facility] at 4:17 PM

## 2018-08-27 NOTE — PROGRESS NOTES
achieving indep in all functional mobility and is appropriate for DC from acute rehab PT program. Upon DC date, pt's WBS updated to WBAT on operative LE. Pt trained on mobility with updated WBS and still independent with mobility following instruction. Discharge Plan: DC home with [facility] PT rec and [employer] for ambulation.      Electronically signed by Shikha Calderón PT on 8/28/2018 at 4:38 PM

## 2018-08-27 NOTE — PROGRESS NOTES
Physical Therapy Rehab Treatment Note  Facility/Department: [facility]  Room: A255/P307-64       NAME: Kala Connelly  : 1960 (59 y.o.)  MRN: [id number]  CODE STATUS: Full Code    Date of Service: 2018    Chart Reviewed: Yes  Family / Caregiver Present: No  General Comment  Comments: Pt agreeable to PT tx    Restrictions:  Restrictions/Precautions: Fall Risk  Position Activity Restriction  Hip Precautions: Posterior hip precautions  Other position/activity restrictions: 25% Partial Weight Bearing L LE, JUAN hose and abdominal binder      SUBJECTIVE: Subjective: \"If Dr. Maciej Vance changes my WBS, will I have some therapy Tuesday so you can teach me what I need to know? \"     Pre Treatment Pain Screening  Pain at present: 2  Scale Used: Numeric Score  Intervention List: Patient able to continue with treatment;Patient declined any intervention  Comments / Details: L hip    Post Treatment Pain Screening:  Pain Assessment  Pain Assessment:  (unchanged)    OBJECTIVE:      Bed mobility  Rolling to Right: Modified independent  Supine to Sit: Modified independent  Sit to Supine: Modified independent    Transfers  Sit to Stand: Modified independent  Stand to sit: Modified independent  Bed to Chair: Modified independent  Car Transfer: Modified independent  Comment: Pt maintains hip precautions throughout; completes with multiple surfaces. Ambulation 1  Surface: carpet  Device: Rolling Walker  Assistance: Modified Independent  Quality of Gait: Pt maintains consistent pacing and safety throughout full distance.    Distance: 50ft   Comments: no knee wrap utilized this AM. Pt able to maintain stability throughout kneePropulsion 1  Propulsion: Manual  Level: Level Tile  Method: RUE;LUE  Level of Assistance: Modified independent  Description/ Details: No concerns; manages obstacles and functional spaces without difficulty  Distance: 200ft straight path on tile and carpet surfaces and in busy environment  Stairs  Comment: Attempting 1 6\" step with +2 assist, however pt unable to exert sufficient force with UEs to clear step safely. Further stair negotiation aborted. Other Activities  Comment: Pt states and maintains 3/3 hip precautions during all mobility completed this AM. Discussed progression through POC and recommendations upon DC. Pt requesting to be seen tomorrow after her orthopedic appointment in case her WBS is changed. Pt verbalizing understanding of all recommendations reviewed, including plan for DC, plan for entering/exiting her home with her S.O., and progression through further PT.      ASSESSMENT:  Assessment: Prep for DC    PLAN OF CARE:   Plan Comment: cont per POC  Safety Devices  Type of devices:  All fall risk precautions in place    Short term goals  Short term goal 1: Pt to complete HEP with indep  Short term goal 2: Pt to state and maintain all hip precautions and 25% weight bearing L LE during following activities without cueing - MET  Long term goals  Long term goal 1: Pt to complete all bed mobility with indep - MET  Long term goal 2: Pt to complete all transfers with indep - MET  Long term goal 3: Pt to ambulate 25-50ft with [employer] and indep to manage short distances within home - MET  Long term goal 4: Pt to manage curb step with family member assist in [facility] - MET  Long term goal 5: Pt to manage and propel WC 150ft with indep - MET    Therapy Time:   Individual   Time In 0900   Time Out 1000   Minutes 60     Timed Code Treatment Minutes: 60 Minutes (gait 30min; WC 10min; transfers 20min)         Tam Simon, PT, 08/27/18 at 11:45 AM

## 2018-08-27 NOTE — PROGRESS NOTES
Subjective: The patient complains of moderate  acute  on chronic upper and lower extremity weakness secondary to cervical myelopathy as well as generalized increased weakness after left total hip replacement partially relieved by  PT, OT, rest and exacerbated by  overexertion. I am concerned about patients low blood pressure and lightheadedness partially relieved by increased by mouth intake and abdominal binder. Her blood pressure actually looks little better today. I will make sure they continue to check orthostatic blood pressures. She is beginning discharge planning and she will go to home tomorrow with her significant other. I will start her med reconciliation. ROS x10: The patient also complains of severely impaired mobility and activities of daily living. Otherwise no new problems with vision, hearing, nose, mouth, throat, dermal, cardiovascular, GI, , pulmonary, musculoskeletal, psychiatric or neurological. See Rehab H&P on Rehab chart dated . Vital signs:  BP (!) 144/75   Pulse 91   Temp 97 °F (36.1 °C) (Oral)   Resp 16   Ht 5' 1\" (1.549 m)   Wt 129 lb 3 oz (58.6 kg)   LMP 08/08/1997   SpO2 100%   BMI 24.41 kg/m²   I/O:   PO/Intake:  fair PO intake, no problems observed or reported. Bowel/Bladder:  continent, no problems noted. General:  Patient is well developed, adequately nourished, non-obese and     well kempt. HEENT:    PERRLA, hearing intact to loud voice, external inspection of ear     and nose benign. Inspection of lips, tongue and gums benign  Musculoskeletal: No significant change in strength or tone. All joints stable. Inspection and palpation of digits and nails show no clubbing,       cyanosis or inflammatory conditions. Neuro/Psychiatric: Affect: flat but pleasant. Alert and oriented to person, place and     situation. No significant change in deep tendon reflexes or     sensation  Lungs:  Diminished, CTA-B.  Respiration effort is normal at the effort, or requirs the assistance of two people, or goes up and down fewer than 4 stairs,  , Assessment: Pt able to increase ambulatory distance up to 100ft today with improved knee control. Able to tolerate standing up to 6' of dyanmic activity with good tolerance and stability. Good safety throughout tx. Occupational therapy: FIMS:  Eatin - Patient feeds self  Groomin - Independent with all tasks using assistive device  Bathin - Able to bathe 8-9 areas  Dressing-Upper: 6 - Independent with device/prosthesis  Dressing-Lower: 4 - Requires assist with buttons/zippers/shoelaces and/or assist with shoes only  Toiletin - Able to perform 1 task only (e.g. hygiene) (uses bedpan when sleeping. )  Toilet Transfer: 5 - Requires setup/supervision/cues  Tub Transfer: 0 - Activity does not occur (pt sleeping)  Shower Transfer: 0 - Activity does not occur (pt sleeping),  , Assessment: Patient demonstrated independence in maintaining posterior hip precautions and 25% partial weight bearing on LLE. Pt demo'd good supported standing balance and good tolerance for BUE strengthening activities. Speech therapy: FIMS: Comprehension: 6 - Complex ideas 90% or device (hearing aid/glasses)  Expression: 6 - Device used to express complex ideas/needs  Social Interaction: 7 - Patient has appropriate behavior/relations 100% of the time  Problem Solvin - Independent with device (e.g. notes, schedules)  Memory: 6 - Patient requires device to recall (e.g. memory book)      Lab/X-ray studies reviewed, analyzed and discussed with patient and staff:   No results found for this or any previous visit (from the past 24 hour(s)). Previous extensive, complex labs, notes and diagnostics reviewed and analyzed. ALLERGIES:    Allergies as of 2018    (No Known Allergies)      (please also verify by checking [facility])     Complex Physical Medicine & Rehab Issues Assess & Plan:   1.  Severe abnormality of gait and family physician after discharge. Complex Active General Medical Issues that complicate care Assess & Plan:    1. 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 Osteoarthrosis involving lower leg,Spinal stenosis, lumbar region, without neurogenic claudication, Chronic pain syndrome  3. COPD (chronic obstructive pulmonary disease) -pulse ox checks every shift titrate nasal cannula O2-add Xolair and Flonase, and Proventil  4. Hyperlipidemia,   Hypertension-vital signs every shift, titrate Zocor-add antihypertensive as needed, consult medical hospitalist for backup medical  5. History of fusion of cervical spine Cervical myelopathy with cervical myelopathy with residual right-sided spasticity  6. Constipation, chronic-with active exacerbation secondary to increased opiate use, titrate mag oxide, Colace  7. Dysthymia-add rec therapy rehabilitation psychology and Cymbalta  8. Neuropathy of both upper extremities-avoid toxic medications titrate membrane stabilizing medication such as Cymbalta and/or Topamax  9. OAB (overactive bladder) Mixed stress and urge urinary incontinence-Ditropan and monitor postvoid residuals, add frequent toileting  10. Type 2 diabetes mellitus with hyperglycemia, without long-term current use of insulin (HCC)  11.  Status post recent Nausea  severe-discontinue IV fluids and scheduled transition to as needed as needed, dose Zofran scheduled transition to as needed      Rhoda Welch D.O., PM&R     Attending    [address]

## 2018-08-28 VITALS
OXYGEN SATURATION: 94 % | HEIGHT: 61 IN | BODY MASS INDEX: 24.39 KG/M2 | HEART RATE: 79 BPM | DIASTOLIC BLOOD PRESSURE: 61 MMHG | SYSTOLIC BLOOD PRESSURE: 102 MMHG | RESPIRATION RATE: 18 BRPM | WEIGHT: 129.19 LBS | TEMPERATURE: 97 F

## 2018-08-28 PROCEDURE — 99239 HOSP IP/OBS DSCHRG MGMT >30: CPT | Performed by: PHYSICAL MEDICINE & REHABILITATION

## 2018-08-28 PROCEDURE — 97116 GAIT TRAINING THERAPY: CPT

## 2018-08-28 PROCEDURE — 6370000000 HC RX 637 (ALT 250 FOR IP): Performed by: PHYSICAL MEDICINE & REHABILITATION

## 2018-08-28 PROCEDURE — 2700000000 HC OXYGEN THERAPY PER DAY

## 2018-08-28 PROCEDURE — 97535 SELF CARE MNGMENT TRAINING: CPT

## 2018-08-28 PROCEDURE — 6370000000 HC RX 637 (ALT 250 FOR IP): Performed by: NURSE PRACTITIONER

## 2018-08-28 PROCEDURE — 94640 AIRWAY INHALATION TREATMENT: CPT

## 2018-08-28 PROCEDURE — 94761 N-INVAS EAR/PLS OXIMETRY MLT: CPT

## 2018-08-28 RX ORDER — ASPIRIN 81 MG/1
81 TABLET ORAL 2 TIMES DAILY
Qty: 30 TABLET | Refills: 0 | Status: SHIPPED | OUTPATIENT
Start: 2018-08-28

## 2018-08-28 RX ORDER — OXYCODONE AND ACETAMINOPHEN 7.5; 325 MG/1; MG/1
TABLET ORAL
Qty: 60 TABLET | Refills: 0 | Status: SHIPPED | OUTPATIENT
Start: 2018-08-28 | End: 2018-09-28

## 2018-08-28 RX ADMIN — ACETAMINOPHEN 650 MG: 325 TABLET ORAL at 06:05

## 2018-08-28 RX ADMIN — Medication 2 PUFF: at 05:24

## 2018-08-28 RX ADMIN — POLYETHYLENE GLYCOL 3350 17 G: 17 POWDER, FOR SOLUTION ORAL at 08:12

## 2018-08-28 RX ADMIN — TRAMADOL HYDROCHLORIDE 50 MG: 50 TABLET, FILM COATED ORAL at 06:06

## 2018-08-28 RX ADMIN — NALOXEGOL OXALATE 12.5 MG: 12.5 TABLET, FILM COATED ORAL at 06:05

## 2018-08-28 RX ADMIN — OXYBUTYNIN CHLORIDE 10 MG: 5 TABLET, EXTENDED RELEASE ORAL at 08:12

## 2018-08-28 RX ADMIN — DULOXETINE HYDROCHLORIDE 60 MG: 60 CAPSULE, DELAYED RELEASE ORAL at 08:13

## 2018-08-28 RX ADMIN — DOCUSATE SODIUM 200 MG: 100 CAPSULE, LIQUID FILLED ORAL at 08:13

## 2018-08-28 RX ADMIN — Medication 400 MG: at 08:13

## 2018-08-28 RX ADMIN — SENNOSIDES AND DOCUSATE SODIUM 1 TABLET: 8.6; 5 TABLET ORAL at 08:12

## 2018-08-28 RX ADMIN — BACLOFEN 10 MG: 10 TABLET ORAL at 12:22

## 2018-08-28 RX ADMIN — MULTIPLE VITAMINS W/ MINERALS TAB 1 TABLET: TAB at 08:12

## 2018-08-28 RX ADMIN — BACLOFEN 10 MG: 10 TABLET ORAL at 08:13

## 2018-08-28 RX ADMIN — ACETAMINOPHEN 650 MG: 325 TABLET ORAL at 12:22

## 2018-08-28 RX ADMIN — ASPIRIN 81 MG: 81 TABLET, COATED ORAL at 08:13

## 2018-08-28 RX ADMIN — OXYCODONE HYDROCHLORIDE 10 MG: 10 TABLET, FILM COATED, EXTENDED RELEASE ORAL at 08:14

## 2018-08-28 RX ADMIN — OXYCODONE HYDROCHLORIDE 5 MG: 5 TABLET ORAL at 12:22

## 2018-08-28 ASSESSMENT — PAIN SCALES - GENERAL
PAINLEVEL_OUTOF10: 4
PAINLEVEL_OUTOF10: 4
PAINLEVEL_OUTOF10: 7

## 2018-08-28 NOTE — PLAN OF CARE
Problem: Falls - Risk of:  Goal: Will remain free from falls  Will remain free from falls   Outcome: Ongoing  Calling for assistance. Pivot transfer to wheelchair and to commode.

## 2018-08-28 NOTE — PROGRESS NOTES
Subjective: The patient complains of moderate  acute  on chronic upper and lower extremity weakness secondary to cervical myelopathy as well as generalized increased weakness after left total hip replacement partially relieved by  PT, OT, rest and exacerbated by  overexertion. [address] Course: The patient was admitted to the Rehabilitation Unit to address ADL and mobility deficits. The patient was enrolled in acute PT, OT program.  Weekly team meetings were held to assess functional progress toward their goals. The patient's medical issues were addressed. The patient progressed in the rehab program and is now ready for discharge. Refer to FIM scores summary report for detailed functional status. Greater than 35 minutes was spent on coordinating patients discharge including follow-up care, medications and patient/family education. ROS x10: The patient also complains of severely impaired mobility and activities of daily living. Otherwise no new problems with vision, hearing, nose, mouth, throat, dermal, cardiovascular, GI, , pulmonary, musculoskeletal, psychiatric or neurological. See Rehab H&P on Rehab chart dated . Vital signs:  /70   Pulse 88   Temp 97 °F (36.1 °C) (Oral)   Resp 16   Ht 5' 1\" (1.549 m)   Wt 129 lb 3 oz (58.6 kg)   LMP 08/08/1997   SpO2 100%   BMI 24.41 kg/m²   I/O:   PO/Intake:  fair PO intake, no problems observed or reported. Bowel/Bladder:  continent, no problems noted. General:  Patient is well developed, adequately nourished, non-obese and     well kempt. HEENT:    PERRLA, hearing intact to loud voice, external inspection of ear     and nose benign. Inspection of lips, tongue and gums benign  Musculoskeletal: No significant change in strength or tone. All joints stable. Inspection and palpation of digits and nails show no clubbing,       cyanosis or inflammatory conditions.    Neuro/Psychiatric: Affect: flat but stabilizing medication such as Cymbalta and/or Topamax  9. OAB (overactive bladder) Mixed stress and urge urinary incontinence-Ditropan and monitor postvoid residuals, add frequent toileting  10. Type 2 diabetes mellitus with hyperglycemia, without long-term current use of insulin (HCC)  11.  Status post recent Nausea  severe-discontinue IV fluids and scheduled transition to as needed as needed, dose Zofran scheduled transition to as needed      Dasia Felipe D.O., PM&R     Attending    [address]

## 2018-08-28 NOTE — DISCHARGE SUMMARY
Subjective: The patient complains of moderate  acute  on chronic upper and lower extremity weakness secondary to cervical myelopathy as well as generalized increased weakness after left total hip replacement partially relieved by  PT, OT, rest and exacerbated by  overexertion. [address] Course: The patient was admitted to the Rehabilitation Unit to address ADL and mobility deficits. The patient was enrolled in acute PT, OT program.  Weekly team meetings were held to assess functional progress toward their goals. The patient's medical issues were addressed. The patient progressed in the rehab program and is now ready for discharge. Refer to FIM scores summary report for detailed functional status. Greater than 35 minutes was spent on coordinating patients discharge including follow-up care, medications and patient/family education. ROS x10: The patient also complains of severely impaired mobility and activities of daily living. Otherwise no new problems with vision, hearing, nose, mouth, throat, dermal, cardiovascular, GI, , pulmonary, musculoskeletal, psychiatric or neurological. See Rehab H&P on Rehab chart dated . Vital signs:  /61   Pulse 79   Temp 97 °F (36.1 °C) (Oral)   Resp 18   Ht 5' 1\" (1.549 m)   Wt 129 lb 3 oz (58.6 kg)   LMP 08/08/1997   SpO2 94%   BMI 24.41 kg/m²   I/O:   PO/Intake:  fair PO intake, no problems observed or reported. Bowel/Bladder:  continent, no problems noted. General:  Patient is well developed, adequately nourished, non-obese and     well kempt. HEENT:    PERRLA, hearing intact to loud voice, external inspection of ear     and nose benign. Inspection of lips, tongue and gums benign  Musculoskeletal: No significant change in strength or tone. All joints stable. Inspection and palpation of digits and nails show no clubbing,       cyanosis or inflammatory conditions.    Neuro/Psychiatric: Affect: flat but pleasant. Alert and oriented to person, place and     situation. No significant change in deep tendon reflexes or     sensation  Lungs:  Diminished, CTA-B. Respiration effort is normal at rest.     Heart:   S1 = S2, RRR. No loud murmurs. Abdomen:  Soft, non-tender, no enlargement of liver or spleen. Extremities:   significant left lower extremity edema and tenderness. Skin:   Intact left hip incision to general survey, no visualized or palpated problems. Rehabilitation:  Physical therapy: FIMS:  Bed Mobility: Scooting: Modified independent    Transfers: Sit to Stand: Modified independent  Stand to sit: Modified independent  Bed to Chair: Modified independent  Stand Pivot Transfers: Contact guard assistance  Comment: Reviewed hip precautions as pertaining to changes in weight bearing status. Pt verbalizes understanding. States and maintains all hip precautions indep. , Ambulation 1  Surface: carpet  Device: Rolling Walker  Other Apparatus:  (knee wrap)  Assistance: Modified Independent  Quality of Gait: Pt instructed to place full weight on surgical leg. Practice in unsupported static standing prior to ambulation. Pt improves with practice and progresses to step through pattern without difficulty. Distance: 100ft X 2  Comments: no knee wrap utilized this AM. Pt able to maintain stability throughout knee, Stairs  # Steps : 4 (X 3)  Stairs Height: 6\"  Rails: Left ascending  Curbs: 6\"  Device: Single pt cane  Assistance: Contact guard assistance, Stand by assistance, Supervision, Modified independent   Comment: CGA progressing to Lg with practice.  Pt instructed in safe sequencing with B LEs.     FIMS: Bed, Chair, Wheel Chair: 6 - Requires assistive device (slide rail)  Walk: 2 - Maximal Assistance Requires up to Maximal Assistance AND requires assistance of one person to walk/operate wheelchair between  feet (Patient performs 25-49% of locomotion effort or goes between  feet)  Distance Walked: 100  Wheel Chair: 6 - Modified [place] Walks/operates wheelchair at least 150 feet with an ambulatory device, orthosis or prosthesis OR requires extra amount of time OR there is concern for safety  Distance Traveled in Wheel Chair: 200  Stairs: 5 - Exception HouseHold Ambulation Goes up and down 4 to 6 stairs independently, w or w/o a device. The activity takes more than a reasonable amount of time, or there are safety considerations,  , Assessment: DC pt    Occupational therapy: FIMS:  Eatin - Patient feeds self  Groomin - Patient independent with all grooming tasks  Bathin - Able to bathe 8-9 areas  Dressing-Upper: 7 - Patient independently dresses upper body  Dressing-Lower: 6 - Independent with device/prosthesis  Toiletin - Requires device (grab bar/walker/etc.) (grab bars)  Toilet Transfer: 6 - Independent with device (grab bar/walker/slide bar) (grab bars)  Tub Transfer: 5 - Supervision, set-up, cues  Shower Transfer: 5 - Supervision, set-up, cues,  , Assessment: Patient demonstrated independence in maintaining posterior hip precautions and 25% partial weight bearing on LLE. Pt demo'd good supported standing balance and good tolerance for BUE strengthening activities. Speech therapy: FIMS: Comprehension: 6 - Complex ideas 90% or device (hearing aid/glasses)  Expression: 7 - Patient expresses complex ideas/needs  Social Interaction: 7 - Patient has appropriate behavior/relations 100% of the time  Problem Solvin - Patient independent with complex tasks  Memory: 7 - Patient independent with meds/people/schedule      Lab/X-ray studies reviewed, analyzed and discussed with patient and staff:   No results found for this or any previous visit (from the past 24 hour(s)). Previous extensive, complex labs, notes and diagnostics reviewed and analyzed.      ALLERGIES:    Allergies as of 2018    (No Known Allergies)      (please also verify by checking MAR)     Complex Physical

## 2018-08-28 NOTE — DISCHARGE INSTR - OTHER ORDERS
Aspirin 81mg - 1 tablet 2 times a day through September 4, 2018 per Orthopedic Doctor Marino Taylor.

## 2018-10-02 ENCOUNTER — HOSPITAL ENCOUNTER (OUTPATIENT)
Dept: GENERAL RADIOLOGY | Age: 58
Discharge: HOME | End: 2018-10-04
Payer: MEDICARE

## 2018-10-02 DIAGNOSIS — J44.9 CHRONIC OBSTRUCTIVE PULMONARY DISEASE, UNSPECIFIED COPD TYPE (HCC): ICD-10-CM

## 2018-10-02 PROCEDURE — 71046 X-RAY EXAM CHEST 2 VIEWS: CPT

## 2018-10-09 ENCOUNTER — HOSPITAL ENCOUNTER (OUTPATIENT)
Dept: GENERAL RADIOLOGY | Age: 58
End: 2018-10-09
Payer: MEDICARE

## 2018-10-09 ENCOUNTER — HOSPITAL ENCOUNTER (OUTPATIENT)
Dept: GENERAL RADIOLOGY | Age: 58
Discharge: HOME | End: 2018-10-11
Payer: MEDICARE

## 2018-10-09 ENCOUNTER — HOSPITAL ENCOUNTER (OUTPATIENT)
Dept: ORTHOPEDIC SURGERY | Age: 58
Discharge: HOME | End: 2018-10-11
Payer: MEDICARE

## 2018-10-09 DIAGNOSIS — R52 PAIN: ICD-10-CM

## 2018-10-09 DIAGNOSIS — M16.9 ARTHROSIS OF HIP: ICD-10-CM

## 2018-10-09 PROCEDURE — 73502 X-RAY EXAM HIP UNI 2-3 VIEWS: CPT

## 2018-10-26 ENCOUNTER — OFFICE VISIT (OUTPATIENT)
Dept: PULMONOLOGY | Age: 58
End: 2018-10-26
Payer: MEDICARE

## 2018-10-26 VITALS
SYSTOLIC BLOOD PRESSURE: 140 MMHG | TEMPERATURE: 97.3 F | HEART RATE: 96 BPM | BODY MASS INDEX: 24.35 KG/M2 | WEIGHT: 129 LBS | HEIGHT: 61 IN | DIASTOLIC BLOOD PRESSURE: 70 MMHG | OXYGEN SATURATION: 98 %

## 2018-10-26 DIAGNOSIS — J44.9 CHRONIC OBSTRUCTIVE PULMONARY DISEASE, UNSPECIFIED COPD TYPE (HCC): Primary | ICD-10-CM

## 2018-10-26 DIAGNOSIS — R09.02 HYPOXIA: ICD-10-CM

## 2018-10-26 DIAGNOSIS — F17.200 SMOKING: ICD-10-CM

## 2018-10-26 PROCEDURE — 99214 OFFICE O/P EST MOD 30 MIN: CPT | Performed by: PHYSICIAN ASSISTANT

## 2018-10-26 RX ORDER — BUDESONIDE AND FORMOTEROL FUMARATE DIHYDRATE 160; 4.5 UG/1; UG/1
2 AEROSOL RESPIRATORY (INHALATION) 2 TIMES DAILY
Qty: 1 INHALER | Refills: 3 | Status: SHIPPED | OUTPATIENT
Start: 2018-10-26 | End: 2019-03-06 | Stop reason: SDUPTHER

## 2018-10-26 ASSESSMENT — ENCOUNTER SYMPTOMS
SHORTNESS OF BREATH: 1
TROUBLE SWALLOWING: 0
STRIDOR: 0
SORE THROAT: 0
BACK PAIN: 0
SINUS PAIN: 0
WHEEZING: 0
CHEST TIGHTNESS: 0
COUGH: 0
RHINORRHEA: 0
ABDOMINAL PAIN: 0
VOICE CHANGE: 0
SINUS PRESSURE: 0

## 2018-10-26 NOTE — PROGRESS NOTES
ARTHROPLASTY Left 8/13/2018    LEFT HIP TOTAL HIP ARTHROPLASTY performed by Karel Rodriguez MD at [address] History   Problem Relation Age of Onset    Breast Cancer Mother     Cancer Mother         liver ca    No Known Problems Brother     Obesity Son        No Known Allergies    Current Outpatient Prescriptions   Medication Sig Dispense Refill    budesonide-formoterol (SYMBICORT) 160-4.5 MCG/ACT AERO Inhale 2 puffs into the lungs 2 times daily 1 Inhaler 3    oxyCODONE-acetaminophen (PERCOCET) 7.5-325 MG per tablet Take 1 tablet by mouth every 6 hours as needed for Pain for up to 30 days. 1tab PO Q6 hrs PRN pain for 30 days. . 100 tablet 0    aspirin 81 MG EC tablet Take 1 tablet by mouth 2 times daily 30 tablet 0    Multiple Vitamins-Minerals (THERAPEUTIC MULTIVITAMIN-MINERALS) tablet Take 1 tablet by mouth daily      Magnesium Oxide 500 MG CAPS Take 1 capsule by mouth 2 times daily      docusate sodium (COLACE) 100 MG capsule Take 200 mg by mouth 2 times daily      oxybutynin (DITROPAN-XL) 10 MG extended release tablet Take 10 mg by mouth daily      simvastatin (ZOCOR) 10 MG tablet Take 10 mg by mouth nightly      senna (SENOKOT) 8.6 MG tablet Take 1 tablet by mouth daily      Oxygen Concentrator Inhale 2 L into the lungs nightly      baclofen (LIORESAL) 10 MG tablet Take 1 tablet by mouth 3 times daily 270 tablet 2    albuterol sulfate HFA (VENTOLIN HFA) 108 (90 Base) MCG/ACT inhaler Inhale 2 puffs into the lungs every 6 hours as needed for Wheezing 1 Inhaler 3    DULoxetine (CYMBALTA) 60 MG capsule       meloxicam (MOBIC) 15 MG tablet TAKE 1 TABLET BY MOUTH DAILY 30 tablet 0    albuterol (PROVENTIL) (2.5 MG/3ML) 0.083% nebulizer solution Take 3 mLs by nebulization every 6 hours as needed for Wheezing 120 each 5     No current facility-administered medications for this visit.         Review of Systems   Constitutional: Negative for activity change, appetite change, fatigue, fever and respiratory distress. She has no wheezes. She has no rales. She exhibits no tenderness. Abdominal: Soft. There is no tenderness. Musculoskeletal: Normal range of motion. She exhibits edema (R>L, non tender without warmth or erythema ). She exhibits no tenderness. Lymphadenopathy:     She has no cervical adenopathy. Neurological: She is alert and oriented to person, place, and time. No cranial nerve deficit. Skin: Skin is warm and dry. No rash noted. She is not diaphoretic. No erythema. Psychiatric: She has a normal mood and affect. Her behavior is normal.   Nursing note and vitals reviewed. Assessment and Plan      ICD-10-CM    1. Chronic obstructive pulmonary disease, unspecified COPD type ([facility]) J44.9 budesonide-formoterol (SYMBICORT) 160-4.5 MCG/ACT AERO   2. Smoking F17.200    3. Hypoxia R09.02      Orders Placed This Encounter   Medications    budesonide-formoterol (SYMBICORT) 160-4.5 MCG/ACT AERO     Sig: Inhale 2 puffs into the lungs 2 times daily     Dispense:  1 Inhaler     Refill:  3          FINDINGS:       Two views of the chest are submitted.  The cardiac silhouette is of normal size configuration.  The mediastinum is unremarkable. Pulmonary vascular is attenuated, lungs are hyperinflated and there is some widening of the AP diameter the chest. Areas atelectasis both bases. .   Right sided trachea. No focal infiltrates.  No effusions.  Pneumothoraces.                                                                                        Impression   NO ACUTE ACTIVE CARDIOPULMONARY PROCESS. RADIOGRAPHIC FINDINGS SUGGESTIVE OF COPD.  .     I had a long discussion with the patient and discussed the importance of stopping smoking. Review chest x-ray with her. She had PFTs completed in the past and is not able to go for PFT at this time. I also discussed with patient my concerns of her right leg which is more swollen than her left. It is nontender and not warm and has no erythema.

## 2018-11-06 LAB
AVERAGE GLUCOSE: NORMAL
HBA1C MFR BLD: 5.5 %

## 2018-11-20 ENCOUNTER — HOSPITAL ENCOUNTER (OUTPATIENT)
Dept: ORTHOPEDIC SURGERY | Age: 58
Discharge: HOME | End: 2018-11-22
Payer: MEDICARE

## 2018-11-20 DIAGNOSIS — M16.9 ARTHROSIS OF HIP: ICD-10-CM

## 2018-11-20 PROCEDURE — 73502 X-RAY EXAM HIP UNI 2-3 VIEWS: CPT

## 2019-01-02 ENCOUNTER — HOSPITAL ENCOUNTER (INPATIENT)
Age: 59
LOS: 7 days | Discharge: NURSING, REHAB OR LONG-TERM CARE | DRG: 871 | End: 2019-01-09
Attending: INTERNAL MEDICINE | Admitting: INTERNAL MEDICINE
Payer: MEDICARE

## 2019-01-02 ENCOUNTER — APPOINTMENT (OUTPATIENT)
Dept: GENERAL RADIOLOGY | Age: 59
DRG: 871 | End: 2019-01-02
Payer: MEDICARE

## 2019-01-02 DIAGNOSIS — R06.03 RESPIRATORY DISTRESS: ICD-10-CM

## 2019-01-02 DIAGNOSIS — Z99.11 VENTILATOR DEPENDENCE (HCC): ICD-10-CM

## 2019-01-02 DIAGNOSIS — J44.1 COPD EXACERBATION (HCC): Primary | ICD-10-CM

## 2019-01-02 DIAGNOSIS — J18.9 PNEUMONIA DUE TO ORGANISM: ICD-10-CM

## 2019-01-02 PROBLEM — A41.9 SEPSIS DUE TO PNEUMONIA (HCC): Status: ACTIVE | Noted: 2019-01-02

## 2019-01-02 LAB
ALBUMIN SERPL-MCNC: 3.5 G/DL (ref 3.9–4.9)
ALP BLD-CCNC: 122 U/L (ref 40–130)
ALT SERPL-CCNC: 14 U/L (ref 0–33)
ANION GAP SERPL CALCULATED.3IONS-SCNC: 10 MEQ/L (ref 7–13)
AST SERPL-CCNC: 21 U/L (ref 0–35)
BASE EXCESS ARTERIAL: 3 (ref -3–3)
BASE EXCESS ARTERIAL: 4 (ref -3–3)
BASOPHILS ABSOLUTE: 0.1 K/UL (ref 0–0.2)
BASOPHILS RELATIVE PERCENT: 1.3 %
BILIRUB SERPL-MCNC: <0.2 MG/DL (ref 0–1.2)
BUN BLDV-MCNC: 10 MG/DL (ref 8–23)
CALCIUM IONIZED: 1.23 MMOL/L (ref 1.12–1.32)
CALCIUM IONIZED: 1.24 MMOL/L (ref 1.12–1.32)
CALCIUM SERPL-MCNC: 9 MG/DL (ref 8.6–10.2)
CHLORIDE BLD-SCNC: 102 MEQ/L (ref 98–107)
CO2: 26 MEQ/L (ref 22–29)
CREAT SERPL-MCNC: 0.46 MG/DL (ref 0.5–0.9)
EOSINOPHILS ABSOLUTE: 0.1 K/UL (ref 0–0.7)
EOSINOPHILS RELATIVE PERCENT: 1.8 %
GFR AFRICAN AMERICAN: >60
GFR NON-AFRICAN AMERICAN: >60
GLOBULIN: 3.4 G/DL (ref 2.3–3.5)
GLUCOSE BLD-MCNC: 109 MG/DL (ref 74–109)
GLUCOSE BLD-MCNC: 130 MG/DL (ref 60–115)
GLUCOSE BLD-MCNC: 182 MG/DL (ref 60–115)
GLUCOSE BLD-MCNC: 222 MG/DL (ref 60–115)
HCO3 ARTERIAL: 28.1 MMOL/L (ref 21–29)
HCO3 ARTERIAL: 29.2 MMOL/L (ref 21–29)
HCT VFR BLD CALC: 39.7 % (ref 37–47)
HEMOGLOBIN: 13.1 G/DL (ref 12–16)
HEMOGLOBIN: 14 GM/DL (ref 12–16)
HEMOGLOBIN: 14.4 GM/DL (ref 12–16)
LACTATE: 0.42 MMOL/L (ref 0.4–2)
LACTATE: 0.54 MMOL/L (ref 0.4–2)
LACTIC ACID: 0.9 MMOL/L (ref 0.5–2.2)
LYMPHOCYTES ABSOLUTE: 0.7 K/UL (ref 1–4.8)
LYMPHOCYTES RELATIVE PERCENT: 17 %
MCH RBC QN AUTO: 28.7 PG (ref 27–31.3)
MCHC RBC AUTO-ENTMCNC: 32.9 % (ref 33–37)
MCV RBC AUTO: 87.2 FL (ref 82–100)
MONOCYTES ABSOLUTE: 0.9 K/UL (ref 0.2–0.8)
MONOCYTES RELATIVE PERCENT: 20.9 %
NEUTROPHILS ABSOLUTE: 2.4 K/UL (ref 1.4–6.5)
NEUTROPHILS RELATIVE PERCENT: 59 %
O2 SAT, ARTERIAL: 90 % (ref 93–100)
O2 SAT, ARTERIAL: 98 % (ref 93–100)
PCO2 ARTERIAL: 51 MM HG (ref 35–45)
PCO2 ARTERIAL: 51 MM HG (ref 35–45)
PDW BLD-RTO: 20.7 % (ref 11.5–14.5)
PERFORMED ON: ABNORMAL
PH ARTERIAL: 7.35 (ref 7.35–7.45)
PH ARTERIAL: 7.37 (ref 7.35–7.45)
PLATELET # BLD: 189 K/UL (ref 130–400)
PO2 ARTERIAL: 111 MM HG (ref 75–108)
PO2 ARTERIAL: 64 MM HG (ref 75–108)
POC CHLORIDE: 101 MEQ/L (ref 99–110)
POC CHLORIDE: 104 MEQ/L (ref 99–110)
POC CREATININE: 0.5 MG/DL (ref 0.6–1.2)
POC CREATININE: 0.5 MG/DL (ref 0.6–1.2)
POC FIO2: 45
POC HEMATOCRIT: 41 % (ref 36–48)
POC HEMATOCRIT: 42 % (ref 36–48)
POC POTASSIUM: 4.1 MEQ/L (ref 3.5–5.1)
POC POTASSIUM: 4.3 MEQ/L (ref 3.5–5.1)
POC SAMPLE TYPE: ABNORMAL
POC SAMPLE TYPE: ABNORMAL
POC SODIUM: 136 MEQ/L (ref 136–145)
POC SODIUM: 138 MEQ/L (ref 136–145)
POTASSIUM SERPL-SCNC: 4.5 MEQ/L (ref 3.5–5.1)
RAPID INFLUENZA  B AGN: NEGATIVE
RAPID INFLUENZA A AGN: NEGATIVE
RBC # BLD: 4.55 M/UL (ref 4.2–5.4)
SODIUM BLD-SCNC: 138 MEQ/L (ref 132–144)
TCO2 ARTERIAL: 30 (ref 22–29)
TCO2 ARTERIAL: 31 (ref 22–29)
TOTAL PROTEIN: 6.9 G/DL (ref 6.4–8.1)
WBC # BLD: 4.1 K/UL (ref 4.8–10.8)

## 2019-01-02 PROCEDURE — 82803 BLOOD GASES ANY COMBINATION: CPT

## 2019-01-02 PROCEDURE — 83605 ASSAY OF LACTIC ACID: CPT

## 2019-01-02 PROCEDURE — 85025 COMPLETE CBC W/AUTO DIFF WBC: CPT

## 2019-01-02 PROCEDURE — 6360000002 HC RX W HCPCS: Performed by: INTERNAL MEDICINE

## 2019-01-02 PROCEDURE — 84132 ASSAY OF SERUM POTASSIUM: CPT

## 2019-01-02 PROCEDURE — 82565 ASSAY OF CREATININE: CPT

## 2019-01-02 PROCEDURE — 6360000002 HC RX W HCPCS: Performed by: NURSE PRACTITIONER

## 2019-01-02 PROCEDURE — 36600 WITHDRAWAL OF ARTERIAL BLOOD: CPT

## 2019-01-02 PROCEDURE — 99285 EMERGENCY DEPT VISIT HI MDM: CPT

## 2019-01-02 PROCEDURE — 2000000000 HC ICU R&B

## 2019-01-02 PROCEDURE — 71045 X-RAY EXAM CHEST 1 VIEW: CPT

## 2019-01-02 PROCEDURE — 2580000003 HC RX 258: Performed by: NURSE PRACTITIONER

## 2019-01-02 PROCEDURE — 94640 AIRWAY INHALATION TREATMENT: CPT

## 2019-01-02 PROCEDURE — 87040 BLOOD CULTURE FOR BACTERIA: CPT

## 2019-01-02 PROCEDURE — 84295 ASSAY OF SERUM SODIUM: CPT

## 2019-01-02 PROCEDURE — 87804 INFLUENZA ASSAY W/OPTIC: CPT

## 2019-01-02 PROCEDURE — 80053 COMPREHEN METABOLIC PANEL: CPT

## 2019-01-02 PROCEDURE — 96375 TX/PRO/DX INJ NEW DRUG ADDON: CPT

## 2019-01-02 PROCEDURE — 82435 ASSAY OF BLOOD CHLORIDE: CPT

## 2019-01-02 PROCEDURE — 6370000000 HC RX 637 (ALT 250 FOR IP): Performed by: NURSE PRACTITIONER

## 2019-01-02 PROCEDURE — 36415 COLL VENOUS BLD VENIPUNCTURE: CPT

## 2019-01-02 PROCEDURE — 82948 REAGENT STRIP/BLOOD GLUCOSE: CPT

## 2019-01-02 PROCEDURE — 96365 THER/PROPH/DIAG IV INF INIT: CPT

## 2019-01-02 PROCEDURE — 2700000000 HC OXYGEN THERAPY PER DAY

## 2019-01-02 PROCEDURE — 94660 CPAP INITIATION&MGMT: CPT

## 2019-01-02 PROCEDURE — 82330 ASSAY OF CALCIUM: CPT

## 2019-01-02 PROCEDURE — 85014 HEMATOCRIT: CPT

## 2019-01-02 PROCEDURE — 2580000003 HC RX 258: Performed by: INTERNAL MEDICINE

## 2019-01-02 RX ORDER — METHYLPREDNISOLONE SODIUM SUCCINATE 125 MG/2ML
125 INJECTION, POWDER, LYOPHILIZED, FOR SOLUTION INTRAMUSCULAR; INTRAVENOUS ONCE
Status: COMPLETED | OUTPATIENT
Start: 2019-01-02 | End: 2019-01-02

## 2019-01-02 RX ORDER — DEXTROSE MONOHYDRATE 25 G/50ML
12.5 INJECTION, SOLUTION INTRAVENOUS PRN
Status: DISCONTINUED | OUTPATIENT
Start: 2019-01-02 | End: 2019-01-10 | Stop reason: HOSPADM

## 2019-01-02 RX ORDER — MAGNESIUM SULFATE IN WATER 40 MG/ML
4 INJECTION, SOLUTION INTRAVENOUS ONCE
Status: COMPLETED | OUTPATIENT
Start: 2019-01-02 | End: 2019-01-02

## 2019-01-02 RX ORDER — NICOTINE POLACRILEX 4 MG
15 LOZENGE BUCCAL PRN
Status: DISCONTINUED | OUTPATIENT
Start: 2019-01-02 | End: 2019-01-10 | Stop reason: HOSPADM

## 2019-01-02 RX ORDER — FAMOTIDINE 20 MG/1
20 TABLET, FILM COATED ORAL 2 TIMES DAILY
Status: DISCONTINUED | OUTPATIENT
Start: 2019-01-02 | End: 2019-01-10 | Stop reason: HOSPADM

## 2019-01-02 RX ORDER — 0.9 % SODIUM CHLORIDE 0.9 %
1000 INTRAVENOUS SOLUTION INTRAVENOUS ONCE
Status: DISCONTINUED | OUTPATIENT
Start: 2019-01-02 | End: 2019-01-10 | Stop reason: HOSPADM

## 2019-01-02 RX ORDER — METHYLPREDNISOLONE SODIUM SUCCINATE 40 MG/ML
40 INJECTION, POWDER, LYOPHILIZED, FOR SOLUTION INTRAMUSCULAR; INTRAVENOUS DAILY
Status: DISCONTINUED | OUTPATIENT
Start: 2019-01-02 | End: 2019-01-05

## 2019-01-02 RX ORDER — HYDRALAZINE HYDROCHLORIDE 20 MG/ML
10 INJECTION INTRAMUSCULAR; INTRAVENOUS ONCE
Status: COMPLETED | OUTPATIENT
Start: 2019-01-02 | End: 2019-01-02

## 2019-01-02 RX ORDER — SODIUM CHLORIDE 0.9 % (FLUSH) 0.9 %
10 SYRINGE (ML) INJECTION PRN
Status: DISCONTINUED | OUTPATIENT
Start: 2019-01-02 | End: 2019-01-10 | Stop reason: HOSPADM

## 2019-01-02 RX ORDER — SODIUM CHLORIDE 0.9 % (FLUSH) 0.9 %
10 SYRINGE (ML) INJECTION EVERY 12 HOURS SCHEDULED
Status: DISCONTINUED | OUTPATIENT
Start: 2019-01-02 | End: 2019-01-10 | Stop reason: HOSPADM

## 2019-01-02 RX ORDER — IPRATROPIUM BROMIDE AND ALBUTEROL SULFATE 2.5; .5 MG/3ML; MG/3ML
1 SOLUTION RESPIRATORY (INHALATION) PRN
Status: DISCONTINUED | OUTPATIENT
Start: 2019-01-02 | End: 2019-01-02 | Stop reason: HOSPADM

## 2019-01-02 RX ORDER — SODIUM CHLORIDE 9 MG/ML
INJECTION, SOLUTION INTRAVENOUS CONTINUOUS
Status: DISCONTINUED | OUTPATIENT
Start: 2019-01-02 | End: 2019-01-03

## 2019-01-02 RX ORDER — HYDRALAZINE HYDROCHLORIDE 20 MG/ML
10 INJECTION INTRAMUSCULAR; INTRAVENOUS EVERY 4 HOURS PRN
Status: DISCONTINUED | OUTPATIENT
Start: 2019-01-02 | End: 2019-01-10 | Stop reason: HOSPADM

## 2019-01-02 RX ORDER — DEXTROSE MONOHYDRATE 50 MG/ML
100 INJECTION, SOLUTION INTRAVENOUS PRN
Status: DISCONTINUED | OUTPATIENT
Start: 2019-01-02 | End: 2019-01-10 | Stop reason: HOSPADM

## 2019-01-02 RX ORDER — LORAZEPAM 2 MG/ML
0.5 INJECTION INTRAMUSCULAR ONCE
Status: COMPLETED | OUTPATIENT
Start: 2019-01-02 | End: 2019-01-02

## 2019-01-02 RX ADMIN — VANCOMYCIN HYDROCHLORIDE 750 MG: 750 INJECTION, POWDER, LYOPHILIZED, FOR SOLUTION INTRAVENOUS at 20:46

## 2019-01-02 RX ADMIN — PIPERACILLIN SODIUM,TAZOBACTAM SODIUM 3.38 G: 3; .375 INJECTION, POWDER, FOR SOLUTION INTRAVENOUS at 21:56

## 2019-01-02 RX ADMIN — AZITHROMYCIN MONOHYDRATE 500 MG: 500 INJECTION, POWDER, LYOPHILIZED, FOR SOLUTION INTRAVENOUS at 17:28

## 2019-01-02 RX ADMIN — MAGNESIUM SULFATE HEPTAHYDRATE 4 G: 40 INJECTION, SOLUTION INTRAVENOUS at 16:04

## 2019-01-02 RX ADMIN — ALBUTEROL SULFATE 5 MG: 2.5 SOLUTION RESPIRATORY (INHALATION) at 14:29

## 2019-01-02 RX ADMIN — ALBUTEROL SULFATE 5 MG: 2.5 SOLUTION RESPIRATORY (INHALATION) at 14:39

## 2019-01-02 RX ADMIN — HYDRALAZINE HYDROCHLORIDE 10 MG: 20 INJECTION INTRAMUSCULAR; INTRAVENOUS at 18:02

## 2019-01-02 RX ADMIN — IPRATROPIUM BROMIDE AND ALBUTEROL SULFATE 1 AMPULE: .5; 3 SOLUTION RESPIRATORY (INHALATION) at 14:29

## 2019-01-02 RX ADMIN — ENOXAPARIN SODIUM 40 MG: 40 INJECTION SUBCUTANEOUS at 20:46

## 2019-01-02 RX ADMIN — LORAZEPAM 0.5 MG: 2 INJECTION, SOLUTION INTRAMUSCULAR; INTRAVENOUS at 16:04

## 2019-01-02 RX ADMIN — CEFTRIAXONE SODIUM 1 G: 1 INJECTION, POWDER, FOR SOLUTION INTRAMUSCULAR; INTRAVENOUS at 17:14

## 2019-01-02 RX ADMIN — HYDRALAZINE HYDROCHLORIDE 10 MG: 20 INJECTION INTRAMUSCULAR; INTRAVENOUS at 21:55

## 2019-01-02 RX ADMIN — METHYLPREDNISOLONE SODIUM SUCCINATE 40 MG: 40 INJECTION, POWDER, FOR SOLUTION INTRAMUSCULAR; INTRAVENOUS at 20:46

## 2019-01-02 RX ADMIN — METHYLPREDNISOLONE SODIUM SUCCINATE 125 MG: 125 INJECTION, POWDER, FOR SOLUTION INTRAMUSCULAR; INTRAVENOUS at 14:38

## 2019-01-02 ASSESSMENT — PAIN DESCRIPTION - DESCRIPTORS
DESCRIPTORS: ACHING;DISCOMFORT;HEADACHE
DESCRIPTORS: ACHING;HEADACHE

## 2019-01-02 ASSESSMENT — PAIN DESCRIPTION - LOCATION
LOCATION: BACK
LOCATION: EYE;HEAD

## 2019-01-02 ASSESSMENT — PULMONARY FUNCTION TESTS
PIF_VALUE: 18
PIF_VALUE: 16
PIF_VALUE: 17
PIF_VALUE: 17

## 2019-01-02 ASSESSMENT — ENCOUNTER SYMPTOMS
BACK PAIN: 0
RHINORRHEA: 0
WHEEZING: 1
ABDOMINAL PAIN: 0
PHOTOPHOBIA: 0
VOMITING: 0
SORE THROAT: 0
DIARRHEA: 0
COUGH: 0
NAUSEA: 0
EYE PAIN: 0
SHORTNESS OF BREATH: 1

## 2019-01-02 ASSESSMENT — PAIN DESCRIPTION - ORIENTATION: ORIENTATION: LOWER

## 2019-01-02 ASSESSMENT — PAIN DESCRIPTION - FREQUENCY: FREQUENCY: CONTINUOUS

## 2019-01-02 ASSESSMENT — PAIN SCALES - GENERAL
PAINLEVEL_OUTOF10: 0
PAINLEVEL_OUTOF10: 0
PAINLEVEL_OUTOF10: 7

## 2019-01-03 ENCOUNTER — APPOINTMENT (OUTPATIENT)
Dept: GENERAL RADIOLOGY | Age: 59
DRG: 871 | End: 2019-01-03
Payer: MEDICARE

## 2019-01-03 ENCOUNTER — APPOINTMENT (OUTPATIENT)
Dept: CT IMAGING | Age: 59
DRG: 871 | End: 2019-01-03
Payer: MEDICARE

## 2019-01-03 LAB
ANION GAP SERPL CALCULATED.3IONS-SCNC: 12 MEQ/L (ref 7–13)
BASE EXCESS ARTERIAL: 4 (ref -3–3)
BUN BLDV-MCNC: 13 MG/DL (ref 8–23)
CALCIUM IONIZED: 1.27 MMOL/L (ref 1.12–1.32)
CALCIUM SERPL-MCNC: 9.5 MG/DL (ref 8.6–10.2)
CHLORIDE BLD-SCNC: 98 MEQ/L (ref 98–107)
CO2: 27 MEQ/L (ref 22–29)
CREAT SERPL-MCNC: 0.52 MG/DL (ref 0.5–0.9)
EKG ATRIAL RATE: 89 BPM
EKG P AXIS: 69 DEGREES
EKG P-R INTERVAL: 146 MS
EKG Q-T INTERVAL: 364 MS
EKG QRS DURATION: 64 MS
EKG QTC CALCULATION (BAZETT): 442 MS
EKG R AXIS: 74 DEGREES
EKG T AXIS: 67 DEGREES
EKG VENTRICULAR RATE: 89 BPM
GFR AFRICAN AMERICAN: >60
GFR AFRICAN AMERICAN: >60
GFR NON-AFRICAN AMERICAN: >60
GFR NON-AFRICAN AMERICAN: >60
GLUCOSE BLD-MCNC: 134 MG/DL (ref 60–115)
GLUCOSE BLD-MCNC: 140 MG/DL (ref 60–115)
GLUCOSE BLD-MCNC: 140 MG/DL (ref 74–109)
GLUCOSE BLD-MCNC: 180 MG/DL (ref 60–115)
HCO3 ARTERIAL: 29.2 MMOL/L (ref 21–29)
HCT VFR BLD CALC: 40.9 % (ref 37–47)
HEMOGLOBIN: 13.5 G/DL (ref 12–16)
HEMOGLOBIN: 14.6 GM/DL (ref 12–16)
LACTATE: 0.43 MMOL/L (ref 0.4–2)
LACTIC ACID: 0.8 MMOL/L (ref 0.5–2.2)
LV EF: 70 %
LVEF MODALITY: NORMAL
MCH RBC QN AUTO: 28.7 PG (ref 27–31.3)
MCHC RBC AUTO-ENTMCNC: 33 % (ref 33–37)
MCV RBC AUTO: 87.2 FL (ref 82–100)
O2 SAT, ARTERIAL: 95 % (ref 93–100)
PCO2 ARTERIAL: 51 MM HG (ref 35–45)
PDW BLD-RTO: 20.7 % (ref 11.5–14.5)
PERFORMED ON: ABNORMAL
PH ARTERIAL: 7.37 (ref 7.35–7.45)
PLATELET # BLD: 216 K/UL (ref 130–400)
PO2 ARTERIAL: 81 MM HG (ref 75–108)
POC CHLORIDE: 101 MEQ/L (ref 99–110)
POC CREATININE: 0.6 MG/DL (ref 0.6–1.2)
POC FIO2: 35
POC HEMATOCRIT: 43 % (ref 36–48)
POC POTASSIUM: 4.5 MEQ/L (ref 3.5–5.1)
POC SAMPLE TYPE: ABNORMAL
POC SODIUM: 137 MEQ/L (ref 136–145)
POTASSIUM REFLEX MAGNESIUM: 4.9 MEQ/L (ref 3.5–5.1)
PROCALCITONIN: 0.15 NG/ML (ref 0–0.15)
RBC # BLD: 4.69 M/UL (ref 4.2–5.4)
SODIUM BLD-SCNC: 137 MEQ/L (ref 132–144)
TCO2 ARTERIAL: 31 (ref 22–29)
TROPONIN: <0.01 NG/ML (ref 0–0.01)
TROPONIN: <0.01 NG/ML (ref 0–0.01)
WBC # BLD: 4.9 K/UL (ref 4.8–10.8)

## 2019-01-03 PROCEDURE — 2500000003 HC RX 250 WO HCPCS

## 2019-01-03 PROCEDURE — 2700000000 HC OXYGEN THERAPY PER DAY

## 2019-01-03 PROCEDURE — 84132 ASSAY OF SERUM POTASSIUM: CPT

## 2019-01-03 PROCEDURE — 2580000003 HC RX 258: Performed by: INTERNAL MEDICINE

## 2019-01-03 PROCEDURE — 31500 INSERT EMERGENCY AIRWAY: CPT | Performed by: INTERNAL MEDICINE

## 2019-01-03 PROCEDURE — 85014 HEMATOCRIT: CPT

## 2019-01-03 PROCEDURE — 5A1935Z RESPIRATORY VENTILATION, LESS THAN 24 CONSECUTIVE HOURS: ICD-10-PCS | Performed by: INTERNAL MEDICINE

## 2019-01-03 PROCEDURE — 82803 BLOOD GASES ANY COMBINATION: CPT

## 2019-01-03 PROCEDURE — 82565 ASSAY OF CREATININE: CPT

## 2019-01-03 PROCEDURE — 84295 ASSAY OF SERUM SODIUM: CPT

## 2019-01-03 PROCEDURE — 2000000000 HC ICU R&B

## 2019-01-03 PROCEDURE — 36415 COLL VENOUS BLD VENIPUNCTURE: CPT

## 2019-01-03 PROCEDURE — 6360000002 HC RX W HCPCS: Performed by: INTERNAL MEDICINE

## 2019-01-03 PROCEDURE — 93005 ELECTROCARDIOGRAM TRACING: CPT

## 2019-01-03 PROCEDURE — 93306 TTE W/DOPPLER COMPLETE: CPT

## 2019-01-03 PROCEDURE — 6360000002 HC RX W HCPCS: Performed by: NURSE PRACTITIONER

## 2019-01-03 PROCEDURE — 6360000004 HC RX CONTRAST MEDICATION: Performed by: INTERNAL MEDICINE

## 2019-01-03 PROCEDURE — 85027 COMPLETE CBC AUTOMATED: CPT

## 2019-01-03 PROCEDURE — 6370000000 HC RX 637 (ALT 250 FOR IP): Performed by: INTERNAL MEDICINE

## 2019-01-03 PROCEDURE — 36600 WITHDRAWAL OF ARTERIAL BLOOD: CPT

## 2019-01-03 PROCEDURE — 84145 PROCALCITONIN (PCT): CPT

## 2019-01-03 PROCEDURE — 82435 ASSAY OF BLOOD CHLORIDE: CPT

## 2019-01-03 PROCEDURE — 83605 ASSAY OF LACTIC ACID: CPT

## 2019-01-03 PROCEDURE — 84484 ASSAY OF TROPONIN QUANT: CPT

## 2019-01-03 PROCEDURE — 0BH17EZ INSERTION OF ENDOTRACHEAL AIRWAY INTO TRACHEA, VIA NATURAL OR ARTIFICIAL OPENING: ICD-10-PCS | Performed by: INTERNAL MEDICINE

## 2019-01-03 PROCEDURE — 99291 CRITICAL CARE FIRST HOUR: CPT | Performed by: INTERNAL MEDICINE

## 2019-01-03 PROCEDURE — 82948 REAGENT STRIP/BLOOD GLUCOSE: CPT

## 2019-01-03 PROCEDURE — 94002 VENT MGMT INPAT INIT DAY: CPT

## 2019-01-03 PROCEDURE — 82330 ASSAY OF CALCIUM: CPT

## 2019-01-03 PROCEDURE — 6360000002 HC RX W HCPCS

## 2019-01-03 PROCEDURE — 80048 BASIC METABOLIC PNL TOTAL CA: CPT

## 2019-01-03 PROCEDURE — 71045 X-RAY EXAM CHEST 1 VIEW: CPT

## 2019-01-03 PROCEDURE — 71275 CT ANGIOGRAPHY CHEST: CPT

## 2019-01-03 RX ORDER — MIDAZOLAM HYDROCHLORIDE 1 MG/ML
4 INJECTION INTRAMUSCULAR; INTRAVENOUS ONCE
Status: COMPLETED | OUTPATIENT
Start: 2019-01-03 | End: 2019-01-03

## 2019-01-03 RX ORDER — FENTANYL CITRATE 50 UG/ML
INJECTION, SOLUTION INTRAMUSCULAR; INTRAVENOUS
Status: COMPLETED
Start: 2019-01-03 | End: 2019-01-03

## 2019-01-03 RX ORDER — PROPOFOL 10 MG/ML
INJECTION, EMULSION INTRAVENOUS
Status: COMPLETED
Start: 2019-01-03 | End: 2019-01-03

## 2019-01-03 RX ORDER — PROPOFOL 10 MG/ML
20 INJECTION, EMULSION INTRAVENOUS
Status: DISCONTINUED | OUTPATIENT
Start: 2019-01-03 | End: 2019-01-04

## 2019-01-03 RX ORDER — CHLORHEXIDINE GLUCONATE 0.12 MG/ML
15 RINSE ORAL 2 TIMES DAILY
Status: DISCONTINUED | OUTPATIENT
Start: 2019-01-03 | End: 2019-01-04

## 2019-01-03 RX ORDER — ETOMIDATE 2 MG/ML
20 INJECTION INTRAVENOUS ONCE
Status: COMPLETED | OUTPATIENT
Start: 2019-01-03 | End: 2019-01-03

## 2019-01-03 RX ORDER — FENTANYL CITRATE 50 UG/ML
25 INJECTION, SOLUTION INTRAMUSCULAR; INTRAVENOUS ONCE
Status: COMPLETED | OUTPATIENT
Start: 2019-01-03 | End: 2019-01-03

## 2019-01-03 RX ORDER — LABETALOL HYDROCHLORIDE 5 MG/ML
10 INJECTION, SOLUTION INTRAVENOUS
Status: DISCONTINUED | OUTPATIENT
Start: 2019-01-03 | End: 2019-01-10 | Stop reason: HOSPADM

## 2019-01-03 RX ORDER — MIDAZOLAM HYDROCHLORIDE 1 MG/ML
INJECTION INTRAMUSCULAR; INTRAVENOUS
Status: COMPLETED
Start: 2019-01-03 | End: 2019-01-03

## 2019-01-03 RX ORDER — AZITHROMYCIN 250 MG/1
250 TABLET, FILM COATED ORAL DAILY
Status: DISCONTINUED | OUTPATIENT
Start: 2019-01-03 | End: 2019-01-04

## 2019-01-03 RX ORDER — ETOMIDATE 2 MG/ML
INJECTION INTRAVENOUS
Status: COMPLETED
Start: 2019-01-03 | End: 2019-01-03

## 2019-01-03 RX ADMIN — PROPOFOL 40 MCG/KG/MIN: 10 INJECTION, EMULSION INTRAVENOUS at 17:57

## 2019-01-03 RX ADMIN — FENTANYL CITRATE 25 MCG: 50 INJECTION, SOLUTION INTRAMUSCULAR; INTRAVENOUS at 11:03

## 2019-01-03 RX ADMIN — PROPOFOL 1000 MG: 10 INJECTION, EMULSION INTRAVENOUS at 11:10

## 2019-01-03 RX ADMIN — PIPERACILLIN SODIUM,TAZOBACTAM SODIUM 3.38 G: 3; .375 INJECTION, POWDER, FOR SOLUTION INTRAVENOUS at 22:00

## 2019-01-03 RX ADMIN — PIPERACILLIN SODIUM,TAZOBACTAM SODIUM 3.38 G: 3; .375 INJECTION, POWDER, FOR SOLUTION INTRAVENOUS at 15:07

## 2019-01-03 RX ADMIN — ETOMIDATE 20 MG: 20 INJECTION, SOLUTION INTRAVENOUS at 11:05

## 2019-01-03 RX ADMIN — HYDRALAZINE HYDROCHLORIDE 10 MG: 20 INJECTION INTRAMUSCULAR; INTRAVENOUS at 09:53

## 2019-01-03 RX ADMIN — ENOXAPARIN SODIUM 40 MG: 40 INJECTION SUBCUTANEOUS at 07:48

## 2019-01-03 RX ADMIN — PIPERACILLIN SODIUM,TAZOBACTAM SODIUM 3.38 G: 3; .375 INJECTION, POWDER, FOR SOLUTION INTRAVENOUS at 06:13

## 2019-01-03 RX ADMIN — CHLORHEXIDINE GLUCONATE 0.12% ORAL RINSE 15 ML: 1.2 LIQUID ORAL at 12:17

## 2019-01-03 RX ADMIN — VANCOMYCIN HYDROCHLORIDE 750 MG: 750 INJECTION, POWDER, LYOPHILIZED, FOR SOLUTION INTRAVENOUS at 10:14

## 2019-01-03 RX ADMIN — MIDAZOLAM HYDROCHLORIDE 2 MG: 1 INJECTION INTRAMUSCULAR; INTRAVENOUS at 11:04

## 2019-01-03 RX ADMIN — FAMOTIDINE 20 MG: 20 TABLET ORAL at 07:50

## 2019-01-03 RX ADMIN — MIDAZOLAM HYDROCHLORIDE 2 MG: 1 INJECTION, SOLUTION INTRAMUSCULAR; INTRAVENOUS at 11:05

## 2019-01-03 RX ADMIN — HYDRALAZINE HYDROCHLORIDE 10 MG: 20 INJECTION INTRAMUSCULAR; INTRAVENOUS at 02:34

## 2019-01-03 RX ADMIN — FAMOTIDINE 20 MG: 20 TABLET ORAL at 20:50

## 2019-01-03 RX ADMIN — SODIUM CHLORIDE: 9 INJECTION, SOLUTION INTRAVENOUS at 06:13

## 2019-01-03 RX ADMIN — FENTANYL CITRATE 25 MCG/HR: 50 INJECTION, SOLUTION INTRAMUSCULAR; INTRAVENOUS at 15:02

## 2019-01-03 RX ADMIN — Medication 10 ML: at 07:50

## 2019-01-03 RX ADMIN — FENTANYL CITRATE 25 MCG: 50 INJECTION, SOLUTION INTRAMUSCULAR; INTRAVENOUS at 11:10

## 2019-01-03 RX ADMIN — METHYLPREDNISOLONE SODIUM SUCCINATE 40 MG: 40 INJECTION, POWDER, FOR SOLUTION INTRAMUSCULAR; INTRAVENOUS at 07:48

## 2019-01-03 RX ADMIN — CHLORHEXIDINE GLUCONATE 0.12% ORAL RINSE 15 ML: 1.2 LIQUID ORAL at 20:50

## 2019-01-03 RX ADMIN — ETOMIDATE 20 MG: 2 INJECTION INTRAVENOUS at 11:05

## 2019-01-03 RX ADMIN — IOPAMIDOL 100 ML: 612 INJECTION, SOLUTION INTRAVENOUS at 18:28

## 2019-01-03 RX ADMIN — MIDAZOLAM HYDROCHLORIDE 2 MG: 1 INJECTION, SOLUTION INTRAMUSCULAR; INTRAVENOUS at 11:04

## 2019-01-03 ASSESSMENT — PAIN SCALES - GENERAL
PAINLEVEL_OUTOF10: 0
PAINLEVEL_OUTOF10: 8
PAINLEVEL_OUTOF10: 0
PAINLEVEL_OUTOF10: 7

## 2019-01-03 ASSESSMENT — PULMONARY FUNCTION TESTS
PIF_VALUE: 5.9
PIF_VALUE: 9.9
PIF_VALUE: 18
PIF_VALUE: 5.7
PIF_VALUE: 18
PIF_VALUE: 5.2
PIF_VALUE: 5.5
PIF_VALUE: 4.9
PIF_VALUE: 16
PIF_VALUE: 17
PIF_VALUE: 5
PIF_VALUE: 19
PIF_VALUE: 6
PIF_VALUE: 19
PIF_VALUE: 17

## 2019-01-04 ENCOUNTER — APPOINTMENT (OUTPATIENT)
Dept: GENERAL RADIOLOGY | Age: 59
DRG: 871 | End: 2019-01-04
Payer: MEDICARE

## 2019-01-04 LAB
ANION GAP SERPL CALCULATED.3IONS-SCNC: 11 MEQ/L (ref 7–13)
BASE EXCESS ARTERIAL: 6 (ref -3–3)
BUN BLDV-MCNC: 24 MG/DL (ref 8–23)
CALCIUM IONIZED: 1.28 MMOL/L (ref 1.12–1.32)
CALCIUM SERPL-MCNC: 9.4 MG/DL (ref 8.6–10.2)
CHLORIDE BLD-SCNC: 101 MEQ/L (ref 98–107)
CO2: 27 MEQ/L (ref 22–29)
CREAT SERPL-MCNC: 0.55 MG/DL (ref 0.5–0.9)
GFR AFRICAN AMERICAN: >60
GFR AFRICAN AMERICAN: >60
GFR NON-AFRICAN AMERICAN: >60
GFR NON-AFRICAN AMERICAN: >60
GLUCOSE BLD-MCNC: 114 MG/DL (ref 60–115)
GLUCOSE BLD-MCNC: 116 MG/DL (ref 74–109)
GLUCOSE BLD-MCNC: 120 MG/DL (ref 60–115)
GLUCOSE BLD-MCNC: 123 MG/DL (ref 60–115)
GLUCOSE BLD-MCNC: 129 MG/DL (ref 60–115)
HCO3 ARTERIAL: 30.8 MMOL/L (ref 21–29)
HCT VFR BLD CALC: 37.7 % (ref 37–47)
HEMOGLOBIN: 12.3 G/DL (ref 12–16)
HEMOGLOBIN: 12.8 GM/DL (ref 12–16)
LACTATE: 0.44 MMOL/L (ref 0.4–2)
MCH RBC QN AUTO: 28.6 PG (ref 27–31.3)
MCHC RBC AUTO-ENTMCNC: 32.6 % (ref 33–37)
MCV RBC AUTO: 87.5 FL (ref 82–100)
O2 SAT, ARTERIAL: 95 % (ref 93–100)
PCO2 ARTERIAL: 52 MM HG (ref 35–45)
PDW BLD-RTO: 21 % (ref 11.5–14.5)
PERFORMED ON: ABNORMAL
PERFORMED ON: NORMAL
PH ARTERIAL: 7.38 (ref 7.35–7.45)
PLATELET # BLD: 218 K/UL (ref 130–400)
PO2 ARTERIAL: 79 MM HG (ref 75–108)
POC CHLORIDE: 101 MEQ/L (ref 99–110)
POC CREATININE: 0.6 MG/DL (ref 0.6–1.2)
POC FIO2: 35
POC HEMATOCRIT: 38 % (ref 36–48)
POC POTASSIUM: 4.4 MEQ/L (ref 3.5–5.1)
POC SAMPLE TYPE: ABNORMAL
POC SODIUM: 137 MEQ/L (ref 136–145)
POTASSIUM REFLEX MAGNESIUM: 5 MEQ/L (ref 3.5–5.1)
RBC # BLD: 4.31 M/UL (ref 4.2–5.4)
SODIUM BLD-SCNC: 139 MEQ/L (ref 132–144)
TCO2 ARTERIAL: 32 (ref 22–29)
WBC # BLD: 6.5 K/UL (ref 4.8–10.8)

## 2019-01-04 PROCEDURE — 84295 ASSAY OF SERUM SODIUM: CPT

## 2019-01-04 PROCEDURE — 6370000000 HC RX 637 (ALT 250 FOR IP): Performed by: INTERNAL MEDICINE

## 2019-01-04 PROCEDURE — 85027 COMPLETE CBC AUTOMATED: CPT

## 2019-01-04 PROCEDURE — 94660 CPAP INITIATION&MGMT: CPT

## 2019-01-04 PROCEDURE — 94640 AIRWAY INHALATION TREATMENT: CPT

## 2019-01-04 PROCEDURE — 82565 ASSAY OF CREATININE: CPT

## 2019-01-04 PROCEDURE — 71045 X-RAY EXAM CHEST 1 VIEW: CPT

## 2019-01-04 PROCEDURE — 6360000002 HC RX W HCPCS: Performed by: INTERNAL MEDICINE

## 2019-01-04 PROCEDURE — 82803 BLOOD GASES ANY COMBINATION: CPT

## 2019-01-04 PROCEDURE — 85014 HEMATOCRIT: CPT

## 2019-01-04 PROCEDURE — 83605 ASSAY OF LACTIC ACID: CPT

## 2019-01-04 PROCEDURE — 2700000000 HC OXYGEN THERAPY PER DAY

## 2019-01-04 PROCEDURE — 6360000002 HC RX W HCPCS: Performed by: NURSE PRACTITIONER

## 2019-01-04 PROCEDURE — 36415 COLL VENOUS BLD VENIPUNCTURE: CPT

## 2019-01-04 PROCEDURE — 2580000003 HC RX 258: Performed by: INTERNAL MEDICINE

## 2019-01-04 PROCEDURE — 93010 ELECTROCARDIOGRAM REPORT: CPT | Performed by: INTERNAL MEDICINE

## 2019-01-04 PROCEDURE — 36600 WITHDRAWAL OF ARTERIAL BLOOD: CPT

## 2019-01-04 PROCEDURE — 94003 VENT MGMT INPAT SUBQ DAY: CPT

## 2019-01-04 PROCEDURE — 82435 ASSAY OF BLOOD CHLORIDE: CPT

## 2019-01-04 PROCEDURE — 99291 CRITICAL CARE FIRST HOUR: CPT | Performed by: INTERNAL MEDICINE

## 2019-01-04 PROCEDURE — 94799 UNLISTED PULMONARY SVC/PX: CPT

## 2019-01-04 PROCEDURE — 1210000000 HC MED SURG R&B

## 2019-01-04 PROCEDURE — 94760 N-INVAS EAR/PLS OXIMETRY 1: CPT

## 2019-01-04 PROCEDURE — 80048 BASIC METABOLIC PNL TOTAL CA: CPT

## 2019-01-04 PROCEDURE — 84132 ASSAY OF SERUM POTASSIUM: CPT

## 2019-01-04 PROCEDURE — 2500000003 HC RX 250 WO HCPCS: Performed by: INTERNAL MEDICINE

## 2019-01-04 PROCEDURE — 94664 DEMO&/EVAL PT USE INHALER: CPT

## 2019-01-04 PROCEDURE — 82330 ASSAY OF CALCIUM: CPT

## 2019-01-04 PROCEDURE — 94150 VITAL CAPACITY TEST: CPT

## 2019-01-04 RX ORDER — BUDESONIDE 0.5 MG/2ML
0.5 INHALANT ORAL 2 TIMES DAILY
Status: DISCONTINUED | OUTPATIENT
Start: 2019-01-04 | End: 2019-01-10 | Stop reason: HOSPADM

## 2019-01-04 RX ORDER — ALBUTEROL SULFATE 2.5 MG/3ML
2.5 SOLUTION RESPIRATORY (INHALATION)
Status: DISCONTINUED | OUTPATIENT
Start: 2019-01-04 | End: 2019-01-10 | Stop reason: HOSPADM

## 2019-01-04 RX ORDER — ALBUTEROL SULFATE 2.5 MG/3ML
2.5 SOLUTION RESPIRATORY (INHALATION)
Status: DISCONTINUED | OUTPATIENT
Start: 2019-01-04 | End: 2019-01-04

## 2019-01-04 RX ORDER — IPRATROPIUM BROMIDE AND ALBUTEROL SULFATE 2.5; .5 MG/3ML; MG/3ML
1 SOLUTION RESPIRATORY (INHALATION)
Status: DISCONTINUED | OUTPATIENT
Start: 2019-01-04 | End: 2019-01-04

## 2019-01-04 RX ORDER — FLUTICASONE PROPIONATE 50 MCG
2 SPRAY, SUSPENSION (ML) NASAL NIGHTLY
Status: DISCONTINUED | OUTPATIENT
Start: 2019-01-04 | End: 2019-01-10 | Stop reason: HOSPADM

## 2019-01-04 RX ORDER — IPRATROPIUM BROMIDE AND ALBUTEROL SULFATE 2.5; .5 MG/3ML; MG/3ML
1 SOLUTION RESPIRATORY (INHALATION) 3 TIMES DAILY
Status: DISCONTINUED | OUTPATIENT
Start: 2019-01-04 | End: 2019-01-05

## 2019-01-04 RX ORDER — OXYCODONE HYDROCHLORIDE AND ACETAMINOPHEN 5; 325 MG/1; MG/1
1 TABLET ORAL EVERY 6 HOURS PRN
Status: DISCONTINUED | OUTPATIENT
Start: 2019-01-04 | End: 2019-01-10 | Stop reason: HOSPADM

## 2019-01-04 RX ADMIN — CHLORHEXIDINE GLUCONATE 0.12% ORAL RINSE 15 ML: 1.2 LIQUID ORAL at 08:15

## 2019-01-04 RX ADMIN — ENOXAPARIN SODIUM 40 MG: 40 INJECTION SUBCUTANEOUS at 08:15

## 2019-01-04 RX ADMIN — ALBUTEROL SULFATE 2.5 MG: 2.5 SOLUTION RESPIRATORY (INHALATION) at 17:53

## 2019-01-04 RX ADMIN — IPRATROPIUM BROMIDE AND ALBUTEROL SULFATE 1 AMPULE: .5; 3 SOLUTION RESPIRATORY (INHALATION) at 15:52

## 2019-01-04 RX ADMIN — DEXMEDETOMIDINE 0.2 MCG/KG/HR: 100 INJECTION, SOLUTION, CONCENTRATE INTRAVENOUS at 10:47

## 2019-01-04 RX ADMIN — IPRATROPIUM BROMIDE AND ALBUTEROL SULFATE 1 AMPULE: .5; 3 SOLUTION RESPIRATORY (INHALATION) at 20:11

## 2019-01-04 RX ADMIN — Medication 10 ML: at 08:15

## 2019-01-04 RX ADMIN — METHYLPREDNISOLONE SODIUM SUCCINATE 40 MG: 40 INJECTION, POWDER, FOR SOLUTION INTRAMUSCULAR; INTRAVENOUS at 08:15

## 2019-01-04 RX ADMIN — PIPERACILLIN SODIUM,TAZOBACTAM SODIUM 3.38 G: 3; .375 INJECTION, POWDER, FOR SOLUTION INTRAVENOUS at 07:31

## 2019-01-04 RX ADMIN — Medication 10 ML: at 21:53

## 2019-01-04 RX ADMIN — AZITHROMYCIN 250 MG: 250 TABLET, FILM COATED ORAL at 08:15

## 2019-01-04 RX ADMIN — FAMOTIDINE 20 MG: 20 TABLET ORAL at 08:15

## 2019-01-04 RX ADMIN — OXYCODONE AND ACETAMINOPHEN 1 TABLET: 5; 325 TABLET ORAL at 21:53

## 2019-01-04 RX ADMIN — BUDESONIDE 500 MCG: 0.5 SUSPENSION RESPIRATORY (INHALATION) at 20:11

## 2019-01-04 ASSESSMENT — PAIN SCALES - GENERAL
PAINLEVEL_OUTOF10: 0
PAINLEVEL_OUTOF10: 7

## 2019-01-04 ASSESSMENT — PULMONARY FUNCTION TESTS
PIF_VALUE: 9.1
PIF_VALUE: 5.09
PIF_VALUE: 5.3
PIF_VALUE: 6.2
PIF_VALUE: 6.9
PIF_VALUE: 36
PIF_VALUE: 4.8
PIF_VALUE: 5.2
PIF_VALUE: 5.4
PIF_VALUE: 4.9
PIF_VALUE: 5.3
PIF_VALUE: 8.6
PIF_VALUE: 10
PIF_VALUE: 11
PIF_VALUE: 11
PIF_VALUE: 12

## 2019-01-05 LAB
ANION GAP SERPL CALCULATED.3IONS-SCNC: 10 MEQ/L (ref 7–13)
BUN BLDV-MCNC: 17 MG/DL (ref 8–23)
CALCIUM SERPL-MCNC: 10 MG/DL (ref 8.6–10.2)
CHLORIDE BLD-SCNC: 102 MEQ/L (ref 98–107)
CO2: 31 MEQ/L (ref 22–29)
CREAT SERPL-MCNC: 0.57 MG/DL (ref 0.5–0.9)
GFR AFRICAN AMERICAN: >60
GFR NON-AFRICAN AMERICAN: >60
GLUCOSE BLD-MCNC: 100 MG/DL (ref 74–109)
GLUCOSE BLD-MCNC: 102 MG/DL (ref 60–115)
GLUCOSE BLD-MCNC: 106 MG/DL (ref 60–115)
GLUCOSE BLD-MCNC: 110 MG/DL (ref 60–115)
GLUCOSE BLD-MCNC: 148 MG/DL (ref 60–115)
GLUCOSE BLD-MCNC: 207 MG/DL (ref 60–115)
HCT VFR BLD CALC: 40.5 % (ref 37–47)
HEMOGLOBIN: 13.4 G/DL (ref 12–16)
MCH RBC QN AUTO: 28.7 PG (ref 27–31.3)
MCHC RBC AUTO-ENTMCNC: 33 % (ref 33–37)
MCV RBC AUTO: 86.8 FL (ref 82–100)
PDW BLD-RTO: 20.6 % (ref 11.5–14.5)
PERFORMED ON: ABNORMAL
PERFORMED ON: ABNORMAL
PERFORMED ON: NORMAL
PLATELET # BLD: 251 K/UL (ref 130–400)
POTASSIUM REFLEX MAGNESIUM: 4.8 MEQ/L (ref 3.5–5.1)
RBC # BLD: 4.66 M/UL (ref 4.2–5.4)
SODIUM BLD-SCNC: 143 MEQ/L (ref 132–144)
WBC # BLD: 7.2 K/UL (ref 4.8–10.8)

## 2019-01-05 PROCEDURE — 6370000000 HC RX 637 (ALT 250 FOR IP): Performed by: INTERNAL MEDICINE

## 2019-01-05 PROCEDURE — 2500000003 HC RX 250 WO HCPCS: Performed by: INTERNAL MEDICINE

## 2019-01-05 PROCEDURE — 1210000000 HC MED SURG R&B

## 2019-01-05 PROCEDURE — 85027 COMPLETE CBC AUTOMATED: CPT

## 2019-01-05 PROCEDURE — 6360000002 HC RX W HCPCS: Performed by: NURSE PRACTITIONER

## 2019-01-05 PROCEDURE — 2580000003 HC RX 258: Performed by: INTERNAL MEDICINE

## 2019-01-05 PROCEDURE — 6360000002 HC RX W HCPCS: Performed by: INTERNAL MEDICINE

## 2019-01-05 PROCEDURE — 94640 AIRWAY INHALATION TREATMENT: CPT

## 2019-01-05 PROCEDURE — 2700000000 HC OXYGEN THERAPY PER DAY

## 2019-01-05 PROCEDURE — 80048 BASIC METABOLIC PNL TOTAL CA: CPT

## 2019-01-05 PROCEDURE — 99233 SBSQ HOSP IP/OBS HIGH 50: CPT | Performed by: INTERNAL MEDICINE

## 2019-01-05 PROCEDURE — 36415 COLL VENOUS BLD VENIPUNCTURE: CPT

## 2019-01-05 RX ORDER — METHYLPREDNISOLONE SODIUM SUCCINATE 40 MG/ML
40 INJECTION, POWDER, LYOPHILIZED, FOR SOLUTION INTRAMUSCULAR; INTRAVENOUS EVERY 8 HOURS
Status: DISCONTINUED | OUTPATIENT
Start: 2019-01-05 | End: 2019-01-08

## 2019-01-05 RX ORDER — IPRATROPIUM BROMIDE AND ALBUTEROL SULFATE 2.5; .5 MG/3ML; MG/3ML
1 SOLUTION RESPIRATORY (INHALATION) 4 TIMES DAILY
Status: DISCONTINUED | OUTPATIENT
Start: 2019-01-05 | End: 2019-01-07

## 2019-01-05 RX ORDER — ACETAMINOPHEN 325 MG/1
650 TABLET ORAL EVERY 6 HOURS PRN
Status: DISCONTINUED | OUTPATIENT
Start: 2019-01-05 | End: 2019-01-10 | Stop reason: HOSPADM

## 2019-01-05 RX ADMIN — FLUTICASONE PROPIONATE 2 SPRAY: 50 SPRAY, METERED NASAL at 01:02

## 2019-01-05 RX ADMIN — METHYLPREDNISOLONE SODIUM SUCCINATE 40 MG: 40 INJECTION, POWDER, FOR SOLUTION INTRAMUSCULAR; INTRAVENOUS at 08:05

## 2019-01-05 RX ADMIN — INSULIN LISPRO 4 UNITS: 100 INJECTION, SOLUTION INTRAVENOUS; SUBCUTANEOUS at 17:12

## 2019-01-05 RX ADMIN — ENOXAPARIN SODIUM 40 MG: 40 INJECTION SUBCUTANEOUS at 08:06

## 2019-01-05 RX ADMIN — Medication 10 ML: at 20:35

## 2019-01-05 RX ADMIN — BUDESONIDE 500 MCG: 0.5 SUSPENSION RESPIRATORY (INHALATION) at 19:20

## 2019-01-05 RX ADMIN — BUDESONIDE 500 MCG: 0.5 SUSPENSION RESPIRATORY (INHALATION) at 08:25

## 2019-01-05 RX ADMIN — ACETAMINOPHEN 650 MG: 325 TABLET ORAL at 09:41

## 2019-01-05 RX ADMIN — FLUTICASONE PROPIONATE 2 SPRAY: 50 SPRAY, METERED NASAL at 17:55

## 2019-01-05 RX ADMIN — IPRATROPIUM BROMIDE AND ALBUTEROL SULFATE 1 AMPULE: .5; 3 SOLUTION RESPIRATORY (INHALATION) at 19:20

## 2019-01-05 RX ADMIN — IPRATROPIUM BROMIDE AND ALBUTEROL SULFATE 1 AMPULE: .5; 3 SOLUTION RESPIRATORY (INHALATION) at 15:39

## 2019-01-05 RX ADMIN — FAMOTIDINE 20 MG: 20 TABLET ORAL at 08:06

## 2019-01-05 RX ADMIN — OXYCODONE AND ACETAMINOPHEN 1 TABLET: 5; 325 TABLET ORAL at 12:16

## 2019-01-05 RX ADMIN — METHYLPREDNISOLONE SODIUM SUCCINATE 40 MG: 40 INJECTION, POWDER, FOR SOLUTION INTRAMUSCULAR; INTRAVENOUS at 16:15

## 2019-01-05 RX ADMIN — IPRATROPIUM BROMIDE AND ALBUTEROL SULFATE 1 AMPULE: .5; 3 SOLUTION RESPIRATORY (INHALATION) at 08:25

## 2019-01-05 RX ADMIN — OXYCODONE AND ACETAMINOPHEN 1 TABLET: 5; 325 TABLET ORAL at 04:26

## 2019-01-05 RX ADMIN — Medication 10 ML: at 08:06

## 2019-01-05 RX ADMIN — FAMOTIDINE 20 MG: 20 TABLET ORAL at 20:35

## 2019-01-05 RX ADMIN — OXYCODONE AND ACETAMINOPHEN 1 TABLET: 5; 325 TABLET ORAL at 17:54

## 2019-01-05 RX ADMIN — LABETALOL HYDROCHLORIDE 10 MG: 5 INJECTION, SOLUTION INTRAVENOUS at 20:35

## 2019-01-05 RX ADMIN — ALBUTEROL SULFATE 2.5 MG: 2.5 SOLUTION RESPIRATORY (INHALATION) at 11:36

## 2019-01-05 ASSESSMENT — PAIN SCALES - GENERAL
PAINLEVEL_OUTOF10: 2
PAINLEVEL_OUTOF10: 4
PAINLEVEL_OUTOF10: 4
PAINLEVEL_OUTOF10: 5
PAINLEVEL_OUTOF10: 6

## 2019-01-06 LAB
ANION GAP SERPL CALCULATED.3IONS-SCNC: 13 MEQ/L (ref 7–13)
BUN BLDV-MCNC: 13 MG/DL (ref 8–23)
CALCIUM SERPL-MCNC: 9.6 MG/DL (ref 8.6–10.2)
CHLORIDE BLD-SCNC: 102 MEQ/L (ref 98–107)
CO2: 28 MEQ/L (ref 22–29)
CREAT SERPL-MCNC: 0.49 MG/DL (ref 0.5–0.9)
GFR AFRICAN AMERICAN: >60
GFR NON-AFRICAN AMERICAN: >60
GLUCOSE BLD-MCNC: 120 MG/DL (ref 60–115)
GLUCOSE BLD-MCNC: 124 MG/DL (ref 60–115)
GLUCOSE BLD-MCNC: 135 MG/DL (ref 74–109)
GLUCOSE BLD-MCNC: 149 MG/DL (ref 60–115)
GLUCOSE BLD-MCNC: 158 MG/DL (ref 60–115)
HCT VFR BLD CALC: 38.5 % (ref 37–47)
HEMOGLOBIN: 12.6 G/DL (ref 12–16)
MCH RBC QN AUTO: 28.5 PG (ref 27–31.3)
MCHC RBC AUTO-ENTMCNC: 32.8 % (ref 33–37)
MCV RBC AUTO: 87 FL (ref 82–100)
PDW BLD-RTO: 20.3 % (ref 11.5–14.5)
PERFORMED ON: ABNORMAL
PLATELET # BLD: 254 K/UL (ref 130–400)
POTASSIUM REFLEX MAGNESIUM: 5.1 MEQ/L (ref 3.5–5.1)
RBC # BLD: 4.43 M/UL (ref 4.2–5.4)
SODIUM BLD-SCNC: 143 MEQ/L (ref 132–144)
WBC # BLD: 5.7 K/UL (ref 4.8–10.8)

## 2019-01-06 PROCEDURE — 6360000002 HC RX W HCPCS: Performed by: INTERNAL MEDICINE

## 2019-01-06 PROCEDURE — 99233 SBSQ HOSP IP/OBS HIGH 50: CPT | Performed by: INTERNAL MEDICINE

## 2019-01-06 PROCEDURE — 36415 COLL VENOUS BLD VENIPUNCTURE: CPT

## 2019-01-06 PROCEDURE — 1210000000 HC MED SURG R&B

## 2019-01-06 PROCEDURE — 2700000000 HC OXYGEN THERAPY PER DAY

## 2019-01-06 PROCEDURE — 94760 N-INVAS EAR/PLS OXIMETRY 1: CPT

## 2019-01-06 PROCEDURE — 94660 CPAP INITIATION&MGMT: CPT

## 2019-01-06 PROCEDURE — 2580000003 HC RX 258: Performed by: INTERNAL MEDICINE

## 2019-01-06 PROCEDURE — 2500000003 HC RX 250 WO HCPCS: Performed by: INTERNAL MEDICINE

## 2019-01-06 PROCEDURE — 6370000000 HC RX 637 (ALT 250 FOR IP): Performed by: INTERNAL MEDICINE

## 2019-01-06 PROCEDURE — 85027 COMPLETE CBC AUTOMATED: CPT

## 2019-01-06 PROCEDURE — 94640 AIRWAY INHALATION TREATMENT: CPT

## 2019-01-06 PROCEDURE — 80048 BASIC METABOLIC PNL TOTAL CA: CPT

## 2019-01-06 RX ADMIN — METHYLPREDNISOLONE SODIUM SUCCINATE 40 MG: 40 INJECTION, POWDER, FOR SOLUTION INTRAMUSCULAR; INTRAVENOUS at 08:08

## 2019-01-06 RX ADMIN — FLUTICASONE PROPIONATE 2 SPRAY: 50 SPRAY, METERED NASAL at 20:58

## 2019-01-06 RX ADMIN — Medication 10 ML: at 08:11

## 2019-01-06 RX ADMIN — IPRATROPIUM BROMIDE AND ALBUTEROL SULFATE 1 AMPULE: .5; 3 SOLUTION RESPIRATORY (INHALATION) at 04:47

## 2019-01-06 RX ADMIN — IPRATROPIUM BROMIDE AND ALBUTEROL SULFATE 1 AMPULE: .5; 3 SOLUTION RESPIRATORY (INHALATION) at 19:09

## 2019-01-06 RX ADMIN — METHYLPREDNISOLONE SODIUM SUCCINATE 40 MG: 40 INJECTION, POWDER, FOR SOLUTION INTRAMUSCULAR; INTRAVENOUS at 00:05

## 2019-01-06 RX ADMIN — FAMOTIDINE 20 MG: 20 TABLET ORAL at 08:08

## 2019-01-06 RX ADMIN — Medication 10 ML: at 19:53

## 2019-01-06 RX ADMIN — IPRATROPIUM BROMIDE AND ALBUTEROL SULFATE 1 AMPULE: .5; 3 SOLUTION RESPIRATORY (INHALATION) at 15:14

## 2019-01-06 RX ADMIN — OXYCODONE AND ACETAMINOPHEN 1 TABLET: 5; 325 TABLET ORAL at 16:52

## 2019-01-06 RX ADMIN — LABETALOL HYDROCHLORIDE 10 MG: 5 INJECTION, SOLUTION INTRAVENOUS at 19:52

## 2019-01-06 RX ADMIN — METHYLPREDNISOLONE SODIUM SUCCINATE 40 MG: 40 INJECTION, POWDER, FOR SOLUTION INTRAMUSCULAR; INTRAVENOUS at 16:49

## 2019-01-06 RX ADMIN — Medication 10 ML: at 23:45

## 2019-01-06 RX ADMIN — METHYLPREDNISOLONE SODIUM SUCCINATE 40 MG: 40 INJECTION, POWDER, FOR SOLUTION INTRAMUSCULAR; INTRAVENOUS at 23:45

## 2019-01-06 RX ADMIN — INSULIN LISPRO 2 UNITS: 100 INJECTION, SOLUTION INTRAVENOUS; SUBCUTANEOUS at 08:08

## 2019-01-06 RX ADMIN — FAMOTIDINE 20 MG: 20 TABLET ORAL at 19:52

## 2019-01-06 RX ADMIN — BUDESONIDE 500 MCG: 0.5 SUSPENSION RESPIRATORY (INHALATION) at 19:09

## 2019-01-06 RX ADMIN — IPRATROPIUM BROMIDE AND ALBUTEROL SULFATE 1 AMPULE: .5; 3 SOLUTION RESPIRATORY (INHALATION) at 10:59

## 2019-01-06 RX ADMIN — ACETAMINOPHEN 650 MG: 325 TABLET ORAL at 11:43

## 2019-01-06 RX ADMIN — BUDESONIDE 500 MCG: 0.5 SUSPENSION RESPIRATORY (INHALATION) at 04:48

## 2019-01-06 RX ADMIN — ACETAMINOPHEN 650 MG: 325 TABLET ORAL at 00:08

## 2019-01-06 RX ADMIN — ACETAMINOPHEN 650 MG: 325 TABLET ORAL at 22:42

## 2019-01-06 RX ADMIN — OXYCODONE AND ACETAMINOPHEN 1 TABLET: 5; 325 TABLET ORAL at 06:43

## 2019-01-06 RX ADMIN — ENOXAPARIN SODIUM 40 MG: 40 INJECTION SUBCUTANEOUS at 08:08

## 2019-01-06 RX ADMIN — INSULIN LISPRO 2 UNITS: 100 INJECTION, SOLUTION INTRAVENOUS; SUBCUTANEOUS at 16:49

## 2019-01-06 ASSESSMENT — PAIN SCALES - GENERAL
PAINLEVEL_OUTOF10: 4
PAINLEVEL_OUTOF10: 4
PAINLEVEL_OUTOF10: 6
PAINLEVEL_OUTOF10: 4
PAINLEVEL_OUTOF10: 4
PAINLEVEL_OUTOF10: 2

## 2019-01-07 PROBLEM — E44.0 MODERATE MALNUTRITION (HCC): Status: ACTIVE | Noted: 2019-01-07

## 2019-01-07 LAB
ANION GAP SERPL CALCULATED.3IONS-SCNC: 11 MEQ/L (ref 7–13)
BLOOD CULTURE, ROUTINE: NORMAL
BUN BLDV-MCNC: 15 MG/DL (ref 8–23)
CALCIUM SERPL-MCNC: 10 MG/DL (ref 8.6–10.2)
CHLORIDE BLD-SCNC: 102 MEQ/L (ref 98–107)
CO2: 30 MEQ/L (ref 22–29)
CREAT SERPL-MCNC: 0.59 MG/DL (ref 0.5–0.9)
CULTURE, BLOOD 2: NORMAL
GFR AFRICAN AMERICAN: >60
GFR NON-AFRICAN AMERICAN: >60
GLUCOSE BLD-MCNC: 111 MG/DL (ref 60–115)
GLUCOSE BLD-MCNC: 126 MG/DL (ref 60–115)
GLUCOSE BLD-MCNC: 131 MG/DL (ref 60–115)
GLUCOSE BLD-MCNC: 139 MG/DL (ref 74–109)
GLUCOSE BLD-MCNC: 177 MG/DL (ref 60–115)
HCT VFR BLD CALC: 40.7 % (ref 37–47)
HEMOGLOBIN: 13.1 G/DL (ref 12–16)
MCH RBC QN AUTO: 28.2 PG (ref 27–31.3)
MCHC RBC AUTO-ENTMCNC: 32.3 % (ref 33–37)
MCV RBC AUTO: 87.2 FL (ref 82–100)
PDW BLD-RTO: 20.8 % (ref 11.5–14.5)
PERFORMED ON: ABNORMAL
PERFORMED ON: NORMAL
PLATELET # BLD: 294 K/UL (ref 130–400)
POTASSIUM REFLEX MAGNESIUM: 4.7 MEQ/L (ref 3.5–5.1)
RBC # BLD: 4.66 M/UL (ref 4.2–5.4)
SODIUM BLD-SCNC: 143 MEQ/L (ref 132–144)
WBC # BLD: 7.8 K/UL (ref 4.8–10.8)

## 2019-01-07 PROCEDURE — G8988 SELF CARE GOAL STATUS: HCPCS

## 2019-01-07 PROCEDURE — 6370000000 HC RX 637 (ALT 250 FOR IP): Performed by: INTERNAL MEDICINE

## 2019-01-07 PROCEDURE — 94668 MNPJ CHEST WALL SBSQ: CPT

## 2019-01-07 PROCEDURE — 97163 PT EVAL HIGH COMPLEX 45 MIN: CPT

## 2019-01-07 PROCEDURE — 6360000002 HC RX W HCPCS: Performed by: INTERNAL MEDICINE

## 2019-01-07 PROCEDURE — G8987 SELF CARE CURRENT STATUS: HCPCS

## 2019-01-07 PROCEDURE — 94640 AIRWAY INHALATION TREATMENT: CPT

## 2019-01-07 PROCEDURE — 1210000000 HC MED SURG R&B

## 2019-01-07 PROCEDURE — 2700000000 HC OXYGEN THERAPY PER DAY

## 2019-01-07 PROCEDURE — 36415 COLL VENOUS BLD VENIPUNCTURE: CPT

## 2019-01-07 PROCEDURE — G8978 MOBILITY CURRENT STATUS: HCPCS

## 2019-01-07 PROCEDURE — 2500000003 HC RX 250 WO HCPCS: Performed by: INTERNAL MEDICINE

## 2019-01-07 PROCEDURE — 85027 COMPLETE CBC AUTOMATED: CPT

## 2019-01-07 PROCEDURE — 94664 DEMO&/EVAL PT USE INHALER: CPT

## 2019-01-07 PROCEDURE — G8979 MOBILITY GOAL STATUS: HCPCS

## 2019-01-07 PROCEDURE — 80048 BASIC METABOLIC PNL TOTAL CA: CPT

## 2019-01-07 PROCEDURE — 94660 CPAP INITIATION&MGMT: CPT

## 2019-01-07 PROCEDURE — 97166 OT EVAL MOD COMPLEX 45 MIN: CPT

## 2019-01-07 PROCEDURE — 99233 SBSQ HOSP IP/OBS HIGH 50: CPT | Performed by: INTERNAL MEDICINE

## 2019-01-07 PROCEDURE — 2580000003 HC RX 258: Performed by: INTERNAL MEDICINE

## 2019-01-07 RX ORDER — DULOXETIN HYDROCHLORIDE 60 MG/1
60 CAPSULE, DELAYED RELEASE ORAL DAILY
Status: DISCONTINUED | OUTPATIENT
Start: 2019-01-07 | End: 2019-01-07

## 2019-01-07 RX ORDER — ASPIRIN 81 MG/1
81 TABLET ORAL 2 TIMES DAILY
Status: DISCONTINUED | OUTPATIENT
Start: 2019-01-07 | End: 2019-01-10 | Stop reason: HOSPADM

## 2019-01-07 RX ORDER — BACLOFEN 10 MG/1
10 TABLET ORAL 3 TIMES DAILY
Status: DISCONTINUED | OUTPATIENT
Start: 2019-01-07 | End: 2019-01-10 | Stop reason: HOSPADM

## 2019-01-07 RX ORDER — DULOXETIN HYDROCHLORIDE 60 MG/1
60 CAPSULE, DELAYED RELEASE ORAL 2 TIMES DAILY
Status: DISCONTINUED | OUTPATIENT
Start: 2019-01-07 | End: 2019-01-10 | Stop reason: HOSPADM

## 2019-01-07 RX ORDER — IPRATROPIUM BROMIDE AND ALBUTEROL SULFATE 2.5; .5 MG/3ML; MG/3ML
1 SOLUTION RESPIRATORY (INHALATION) 3 TIMES DAILY
Status: DISCONTINUED | OUTPATIENT
Start: 2019-01-07 | End: 2019-01-10 | Stop reason: HOSPADM

## 2019-01-07 RX ORDER — OXYBUTYNIN CHLORIDE 5 MG/1
10 TABLET, EXTENDED RELEASE ORAL DAILY
Status: DISCONTINUED | OUTPATIENT
Start: 2019-01-07 | End: 2019-01-10 | Stop reason: HOSPADM

## 2019-01-07 RX ORDER — SIMVASTATIN 10 MG
10 TABLET ORAL NIGHTLY
Status: DISCONTINUED | OUTPATIENT
Start: 2019-01-07 | End: 2019-01-10 | Stop reason: HOSPADM

## 2019-01-07 RX ORDER — ALPRAZOLAM 0.25 MG/1
0.25 TABLET ORAL 3 TIMES DAILY PRN
Status: DISCONTINUED | OUTPATIENT
Start: 2019-01-07 | End: 2019-01-10 | Stop reason: HOSPADM

## 2019-01-07 RX ADMIN — Medication 2 PUFF: at 11:07

## 2019-01-07 RX ADMIN — FAMOTIDINE 20 MG: 20 TABLET ORAL at 19:57

## 2019-01-07 RX ADMIN — FAMOTIDINE 20 MG: 20 TABLET ORAL at 07:49

## 2019-01-07 RX ADMIN — BACLOFEN 10 MG: 10 TABLET ORAL at 19:58

## 2019-01-07 RX ADMIN — FLUTICASONE PROPIONATE 2 SPRAY: 50 SPRAY, METERED NASAL at 20:01

## 2019-01-07 RX ADMIN — ACETAMINOPHEN 650 MG: 325 TABLET ORAL at 20:08

## 2019-01-07 RX ADMIN — DULOXETINE HYDROCHLORIDE 60 MG: 60 CAPSULE, DELAYED RELEASE ORAL at 09:58

## 2019-01-07 RX ADMIN — LABETALOL HYDROCHLORIDE 10 MG: 5 INJECTION, SOLUTION INTRAVENOUS at 03:35

## 2019-01-07 RX ADMIN — SIMVASTATIN 10 MG: 10 TABLET, FILM COATED ORAL at 19:57

## 2019-01-07 RX ADMIN — BUDESONIDE 500 MCG: 0.5 SUSPENSION RESPIRATORY (INHALATION) at 19:38

## 2019-01-07 RX ADMIN — ACETAMINOPHEN 650 MG: 325 TABLET ORAL at 04:52

## 2019-01-07 RX ADMIN — OXYBUTYNIN CHLORIDE 10 MG: 5 TABLET, EXTENDED RELEASE ORAL at 09:58

## 2019-01-07 RX ADMIN — Medication 10 ML: at 07:50

## 2019-01-07 RX ADMIN — INSULIN LISPRO 2 UNITS: 100 INJECTION, SOLUTION INTRAVENOUS; SUBCUTANEOUS at 22:43

## 2019-01-07 RX ADMIN — DULOXETINE HYDROCHLORIDE 60 MG: 60 CAPSULE, DELAYED RELEASE ORAL at 20:49

## 2019-01-07 RX ADMIN — IPRATROPIUM BROMIDE AND ALBUTEROL SULFATE 1 AMPULE: .5; 3 SOLUTION RESPIRATORY (INHALATION) at 12:53

## 2019-01-07 RX ADMIN — ASPIRIN 81 MG: 81 TABLET, COATED ORAL at 19:57

## 2019-01-07 RX ADMIN — OXYCODONE AND ACETAMINOPHEN 1 TABLET: 5; 325 TABLET ORAL at 09:58

## 2019-01-07 RX ADMIN — BUDESONIDE 500 MCG: 0.5 SUSPENSION RESPIRATORY (INHALATION) at 07:10

## 2019-01-07 RX ADMIN — IPRATROPIUM BROMIDE AND ALBUTEROL SULFATE 1 AMPULE: .5; 3 SOLUTION RESPIRATORY (INHALATION) at 19:38

## 2019-01-07 RX ADMIN — LABETALOL HYDROCHLORIDE 10 MG: 5 INJECTION, SOLUTION INTRAVENOUS at 19:58

## 2019-01-07 RX ADMIN — METHYLPREDNISOLONE SODIUM SUCCINATE 40 MG: 40 INJECTION, POWDER, FOR SOLUTION INTRAMUSCULAR; INTRAVENOUS at 16:58

## 2019-01-07 RX ADMIN — METHYLPREDNISOLONE SODIUM SUCCINATE 40 MG: 40 INJECTION, POWDER, FOR SOLUTION INTRAMUSCULAR; INTRAVENOUS at 07:49

## 2019-01-07 RX ADMIN — IPRATROPIUM BROMIDE AND ALBUTEROL SULFATE 1 AMPULE: .5; 3 SOLUTION RESPIRATORY (INHALATION) at 07:09

## 2019-01-07 RX ADMIN — ALPRAZOLAM 0.25 MG: 0.25 TABLET ORAL at 17:03

## 2019-01-07 RX ADMIN — ENOXAPARIN SODIUM 40 MG: 40 INJECTION SUBCUTANEOUS at 07:49

## 2019-01-07 RX ADMIN — Medication 10 ML: at 20:01

## 2019-01-07 RX ADMIN — Medication 2 PUFF: at 19:38

## 2019-01-07 RX ADMIN — BACLOFEN 10 MG: 10 TABLET ORAL at 09:58

## 2019-01-07 RX ADMIN — ASPIRIN 81 MG: 81 TABLET, COATED ORAL at 09:58

## 2019-01-07 ASSESSMENT — PAIN SCALES - GENERAL
PAINLEVEL_OUTOF10: 2
PAINLEVEL_OUTOF10: 4
PAINLEVEL_OUTOF10: 2
PAINLEVEL_OUTOF10: 0
PAINLEVEL_OUTOF10: 4

## 2019-01-08 LAB
ANION GAP SERPL CALCULATED.3IONS-SCNC: 12 MEQ/L (ref 7–13)
BUN BLDV-MCNC: 14 MG/DL (ref 8–23)
CALCIUM SERPL-MCNC: 9.4 MG/DL (ref 8.6–10.2)
CHLORIDE BLD-SCNC: 105 MEQ/L (ref 98–107)
CO2: 28 MEQ/L (ref 22–29)
CREAT SERPL-MCNC: 0.53 MG/DL (ref 0.5–0.9)
GFR AFRICAN AMERICAN: >60
GFR NON-AFRICAN AMERICAN: >60
GLUCOSE BLD-MCNC: 126 MG/DL (ref 60–115)
GLUCOSE BLD-MCNC: 132 MG/DL (ref 60–115)
GLUCOSE BLD-MCNC: 145 MG/DL (ref 74–109)
GLUCOSE BLD-MCNC: 166 MG/DL (ref 60–115)
GLUCOSE BLD-MCNC: 98 MG/DL (ref 60–115)
HCT VFR BLD CALC: 40.4 % (ref 37–47)
HEMOGLOBIN: 13.3 G/DL (ref 12–16)
MCH RBC QN AUTO: 28.6 PG (ref 27–31.3)
MCHC RBC AUTO-ENTMCNC: 32.8 % (ref 33–37)
MCV RBC AUTO: 87.2 FL (ref 82–100)
PDW BLD-RTO: 20.4 % (ref 11.5–14.5)
PERFORMED ON: ABNORMAL
PERFORMED ON: NORMAL
PLATELET # BLD: 302 K/UL (ref 130–400)
POTASSIUM REFLEX MAGNESIUM: 4.6 MEQ/L (ref 3.5–5.1)
RBC # BLD: 4.64 M/UL (ref 4.2–5.4)
SODIUM BLD-SCNC: 145 MEQ/L (ref 132–144)
WBC # BLD: 7.3 K/UL (ref 4.8–10.8)

## 2019-01-08 PROCEDURE — 6370000000 HC RX 637 (ALT 250 FOR IP): Performed by: INTERNAL MEDICINE

## 2019-01-08 PROCEDURE — 6360000002 HC RX W HCPCS: Performed by: INTERNAL MEDICINE

## 2019-01-08 PROCEDURE — 94660 CPAP INITIATION&MGMT: CPT

## 2019-01-08 PROCEDURE — 94640 AIRWAY INHALATION TREATMENT: CPT

## 2019-01-08 PROCEDURE — 99233 SBSQ HOSP IP/OBS HIGH 50: CPT | Performed by: INTERNAL MEDICINE

## 2019-01-08 PROCEDURE — 94667 MNPJ CHEST WALL 1ST: CPT

## 2019-01-08 PROCEDURE — 2580000003 HC RX 258: Performed by: INTERNAL MEDICINE

## 2019-01-08 PROCEDURE — 36415 COLL VENOUS BLD VENIPUNCTURE: CPT

## 2019-01-08 PROCEDURE — 97116 GAIT TRAINING THERAPY: CPT

## 2019-01-08 PROCEDURE — 94668 MNPJ CHEST WALL SBSQ: CPT

## 2019-01-08 PROCEDURE — 2700000000 HC OXYGEN THERAPY PER DAY

## 2019-01-08 PROCEDURE — 85027 COMPLETE CBC AUTOMATED: CPT

## 2019-01-08 PROCEDURE — 2500000003 HC RX 250 WO HCPCS: Performed by: INTERNAL MEDICINE

## 2019-01-08 PROCEDURE — 80048 BASIC METABOLIC PNL TOTAL CA: CPT

## 2019-01-08 PROCEDURE — 1210000000 HC MED SURG R&B

## 2019-01-08 RX ORDER — GUAIFENESIN 600 MG/1
600 TABLET, EXTENDED RELEASE ORAL 2 TIMES DAILY
Status: DISCONTINUED | OUTPATIENT
Start: 2019-01-08 | End: 2019-01-10 | Stop reason: HOSPADM

## 2019-01-08 RX ORDER — METHYLPREDNISOLONE SODIUM SUCCINATE 40 MG/ML
40 INJECTION, POWDER, LYOPHILIZED, FOR SOLUTION INTRAMUSCULAR; INTRAVENOUS EVERY 12 HOURS
Status: DISCONTINUED | OUTPATIENT
Start: 2019-01-09 | End: 2019-01-10 | Stop reason: HOSPADM

## 2019-01-08 RX ADMIN — BACLOFEN 10 MG: 10 TABLET ORAL at 13:33

## 2019-01-08 RX ADMIN — GUAIFENESIN 600 MG: 600 TABLET, EXTENDED RELEASE ORAL at 12:16

## 2019-01-08 RX ADMIN — LABETALOL HYDROCHLORIDE 10 MG: 5 INJECTION, SOLUTION INTRAVENOUS at 18:45

## 2019-01-08 RX ADMIN — Medication 2 PUFF: at 20:33

## 2019-01-08 RX ADMIN — METHYLPREDNISOLONE SODIUM SUCCINATE 40 MG: 40 INJECTION, POWDER, FOR SOLUTION INTRAMUSCULAR; INTRAVENOUS at 07:43

## 2019-01-08 RX ADMIN — METHYLPREDNISOLONE SODIUM SUCCINATE 40 MG: 40 INJECTION, POWDER, FOR SOLUTION INTRAMUSCULAR; INTRAVENOUS at 00:16

## 2019-01-08 RX ADMIN — ALPRAZOLAM 0.25 MG: 0.25 TABLET ORAL at 20:07

## 2019-01-08 RX ADMIN — INSULIN LISPRO 2 UNITS: 100 INJECTION, SOLUTION INTRAVENOUS; SUBCUTANEOUS at 21:42

## 2019-01-08 RX ADMIN — DULOXETINE HYDROCHLORIDE 60 MG: 60 CAPSULE, DELAYED RELEASE ORAL at 09:17

## 2019-01-08 RX ADMIN — IPRATROPIUM BROMIDE AND ALBUTEROL SULFATE 1 AMPULE: .5; 3 SOLUTION RESPIRATORY (INHALATION) at 13:17

## 2019-01-08 RX ADMIN — Medication 10 ML: at 07:43

## 2019-01-08 RX ADMIN — SIMVASTATIN 10 MG: 10 TABLET, FILM COATED ORAL at 20:07

## 2019-01-08 RX ADMIN — BACLOFEN 10 MG: 10 TABLET ORAL at 20:11

## 2019-01-08 RX ADMIN — Medication 2 PUFF: at 07:23

## 2019-01-08 RX ADMIN — GUAIFENESIN 600 MG: 600 TABLET, EXTENDED RELEASE ORAL at 20:07

## 2019-01-08 RX ADMIN — BUDESONIDE 500 MCG: 0.5 SUSPENSION RESPIRATORY (INHALATION) at 19:41

## 2019-01-08 RX ADMIN — Medication 10 ML: at 20:16

## 2019-01-08 RX ADMIN — ASPIRIN 81 MG: 81 TABLET, COATED ORAL at 20:07

## 2019-01-08 RX ADMIN — METHYLPREDNISOLONE SODIUM SUCCINATE 40 MG: 40 INJECTION, POWDER, FOR SOLUTION INTRAMUSCULAR; INTRAVENOUS at 17:03

## 2019-01-08 RX ADMIN — FAMOTIDINE 20 MG: 20 TABLET ORAL at 20:07

## 2019-01-08 RX ADMIN — OXYBUTYNIN CHLORIDE 10 MG: 5 TABLET, EXTENDED RELEASE ORAL at 09:17

## 2019-01-08 RX ADMIN — OXYCODONE AND ACETAMINOPHEN 1 TABLET: 5; 325 TABLET ORAL at 23:04

## 2019-01-08 RX ADMIN — DULOXETINE HYDROCHLORIDE 60 MG: 60 CAPSULE, DELAYED RELEASE ORAL at 20:07

## 2019-01-08 RX ADMIN — ASPIRIN 81 MG: 81 TABLET, COATED ORAL at 09:17

## 2019-01-08 RX ADMIN — OXYCODONE AND ACETAMINOPHEN 1 TABLET: 5; 325 TABLET ORAL at 16:24

## 2019-01-08 RX ADMIN — BACLOFEN 10 MG: 10 TABLET ORAL at 09:16

## 2019-01-08 RX ADMIN — OXYCODONE AND ACETAMINOPHEN 1 TABLET: 5; 325 TABLET ORAL at 09:17

## 2019-01-08 RX ADMIN — ALPRAZOLAM 0.25 MG: 0.25 TABLET ORAL at 13:31

## 2019-01-08 RX ADMIN — ENOXAPARIN SODIUM 40 MG: 40 INJECTION SUBCUTANEOUS at 09:18

## 2019-01-08 RX ADMIN — FAMOTIDINE 20 MG: 20 TABLET ORAL at 09:17

## 2019-01-08 RX ADMIN — IPRATROPIUM BROMIDE AND ALBUTEROL SULFATE 1 AMPULE: .5; 3 SOLUTION RESPIRATORY (INHALATION) at 19:41

## 2019-01-08 RX ADMIN — FLUTICASONE PROPIONATE 2 SPRAY: 50 SPRAY, METERED NASAL at 20:16

## 2019-01-08 RX ADMIN — BUDESONIDE 500 MCG: 0.5 SUSPENSION RESPIRATORY (INHALATION) at 07:23

## 2019-01-08 RX ADMIN — IPRATROPIUM BROMIDE AND ALBUTEROL SULFATE 1 AMPULE: .5; 3 SOLUTION RESPIRATORY (INHALATION) at 07:23

## 2019-01-08 ASSESSMENT — PAIN SCALES - GENERAL
PAINLEVEL_OUTOF10: 0
PAINLEVEL_OUTOF10: 5
PAINLEVEL_OUTOF10: 4
PAINLEVEL_OUTOF10: 4
PAINLEVEL_OUTOF10: 2

## 2019-01-09 ENCOUNTER — APPOINTMENT (OUTPATIENT)
Dept: GENERAL RADIOLOGY | Age: 59
DRG: 871 | End: 2019-01-09
Payer: MEDICARE

## 2019-01-09 LAB
ANION GAP SERPL CALCULATED.3IONS-SCNC: 10 MEQ/L (ref 7–13)
BUN BLDV-MCNC: 11 MG/DL (ref 8–23)
CALCIUM SERPL-MCNC: 9.5 MG/DL (ref 8.6–10.2)
CHLORIDE BLD-SCNC: 101 MEQ/L (ref 98–107)
CO2: 30 MEQ/L (ref 22–29)
CREAT SERPL-MCNC: 0.54 MG/DL (ref 0.5–0.9)
GFR AFRICAN AMERICAN: >60
GFR NON-AFRICAN AMERICAN: >60
GLUCOSE BLD-MCNC: 114 MG/DL (ref 74–109)
GLUCOSE BLD-MCNC: 123 MG/DL (ref 60–115)
GLUCOSE BLD-MCNC: 128 MG/DL (ref 60–115)
GLUCOSE BLD-MCNC: 142 MG/DL (ref 60–115)
GLUCOSE BLD-MCNC: 92 MG/DL (ref 60–115)
HCT VFR BLD CALC: 38.7 % (ref 37–47)
HEMOGLOBIN: 12.5 G/DL (ref 12–16)
MCH RBC QN AUTO: 28.5 PG (ref 27–31.3)
MCHC RBC AUTO-ENTMCNC: 32.5 % (ref 33–37)
MCV RBC AUTO: 87.8 FL (ref 82–100)
PDW BLD-RTO: 19.8 % (ref 11.5–14.5)
PERFORMED ON: ABNORMAL
PERFORMED ON: NORMAL
PLATELET # BLD: 311 K/UL (ref 130–400)
POTASSIUM REFLEX MAGNESIUM: 4.6 MEQ/L (ref 3.5–5.1)
RBC # BLD: 4.4 M/UL (ref 4.2–5.4)
SODIUM BLD-SCNC: 141 MEQ/L (ref 132–144)
WBC # BLD: 8.3 K/UL (ref 4.8–10.8)

## 2019-01-09 PROCEDURE — 1210000000 HC MED SURG R&B

## 2019-01-09 PROCEDURE — 2700000000 HC OXYGEN THERAPY PER DAY

## 2019-01-09 PROCEDURE — 6370000000 HC RX 637 (ALT 250 FOR IP): Performed by: INTERNAL MEDICINE

## 2019-01-09 PROCEDURE — 36415 COLL VENOUS BLD VENIPUNCTURE: CPT

## 2019-01-09 PROCEDURE — 94660 CPAP INITIATION&MGMT: CPT

## 2019-01-09 PROCEDURE — 6360000002 HC RX W HCPCS: Performed by: INTERNAL MEDICINE

## 2019-01-09 PROCEDURE — 2580000003 HC RX 258: Performed by: INTERNAL MEDICINE

## 2019-01-09 PROCEDURE — 71046 X-RAY EXAM CHEST 2 VIEWS: CPT

## 2019-01-09 PROCEDURE — 85027 COMPLETE CBC AUTOMATED: CPT

## 2019-01-09 PROCEDURE — 94640 AIRWAY INHALATION TREATMENT: CPT

## 2019-01-09 PROCEDURE — 80048 BASIC METABOLIC PNL TOTAL CA: CPT

## 2019-01-09 PROCEDURE — 94669 MECHANICAL CHEST WALL OSCILL: CPT

## 2019-01-09 PROCEDURE — 94760 N-INVAS EAR/PLS OXIMETRY 1: CPT

## 2019-01-09 PROCEDURE — 99232 SBSQ HOSP IP/OBS MODERATE 35: CPT | Performed by: INTERNAL MEDICINE

## 2019-01-09 RX ORDER — NICOTINE POLACRILEX 4 MG
15 LOZENGE BUCCAL PRN
Status: CANCELLED | OUTPATIENT
Start: 2019-01-09

## 2019-01-09 RX ORDER — IPRATROPIUM BROMIDE AND ALBUTEROL SULFATE 2.5; .5 MG/3ML; MG/3ML
1 SOLUTION RESPIRATORY (INHALATION) 3 TIMES DAILY
Status: CANCELLED | OUTPATIENT
Start: 2019-01-09

## 2019-01-09 RX ORDER — SIMVASTATIN 10 MG
10 TABLET ORAL NIGHTLY
Status: CANCELLED | OUTPATIENT
Start: 2019-01-09

## 2019-01-09 RX ORDER — SODIUM CHLORIDE 0.9 % (FLUSH) 0.9 %
10 SYRINGE (ML) INJECTION EVERY 12 HOURS SCHEDULED
Status: CANCELLED | OUTPATIENT
Start: 2019-01-09

## 2019-01-09 RX ORDER — ACETAMINOPHEN 325 MG/1
650 TABLET ORAL EVERY 6 HOURS PRN
Status: CANCELLED | OUTPATIENT
Start: 2019-01-09

## 2019-01-09 RX ORDER — PREDNISONE 10 MG/1
10 TABLET ORAL DAILY
Status: CANCELLED | OUTPATIENT
Start: 2019-01-21 | End: 2019-01-25

## 2019-01-09 RX ORDER — BUDESONIDE 0.5 MG/2ML
0.5 INHALANT ORAL 2 TIMES DAILY
Status: CANCELLED | OUTPATIENT
Start: 2019-01-09

## 2019-01-09 RX ORDER — DEXTROSE MONOHYDRATE 25 G/50ML
12.5 INJECTION, SOLUTION INTRAVENOUS PRN
Status: CANCELLED | OUTPATIENT
Start: 2019-01-09

## 2019-01-09 RX ORDER — ALPRAZOLAM 0.25 MG/1
0.25 TABLET ORAL 3 TIMES DAILY PRN
Status: CANCELLED | OUTPATIENT
Start: 2019-01-09

## 2019-01-09 RX ORDER — ALBUTEROL SULFATE 2.5 MG/3ML
2.5 SOLUTION RESPIRATORY (INHALATION)
Status: CANCELLED | OUTPATIENT
Start: 2019-01-09

## 2019-01-09 RX ORDER — PREDNISONE 20 MG/1
20 TABLET ORAL DAILY
Status: CANCELLED | OUTPATIENT
Start: 2019-01-17 | End: 2019-01-21

## 2019-01-09 RX ORDER — OXYBUTYNIN CHLORIDE 5 MG/1
10 TABLET, EXTENDED RELEASE ORAL DAILY
Status: CANCELLED | OUTPATIENT
Start: 2019-01-10

## 2019-01-09 RX ORDER — PREDNISONE 20 MG/1
40 TABLET ORAL DAILY
Status: CANCELLED | OUTPATIENT
Start: 2019-01-09 | End: 2019-01-13

## 2019-01-09 RX ORDER — GUAIFENESIN 600 MG/1
600 TABLET, EXTENDED RELEASE ORAL 2 TIMES DAILY
Status: CANCELLED | OUTPATIENT
Start: 2019-01-09

## 2019-01-09 RX ORDER — FLUTICASONE PROPIONATE 50 MCG
2 SPRAY, SUSPENSION (ML) NASAL NIGHTLY
Status: CANCELLED | OUTPATIENT
Start: 2019-01-09

## 2019-01-09 RX ORDER — HYDRALAZINE HYDROCHLORIDE 20 MG/ML
10 INJECTION INTRAMUSCULAR; INTRAVENOUS EVERY 4 HOURS PRN
Status: CANCELLED | OUTPATIENT
Start: 2019-01-09

## 2019-01-09 RX ORDER — BACLOFEN 10 MG/1
10 TABLET ORAL 3 TIMES DAILY
Status: CANCELLED | OUTPATIENT
Start: 2019-01-09

## 2019-01-09 RX ORDER — OXYCODONE HYDROCHLORIDE AND ACETAMINOPHEN 5; 325 MG/1; MG/1
1 TABLET ORAL EVERY 6 HOURS PRN
Status: CANCELLED | OUTPATIENT
Start: 2019-01-09

## 2019-01-09 RX ORDER — ASPIRIN 81 MG/1
81 TABLET ORAL 2 TIMES DAILY
Status: CANCELLED | OUTPATIENT
Start: 2019-01-09

## 2019-01-09 RX ORDER — DEXTROSE MONOHYDRATE 50 MG/ML
100 INJECTION, SOLUTION INTRAVENOUS PRN
Status: CANCELLED | OUTPATIENT
Start: 2019-01-09

## 2019-01-09 RX ORDER — DULOXETIN HYDROCHLORIDE 60 MG/1
60 CAPSULE, DELAYED RELEASE ORAL 2 TIMES DAILY
Status: CANCELLED | OUTPATIENT
Start: 2019-01-09

## 2019-01-09 RX ORDER — SODIUM CHLORIDE 0.9 % (FLUSH) 0.9 %
10 SYRINGE (ML) INJECTION PRN
Status: CANCELLED | OUTPATIENT
Start: 2019-01-09

## 2019-01-09 RX ORDER — LABETALOL HYDROCHLORIDE 5 MG/ML
10 INJECTION, SOLUTION INTRAVENOUS
Status: CANCELLED | OUTPATIENT
Start: 2019-01-09

## 2019-01-09 RX ORDER — FAMOTIDINE 20 MG/1
20 TABLET, FILM COATED ORAL 2 TIMES DAILY
Status: CANCELLED | OUTPATIENT
Start: 2019-01-09

## 2019-01-09 RX ADMIN — SIMVASTATIN 10 MG: 10 TABLET, FILM COATED ORAL at 22:00

## 2019-01-09 RX ADMIN — BACLOFEN 10 MG: 10 TABLET ORAL at 08:38

## 2019-01-09 RX ADMIN — ASPIRIN 81 MG: 81 TABLET, COATED ORAL at 08:38

## 2019-01-09 RX ADMIN — ALPRAZOLAM 0.25 MG: 0.25 TABLET ORAL at 09:38

## 2019-01-09 RX ADMIN — BUDESONIDE 500 MCG: 0.5 SUSPENSION RESPIRATORY (INHALATION) at 07:12

## 2019-01-09 RX ADMIN — Medication 10 ML: at 22:04

## 2019-01-09 RX ADMIN — ACETAMINOPHEN 650 MG: 325 TABLET ORAL at 14:34

## 2019-01-09 RX ADMIN — IPRATROPIUM BROMIDE AND ALBUTEROL SULFATE 1 AMPULE: .5; 3 SOLUTION RESPIRATORY (INHALATION) at 12:16

## 2019-01-09 RX ADMIN — ALPRAZOLAM 0.25 MG: 0.25 TABLET ORAL at 22:00

## 2019-01-09 RX ADMIN — IPRATROPIUM BROMIDE AND ALBUTEROL SULFATE 1 AMPULE: .5; 3 SOLUTION RESPIRATORY (INHALATION) at 07:12

## 2019-01-09 RX ADMIN — DULOXETINE HYDROCHLORIDE 60 MG: 60 CAPSULE, DELAYED RELEASE ORAL at 08:38

## 2019-01-09 RX ADMIN — OXYCODONE AND ACETAMINOPHEN 1 TABLET: 5; 325 TABLET ORAL at 20:47

## 2019-01-09 RX ADMIN — Medication 2 PUFF: at 19:28

## 2019-01-09 RX ADMIN — GUAIFENESIN 600 MG: 600 TABLET, EXTENDED RELEASE ORAL at 08:38

## 2019-01-09 RX ADMIN — ASPIRIN 81 MG: 81 TABLET, COATED ORAL at 22:01

## 2019-01-09 RX ADMIN — METHYLPREDNISOLONE SODIUM SUCCINATE 40 MG: 40 INJECTION, POWDER, FOR SOLUTION INTRAMUSCULAR; INTRAVENOUS at 05:14

## 2019-01-09 RX ADMIN — IPRATROPIUM BROMIDE AND ALBUTEROL SULFATE 1 AMPULE: .5; 3 SOLUTION RESPIRATORY (INHALATION) at 19:28

## 2019-01-09 RX ADMIN — ACETAMINOPHEN 650 MG: 325 TABLET ORAL at 08:38

## 2019-01-09 RX ADMIN — BACLOFEN 10 MG: 10 TABLET ORAL at 14:33

## 2019-01-09 RX ADMIN — BUDESONIDE 500 MCG: 0.5 SUSPENSION RESPIRATORY (INHALATION) at 19:28

## 2019-01-09 RX ADMIN — BACLOFEN 10 MG: 10 TABLET ORAL at 22:01

## 2019-01-09 RX ADMIN — FLUTICASONE PROPIONATE 2 SPRAY: 50 SPRAY, METERED NASAL at 22:04

## 2019-01-09 RX ADMIN — GUAIFENESIN 600 MG: 600 TABLET, EXTENDED RELEASE ORAL at 22:00

## 2019-01-09 RX ADMIN — FAMOTIDINE 20 MG: 20 TABLET ORAL at 22:01

## 2019-01-09 RX ADMIN — METHYLPREDNISOLONE SODIUM SUCCINATE 40 MG: 40 INJECTION, POWDER, FOR SOLUTION INTRAMUSCULAR; INTRAVENOUS at 17:01

## 2019-01-09 RX ADMIN — DULOXETINE HYDROCHLORIDE 60 MG: 60 CAPSULE, DELAYED RELEASE ORAL at 22:00

## 2019-01-09 RX ADMIN — FAMOTIDINE 20 MG: 20 TABLET ORAL at 08:38

## 2019-01-09 RX ADMIN — Medication 2 PUFF: at 07:12

## 2019-01-09 RX ADMIN — OXYBUTYNIN CHLORIDE 10 MG: 5 TABLET, EXTENDED RELEASE ORAL at 08:38

## 2019-01-09 RX ADMIN — ENOXAPARIN SODIUM 40 MG: 40 INJECTION SUBCUTANEOUS at 08:38

## 2019-01-09 ASSESSMENT — PAIN SCALES - GENERAL
PAINLEVEL_OUTOF10: 0
PAINLEVEL_OUTOF10: 4
PAINLEVEL_OUTOF10: 5
PAINLEVEL_OUTOF10: 0
PAINLEVEL_OUTOF10: 4
PAINLEVEL_OUTOF10: 2

## 2019-01-09 ASSESSMENT — PAIN DESCRIPTION - LOCATION: LOCATION: HEAD

## 2019-01-09 ASSESSMENT — PAIN DESCRIPTION - DESCRIPTORS: DESCRIPTORS: HEADACHE

## 2019-01-09 ASSESSMENT — PAIN DESCRIPTION - PAIN TYPE: TYPE: ACUTE PAIN

## 2019-01-10 ENCOUNTER — HOSPITAL ENCOUNTER (INPATIENT)
Age: 59
LOS: 11 days | Discharge: HOME HEALTH | DRG: 189 | End: 2019-01-21
Attending: INTERNAL MEDICINE | Admitting: INTERNAL MEDICINE
Payer: MEDICARE

## 2019-01-10 VITALS
RESPIRATION RATE: 19 BRPM | OXYGEN SATURATION: 100 % | TEMPERATURE: 97.9 F | DIASTOLIC BLOOD PRESSURE: 68 MMHG | BODY MASS INDEX: 22.06 KG/M2 | WEIGHT: 116.84 LBS | HEART RATE: 80 BPM | HEIGHT: 61 IN | SYSTOLIC BLOOD PRESSURE: 150 MMHG

## 2019-01-10 LAB
ANION GAP SERPL CALCULATED.3IONS-SCNC: 12 MEQ/L (ref 7–13)
BUN BLDV-MCNC: 14 MG/DL (ref 8–23)
CALCIUM SERPL-MCNC: 9.7 MG/DL (ref 8.6–10.2)
CHLORIDE BLD-SCNC: 100 MEQ/L (ref 98–107)
CO2: 29 MEQ/L (ref 22–29)
CREAT SERPL-MCNC: 0.51 MG/DL (ref 0.5–0.9)
GFR AFRICAN AMERICAN: >60
GFR NON-AFRICAN AMERICAN: >60
GLUCOSE BLD-MCNC: 111 MG/DL (ref 60–115)
GLUCOSE BLD-MCNC: 122 MG/DL (ref 60–115)
GLUCOSE BLD-MCNC: 124 MG/DL (ref 74–109)
GLUCOSE BLD-MCNC: 165 MG/DL (ref 60–115)
HCT VFR BLD CALC: 40.2 % (ref 37–47)
HEMOGLOBIN: 13.3 G/DL (ref 12–16)
MCH RBC QN AUTO: 29.1 PG (ref 27–31.3)
MCHC RBC AUTO-ENTMCNC: 33.2 % (ref 33–37)
MCV RBC AUTO: 87.6 FL (ref 82–100)
PDW BLD-RTO: 20.7 % (ref 11.5–14.5)
PERFORMED ON: ABNORMAL
PERFORMED ON: ABNORMAL
PERFORMED ON: NORMAL
PLATELET # BLD: 318 K/UL (ref 130–400)
POTASSIUM REFLEX MAGNESIUM: 4.6 MEQ/L (ref 3.5–5.1)
RBC # BLD: 4.59 M/UL (ref 4.2–5.4)
SODIUM BLD-SCNC: 141 MEQ/L (ref 132–144)
WBC # BLD: 8.8 K/UL (ref 4.8–10.8)

## 2019-01-10 PROCEDURE — 6370000000 HC RX 637 (ALT 250 FOR IP): Performed by: INTERNAL MEDICINE

## 2019-01-10 PROCEDURE — 2580000003 HC RX 258: Performed by: INTERNAL MEDICINE

## 2019-01-10 PROCEDURE — 99232 SBSQ HOSP IP/OBS MODERATE 35: CPT | Performed by: INTERNAL MEDICINE

## 2019-01-10 PROCEDURE — 6360000002 HC RX W HCPCS: Performed by: INTERNAL MEDICINE

## 2019-01-10 PROCEDURE — 94669 MECHANICAL CHEST WALL OSCILL: CPT

## 2019-01-10 PROCEDURE — 1200000000 HC SEMI PRIVATE

## 2019-01-10 PROCEDURE — 85027 COMPLETE CBC AUTOMATED: CPT

## 2019-01-10 PROCEDURE — 2700000000 HC OXYGEN THERAPY PER DAY

## 2019-01-10 PROCEDURE — 94664 DEMO&/EVAL PT USE INHALER: CPT

## 2019-01-10 PROCEDURE — 94640 AIRWAY INHALATION TREATMENT: CPT

## 2019-01-10 PROCEDURE — 94660 CPAP INITIATION&MGMT: CPT

## 2019-01-10 PROCEDURE — 36415 COLL VENOUS BLD VENIPUNCTURE: CPT

## 2019-01-10 PROCEDURE — 94668 MNPJ CHEST WALL SBSQ: CPT

## 2019-01-10 PROCEDURE — 80048 BASIC METABOLIC PNL TOTAL CA: CPT

## 2019-01-10 RX ORDER — SODIUM CHLORIDE 0.9 % (FLUSH) 0.9 %
10 SYRINGE (ML) INJECTION PRN
Status: DISCONTINUED | OUTPATIENT
Start: 2019-01-10 | End: 2019-01-18

## 2019-01-10 RX ORDER — DEXTROSE MONOHYDRATE 25 G/50ML
12.5 INJECTION, SOLUTION INTRAVENOUS PRN
Status: DISCONTINUED | OUTPATIENT
Start: 2019-01-10 | End: 2019-01-18

## 2019-01-10 RX ORDER — DULOXETIN HYDROCHLORIDE 30 MG/1
60 CAPSULE, DELAYED RELEASE ORAL 2 TIMES DAILY
Status: DISCONTINUED | OUTPATIENT
Start: 2019-01-10 | End: 2019-01-21 | Stop reason: HOSPADM

## 2019-01-10 RX ORDER — ACETAMINOPHEN 325 MG/1
650 TABLET ORAL EVERY 6 HOURS PRN
Status: DISCONTINUED | OUTPATIENT
Start: 2019-01-10 | End: 2019-01-21 | Stop reason: HOSPADM

## 2019-01-10 RX ORDER — BUDESONIDE 0.5 MG/2ML
0.5 INHALANT ORAL 2 TIMES DAILY
Status: DISCONTINUED | OUTPATIENT
Start: 2019-01-10 | End: 2019-01-10

## 2019-01-10 RX ORDER — IPRATROPIUM BROMIDE AND ALBUTEROL SULFATE 2.5; .5 MG/3ML; MG/3ML
1 SOLUTION RESPIRATORY (INHALATION) 3 TIMES DAILY
Status: DISCONTINUED | OUTPATIENT
Start: 2019-01-10 | End: 2019-01-10

## 2019-01-10 RX ORDER — ALPRAZOLAM 0.25 MG/1
0.25 TABLET ORAL 3 TIMES DAILY PRN
Status: DISCONTINUED | OUTPATIENT
Start: 2019-01-10 | End: 2019-01-21 | Stop reason: HOSPADM

## 2019-01-10 RX ORDER — LABETALOL HYDROCHLORIDE 5 MG/ML
10 INJECTION, SOLUTION INTRAVENOUS
Status: DISCONTINUED | OUTPATIENT
Start: 2019-01-10 | End: 2019-01-11

## 2019-01-10 RX ORDER — ALBUTEROL SULFATE 2.5 MG/3ML
2.5 SOLUTION RESPIRATORY (INHALATION)
Status: DISCONTINUED | OUTPATIENT
Start: 2019-01-10 | End: 2019-01-21 | Stop reason: HOSPADM

## 2019-01-10 RX ORDER — ASPIRIN 81 MG/1
81 TABLET ORAL 2 TIMES DAILY
Status: DISCONTINUED | OUTPATIENT
Start: 2019-01-10 | End: 2019-01-21 | Stop reason: HOSPADM

## 2019-01-10 RX ORDER — HYDRALAZINE HYDROCHLORIDE 20 MG/ML
10 INJECTION INTRAMUSCULAR; INTRAVENOUS EVERY 4 HOURS PRN
Status: DISCONTINUED | OUTPATIENT
Start: 2019-01-10 | End: 2019-01-11

## 2019-01-10 RX ORDER — GUAIFENESIN 600 MG/1
600 TABLET, EXTENDED RELEASE ORAL 2 TIMES DAILY
Status: DISCONTINUED | OUTPATIENT
Start: 2019-01-10 | End: 2019-01-21 | Stop reason: HOSPADM

## 2019-01-10 RX ORDER — BUDESONIDE 0.5 MG/2ML
0.5 INHALANT ORAL 2 TIMES DAILY
Status: DISCONTINUED | OUTPATIENT
Start: 2019-01-10 | End: 2019-01-21 | Stop reason: HOSPADM

## 2019-01-10 RX ORDER — SIMVASTATIN 20 MG
10 TABLET ORAL NIGHTLY
Status: DISCONTINUED | OUTPATIENT
Start: 2019-01-10 | End: 2019-01-21 | Stop reason: HOSPADM

## 2019-01-10 RX ORDER — PREDNISONE 10 MG/1
10 TABLET ORAL DAILY
Status: DISCONTINUED | OUTPATIENT
Start: 2019-01-22 | End: 2019-01-21 | Stop reason: HOSPADM

## 2019-01-10 RX ORDER — FLUTICASONE PROPIONATE 50 MCG
2 SPRAY, SUSPENSION (ML) NASAL NIGHTLY
Status: DISCONTINUED | OUTPATIENT
Start: 2019-01-10 | End: 2019-01-21 | Stop reason: HOSPADM

## 2019-01-10 RX ORDER — IPRATROPIUM BROMIDE AND ALBUTEROL SULFATE 2.5; .5 MG/3ML; MG/3ML
1 SOLUTION RESPIRATORY (INHALATION) 3 TIMES DAILY
Status: DISCONTINUED | OUTPATIENT
Start: 2019-01-10 | End: 2019-01-21 | Stop reason: HOSPADM

## 2019-01-10 RX ORDER — DEXTROSE MONOHYDRATE 50 MG/ML
100 INJECTION, SOLUTION INTRAVENOUS PRN
Status: DISCONTINUED | OUTPATIENT
Start: 2019-01-10 | End: 2019-01-18

## 2019-01-10 RX ORDER — SODIUM CHLORIDE 0.9 % (FLUSH) 0.9 %
10 SYRINGE (ML) INJECTION EVERY 12 HOURS SCHEDULED
Status: DISCONTINUED | OUTPATIENT
Start: 2019-01-10 | End: 2019-01-18

## 2019-01-10 RX ORDER — BACLOFEN 10 MG/1
10 TABLET ORAL 3 TIMES DAILY
Status: DISCONTINUED | OUTPATIENT
Start: 2019-01-10 | End: 2019-01-21 | Stop reason: HOSPADM

## 2019-01-10 RX ORDER — OXYBUTYNIN CHLORIDE 5 MG/1
10 TABLET, EXTENDED RELEASE ORAL DAILY
Status: DISCONTINUED | OUTPATIENT
Start: 2019-01-10 | End: 2019-01-21 | Stop reason: HOSPADM

## 2019-01-10 RX ORDER — OXYCODONE HYDROCHLORIDE AND ACETAMINOPHEN 5; 325 MG/1; MG/1
1 TABLET ORAL EVERY 6 HOURS PRN
Status: DISCONTINUED | OUTPATIENT
Start: 2019-01-10 | End: 2019-01-11

## 2019-01-10 RX ORDER — NICOTINE POLACRILEX 4 MG
15 LOZENGE BUCCAL PRN
Status: DISCONTINUED | OUTPATIENT
Start: 2019-01-10 | End: 2019-01-18

## 2019-01-10 RX ORDER — FAMOTIDINE 20 MG/1
20 TABLET, FILM COATED ORAL 2 TIMES DAILY
Status: DISCONTINUED | OUTPATIENT
Start: 2019-01-10 | End: 2019-01-21 | Stop reason: HOSPADM

## 2019-01-10 RX ORDER — PREDNISONE 20 MG/1
20 TABLET ORAL DAILY
Status: COMPLETED | OUTPATIENT
Start: 2019-01-18 | End: 2019-01-21

## 2019-01-10 RX ORDER — PREDNISONE 20 MG/1
40 TABLET ORAL DAILY
Status: DISPENSED | OUTPATIENT
Start: 2019-01-10 | End: 2019-01-14

## 2019-01-10 RX ADMIN — BACLOFEN 10 MG: 10 TABLET ORAL at 08:05

## 2019-01-10 RX ADMIN — BACLOFEN 10 MG: 10 TABLET ORAL at 20:27

## 2019-01-10 RX ADMIN — IPRATROPIUM BROMIDE AND ALBUTEROL SULFATE 1 AMPULE: .5; 3 SOLUTION RESPIRATORY (INHALATION) at 19:52

## 2019-01-10 RX ADMIN — ALPRAZOLAM 0.25 MG: 0.25 TABLET ORAL at 10:25

## 2019-01-10 RX ADMIN — Medication 2 PUFF: at 07:34

## 2019-01-10 RX ADMIN — ACETAMINOPHEN 650 MG: 325 TABLET ORAL at 08:06

## 2019-01-10 RX ADMIN — Medication 10 ML: at 20:45

## 2019-01-10 RX ADMIN — GUAIFENESIN 600 MG: 600 TABLET, EXTENDED RELEASE ORAL at 20:27

## 2019-01-10 RX ADMIN — IPRATROPIUM BROMIDE AND ALBUTEROL SULFATE 1 AMPULE: .5; 3 SOLUTION RESPIRATORY (INHALATION) at 12:20

## 2019-01-10 RX ADMIN — DULOXETINE HYDROCHLORIDE 60 MG: 60 CAPSULE, DELAYED RELEASE ORAL at 08:05

## 2019-01-10 RX ADMIN — ASPIRIN 81 MG: 81 TABLET, COATED ORAL at 08:05

## 2019-01-10 RX ADMIN — FAMOTIDINE 20 MG: 20 TABLET ORAL at 08:05

## 2019-01-10 RX ADMIN — ACETAMINOPHEN 650 MG: 325 TABLET ORAL at 13:25

## 2019-01-10 RX ADMIN — INSULIN LISPRO 2 UNITS: 100 INJECTION, SOLUTION INTRAVENOUS; SUBCUTANEOUS at 11:52

## 2019-01-10 RX ADMIN — MOMETASONE FUROATE AND FORMOTEROL FUMARATE DIHYDRATE 2 PUFF: 200; 5 AEROSOL RESPIRATORY (INHALATION) at 19:52

## 2019-01-10 RX ADMIN — BACLOFEN 10 MG: 10 TABLET ORAL at 13:27

## 2019-01-10 RX ADMIN — FAMOTIDINE 20 MG: 20 TABLET, FILM COATED ORAL at 20:27

## 2019-01-10 RX ADMIN — DULOXETINE HYDROCHLORIDE 60 MG: 30 CAPSULE, DELAYED RELEASE ORAL at 20:28

## 2019-01-10 RX ADMIN — ENOXAPARIN SODIUM 40 MG: 40 INJECTION SUBCUTANEOUS at 08:04

## 2019-01-10 RX ADMIN — METHYLPREDNISOLONE SODIUM SUCCINATE 40 MG: 40 INJECTION, POWDER, FOR SOLUTION INTRAMUSCULAR; INTRAVENOUS at 05:26

## 2019-01-10 RX ADMIN — ASPIRIN 81 MG: 81 TABLET ORAL at 20:28

## 2019-01-10 RX ADMIN — BUDESONIDE 500 MCG: 0.5 INHALANT RESPIRATORY (INHALATION) at 19:52

## 2019-01-10 RX ADMIN — OXYCODONE HYDROCHLORIDE AND ACETAMINOPHEN 1 TABLET: 5; 325 TABLET ORAL at 20:27

## 2019-01-10 RX ADMIN — FLUTICASONE PROPIONATE 2 SPRAY: 50 SPRAY, METERED NASAL at 20:38

## 2019-01-10 RX ADMIN — SIMVASTATIN 10 MG: 20 TABLET, FILM COATED ORAL at 20:28

## 2019-01-10 RX ADMIN — BUDESONIDE 500 MCG: 0.5 SUSPENSION RESPIRATORY (INHALATION) at 07:26

## 2019-01-10 RX ADMIN — ALPRAZOLAM 0.25 MG: 0.25 TABLET ORAL at 20:27

## 2019-01-10 RX ADMIN — GUAIFENESIN 600 MG: 600 TABLET, EXTENDED RELEASE ORAL at 08:05

## 2019-01-10 RX ADMIN — IPRATROPIUM BROMIDE AND ALBUTEROL SULFATE 1 AMPULE: .5; 3 SOLUTION RESPIRATORY (INHALATION) at 07:34

## 2019-01-10 RX ADMIN — Medication 10 ML: at 08:08

## 2019-01-10 RX ADMIN — OXYBUTYNIN CHLORIDE 10 MG: 5 TABLET, EXTENDED RELEASE ORAL at 08:04

## 2019-01-10 ASSESSMENT — PAIN SCALES - GENERAL
PAINLEVEL_OUTOF10: 3
PAINLEVEL_OUTOF10: 0
PAINLEVEL_OUTOF10: 5
PAINLEVEL_OUTOF10: 3

## 2019-01-10 ASSESSMENT — PAIN - FUNCTIONAL ASSESSMENT: PAIN_FUNCTIONAL_ASSESSMENT: ACTIVITIES ARE NOT PREVENTED

## 2019-01-10 ASSESSMENT — PAIN DESCRIPTION - ONSET: ONSET: ON-GOING

## 2019-01-10 ASSESSMENT — PAIN DESCRIPTION - ORIENTATION: ORIENTATION: LOWER

## 2019-01-10 ASSESSMENT — PAIN DESCRIPTION - DESCRIPTORS: DESCRIPTORS: ACHING;SORE

## 2019-01-10 ASSESSMENT — PAIN DESCRIPTION - LOCATION: LOCATION: BACK

## 2019-01-10 ASSESSMENT — PAIN DESCRIPTION - PROGRESSION: CLINICAL_PROGRESSION: GRADUALLY WORSENING

## 2019-01-10 ASSESSMENT — PAIN DESCRIPTION - FREQUENCY: FREQUENCY: CONTINUOUS

## 2019-01-10 ASSESSMENT — PAIN DESCRIPTION - PAIN TYPE: TYPE: CHRONIC PAIN

## 2019-01-11 PROBLEM — J44.1 COPD EXACERBATION (HCC): Status: ACTIVE | Noted: 2019-01-11

## 2019-01-11 LAB
ANION GAP SERPL CALCULATED.3IONS-SCNC: 8 MEQ/L (ref 7–13)
BUN BLDV-MCNC: 20 MG/DL (ref 8–23)
CALCIUM SERPL-MCNC: 10.1 MG/DL (ref 8.6–10.2)
CHLORIDE BLD-SCNC: 102 MEQ/L (ref 98–107)
CO2: 33 MEQ/L (ref 22–29)
CREAT SERPL-MCNC: 0.67 MG/DL (ref 0.5–0.9)
GFR AFRICAN AMERICAN: >60
GFR NON-AFRICAN AMERICAN: >60
GLUCOSE BLD-MCNC: 102 MG/DL (ref 74–109)
GLUCOSE BLD-MCNC: 105 MG/DL (ref 60–115)
GLUCOSE BLD-MCNC: 158 MG/DL (ref 60–115)
GLUCOSE BLD-MCNC: 164 MG/DL (ref 60–115)
GLUCOSE BLD-MCNC: 169 MG/DL (ref 60–115)
GLUCOSE BLD-MCNC: 184 MG/DL (ref 60–115)
PERFORMED ON: ABNORMAL
PERFORMED ON: NORMAL
POTASSIUM REFLEX MAGNESIUM: 5.3 MEQ/L (ref 3.5–5.1)
SODIUM BLD-SCNC: 143 MEQ/L (ref 132–144)

## 2019-01-11 PROCEDURE — 94640 AIRWAY INHALATION TREATMENT: CPT

## 2019-01-11 PROCEDURE — 6360000002 HC RX W HCPCS: Performed by: INTERNAL MEDICINE

## 2019-01-11 PROCEDURE — 97166 OT EVAL MOD COMPLEX 45 MIN: CPT

## 2019-01-11 PROCEDURE — 97535 SELF CARE MNGMENT TRAINING: CPT

## 2019-01-11 PROCEDURE — 97162 PT EVAL MOD COMPLEX 30 MIN: CPT

## 2019-01-11 PROCEDURE — 94660 CPAP INITIATION&MGMT: CPT

## 2019-01-11 PROCEDURE — 2580000003 HC RX 258: Performed by: INTERNAL MEDICINE

## 2019-01-11 PROCEDURE — 6370000000 HC RX 637 (ALT 250 FOR IP): Performed by: INTERNAL MEDICINE

## 2019-01-11 PROCEDURE — 94669 MECHANICAL CHEST WALL OSCILL: CPT

## 2019-01-11 PROCEDURE — 80048 BASIC METABOLIC PNL TOTAL CA: CPT

## 2019-01-11 PROCEDURE — 97530 THERAPEUTIC ACTIVITIES: CPT

## 2019-01-11 PROCEDURE — 1200000000 HC SEMI PRIVATE

## 2019-01-11 PROCEDURE — 2700000000 HC OXYGEN THERAPY PER DAY

## 2019-01-11 PROCEDURE — 36415 COLL VENOUS BLD VENIPUNCTURE: CPT

## 2019-01-11 PROCEDURE — 97116 GAIT TRAINING THERAPY: CPT

## 2019-01-11 RX ORDER — POLYETHYLENE GLYCOL 3350 17 G/17G
17 POWDER, FOR SOLUTION ORAL DAILY
Status: DISCONTINUED | OUTPATIENT
Start: 2019-01-11 | End: 2019-01-21 | Stop reason: HOSPADM

## 2019-01-11 RX ORDER — OXYCODONE HYDROCHLORIDE AND ACETAMINOPHEN 5; 325 MG/1; MG/1
1.5 TABLET ORAL EVERY 6 HOURS PRN
Status: DISCONTINUED | OUTPATIENT
Start: 2019-01-11 | End: 2019-01-21 | Stop reason: HOSPADM

## 2019-01-11 RX ORDER — DOCUSATE SODIUM 100 MG/1
100 CAPSULE, LIQUID FILLED ORAL 2 TIMES DAILY
Status: DISCONTINUED | OUTPATIENT
Start: 2019-01-11 | End: 2019-01-21 | Stop reason: HOSPADM

## 2019-01-11 RX ADMIN — BUDESONIDE 500 MCG: 0.5 INHALANT RESPIRATORY (INHALATION) at 05:32

## 2019-01-11 RX ADMIN — ALPRAZOLAM 0.25 MG: 0.25 TABLET ORAL at 21:15

## 2019-01-11 RX ADMIN — DOCUSATE SODIUM 100 MG: 100 CAPSULE, LIQUID FILLED ORAL at 12:31

## 2019-01-11 RX ADMIN — DULOXETINE HYDROCHLORIDE 60 MG: 30 CAPSULE, DELAYED RELEASE ORAL at 10:21

## 2019-01-11 RX ADMIN — MOMETASONE FUROATE AND FORMOTEROL FUMARATE DIHYDRATE 2 PUFF: 200; 5 AEROSOL RESPIRATORY (INHALATION) at 05:32

## 2019-01-11 RX ADMIN — IPRATROPIUM BROMIDE AND ALBUTEROL SULFATE 1 AMPULE: .5; 3 SOLUTION RESPIRATORY (INHALATION) at 12:20

## 2019-01-11 RX ADMIN — FAMOTIDINE 20 MG: 20 TABLET, FILM COATED ORAL at 10:20

## 2019-01-11 RX ADMIN — Medication 10 ML: at 21:27

## 2019-01-11 RX ADMIN — BUDESONIDE 500 MCG: 0.5 INHALANT RESPIRATORY (INHALATION) at 18:19

## 2019-01-11 RX ADMIN — IPRATROPIUM BROMIDE AND ALBUTEROL SULFATE 1 AMPULE: .5; 3 SOLUTION RESPIRATORY (INHALATION) at 05:32

## 2019-01-11 RX ADMIN — IPRATROPIUM BROMIDE AND ALBUTEROL SULFATE 1 AMPULE: .5; 3 SOLUTION RESPIRATORY (INHALATION) at 18:19

## 2019-01-11 RX ADMIN — Medication 10 ML: at 10:32

## 2019-01-11 RX ADMIN — ASPIRIN 81 MG: 81 TABLET ORAL at 10:23

## 2019-01-11 RX ADMIN — BACLOFEN 10 MG: 10 TABLET ORAL at 15:10

## 2019-01-11 RX ADMIN — FLUTICASONE PROPIONATE 2 SPRAY: 50 SPRAY, METERED NASAL at 21:18

## 2019-01-11 RX ADMIN — BACLOFEN 10 MG: 10 TABLET ORAL at 10:21

## 2019-01-11 RX ADMIN — BACLOFEN 10 MG: 10 TABLET ORAL at 21:15

## 2019-01-11 RX ADMIN — ENOXAPARIN SODIUM 40 MG: 40 INJECTION SUBCUTANEOUS at 10:23

## 2019-01-11 RX ADMIN — OXYCODONE HYDROCHLORIDE AND ACETAMINOPHEN 1.5 TABLET: 5; 325 TABLET ORAL at 13:51

## 2019-01-11 RX ADMIN — SIMVASTATIN 10 MG: 20 TABLET, FILM COATED ORAL at 21:16

## 2019-01-11 RX ADMIN — OXYCODONE HYDROCHLORIDE AND ACETAMINOPHEN 1.5 TABLET: 5; 325 TABLET ORAL at 21:26

## 2019-01-11 RX ADMIN — DULOXETINE HYDROCHLORIDE 60 MG: 30 CAPSULE, DELAYED RELEASE ORAL at 21:16

## 2019-01-11 RX ADMIN — OXYBUTYNIN CHLORIDE 10 MG: 5 TABLET, EXTENDED RELEASE ORAL at 10:22

## 2019-01-11 RX ADMIN — FAMOTIDINE 20 MG: 20 TABLET, FILM COATED ORAL at 21:16

## 2019-01-11 RX ADMIN — OXYCODONE HYDROCHLORIDE AND ACETAMINOPHEN 1 TABLET: 5; 325 TABLET ORAL at 04:20

## 2019-01-11 RX ADMIN — PREDNISONE 40 MG: 20 TABLET ORAL at 10:22

## 2019-01-11 RX ADMIN — POLYETHYLENE GLYCOL 3350 17 G: 17 POWDER, FOR SOLUTION ORAL at 12:31

## 2019-01-11 RX ADMIN — ASPIRIN 81 MG: 81 TABLET ORAL at 21:26

## 2019-01-11 RX ADMIN — DOCUSATE SODIUM 100 MG: 100 CAPSULE, LIQUID FILLED ORAL at 21:16

## 2019-01-11 RX ADMIN — GUAIFENESIN 600 MG: 600 TABLET, EXTENDED RELEASE ORAL at 21:15

## 2019-01-11 RX ADMIN — GUAIFENESIN 600 MG: 600 TABLET, EXTENDED RELEASE ORAL at 10:21

## 2019-01-11 RX ADMIN — MOMETASONE FUROATE AND FORMOTEROL FUMARATE DIHYDRATE 2 PUFF: 200; 5 AEROSOL RESPIRATORY (INHALATION) at 18:19

## 2019-01-11 ASSESSMENT — PAIN DESCRIPTION - PROGRESSION
CLINICAL_PROGRESSION: GRADUALLY WORSENING
CLINICAL_PROGRESSION: GRADUALLY IMPROVING
CLINICAL_PROGRESSION: GRADUALLY IMPROVING
CLINICAL_PROGRESSION: NOT CHANGED

## 2019-01-11 ASSESSMENT — PAIN DESCRIPTION - FREQUENCY
FREQUENCY: INTERMITTENT
FREQUENCY: INTERMITTENT
FREQUENCY: CONTINUOUS
FREQUENCY: INTERMITTENT

## 2019-01-11 ASSESSMENT — PAIN SCALES - GENERAL
PAINLEVEL_OUTOF10: 0
PAINLEVEL_OUTOF10: 2
PAINLEVEL_OUTOF10: 7
PAINLEVEL_OUTOF10: 5
PAINLEVEL_OUTOF10: 2
PAINLEVEL_OUTOF10: 5

## 2019-01-11 ASSESSMENT — PAIN DESCRIPTION - LOCATION
LOCATION: BACK

## 2019-01-11 ASSESSMENT — PAIN DESCRIPTION - PAIN TYPE
TYPE: CHRONIC PAIN

## 2019-01-11 ASSESSMENT — PAIN DESCRIPTION - ONSET
ONSET: PROGRESSIVE
ONSET: ON-GOING

## 2019-01-11 ASSESSMENT — PAIN - FUNCTIONAL ASSESSMENT
PAIN_FUNCTIONAL_ASSESSMENT: PREVENTS OR INTERFERES SOME ACTIVE ACTIVITIES AND ADLS

## 2019-01-11 ASSESSMENT — PAIN DESCRIPTION - ORIENTATION
ORIENTATION: MID
ORIENTATION: LOWER
ORIENTATION: MID
ORIENTATION: LOWER

## 2019-01-11 ASSESSMENT — PAIN DESCRIPTION - DESCRIPTORS
DESCRIPTORS: ACHING;SORE
DESCRIPTORS: ACHING;SORE
DESCRIPTORS: ACHING
DESCRIPTORS: ACHING;DULL
DESCRIPTORS: ACHING;DISCOMFORT;DULL;NAGGING
DESCRIPTORS: ACHING

## 2019-01-12 LAB
GLUCOSE BLD-MCNC: 115 MG/DL (ref 60–115)
GLUCOSE BLD-MCNC: 150 MG/DL (ref 60–115)
GLUCOSE BLD-MCNC: 166 MG/DL (ref 60–115)
GLUCOSE BLD-MCNC: 221 MG/DL (ref 60–115)
HBA1C MFR BLD: 6.1 % (ref 4.8–5.9)
PERFORMED ON: ABNORMAL
PERFORMED ON: NORMAL

## 2019-01-12 PROCEDURE — 6370000000 HC RX 637 (ALT 250 FOR IP): Performed by: INTERNAL MEDICINE

## 2019-01-12 PROCEDURE — 6360000002 HC RX W HCPCS: Performed by: INTERNAL MEDICINE

## 2019-01-12 PROCEDURE — 94669 MECHANICAL CHEST WALL OSCILL: CPT

## 2019-01-12 PROCEDURE — 97110 THERAPEUTIC EXERCISES: CPT

## 2019-01-12 PROCEDURE — 2580000003 HC RX 258: Performed by: INTERNAL MEDICINE

## 2019-01-12 PROCEDURE — 94640 AIRWAY INHALATION TREATMENT: CPT

## 2019-01-12 PROCEDURE — 97116 GAIT TRAINING THERAPY: CPT

## 2019-01-12 PROCEDURE — 2700000000 HC OXYGEN THERAPY PER DAY

## 2019-01-12 PROCEDURE — 83036 HEMOGLOBIN GLYCOSYLATED A1C: CPT

## 2019-01-12 PROCEDURE — 1200000000 HC SEMI PRIVATE

## 2019-01-12 PROCEDURE — 97535 SELF CARE MNGMENT TRAINING: CPT

## 2019-01-12 RX ADMIN — DULOXETINE HYDROCHLORIDE 60 MG: 30 CAPSULE, DELAYED RELEASE ORAL at 22:45

## 2019-01-12 RX ADMIN — IPRATROPIUM BROMIDE AND ALBUTEROL SULFATE 1 AMPULE: .5; 3 SOLUTION RESPIRATORY (INHALATION) at 18:22

## 2019-01-12 RX ADMIN — BUDESONIDE 500 MCG: 0.5 INHALANT RESPIRATORY (INHALATION) at 18:22

## 2019-01-12 RX ADMIN — Medication 10 ML: at 22:49

## 2019-01-12 RX ADMIN — MOMETASONE FUROATE AND FORMOTEROL FUMARATE DIHYDRATE 2 PUFF: 200; 5 AEROSOL RESPIRATORY (INHALATION) at 06:17

## 2019-01-12 RX ADMIN — GUAIFENESIN 600 MG: 600 TABLET, EXTENDED RELEASE ORAL at 09:39

## 2019-01-12 RX ADMIN — FLUTICASONE PROPIONATE 2 SPRAY: 50 SPRAY, METERED NASAL at 22:50

## 2019-01-12 RX ADMIN — DOCUSATE SODIUM 100 MG: 100 CAPSULE, LIQUID FILLED ORAL at 22:46

## 2019-01-12 RX ADMIN — BUDESONIDE 500 MCG: 0.5 INHALANT RESPIRATORY (INHALATION) at 06:11

## 2019-01-12 RX ADMIN — GUAIFENESIN 600 MG: 600 TABLET, EXTENDED RELEASE ORAL at 22:46

## 2019-01-12 RX ADMIN — ACETAMINOPHEN 650 MG: 325 TABLET ORAL at 13:26

## 2019-01-12 RX ADMIN — BACLOFEN 10 MG: 10 TABLET ORAL at 20:45

## 2019-01-12 RX ADMIN — Medication 10 ML: at 09:49

## 2019-01-12 RX ADMIN — IPRATROPIUM BROMIDE AND ALBUTEROL SULFATE 1 AMPULE: .5; 3 SOLUTION RESPIRATORY (INHALATION) at 06:10

## 2019-01-12 RX ADMIN — OXYCODONE HYDROCHLORIDE AND ACETAMINOPHEN 1.5 TABLET: 5; 325 TABLET ORAL at 16:04

## 2019-01-12 RX ADMIN — BACLOFEN 10 MG: 10 TABLET ORAL at 09:39

## 2019-01-12 RX ADMIN — SIMVASTATIN 10 MG: 20 TABLET, FILM COATED ORAL at 22:45

## 2019-01-12 RX ADMIN — ALPRAZOLAM 0.25 MG: 0.25 TABLET ORAL at 09:49

## 2019-01-12 RX ADMIN — ACETAMINOPHEN 650 MG: 325 TABLET ORAL at 02:24

## 2019-01-12 RX ADMIN — BACLOFEN 10 MG: 10 TABLET ORAL at 13:30

## 2019-01-12 RX ADMIN — DULOXETINE HYDROCHLORIDE 60 MG: 30 CAPSULE, DELAYED RELEASE ORAL at 09:40

## 2019-01-12 RX ADMIN — ALPRAZOLAM 0.25 MG: 0.25 TABLET ORAL at 22:46

## 2019-01-12 RX ADMIN — ASPIRIN 81 MG: 81 TABLET ORAL at 09:39

## 2019-01-12 RX ADMIN — OXYCODONE HYDROCHLORIDE AND ACETAMINOPHEN 1.5 TABLET: 5; 325 TABLET ORAL at 22:44

## 2019-01-12 RX ADMIN — OXYBUTYNIN CHLORIDE 10 MG: 5 TABLET, EXTENDED RELEASE ORAL at 09:40

## 2019-01-12 RX ADMIN — IPRATROPIUM BROMIDE AND ALBUTEROL SULFATE 1 AMPULE: .5; 3 SOLUTION RESPIRATORY (INHALATION) at 11:18

## 2019-01-12 RX ADMIN — OXYCODONE HYDROCHLORIDE AND ACETAMINOPHEN 1.5 TABLET: 5; 325 TABLET ORAL at 05:26

## 2019-01-12 RX ADMIN — POLYETHYLENE GLYCOL 3350 17 G: 17 POWDER, FOR SOLUTION ORAL at 09:39

## 2019-01-12 RX ADMIN — DOCUSATE SODIUM 100 MG: 100 CAPSULE, LIQUID FILLED ORAL at 09:39

## 2019-01-12 RX ADMIN — ENOXAPARIN SODIUM 40 MG: 40 INJECTION SUBCUTANEOUS at 09:38

## 2019-01-12 RX ADMIN — FAMOTIDINE 20 MG: 20 TABLET, FILM COATED ORAL at 22:46

## 2019-01-12 RX ADMIN — PREDNISONE 40 MG: 20 TABLET ORAL at 09:39

## 2019-01-12 RX ADMIN — MAGNESIUM HYDROXIDE 30 ML: 400 SUSPENSION ORAL at 13:30

## 2019-01-12 RX ADMIN — FAMOTIDINE 20 MG: 20 TABLET, FILM COATED ORAL at 09:40

## 2019-01-12 RX ADMIN — MOMETASONE FUROATE AND FORMOTEROL FUMARATE DIHYDRATE 2 PUFF: 200; 5 AEROSOL RESPIRATORY (INHALATION) at 18:22

## 2019-01-12 RX ADMIN — ASPIRIN 81 MG: 81 TABLET ORAL at 22:45

## 2019-01-12 ASSESSMENT — PAIN SCALES - GENERAL
PAINLEVEL_OUTOF10: 4
PAINLEVEL_OUTOF10: 4
PAINLEVEL_OUTOF10: 2
PAINLEVEL_OUTOF10: 4
PAINLEVEL_OUTOF10: 5
PAINLEVEL_OUTOF10: 2
PAINLEVEL_OUTOF10: 2
PAINLEVEL_OUTOF10: 4
PAINLEVEL_OUTOF10: 2
PAINLEVEL_OUTOF10: 4
PAINLEVEL_OUTOF10: 2
PAINLEVEL_OUTOF10: 4

## 2019-01-12 ASSESSMENT — PAIN SCALES - WONG BAKER
WONGBAKER_NUMERICALRESPONSE: 2
WONGBAKER_NUMERICALRESPONSE: 4
WONGBAKER_NUMERICALRESPONSE: 0

## 2019-01-12 ASSESSMENT — PAIN DESCRIPTION - LOCATION
LOCATION: BACK
LOCATION: HEAD

## 2019-01-12 ASSESSMENT — PAIN DESCRIPTION - PROGRESSION
CLINICAL_PROGRESSION: GRADUALLY WORSENING
CLINICAL_PROGRESSION: NOT CHANGED
CLINICAL_PROGRESSION: GRADUALLY WORSENING

## 2019-01-12 ASSESSMENT — PAIN DESCRIPTION - DESCRIPTORS
DESCRIPTORS: ACHING;DISCOMFORT;DULL

## 2019-01-12 ASSESSMENT — PAIN - FUNCTIONAL ASSESSMENT
PAIN_FUNCTIONAL_ASSESSMENT: PREVENTS OR INTERFERES SOME ACTIVE ACTIVITIES AND ADLS

## 2019-01-12 ASSESSMENT — PAIN DESCRIPTION - ONSET
ONSET: PROGRESSIVE

## 2019-01-12 ASSESSMENT — PAIN DESCRIPTION - PAIN TYPE
TYPE: CHRONIC PAIN
TYPE: ACUTE PAIN
TYPE: CHRONIC PAIN
TYPE: CHRONIC PAIN

## 2019-01-12 ASSESSMENT — PAIN DESCRIPTION - ORIENTATION
ORIENTATION: MID

## 2019-01-12 ASSESSMENT — PAIN DESCRIPTION - FREQUENCY
FREQUENCY: CONTINUOUS

## 2019-01-13 LAB
GLUCOSE BLD-MCNC: 123 MG/DL (ref 60–115)
GLUCOSE BLD-MCNC: 132 MG/DL (ref 60–115)
GLUCOSE BLD-MCNC: 214 MG/DL (ref 60–115)
GLUCOSE BLD-MCNC: 92 MG/DL (ref 60–115)
PERFORMED ON: ABNORMAL
PERFORMED ON: NORMAL

## 2019-01-13 PROCEDURE — 2700000000 HC OXYGEN THERAPY PER DAY

## 2019-01-13 PROCEDURE — 6360000002 HC RX W HCPCS: Performed by: INTERNAL MEDICINE

## 2019-01-13 PROCEDURE — 1200000000 HC SEMI PRIVATE

## 2019-01-13 PROCEDURE — 97116 GAIT TRAINING THERAPY: CPT

## 2019-01-13 PROCEDURE — 6370000000 HC RX 637 (ALT 250 FOR IP): Performed by: INTERNAL MEDICINE

## 2019-01-13 PROCEDURE — 94760 N-INVAS EAR/PLS OXIMETRY 1: CPT

## 2019-01-13 PROCEDURE — 94640 AIRWAY INHALATION TREATMENT: CPT

## 2019-01-13 PROCEDURE — 97110 THERAPEUTIC EXERCISES: CPT

## 2019-01-13 PROCEDURE — 94669 MECHANICAL CHEST WALL OSCILL: CPT

## 2019-01-13 RX ORDER — SENNA PLUS 8.6 MG/1
1 TABLET ORAL 2 TIMES DAILY PRN
Status: DISCONTINUED | OUTPATIENT
Start: 2019-01-13 | End: 2019-01-21 | Stop reason: HOSPADM

## 2019-01-13 RX ORDER — ALUMINA, MAGNESIA, AND SIMETHICONE 2400; 2400; 240 MG/30ML; MG/30ML; MG/30ML
15 SUSPENSION ORAL EVERY 6 HOURS PRN
Status: DISCONTINUED | OUTPATIENT
Start: 2019-01-13 | End: 2019-01-14

## 2019-01-13 RX ADMIN — BUDESONIDE 500 MCG: 0.5 INHALANT RESPIRATORY (INHALATION) at 06:54

## 2019-01-13 RX ADMIN — OXYCODONE HYDROCHLORIDE AND ACETAMINOPHEN 1.5 TABLET: 5; 325 TABLET ORAL at 04:30

## 2019-01-13 RX ADMIN — OXYBUTYNIN CHLORIDE 10 MG: 5 TABLET, EXTENDED RELEASE ORAL at 09:16

## 2019-01-13 RX ADMIN — OXYCODONE HYDROCHLORIDE AND ACETAMINOPHEN 1.5 TABLET: 5; 325 TABLET ORAL at 21:36

## 2019-01-13 RX ADMIN — GUAIFENESIN 600 MG: 600 TABLET, EXTENDED RELEASE ORAL at 21:27

## 2019-01-13 RX ADMIN — GUAIFENESIN 600 MG: 600 TABLET, EXTENDED RELEASE ORAL at 09:17

## 2019-01-13 RX ADMIN — MOMETASONE FUROATE AND FORMOTEROL FUMARATE DIHYDRATE 2 PUFF: 200; 5 AEROSOL RESPIRATORY (INHALATION) at 06:54

## 2019-01-13 RX ADMIN — FAMOTIDINE 20 MG: 20 TABLET, FILM COATED ORAL at 09:17

## 2019-01-13 RX ADMIN — PREDNISONE 40 MG: 20 TABLET ORAL at 09:17

## 2019-01-13 RX ADMIN — BACLOFEN 10 MG: 10 TABLET ORAL at 14:51

## 2019-01-13 RX ADMIN — DULOXETINE HYDROCHLORIDE 60 MG: 30 CAPSULE, DELAYED RELEASE ORAL at 21:27

## 2019-01-13 RX ADMIN — POLYETHYLENE GLYCOL 3350 17 G: 17 POWDER, FOR SOLUTION ORAL at 09:16

## 2019-01-13 RX ADMIN — ALPRAZOLAM 0.25 MG: 0.25 TABLET ORAL at 21:36

## 2019-01-13 RX ADMIN — OXYCODONE HYDROCHLORIDE AND ACETAMINOPHEN 1.5 TABLET: 5; 325 TABLET ORAL at 14:51

## 2019-01-13 RX ADMIN — SENNOSIDES 8.6 MG: 8.6 TABLET, FILM COATED ORAL at 22:26

## 2019-01-13 RX ADMIN — IPRATROPIUM BROMIDE AND ALBUTEROL SULFATE 1 AMPULE: .5; 3 SOLUTION RESPIRATORY (INHALATION) at 06:54

## 2019-01-13 RX ADMIN — FAMOTIDINE 20 MG: 20 TABLET, FILM COATED ORAL at 21:27

## 2019-01-13 RX ADMIN — DULOXETINE HYDROCHLORIDE 60 MG: 30 CAPSULE, DELAYED RELEASE ORAL at 09:16

## 2019-01-13 RX ADMIN — IPRATROPIUM BROMIDE AND ALBUTEROL SULFATE 1 AMPULE: .5; 3 SOLUTION RESPIRATORY (INHALATION) at 18:04

## 2019-01-13 RX ADMIN — SIMVASTATIN 10 MG: 20 TABLET, FILM COATED ORAL at 21:26

## 2019-01-13 RX ADMIN — BUDESONIDE 500 MCG: 0.5 INHALANT RESPIRATORY (INHALATION) at 18:04

## 2019-01-13 RX ADMIN — ASPIRIN 81 MG: 81 TABLET ORAL at 09:16

## 2019-01-13 RX ADMIN — DOCUSATE SODIUM 100 MG: 100 CAPSULE, LIQUID FILLED ORAL at 21:26

## 2019-01-13 RX ADMIN — ALPRAZOLAM 0.25 MG: 0.25 TABLET ORAL at 09:27

## 2019-01-13 RX ADMIN — BACLOFEN 10 MG: 10 TABLET ORAL at 09:17

## 2019-01-13 RX ADMIN — BACLOFEN 10 MG: 10 TABLET ORAL at 21:37

## 2019-01-13 RX ADMIN — DOCUSATE SODIUM 100 MG: 100 CAPSULE, LIQUID FILLED ORAL at 09:17

## 2019-01-13 RX ADMIN — ACETAMINOPHEN 650 MG: 325 TABLET ORAL at 09:26

## 2019-01-13 RX ADMIN — ENOXAPARIN SODIUM 40 MG: 40 INJECTION SUBCUTANEOUS at 09:15

## 2019-01-13 RX ADMIN — MOMETASONE FUROATE AND FORMOTEROL FUMARATE DIHYDRATE 2 PUFF: 200; 5 AEROSOL RESPIRATORY (INHALATION) at 18:04

## 2019-01-13 RX ADMIN — FLUTICASONE PROPIONATE 2 SPRAY: 50 SPRAY, METERED NASAL at 22:30

## 2019-01-13 RX ADMIN — IPRATROPIUM BROMIDE AND ALBUTEROL SULFATE 1 AMPULE: .5; 3 SOLUTION RESPIRATORY (INHALATION) at 11:24

## 2019-01-13 ASSESSMENT — PAIN SCALES - GENERAL
PAINLEVEL_OUTOF10: 4
PAINLEVEL_OUTOF10: 4
PAINLEVEL_OUTOF10: 5
PAINLEVEL_OUTOF10: 4
PAINLEVEL_OUTOF10: 3

## 2019-01-13 ASSESSMENT — PAIN DESCRIPTION - PROGRESSION
CLINICAL_PROGRESSION: GRADUALLY WORSENING

## 2019-01-13 ASSESSMENT — PAIN SCALES - WONG BAKER
WONGBAKER_NUMERICALRESPONSE: 2
WONGBAKER_NUMERICALRESPONSE: 4
WONGBAKER_NUMERICALRESPONSE: 4

## 2019-01-14 LAB
ANION GAP SERPL CALCULATED.3IONS-SCNC: 8 MEQ/L (ref 7–13)
BUN BLDV-MCNC: 22 MG/DL (ref 8–23)
CALCIUM SERPL-MCNC: 9.8 MG/DL (ref 8.6–10.2)
CHLORIDE BLD-SCNC: 103 MEQ/L (ref 98–107)
CO2: 31 MEQ/L (ref 22–29)
CREAT SERPL-MCNC: 0.53 MG/DL (ref 0.5–0.9)
GFR AFRICAN AMERICAN: >60
GFR NON-AFRICAN AMERICAN: >60
GLUCOSE BLD-MCNC: 102 MG/DL (ref 60–115)
GLUCOSE BLD-MCNC: 137 MG/DL (ref 60–115)
GLUCOSE BLD-MCNC: 209 MG/DL (ref 60–115)
GLUCOSE BLD-MCNC: 92 MG/DL (ref 74–109)
GLUCOSE BLD-MCNC: 93 MG/DL (ref 60–115)
HCT VFR BLD CALC: 33.9 % (ref 37–47)
HEMOGLOBIN: 10.9 G/DL (ref 12–16)
MCH RBC QN AUTO: 28.3 PG (ref 27–31.3)
MCHC RBC AUTO-ENTMCNC: 32.2 % (ref 33–37)
MCV RBC AUTO: 88.1 FL (ref 82–100)
PDW BLD-RTO: 21.1 % (ref 11.5–14.5)
PERFORMED ON: ABNORMAL
PERFORMED ON: ABNORMAL
PERFORMED ON: NORMAL
PERFORMED ON: NORMAL
PLATELET # BLD: 323 K/UL (ref 130–400)
POTASSIUM SERPL-SCNC: 4.6 MEQ/L (ref 3.5–5.1)
RBC # BLD: 3.85 M/UL (ref 4.2–5.4)
SODIUM BLD-SCNC: 142 MEQ/L (ref 132–144)
WBC # BLD: 11.9 K/UL (ref 4.8–10.8)

## 2019-01-14 PROCEDURE — 94640 AIRWAY INHALATION TREATMENT: CPT

## 2019-01-14 PROCEDURE — 6360000002 HC RX W HCPCS: Performed by: INTERNAL MEDICINE

## 2019-01-14 PROCEDURE — 97530 THERAPEUTIC ACTIVITIES: CPT

## 2019-01-14 PROCEDURE — 97110 THERAPEUTIC EXERCISES: CPT

## 2019-01-14 PROCEDURE — 94669 MECHANICAL CHEST WALL OSCILL: CPT

## 2019-01-14 PROCEDURE — 80048 BASIC METABOLIC PNL TOTAL CA: CPT

## 2019-01-14 PROCEDURE — 97535 SELF CARE MNGMENT TRAINING: CPT

## 2019-01-14 PROCEDURE — 85027 COMPLETE CBC AUTOMATED: CPT

## 2019-01-14 PROCEDURE — 6370000000 HC RX 637 (ALT 250 FOR IP): Performed by: INTERNAL MEDICINE

## 2019-01-14 PROCEDURE — 36415 COLL VENOUS BLD VENIPUNCTURE: CPT

## 2019-01-14 PROCEDURE — 1200000000 HC SEMI PRIVATE

## 2019-01-14 PROCEDURE — 97116 GAIT TRAINING THERAPY: CPT

## 2019-01-14 RX ORDER — MAGNESIUM HYDROXIDE/ALUMINUM HYDROXICE/SIMETHICONE 120; 1200; 1200 MG/30ML; MG/30ML; MG/30ML
30 SUSPENSION ORAL EVERY 6 HOURS PRN
Status: DISCONTINUED | OUTPATIENT
Start: 2019-01-14 | End: 2019-01-21 | Stop reason: HOSPADM

## 2019-01-14 RX ORDER — NAPROXEN 500 MG/1
500 TABLET ORAL 2 TIMES DAILY WITH MEALS
Status: DISCONTINUED | OUTPATIENT
Start: 2019-01-14 | End: 2019-01-21 | Stop reason: HOSPADM

## 2019-01-14 RX ADMIN — DULOXETINE HYDROCHLORIDE 60 MG: 30 CAPSULE, DELAYED RELEASE ORAL at 09:21

## 2019-01-14 RX ADMIN — DOCUSATE SODIUM 100 MG: 100 CAPSULE, LIQUID FILLED ORAL at 21:35

## 2019-01-14 RX ADMIN — ACETAMINOPHEN 650 MG: 325 TABLET ORAL at 13:59

## 2019-01-14 RX ADMIN — MAGESIUM CITRATE 150 ML: 1.75 LIQUID ORAL at 09:24

## 2019-01-14 RX ADMIN — BUDESONIDE 500 MCG: 0.5 INHALANT RESPIRATORY (INHALATION) at 05:43

## 2019-01-14 RX ADMIN — FLUTICASONE PROPIONATE 2 SPRAY: 50 SPRAY, METERED NASAL at 21:36

## 2019-01-14 RX ADMIN — ASPIRIN 81 MG: 81 TABLET ORAL at 21:35

## 2019-01-14 RX ADMIN — ASPIRIN 81 MG: 81 TABLET ORAL at 13:59

## 2019-01-14 RX ADMIN — NAPROXEN 500 MG: 500 TABLET ORAL at 09:36

## 2019-01-14 RX ADMIN — ALPRAZOLAM 0.25 MG: 0.25 TABLET ORAL at 09:39

## 2019-01-14 RX ADMIN — IPRATROPIUM BROMIDE AND ALBUTEROL SULFATE 1 AMPULE: .5; 3 SOLUTION RESPIRATORY (INHALATION) at 11:20

## 2019-01-14 RX ADMIN — OXYCODONE HYDROCHLORIDE AND ACETAMINOPHEN 1.5 TABLET: 5; 325 TABLET ORAL at 21:35

## 2019-01-14 RX ADMIN — ENOXAPARIN SODIUM 40 MG: 40 INJECTION SUBCUTANEOUS at 09:21

## 2019-01-14 RX ADMIN — SIMVASTATIN 10 MG: 20 TABLET, FILM COATED ORAL at 21:35

## 2019-01-14 RX ADMIN — DOCUSATE SODIUM 100 MG: 100 CAPSULE, LIQUID FILLED ORAL at 09:23

## 2019-01-14 RX ADMIN — GUAIFENESIN 600 MG: 600 TABLET, EXTENDED RELEASE ORAL at 09:21

## 2019-01-14 RX ADMIN — ALPRAZOLAM 0.25 MG: 0.25 TABLET ORAL at 21:35

## 2019-01-14 RX ADMIN — OXYCODONE HYDROCHLORIDE AND ACETAMINOPHEN 1.5 TABLET: 5; 325 TABLET ORAL at 05:04

## 2019-01-14 RX ADMIN — DULOXETINE HYDROCHLORIDE 60 MG: 30 CAPSULE, DELAYED RELEASE ORAL at 21:34

## 2019-01-14 RX ADMIN — OXYBUTYNIN CHLORIDE 10 MG: 5 TABLET, EXTENDED RELEASE ORAL at 09:21

## 2019-01-14 RX ADMIN — IPRATROPIUM BROMIDE AND ALBUTEROL SULFATE 1 AMPULE: .5; 3 SOLUTION RESPIRATORY (INHALATION) at 18:25

## 2019-01-14 RX ADMIN — GUAIFENESIN 600 MG: 600 TABLET, EXTENDED RELEASE ORAL at 21:35

## 2019-01-14 RX ADMIN — PREDNISONE 30 MG: 20 TABLET ORAL at 09:23

## 2019-01-14 RX ADMIN — NAPROXEN 500 MG: 500 TABLET ORAL at 18:10

## 2019-01-14 RX ADMIN — ALUMINUM HYDROXIDE, MAGNESIUM HYDROXIDE, AND DIMETHICONE 15 ML: 400; 400; 40 SUSPENSION ORAL at 11:13

## 2019-01-14 RX ADMIN — BACLOFEN 10 MG: 10 TABLET ORAL at 13:59

## 2019-01-14 RX ADMIN — FAMOTIDINE 20 MG: 20 TABLET, FILM COATED ORAL at 21:35

## 2019-01-14 RX ADMIN — BACLOFEN 10 MG: 10 TABLET ORAL at 09:21

## 2019-01-14 RX ADMIN — FAMOTIDINE 20 MG: 20 TABLET, FILM COATED ORAL at 09:23

## 2019-01-14 RX ADMIN — BACLOFEN 10 MG: 10 TABLET ORAL at 21:36

## 2019-01-14 RX ADMIN — MOMETASONE FUROATE AND FORMOTEROL FUMARATE DIHYDRATE 2 PUFF: 200; 5 AEROSOL RESPIRATORY (INHALATION) at 18:25

## 2019-01-14 RX ADMIN — POLYETHYLENE GLYCOL 3350 17 G: 17 POWDER, FOR SOLUTION ORAL at 13:59

## 2019-01-14 RX ADMIN — IPRATROPIUM BROMIDE AND ALBUTEROL SULFATE 1 AMPULE: .5; 3 SOLUTION RESPIRATORY (INHALATION) at 05:43

## 2019-01-14 RX ADMIN — MOMETASONE FUROATE AND FORMOTEROL FUMARATE DIHYDRATE 2 PUFF: 200; 5 AEROSOL RESPIRATORY (INHALATION) at 05:43

## 2019-01-14 RX ADMIN — BUDESONIDE 500 MCG: 0.5 INHALANT RESPIRATORY (INHALATION) at 18:26

## 2019-01-14 ASSESSMENT — PAIN DESCRIPTION - DESCRIPTORS
DESCRIPTORS: ACHING;SORE

## 2019-01-14 ASSESSMENT — PAIN DESCRIPTION - PAIN TYPE
TYPE: CHRONIC PAIN

## 2019-01-14 ASSESSMENT — PAIN SCALES - GENERAL
PAINLEVEL_OUTOF10: 2
PAINLEVEL_OUTOF10: 5
PAINLEVEL_OUTOF10: 5
PAINLEVEL_OUTOF10: 3
PAINLEVEL_OUTOF10: 3
PAINLEVEL_OUTOF10: 4
PAINLEVEL_OUTOF10: 2

## 2019-01-14 ASSESSMENT — PAIN DESCRIPTION - LOCATION
LOCATION: BACK

## 2019-01-14 ASSESSMENT — PAIN DESCRIPTION - PROGRESSION
CLINICAL_PROGRESSION: GRADUALLY IMPROVING
CLINICAL_PROGRESSION: GRADUALLY IMPROVING
CLINICAL_PROGRESSION: GRADUALLY WORSENING

## 2019-01-14 ASSESSMENT — PAIN - FUNCTIONAL ASSESSMENT
PAIN_FUNCTIONAL_ASSESSMENT: ACTIVITIES ARE NOT PREVENTED

## 2019-01-14 ASSESSMENT — PAIN DESCRIPTION - ONSET
ONSET: ON-GOING

## 2019-01-14 ASSESSMENT — PAIN DESCRIPTION - ORIENTATION
ORIENTATION: MID
ORIENTATION: LOWER
ORIENTATION: MID

## 2019-01-14 ASSESSMENT — PAIN DESCRIPTION - FREQUENCY
FREQUENCY: INTERMITTENT

## 2019-01-15 LAB
GLUCOSE BLD-MCNC: 116 MG/DL (ref 60–115)
GLUCOSE BLD-MCNC: 150 MG/DL (ref 60–115)
GLUCOSE BLD-MCNC: 186 MG/DL (ref 60–115)
GLUCOSE BLD-MCNC: 95 MG/DL (ref 60–115)
PERFORMED ON: ABNORMAL
PERFORMED ON: NORMAL

## 2019-01-15 PROCEDURE — 94760 N-INVAS EAR/PLS OXIMETRY 1: CPT

## 2019-01-15 PROCEDURE — 6360000002 HC RX W HCPCS: Performed by: INTERNAL MEDICINE

## 2019-01-15 PROCEDURE — 1200000000 HC SEMI PRIVATE

## 2019-01-15 PROCEDURE — 97116 GAIT TRAINING THERAPY: CPT

## 2019-01-15 PROCEDURE — 94669 MECHANICAL CHEST WALL OSCILL: CPT

## 2019-01-15 PROCEDURE — 6370000000 HC RX 637 (ALT 250 FOR IP): Performed by: INTERNAL MEDICINE

## 2019-01-15 PROCEDURE — 94640 AIRWAY INHALATION TREATMENT: CPT

## 2019-01-15 PROCEDURE — 97110 THERAPEUTIC EXERCISES: CPT

## 2019-01-15 PROCEDURE — 97530 THERAPEUTIC ACTIVITIES: CPT

## 2019-01-15 PROCEDURE — 97535 SELF CARE MNGMENT TRAINING: CPT

## 2019-01-15 PROCEDURE — 2700000000 HC OXYGEN THERAPY PER DAY

## 2019-01-15 RX ADMIN — FAMOTIDINE 20 MG: 20 TABLET, FILM COATED ORAL at 20:46

## 2019-01-15 RX ADMIN — MOMETASONE FUROATE AND FORMOTEROL FUMARATE DIHYDRATE 2 PUFF: 200; 5 AEROSOL RESPIRATORY (INHALATION) at 18:31

## 2019-01-15 RX ADMIN — BACLOFEN 10 MG: 10 TABLET ORAL at 20:46

## 2019-01-15 RX ADMIN — DOCUSATE SODIUM 100 MG: 100 CAPSULE, LIQUID FILLED ORAL at 09:11

## 2019-01-15 RX ADMIN — ACETAMINOPHEN 650 MG: 325 TABLET ORAL at 03:39

## 2019-01-15 RX ADMIN — ALPRAZOLAM 0.25 MG: 0.25 TABLET ORAL at 09:32

## 2019-01-15 RX ADMIN — ENOXAPARIN SODIUM 40 MG: 40 INJECTION SUBCUTANEOUS at 09:15

## 2019-01-15 RX ADMIN — MOMETASONE FUROATE AND FORMOTEROL FUMARATE DIHYDRATE 2 PUFF: 200; 5 AEROSOL RESPIRATORY (INHALATION) at 05:30

## 2019-01-15 RX ADMIN — IPRATROPIUM BROMIDE AND ALBUTEROL SULFATE 1 AMPULE: .5; 3 SOLUTION RESPIRATORY (INHALATION) at 12:01

## 2019-01-15 RX ADMIN — IPRATROPIUM BROMIDE AND ALBUTEROL SULFATE 1 AMPULE: .5; 3 SOLUTION RESPIRATORY (INHALATION) at 18:31

## 2019-01-15 RX ADMIN — SIMVASTATIN 10 MG: 20 TABLET, FILM COATED ORAL at 20:46

## 2019-01-15 RX ADMIN — FAMOTIDINE 20 MG: 20 TABLET, FILM COATED ORAL at 09:12

## 2019-01-15 RX ADMIN — BUDESONIDE 500 MCG: 0.5 INHALANT RESPIRATORY (INHALATION) at 18:31

## 2019-01-15 RX ADMIN — GUAIFENESIN 600 MG: 600 TABLET, EXTENDED RELEASE ORAL at 09:15

## 2019-01-15 RX ADMIN — OXYBUTYNIN CHLORIDE 10 MG: 5 TABLET, EXTENDED RELEASE ORAL at 09:14

## 2019-01-15 RX ADMIN — NAPROXEN 500 MG: 500 TABLET ORAL at 09:12

## 2019-01-15 RX ADMIN — OXYCODONE HYDROCHLORIDE AND ACETAMINOPHEN 1.5 TABLET: 5; 325 TABLET ORAL at 09:32

## 2019-01-15 RX ADMIN — ACETAMINOPHEN 650 MG: 325 TABLET ORAL at 15:17

## 2019-01-15 RX ADMIN — BACLOFEN 10 MG: 10 TABLET ORAL at 09:12

## 2019-01-15 RX ADMIN — POLYETHYLENE GLYCOL 3350 17 G: 17 POWDER, FOR SOLUTION ORAL at 09:16

## 2019-01-15 RX ADMIN — DULOXETINE HYDROCHLORIDE 60 MG: 30 CAPSULE, DELAYED RELEASE ORAL at 09:15

## 2019-01-15 RX ADMIN — IPRATROPIUM BROMIDE AND ALBUTEROL SULFATE 1 AMPULE: .5; 3 SOLUTION RESPIRATORY (INHALATION) at 05:28

## 2019-01-15 RX ADMIN — DULOXETINE HYDROCHLORIDE 60 MG: 30 CAPSULE, DELAYED RELEASE ORAL at 20:46

## 2019-01-15 RX ADMIN — ASPIRIN 81 MG: 81 TABLET ORAL at 20:46

## 2019-01-15 RX ADMIN — FLUTICASONE PROPIONATE 2 SPRAY: 50 SPRAY, METERED NASAL at 21:14

## 2019-01-15 RX ADMIN — PREDNISONE 30 MG: 20 TABLET ORAL at 09:14

## 2019-01-15 RX ADMIN — ASPIRIN 81 MG: 81 TABLET ORAL at 09:13

## 2019-01-15 RX ADMIN — OXYCODONE HYDROCHLORIDE AND ACETAMINOPHEN 1.5 TABLET: 5; 325 TABLET ORAL at 20:45

## 2019-01-15 RX ADMIN — GUAIFENESIN 600 MG: 600 TABLET, EXTENDED RELEASE ORAL at 20:46

## 2019-01-15 RX ADMIN — BUDESONIDE 500 MCG: 0.5 INHALANT RESPIRATORY (INHALATION) at 05:28

## 2019-01-15 RX ADMIN — BACLOFEN 10 MG: 10 TABLET ORAL at 15:23

## 2019-01-15 RX ADMIN — DOCUSATE SODIUM 100 MG: 100 CAPSULE, LIQUID FILLED ORAL at 20:46

## 2019-01-15 RX ADMIN — ALPRAZOLAM 0.25 MG: 0.25 TABLET ORAL at 20:45

## 2019-01-15 RX ADMIN — NAPROXEN 500 MG: 500 TABLET ORAL at 17:41

## 2019-01-15 ASSESSMENT — PAIN SCALES - GENERAL
PAINLEVEL_OUTOF10: 4
PAINLEVEL_OUTOF10: 4
PAINLEVEL_OUTOF10: 0
PAINLEVEL_OUTOF10: 4
PAINLEVEL_OUTOF10: 3
PAINLEVEL_OUTOF10: 0

## 2019-01-15 ASSESSMENT — PAIN - FUNCTIONAL ASSESSMENT: PAIN_FUNCTIONAL_ASSESSMENT: ACTIVITIES ARE NOT PREVENTED

## 2019-01-15 ASSESSMENT — PAIN DESCRIPTION - PROGRESSION
CLINICAL_PROGRESSION: RAPIDLY WORSENING
CLINICAL_PROGRESSION: RAPIDLY WORSENING

## 2019-01-15 ASSESSMENT — PAIN DESCRIPTION - FREQUENCY: FREQUENCY: INTERMITTENT

## 2019-01-15 ASSESSMENT — PAIN DESCRIPTION - PAIN TYPE: TYPE: OTHER (COMMENT)

## 2019-01-15 ASSESSMENT — PAIN DESCRIPTION - ORIENTATION: ORIENTATION: OTHER (COMMENT)

## 2019-01-15 ASSESSMENT — PAIN DESCRIPTION - ONSET: ONSET: AWAKENED FROM SLEEP

## 2019-01-15 ASSESSMENT — PAIN DESCRIPTION - LOCATION: LOCATION: HEAD

## 2019-01-15 ASSESSMENT — PAIN DESCRIPTION - DESCRIPTORS: DESCRIPTORS: POUNDING;PRESSURE

## 2019-01-16 LAB
GLUCOSE BLD-MCNC: 142 MG/DL (ref 60–115)
GLUCOSE BLD-MCNC: 147 MG/DL (ref 60–115)
GLUCOSE BLD-MCNC: 179 MG/DL (ref 60–115)
GLUCOSE BLD-MCNC: 89 MG/DL (ref 60–115)
PERFORMED ON: ABNORMAL
PERFORMED ON: NORMAL

## 2019-01-16 PROCEDURE — 94640 AIRWAY INHALATION TREATMENT: CPT

## 2019-01-16 PROCEDURE — 97116 GAIT TRAINING THERAPY: CPT

## 2019-01-16 PROCEDURE — 6370000000 HC RX 637 (ALT 250 FOR IP): Performed by: INTERNAL MEDICINE

## 2019-01-16 PROCEDURE — 97530 THERAPEUTIC ACTIVITIES: CPT

## 2019-01-16 PROCEDURE — 2700000000 HC OXYGEN THERAPY PER DAY

## 2019-01-16 PROCEDURE — 97110 THERAPEUTIC EXERCISES: CPT

## 2019-01-16 PROCEDURE — 6360000002 HC RX W HCPCS: Performed by: INTERNAL MEDICINE

## 2019-01-16 PROCEDURE — 94760 N-INVAS EAR/PLS OXIMETRY 1: CPT

## 2019-01-16 PROCEDURE — 1200000000 HC SEMI PRIVATE

## 2019-01-16 PROCEDURE — 97535 SELF CARE MNGMENT TRAINING: CPT

## 2019-01-16 RX ADMIN — OXYCODONE HYDROCHLORIDE AND ACETAMINOPHEN 1.5 TABLET: 5; 325 TABLET ORAL at 09:22

## 2019-01-16 RX ADMIN — IPRATROPIUM BROMIDE AND ALBUTEROL SULFATE 1 AMPULE: .5; 3 SOLUTION RESPIRATORY (INHALATION) at 06:16

## 2019-01-16 RX ADMIN — OXYBUTYNIN CHLORIDE 10 MG: 5 TABLET, EXTENDED RELEASE ORAL at 09:23

## 2019-01-16 RX ADMIN — ALPRAZOLAM 0.25 MG: 0.25 TABLET ORAL at 09:24

## 2019-01-16 RX ADMIN — FLUTICASONE PROPIONATE 2 SPRAY: 50 SPRAY, METERED NASAL at 20:38

## 2019-01-16 RX ADMIN — SIMVASTATIN 10 MG: 20 TABLET, FILM COATED ORAL at 20:35

## 2019-01-16 RX ADMIN — NAPROXEN 500 MG: 500 TABLET ORAL at 09:23

## 2019-01-16 RX ADMIN — IPRATROPIUM BROMIDE AND ALBUTEROL SULFATE 1 AMPULE: .5; 3 SOLUTION RESPIRATORY (INHALATION) at 18:36

## 2019-01-16 RX ADMIN — IPRATROPIUM BROMIDE AND ALBUTEROL SULFATE 1 AMPULE: .5; 3 SOLUTION RESPIRATORY (INHALATION) at 11:12

## 2019-01-16 RX ADMIN — OXYCODONE HYDROCHLORIDE AND ACETAMINOPHEN 1.5 TABLET: 5; 325 TABLET ORAL at 20:35

## 2019-01-16 RX ADMIN — MOMETASONE FUROATE AND FORMOTEROL FUMARATE DIHYDRATE 2 PUFF: 200; 5 AEROSOL RESPIRATORY (INHALATION) at 18:36

## 2019-01-16 RX ADMIN — ENOXAPARIN SODIUM 40 MG: 40 INJECTION SUBCUTANEOUS at 09:22

## 2019-01-16 RX ADMIN — GUAIFENESIN 600 MG: 600 TABLET, EXTENDED RELEASE ORAL at 20:36

## 2019-01-16 RX ADMIN — DULOXETINE HYDROCHLORIDE 60 MG: 30 CAPSULE, DELAYED RELEASE ORAL at 09:23

## 2019-01-16 RX ADMIN — DULOXETINE HYDROCHLORIDE 60 MG: 30 CAPSULE, DELAYED RELEASE ORAL at 20:36

## 2019-01-16 RX ADMIN — PREDNISONE 30 MG: 20 TABLET ORAL at 09:23

## 2019-01-16 RX ADMIN — BACLOFEN 10 MG: 10 TABLET ORAL at 13:04

## 2019-01-16 RX ADMIN — NAPROXEN 500 MG: 500 TABLET ORAL at 17:33

## 2019-01-16 RX ADMIN — ACETAMINOPHEN 650 MG: 325 TABLET ORAL at 15:26

## 2019-01-16 RX ADMIN — POLYETHYLENE GLYCOL 3350 17 G: 17 POWDER, FOR SOLUTION ORAL at 09:23

## 2019-01-16 RX ADMIN — BACLOFEN 10 MG: 10 TABLET ORAL at 09:22

## 2019-01-16 RX ADMIN — FAMOTIDINE 20 MG: 20 TABLET, FILM COATED ORAL at 20:36

## 2019-01-16 RX ADMIN — DOCUSATE SODIUM 100 MG: 100 CAPSULE, LIQUID FILLED ORAL at 20:36

## 2019-01-16 RX ADMIN — DOCUSATE SODIUM 100 MG: 100 CAPSULE, LIQUID FILLED ORAL at 09:23

## 2019-01-16 RX ADMIN — MOMETASONE FUROATE AND FORMOTEROL FUMARATE DIHYDRATE 2 PUFF: 200; 5 AEROSOL RESPIRATORY (INHALATION) at 06:16

## 2019-01-16 RX ADMIN — BUDESONIDE 500 MCG: 0.5 INHALANT RESPIRATORY (INHALATION) at 18:37

## 2019-01-16 RX ADMIN — ASPIRIN 81 MG: 81 TABLET ORAL at 09:23

## 2019-01-16 RX ADMIN — BUDESONIDE 500 MCG: 0.5 INHALANT RESPIRATORY (INHALATION) at 06:16

## 2019-01-16 RX ADMIN — FAMOTIDINE 20 MG: 20 TABLET, FILM COATED ORAL at 09:23

## 2019-01-16 RX ADMIN — ALPRAZOLAM 0.25 MG: 0.25 TABLET ORAL at 20:36

## 2019-01-16 RX ADMIN — ACETAMINOPHEN 650 MG: 325 TABLET ORAL at 03:24

## 2019-01-16 RX ADMIN — GUAIFENESIN 600 MG: 600 TABLET, EXTENDED RELEASE ORAL at 09:24

## 2019-01-16 RX ADMIN — ASPIRIN 81 MG: 81 TABLET ORAL at 20:35

## 2019-01-16 RX ADMIN — BACLOFEN 10 MG: 10 TABLET ORAL at 20:36

## 2019-01-16 ASSESSMENT — PAIN SCALES - GENERAL
PAINLEVEL_OUTOF10: 3
PAINLEVEL_OUTOF10: 4
PAINLEVEL_OUTOF10: 3
PAINLEVEL_OUTOF10: 5
PAINLEVEL_OUTOF10: 3
PAINLEVEL_OUTOF10: 5
PAINLEVEL_OUTOF10: 4
PAINLEVEL_OUTOF10: 3
PAINLEVEL_OUTOF10: 3
PAINLEVEL_OUTOF10: 5
PAINLEVEL_OUTOF10: 5
PAINLEVEL_OUTOF10: 3
PAINLEVEL_OUTOF10: 5

## 2019-01-16 ASSESSMENT — PAIN DESCRIPTION - PAIN TYPE: TYPE: CHRONIC PAIN

## 2019-01-16 ASSESSMENT — PAIN DESCRIPTION - LOCATION: LOCATION: BACK

## 2019-01-17 LAB
ANION GAP SERPL CALCULATED.3IONS-SCNC: 7 MEQ/L (ref 7–13)
BUN BLDV-MCNC: 23 MG/DL (ref 8–23)
CALCIUM SERPL-MCNC: 9.7 MG/DL (ref 8.6–10.2)
CHLORIDE BLD-SCNC: 104 MEQ/L (ref 98–107)
CO2: 32 MEQ/L (ref 22–29)
CREAT SERPL-MCNC: 0.55 MG/DL (ref 0.5–0.9)
GFR AFRICAN AMERICAN: >60
GFR NON-AFRICAN AMERICAN: >60
GLUCOSE BLD-MCNC: 103 MG/DL (ref 74–109)
GLUCOSE BLD-MCNC: 105 MG/DL (ref 60–115)
GLUCOSE BLD-MCNC: 121 MG/DL (ref 60–115)
GLUCOSE BLD-MCNC: 194 MG/DL (ref 60–115)
GLUCOSE BLD-MCNC: 212 MG/DL (ref 60–115)
HCT VFR BLD CALC: 32.4 % (ref 37–47)
HEMOGLOBIN: 10.7 G/DL (ref 12–16)
MCH RBC QN AUTO: 28.8 PG (ref 27–31.3)
MCHC RBC AUTO-ENTMCNC: 32.9 % (ref 33–37)
MCV RBC AUTO: 87.7 FL (ref 82–100)
PDW BLD-RTO: 21.3 % (ref 11.5–14.5)
PERFORMED ON: ABNORMAL
PERFORMED ON: NORMAL
PLATELET # BLD: 317 K/UL (ref 130–400)
POTASSIUM SERPL-SCNC: 4.5 MEQ/L (ref 3.5–5.1)
RBC # BLD: 3.7 M/UL (ref 4.2–5.4)
SODIUM BLD-SCNC: 143 MEQ/L (ref 132–144)
WBC # BLD: 11.9 K/UL (ref 4.8–10.8)

## 2019-01-17 PROCEDURE — 97116 GAIT TRAINING THERAPY: CPT

## 2019-01-17 PROCEDURE — 6370000000 HC RX 637 (ALT 250 FOR IP): Performed by: INTERNAL MEDICINE

## 2019-01-17 PROCEDURE — 94640 AIRWAY INHALATION TREATMENT: CPT

## 2019-01-17 PROCEDURE — 85027 COMPLETE CBC AUTOMATED: CPT

## 2019-01-17 PROCEDURE — 97530 THERAPEUTIC ACTIVITIES: CPT

## 2019-01-17 PROCEDURE — 97110 THERAPEUTIC EXERCISES: CPT

## 2019-01-17 PROCEDURE — 2700000000 HC OXYGEN THERAPY PER DAY

## 2019-01-17 PROCEDURE — 97535 SELF CARE MNGMENT TRAINING: CPT

## 2019-01-17 PROCEDURE — 6360000002 HC RX W HCPCS: Performed by: INTERNAL MEDICINE

## 2019-01-17 PROCEDURE — 1200000000 HC SEMI PRIVATE

## 2019-01-17 PROCEDURE — 80048 BASIC METABOLIC PNL TOTAL CA: CPT

## 2019-01-17 PROCEDURE — 97032 APPL MODALITY 1+ESTIM EA 15: CPT

## 2019-01-17 PROCEDURE — 36415 COLL VENOUS BLD VENIPUNCTURE: CPT

## 2019-01-17 RX ADMIN — DOCUSATE SODIUM 100 MG: 100 CAPSULE, LIQUID FILLED ORAL at 09:40

## 2019-01-17 RX ADMIN — OXYCODONE HYDROCHLORIDE AND ACETAMINOPHEN 1.5 TABLET: 5; 325 TABLET ORAL at 09:41

## 2019-01-17 RX ADMIN — GUAIFENESIN 600 MG: 600 TABLET, EXTENDED RELEASE ORAL at 20:01

## 2019-01-17 RX ADMIN — ACETAMINOPHEN 650 MG: 325 TABLET ORAL at 14:36

## 2019-01-17 RX ADMIN — ASPIRIN 81 MG: 81 TABLET ORAL at 09:41

## 2019-01-17 RX ADMIN — BUDESONIDE 500 MCG: 0.5 INHALANT RESPIRATORY (INHALATION) at 05:43

## 2019-01-17 RX ADMIN — SIMVASTATIN 10 MG: 20 TABLET, FILM COATED ORAL at 20:01

## 2019-01-17 RX ADMIN — FAMOTIDINE 20 MG: 20 TABLET, FILM COATED ORAL at 20:02

## 2019-01-17 RX ADMIN — FAMOTIDINE 20 MG: 20 TABLET, FILM COATED ORAL at 09:40

## 2019-01-17 RX ADMIN — IPRATROPIUM BROMIDE AND ALBUTEROL SULFATE 1 AMPULE: .5; 3 SOLUTION RESPIRATORY (INHALATION) at 05:43

## 2019-01-17 RX ADMIN — POLYETHYLENE GLYCOL 3350 17 G: 17 POWDER, FOR SOLUTION ORAL at 09:39

## 2019-01-17 RX ADMIN — ALPRAZOLAM 0.25 MG: 0.25 TABLET ORAL at 20:01

## 2019-01-17 RX ADMIN — BACLOFEN 10 MG: 10 TABLET ORAL at 20:01

## 2019-01-17 RX ADMIN — DOCUSATE SODIUM 100 MG: 100 CAPSULE, LIQUID FILLED ORAL at 20:01

## 2019-01-17 RX ADMIN — MOMETASONE FUROATE AND FORMOTEROL FUMARATE DIHYDRATE 2 PUFF: 200; 5 AEROSOL RESPIRATORY (INHALATION) at 05:43

## 2019-01-17 RX ADMIN — ASPIRIN 81 MG: 81 TABLET ORAL at 20:01

## 2019-01-17 RX ADMIN — BUDESONIDE 500 MCG: 0.5 INHALANT RESPIRATORY (INHALATION) at 17:56

## 2019-01-17 RX ADMIN — BACLOFEN 10 MG: 10 TABLET ORAL at 09:45

## 2019-01-17 RX ADMIN — DULOXETINE HYDROCHLORIDE 60 MG: 30 CAPSULE, DELAYED RELEASE ORAL at 09:41

## 2019-01-17 RX ADMIN — SENNOSIDES 8.6 MG: 8.6 TABLET, FILM COATED ORAL at 09:41

## 2019-01-17 RX ADMIN — NAPROXEN 500 MG: 500 TABLET ORAL at 17:33

## 2019-01-17 RX ADMIN — FLUTICASONE PROPIONATE 2 SPRAY: 50 SPRAY, METERED NASAL at 21:34

## 2019-01-17 RX ADMIN — ALPRAZOLAM 0.25 MG: 0.25 TABLET ORAL at 09:41

## 2019-01-17 RX ADMIN — DULOXETINE HYDROCHLORIDE 60 MG: 30 CAPSULE, DELAYED RELEASE ORAL at 20:01

## 2019-01-17 RX ADMIN — IPRATROPIUM BROMIDE AND ALBUTEROL SULFATE 1 AMPULE: .5; 3 SOLUTION RESPIRATORY (INHALATION) at 17:56

## 2019-01-17 RX ADMIN — OXYBUTYNIN CHLORIDE 10 MG: 5 TABLET, EXTENDED RELEASE ORAL at 09:41

## 2019-01-17 RX ADMIN — GUAIFENESIN 600 MG: 600 TABLET, EXTENDED RELEASE ORAL at 09:40

## 2019-01-17 RX ADMIN — BACLOFEN 10 MG: 10 TABLET ORAL at 14:36

## 2019-01-17 RX ADMIN — PREDNISONE 30 MG: 20 TABLET ORAL at 09:41

## 2019-01-17 RX ADMIN — ENOXAPARIN SODIUM 40 MG: 40 INJECTION SUBCUTANEOUS at 09:40

## 2019-01-17 RX ADMIN — NAPROXEN 500 MG: 500 TABLET ORAL at 09:45

## 2019-01-17 RX ADMIN — OXYCODONE HYDROCHLORIDE AND ACETAMINOPHEN 1.5 TABLET: 5; 325 TABLET ORAL at 20:02

## 2019-01-17 RX ADMIN — MOMETASONE FUROATE AND FORMOTEROL FUMARATE DIHYDRATE 2 PUFF: 200; 5 AEROSOL RESPIRATORY (INHALATION) at 17:56

## 2019-01-17 RX ADMIN — ACETAMINOPHEN 650 MG: 325 TABLET ORAL at 02:23

## 2019-01-17 ASSESSMENT — PAIN SCALES - GENERAL
PAINLEVEL_OUTOF10: 3
PAINLEVEL_OUTOF10: 5
PAINLEVEL_OUTOF10: 0
PAINLEVEL_OUTOF10: 5
PAINLEVEL_OUTOF10: 3
PAINLEVEL_OUTOF10: 5

## 2019-01-17 ASSESSMENT — PAIN SCALES - WONG BAKER
WONGBAKER_NUMERICALRESPONSE: 2

## 2019-01-18 LAB
GLUCOSE BLD-MCNC: 105 MG/DL (ref 60–115)
GLUCOSE BLD-MCNC: 144 MG/DL (ref 60–115)
GLUCOSE BLD-MCNC: 93 MG/DL (ref 60–115)
PERFORMED ON: ABNORMAL
PERFORMED ON: NORMAL
PERFORMED ON: NORMAL

## 2019-01-18 PROCEDURE — 6360000002 HC RX W HCPCS: Performed by: INTERNAL MEDICINE

## 2019-01-18 PROCEDURE — 1200000000 HC SEMI PRIVATE

## 2019-01-18 PROCEDURE — 97116 GAIT TRAINING THERAPY: CPT

## 2019-01-18 PROCEDURE — 94640 AIRWAY INHALATION TREATMENT: CPT

## 2019-01-18 PROCEDURE — 6370000000 HC RX 637 (ALT 250 FOR IP): Performed by: INTERNAL MEDICINE

## 2019-01-18 PROCEDURE — 97110 THERAPEUTIC EXERCISES: CPT

## 2019-01-18 PROCEDURE — 97535 SELF CARE MNGMENT TRAINING: CPT

## 2019-01-18 PROCEDURE — 97032 APPL MODALITY 1+ESTIM EA 15: CPT

## 2019-01-18 PROCEDURE — 2700000000 HC OXYGEN THERAPY PER DAY

## 2019-01-18 RX ADMIN — BUDESONIDE 500 MCG: 0.5 INHALANT RESPIRATORY (INHALATION) at 17:59

## 2019-01-18 RX ADMIN — IPRATROPIUM BROMIDE AND ALBUTEROL SULFATE 1 AMPULE: .5; 3 SOLUTION RESPIRATORY (INHALATION) at 13:00

## 2019-01-18 RX ADMIN — DULOXETINE HYDROCHLORIDE 60 MG: 30 CAPSULE, DELAYED RELEASE ORAL at 09:16

## 2019-01-18 RX ADMIN — SENNOSIDES 8.6 MG: 8.6 TABLET, FILM COATED ORAL at 09:16

## 2019-01-18 RX ADMIN — GUAIFENESIN 600 MG: 600 TABLET, EXTENDED RELEASE ORAL at 09:17

## 2019-01-18 RX ADMIN — ASPIRIN 81 MG: 81 TABLET ORAL at 21:04

## 2019-01-18 RX ADMIN — ASPIRIN 81 MG: 81 TABLET ORAL at 09:16

## 2019-01-18 RX ADMIN — BACLOFEN 10 MG: 10 TABLET ORAL at 15:05

## 2019-01-18 RX ADMIN — OXYCODONE HYDROCHLORIDE AND ACETAMINOPHEN 1.5 TABLET: 5; 325 TABLET ORAL at 02:43

## 2019-01-18 RX ADMIN — DOCUSATE SODIUM 100 MG: 100 CAPSULE, LIQUID FILLED ORAL at 21:04

## 2019-01-18 RX ADMIN — OXYCODONE HYDROCHLORIDE AND ACETAMINOPHEN 1.5 TABLET: 5; 325 TABLET ORAL at 09:18

## 2019-01-18 RX ADMIN — OXYBUTYNIN CHLORIDE 10 MG: 5 TABLET, EXTENDED RELEASE ORAL at 09:17

## 2019-01-18 RX ADMIN — FAMOTIDINE 20 MG: 20 TABLET, FILM COATED ORAL at 09:17

## 2019-01-18 RX ADMIN — ENOXAPARIN SODIUM 40 MG: 40 INJECTION SUBCUTANEOUS at 09:15

## 2019-01-18 RX ADMIN — IPRATROPIUM BROMIDE AND ALBUTEROL SULFATE 1 AMPULE: .5; 3 SOLUTION RESPIRATORY (INHALATION) at 05:47

## 2019-01-18 RX ADMIN — FAMOTIDINE 20 MG: 20 TABLET, FILM COATED ORAL at 21:04

## 2019-01-18 RX ADMIN — POLYETHYLENE GLYCOL 3350 17 G: 17 POWDER, FOR SOLUTION ORAL at 09:15

## 2019-01-18 RX ADMIN — DOCUSATE SODIUM 100 MG: 100 CAPSULE, LIQUID FILLED ORAL at 09:18

## 2019-01-18 RX ADMIN — SIMVASTATIN 10 MG: 20 TABLET, FILM COATED ORAL at 21:04

## 2019-01-18 RX ADMIN — PREDNISONE 20 MG: 20 TABLET ORAL at 09:16

## 2019-01-18 RX ADMIN — GUAIFENESIN 600 MG: 600 TABLET, EXTENDED RELEASE ORAL at 21:05

## 2019-01-18 RX ADMIN — FLUTICASONE PROPIONATE 2 SPRAY: 50 SPRAY, METERED NASAL at 21:04

## 2019-01-18 RX ADMIN — NAPROXEN 500 MG: 500 TABLET ORAL at 17:22

## 2019-01-18 RX ADMIN — IPRATROPIUM BROMIDE AND ALBUTEROL SULFATE 1 AMPULE: .5; 3 SOLUTION RESPIRATORY (INHALATION) at 17:59

## 2019-01-18 RX ADMIN — BACLOFEN 10 MG: 10 TABLET ORAL at 21:04

## 2019-01-18 RX ADMIN — ALPRAZOLAM 0.25 MG: 0.25 TABLET ORAL at 21:04

## 2019-01-18 RX ADMIN — BACLOFEN 10 MG: 10 TABLET ORAL at 09:20

## 2019-01-18 RX ADMIN — ACETAMINOPHEN 650 MG: 325 TABLET ORAL at 15:05

## 2019-01-18 RX ADMIN — MOMETASONE FUROATE AND FORMOTEROL FUMARATE DIHYDRATE 2 PUFF: 200; 5 AEROSOL RESPIRATORY (INHALATION) at 17:59

## 2019-01-18 RX ADMIN — DULOXETINE HYDROCHLORIDE 60 MG: 30 CAPSULE, DELAYED RELEASE ORAL at 21:04

## 2019-01-18 RX ADMIN — OXYCODONE HYDROCHLORIDE AND ACETAMINOPHEN 1.5 TABLET: 5; 325 TABLET ORAL at 21:03

## 2019-01-18 RX ADMIN — BUDESONIDE 500 MCG: 0.5 INHALANT RESPIRATORY (INHALATION) at 05:47

## 2019-01-18 RX ADMIN — ALPRAZOLAM 0.25 MG: 0.25 TABLET ORAL at 09:16

## 2019-01-18 RX ADMIN — MOMETASONE FUROATE AND FORMOTEROL FUMARATE DIHYDRATE 2 PUFF: 200; 5 AEROSOL RESPIRATORY (INHALATION) at 05:47

## 2019-01-18 RX ADMIN — NAPROXEN 500 MG: 500 TABLET ORAL at 09:19

## 2019-01-18 ASSESSMENT — PAIN DESCRIPTION - ONSET: ONSET: ON-GOING

## 2019-01-18 ASSESSMENT — PAIN DESCRIPTION - PAIN TYPE
TYPE: CHRONIC PAIN

## 2019-01-18 ASSESSMENT — PAIN SCALES - GENERAL
PAINLEVEL_OUTOF10: 4
PAINLEVEL_OUTOF10: 0
PAINLEVEL_OUTOF10: 3
PAINLEVEL_OUTOF10: 4
PAINLEVEL_OUTOF10: 3
PAINLEVEL_OUTOF10: 4
PAINLEVEL_OUTOF10: 4
PAINLEVEL_OUTOF10: 0
PAINLEVEL_OUTOF10: 3
PAINLEVEL_OUTOF10: 4
PAINLEVEL_OUTOF10: 0
PAINLEVEL_OUTOF10: 0

## 2019-01-18 ASSESSMENT — PAIN DESCRIPTION - LOCATION
LOCATION: BACK;NECK
LOCATION: NECK;OTHER (COMMENT)

## 2019-01-18 ASSESSMENT — PAIN DESCRIPTION - PROGRESSION: CLINICAL_PROGRESSION: GRADUALLY WORSENING

## 2019-01-18 ASSESSMENT — PAIN SCALES - WONG BAKER
WONGBAKER_NUMERICALRESPONSE: 2
WONGBAKER_NUMERICALRESPONSE: 4

## 2019-01-18 ASSESSMENT — PAIN DESCRIPTION - FREQUENCY: FREQUENCY: INTERMITTENT

## 2019-01-19 PROCEDURE — 2700000000 HC OXYGEN THERAPY PER DAY

## 2019-01-19 PROCEDURE — 6360000002 HC RX W HCPCS: Performed by: INTERNAL MEDICINE

## 2019-01-19 PROCEDURE — 6370000000 HC RX 637 (ALT 250 FOR IP): Performed by: INTERNAL MEDICINE

## 2019-01-19 PROCEDURE — 1200000000 HC SEMI PRIVATE

## 2019-01-19 PROCEDURE — 97032 APPL MODALITY 1+ESTIM EA 15: CPT

## 2019-01-19 PROCEDURE — 97116 GAIT TRAINING THERAPY: CPT

## 2019-01-19 PROCEDURE — 97110 THERAPEUTIC EXERCISES: CPT

## 2019-01-19 PROCEDURE — 94640 AIRWAY INHALATION TREATMENT: CPT

## 2019-01-19 RX ADMIN — SIMVASTATIN 10 MG: 20 TABLET, FILM COATED ORAL at 21:55

## 2019-01-19 RX ADMIN — IPRATROPIUM BROMIDE AND ALBUTEROL SULFATE 1 AMPULE: .5; 3 SOLUTION RESPIRATORY (INHALATION) at 11:15

## 2019-01-19 RX ADMIN — GUAIFENESIN 600 MG: 600 TABLET, EXTENDED RELEASE ORAL at 09:15

## 2019-01-19 RX ADMIN — IPRATROPIUM BROMIDE AND ALBUTEROL SULFATE 1 AMPULE: .5; 3 SOLUTION RESPIRATORY (INHALATION) at 18:59

## 2019-01-19 RX ADMIN — ASPIRIN 81 MG: 81 TABLET ORAL at 09:14

## 2019-01-19 RX ADMIN — MOMETASONE FUROATE AND FORMOTEROL FUMARATE DIHYDRATE 2 PUFF: 200; 5 AEROSOL RESPIRATORY (INHALATION) at 06:18

## 2019-01-19 RX ADMIN — DULOXETINE HYDROCHLORIDE 60 MG: 30 CAPSULE, DELAYED RELEASE ORAL at 09:12

## 2019-01-19 RX ADMIN — OXYCODONE HYDROCHLORIDE AND ACETAMINOPHEN 1.5 TABLET: 5; 325 TABLET ORAL at 21:54

## 2019-01-19 RX ADMIN — FAMOTIDINE 20 MG: 20 TABLET, FILM COATED ORAL at 09:13

## 2019-01-19 RX ADMIN — ENOXAPARIN SODIUM 40 MG: 40 INJECTION SUBCUTANEOUS at 09:16

## 2019-01-19 RX ADMIN — DOCUSATE SODIUM 100 MG: 100 CAPSULE, LIQUID FILLED ORAL at 21:55

## 2019-01-19 RX ADMIN — ACETAMINOPHEN 650 MG: 325 TABLET ORAL at 03:44

## 2019-01-19 RX ADMIN — FAMOTIDINE 20 MG: 20 TABLET, FILM COATED ORAL at 21:58

## 2019-01-19 RX ADMIN — ASPIRIN 81 MG: 81 TABLET ORAL at 21:55

## 2019-01-19 RX ADMIN — FLUTICASONE PROPIONATE 2 SPRAY: 50 SPRAY, METERED NASAL at 21:58

## 2019-01-19 RX ADMIN — PREDNISONE 20 MG: 20 TABLET ORAL at 09:18

## 2019-01-19 RX ADMIN — BACLOFEN 10 MG: 10 TABLET ORAL at 21:56

## 2019-01-19 RX ADMIN — ACETAMINOPHEN 650 MG: 325 TABLET ORAL at 15:30

## 2019-01-19 RX ADMIN — BACLOFEN 10 MG: 10 TABLET ORAL at 15:30

## 2019-01-19 RX ADMIN — OXYBUTYNIN CHLORIDE 10 MG: 5 TABLET, EXTENDED RELEASE ORAL at 09:14

## 2019-01-19 RX ADMIN — GUAIFENESIN 600 MG: 600 TABLET, EXTENDED RELEASE ORAL at 21:55

## 2019-01-19 RX ADMIN — ALPRAZOLAM 0.25 MG: 0.25 TABLET ORAL at 21:55

## 2019-01-19 RX ADMIN — BACLOFEN 10 MG: 10 TABLET ORAL at 09:14

## 2019-01-19 RX ADMIN — POLYETHYLENE GLYCOL 3350 17 G: 17 POWDER, FOR SOLUTION ORAL at 09:17

## 2019-01-19 RX ADMIN — DOCUSATE SODIUM 100 MG: 100 CAPSULE, LIQUID FILLED ORAL at 09:12

## 2019-01-19 RX ADMIN — BUDESONIDE 500 MCG: 0.5 INHALANT RESPIRATORY (INHALATION) at 06:18

## 2019-01-19 RX ADMIN — NAPROXEN 500 MG: 500 TABLET ORAL at 09:15

## 2019-01-19 RX ADMIN — MOMETASONE FUROATE AND FORMOTEROL FUMARATE DIHYDRATE 2 PUFF: 200; 5 AEROSOL RESPIRATORY (INHALATION) at 19:03

## 2019-01-19 RX ADMIN — IPRATROPIUM BROMIDE AND ALBUTEROL SULFATE 1 AMPULE: .5; 3 SOLUTION RESPIRATORY (INHALATION) at 06:18

## 2019-01-19 RX ADMIN — NAPROXEN 500 MG: 500 TABLET ORAL at 17:50

## 2019-01-19 RX ADMIN — DULOXETINE HYDROCHLORIDE 60 MG: 30 CAPSULE, DELAYED RELEASE ORAL at 21:55

## 2019-01-19 RX ADMIN — BUDESONIDE 500 MCG: 0.5 INHALANT RESPIRATORY (INHALATION) at 18:59

## 2019-01-19 RX ADMIN — OXYCODONE HYDROCHLORIDE AND ACETAMINOPHEN 1.5 TABLET: 5; 325 TABLET ORAL at 09:16

## 2019-01-19 RX ADMIN — ALPRAZOLAM 0.25 MG: 0.25 TABLET ORAL at 09:15

## 2019-01-19 ASSESSMENT — PAIN SCALES - GENERAL
PAINLEVEL_OUTOF10: 4
PAINLEVEL_OUTOF10: 0
PAINLEVEL_OUTOF10: 3
PAINLEVEL_OUTOF10: 4
PAINLEVEL_OUTOF10: 6

## 2019-01-19 ASSESSMENT — PAIN SCALES - WONG BAKER: WONGBAKER_NUMERICALRESPONSE: 2

## 2019-01-20 PROCEDURE — 2700000000 HC OXYGEN THERAPY PER DAY

## 2019-01-20 PROCEDURE — 6370000000 HC RX 637 (ALT 250 FOR IP): Performed by: INTERNAL MEDICINE

## 2019-01-20 PROCEDURE — 94640 AIRWAY INHALATION TREATMENT: CPT

## 2019-01-20 PROCEDURE — 6360000002 HC RX W HCPCS: Performed by: INTERNAL MEDICINE

## 2019-01-20 PROCEDURE — 1200000000 HC SEMI PRIVATE

## 2019-01-20 RX ADMIN — FLUTICASONE PROPIONATE 2 SPRAY: 50 SPRAY, METERED NASAL at 20:13

## 2019-01-20 RX ADMIN — BUDESONIDE 500 MCG: 0.5 INHALANT RESPIRATORY (INHALATION) at 05:29

## 2019-01-20 RX ADMIN — MOMETASONE FUROATE AND FORMOTEROL FUMARATE DIHYDRATE 2 PUFF: 200; 5 AEROSOL RESPIRATORY (INHALATION) at 05:29

## 2019-01-20 RX ADMIN — ACETAMINOPHEN 650 MG: 325 TABLET ORAL at 01:19

## 2019-01-20 RX ADMIN — DOCUSATE SODIUM 100 MG: 100 CAPSULE, LIQUID FILLED ORAL at 20:13

## 2019-01-20 RX ADMIN — IPRATROPIUM BROMIDE AND ALBUTEROL SULFATE 1 AMPULE: .5; 3 SOLUTION RESPIRATORY (INHALATION) at 11:37

## 2019-01-20 RX ADMIN — ASPIRIN 81 MG: 81 TABLET ORAL at 09:13

## 2019-01-20 RX ADMIN — BACLOFEN 10 MG: 10 TABLET ORAL at 20:13

## 2019-01-20 RX ADMIN — ALPRAZOLAM 0.25 MG: 0.25 TABLET ORAL at 09:11

## 2019-01-20 RX ADMIN — IPRATROPIUM BROMIDE AND ALBUTEROL SULFATE 1 AMPULE: .5; 3 SOLUTION RESPIRATORY (INHALATION) at 18:34

## 2019-01-20 RX ADMIN — ACETAMINOPHEN 650 MG: 325 TABLET ORAL at 13:41

## 2019-01-20 RX ADMIN — ALPRAZOLAM 0.25 MG: 0.25 TABLET ORAL at 20:13

## 2019-01-20 RX ADMIN — OXYBUTYNIN CHLORIDE 10 MG: 5 TABLET, EXTENDED RELEASE ORAL at 09:11

## 2019-01-20 RX ADMIN — MOMETASONE FUROATE AND FORMOTEROL FUMARATE DIHYDRATE 2 PUFF: 200; 5 AEROSOL RESPIRATORY (INHALATION) at 18:34

## 2019-01-20 RX ADMIN — SIMVASTATIN 10 MG: 20 TABLET, FILM COATED ORAL at 20:13

## 2019-01-20 RX ADMIN — FAMOTIDINE 20 MG: 20 TABLET, FILM COATED ORAL at 20:13

## 2019-01-20 RX ADMIN — FAMOTIDINE 20 MG: 20 TABLET, FILM COATED ORAL at 09:13

## 2019-01-20 RX ADMIN — NAPROXEN 500 MG: 500 TABLET ORAL at 09:11

## 2019-01-20 RX ADMIN — PREDNISONE 20 MG: 20 TABLET ORAL at 09:12

## 2019-01-20 RX ADMIN — ASPIRIN 81 MG: 81 TABLET ORAL at 20:13

## 2019-01-20 RX ADMIN — GUAIFENESIN 600 MG: 600 TABLET, EXTENDED RELEASE ORAL at 20:13

## 2019-01-20 RX ADMIN — DOCUSATE SODIUM 100 MG: 100 CAPSULE, LIQUID FILLED ORAL at 09:13

## 2019-01-20 RX ADMIN — POLYETHYLENE GLYCOL 3350 17 G: 17 POWDER, FOR SOLUTION ORAL at 09:10

## 2019-01-20 RX ADMIN — ACETAMINOPHEN 650 MG: 325 TABLET ORAL at 20:12

## 2019-01-20 RX ADMIN — DULOXETINE HYDROCHLORIDE 60 MG: 30 CAPSULE, DELAYED RELEASE ORAL at 20:12

## 2019-01-20 RX ADMIN — BACLOFEN 10 MG: 10 TABLET ORAL at 13:33

## 2019-01-20 RX ADMIN — IPRATROPIUM BROMIDE AND ALBUTEROL SULFATE 1 AMPULE: .5; 3 SOLUTION RESPIRATORY (INHALATION) at 05:29

## 2019-01-20 RX ADMIN — BACLOFEN 10 MG: 10 TABLET ORAL at 09:13

## 2019-01-20 RX ADMIN — ENOXAPARIN SODIUM 40 MG: 40 INJECTION SUBCUTANEOUS at 09:10

## 2019-01-20 RX ADMIN — NAPROXEN 500 MG: 500 TABLET ORAL at 17:12

## 2019-01-20 RX ADMIN — BUDESONIDE 500 MCG: 0.5 INHALANT RESPIRATORY (INHALATION) at 18:34

## 2019-01-20 RX ADMIN — OXYCODONE HYDROCHLORIDE AND ACETAMINOPHEN 1.5 TABLET: 5; 325 TABLET ORAL at 23:24

## 2019-01-20 RX ADMIN — DULOXETINE HYDROCHLORIDE 60 MG: 30 CAPSULE, DELAYED RELEASE ORAL at 09:13

## 2019-01-20 RX ADMIN — OXYCODONE HYDROCHLORIDE AND ACETAMINOPHEN 1.5 TABLET: 5; 325 TABLET ORAL at 16:07

## 2019-01-20 RX ADMIN — OXYCODONE HYDROCHLORIDE AND ACETAMINOPHEN 1.5 TABLET: 5; 325 TABLET ORAL at 09:10

## 2019-01-20 RX ADMIN — GUAIFENESIN 600 MG: 600 TABLET, EXTENDED RELEASE ORAL at 09:13

## 2019-01-20 ASSESSMENT — PAIN DESCRIPTION - PAIN TYPE
TYPE: CHRONIC PAIN

## 2019-01-20 ASSESSMENT — PAIN SCALES - GENERAL
PAINLEVEL_OUTOF10: 2
PAINLEVEL_OUTOF10: 4
PAINLEVEL_OUTOF10: 2
PAINLEVEL_OUTOF10: 4
PAINLEVEL_OUTOF10: 4
PAINLEVEL_OUTOF10: 2
PAINLEVEL_OUTOF10: 3
PAINLEVEL_OUTOF10: 2
PAINLEVEL_OUTOF10: 0
PAINLEVEL_OUTOF10: 2
PAINLEVEL_OUTOF10: 3
PAINLEVEL_OUTOF10: 4
PAINLEVEL_OUTOF10: 3
PAINLEVEL_OUTOF10: 0

## 2019-01-20 ASSESSMENT — PAIN DESCRIPTION - LOCATION
LOCATION: BACK

## 2019-01-21 VITALS
HEIGHT: 61 IN | DIASTOLIC BLOOD PRESSURE: 64 MMHG | WEIGHT: 123.4 LBS | BODY MASS INDEX: 23.3 KG/M2 | HEART RATE: 93 BPM | OXYGEN SATURATION: 98 % | SYSTOLIC BLOOD PRESSURE: 113 MMHG | RESPIRATION RATE: 17 BRPM | TEMPERATURE: 98.6 F

## 2019-01-21 LAB
ANION GAP SERPL CALCULATED.3IONS-SCNC: 6 MEQ/L (ref 7–13)
BUN BLDV-MCNC: 23 MG/DL (ref 8–23)
CALCIUM SERPL-MCNC: 9.4 MG/DL (ref 8.6–10.2)
CHLORIDE BLD-SCNC: 104 MEQ/L (ref 98–107)
CO2: 32 MEQ/L (ref 22–29)
CREAT SERPL-MCNC: 0.52 MG/DL (ref 0.5–0.9)
GFR AFRICAN AMERICAN: >60
GFR NON-AFRICAN AMERICAN: >60
GLUCOSE BLD-MCNC: 97 MG/DL (ref 74–109)
HCT VFR BLD CALC: 32 % (ref 37–47)
HEMOGLOBIN: 10.4 G/DL (ref 12–16)
MCH RBC QN AUTO: 28.9 PG (ref 27–31.3)
MCHC RBC AUTO-ENTMCNC: 32.6 % (ref 33–37)
MCV RBC AUTO: 88.6 FL (ref 82–100)
PDW BLD-RTO: 22.9 % (ref 11.5–14.5)
PLATELET # BLD: 268 K/UL (ref 130–400)
POTASSIUM SERPL-SCNC: 4.8 MEQ/L (ref 3.5–5.1)
RBC # BLD: 3.61 M/UL (ref 4.2–5.4)
SODIUM BLD-SCNC: 142 MEQ/L (ref 132–144)
WBC # BLD: 9.4 K/UL (ref 4.8–10.8)

## 2019-01-21 PROCEDURE — 97110 THERAPEUTIC EXERCISES: CPT

## 2019-01-21 PROCEDURE — 80048 BASIC METABOLIC PNL TOTAL CA: CPT

## 2019-01-21 PROCEDURE — 6360000002 HC RX W HCPCS: Performed by: INTERNAL MEDICINE

## 2019-01-21 PROCEDURE — 99213 OFFICE O/P EST LOW 20 MIN: CPT

## 2019-01-21 PROCEDURE — 99212 OFFICE O/P EST SF 10 MIN: CPT

## 2019-01-21 PROCEDURE — 94640 AIRWAY INHALATION TREATMENT: CPT

## 2019-01-21 PROCEDURE — 6370000000 HC RX 637 (ALT 250 FOR IP): Performed by: INTERNAL MEDICINE

## 2019-01-21 PROCEDURE — 36415 COLL VENOUS BLD VENIPUNCTURE: CPT

## 2019-01-21 PROCEDURE — 97116 GAIT TRAINING THERAPY: CPT

## 2019-01-21 PROCEDURE — 97032 APPL MODALITY 1+ESTIM EA 15: CPT

## 2019-01-21 PROCEDURE — 85027 COMPLETE CBC AUTOMATED: CPT

## 2019-01-21 PROCEDURE — 97530 THERAPEUTIC ACTIVITIES: CPT

## 2019-01-21 RX ORDER — PREDNISONE 10 MG/1
10 TABLET ORAL DAILY
Qty: 5 TABLET | Refills: 0 | Status: SHIPPED | OUTPATIENT
Start: 2019-01-22 | End: 2019-01-27

## 2019-01-21 RX ADMIN — FAMOTIDINE 20 MG: 20 TABLET, FILM COATED ORAL at 09:04

## 2019-01-21 RX ADMIN — OXYBUTYNIN CHLORIDE 10 MG: 5 TABLET, EXTENDED RELEASE ORAL at 09:05

## 2019-01-21 RX ADMIN — ACETAMINOPHEN 650 MG: 325 TABLET ORAL at 09:10

## 2019-01-21 RX ADMIN — POLYETHYLENE GLYCOL 3350 17 G: 17 POWDER, FOR SOLUTION ORAL at 09:04

## 2019-01-21 RX ADMIN — BUDESONIDE 500 MCG: 0.5 INHALANT RESPIRATORY (INHALATION) at 06:00

## 2019-01-21 RX ADMIN — PREDNISONE 20 MG: 20 TABLET ORAL at 09:05

## 2019-01-21 RX ADMIN — IPRATROPIUM BROMIDE AND ALBUTEROL SULFATE 1 AMPULE: .5; 3 SOLUTION RESPIRATORY (INHALATION) at 06:00

## 2019-01-21 RX ADMIN — ENOXAPARIN SODIUM 40 MG: 40 INJECTION SUBCUTANEOUS at 09:04

## 2019-01-21 RX ADMIN — OXYCODONE HYDROCHLORIDE AND ACETAMINOPHEN 1.5 TABLET: 5; 325 TABLET ORAL at 06:23

## 2019-01-21 RX ADMIN — BACLOFEN 10 MG: 10 TABLET ORAL at 09:04

## 2019-01-21 RX ADMIN — DOCUSATE SODIUM 100 MG: 100 CAPSULE, LIQUID FILLED ORAL at 09:05

## 2019-01-21 RX ADMIN — MOMETASONE FUROATE AND FORMOTEROL FUMARATE DIHYDRATE 2 PUFF: 200; 5 AEROSOL RESPIRATORY (INHALATION) at 06:00

## 2019-01-21 RX ADMIN — ALPRAZOLAM 0.25 MG: 0.25 TABLET ORAL at 09:10

## 2019-01-21 RX ADMIN — GUAIFENESIN 600 MG: 600 TABLET, EXTENDED RELEASE ORAL at 09:04

## 2019-01-21 RX ADMIN — BACLOFEN 10 MG: 10 TABLET ORAL at 13:43

## 2019-01-21 RX ADMIN — NAPROXEN 500 MG: 500 TABLET ORAL at 09:05

## 2019-01-21 RX ADMIN — OXYCODONE HYDROCHLORIDE AND ACETAMINOPHEN 1.5 TABLET: 5; 325 TABLET ORAL at 13:42

## 2019-01-21 RX ADMIN — IPRATROPIUM BROMIDE AND ALBUTEROL SULFATE 1 AMPULE: .5; 3 SOLUTION RESPIRATORY (INHALATION) at 11:41

## 2019-01-21 RX ADMIN — ASPIRIN 81 MG: 81 TABLET ORAL at 09:04

## 2019-01-21 RX ADMIN — DULOXETINE HYDROCHLORIDE 60 MG: 30 CAPSULE, DELAYED RELEASE ORAL at 09:04

## 2019-01-21 ASSESSMENT — PAIN SCALES - WONG BAKER: WONGBAKER_NUMERICALRESPONSE: 2

## 2019-01-21 ASSESSMENT — PAIN SCALES - GENERAL
PAINLEVEL_OUTOF10: 4
PAINLEVEL_OUTOF10: 3
PAINLEVEL_OUTOF10: 0
PAINLEVEL_OUTOF10: 3
PAINLEVEL_OUTOF10: 3
PAINLEVEL_OUTOF10: 4
PAINLEVEL_OUTOF10: 3

## 2019-01-21 ASSESSMENT — PAIN DESCRIPTION - DESCRIPTORS: DESCRIPTORS: ACHING;DISCOMFORT;SORE

## 2019-01-21 ASSESSMENT — PAIN DESCRIPTION - LOCATION
LOCATION: BACK
LOCATION: BACK

## 2019-01-21 ASSESSMENT — PAIN DESCRIPTION - ONSET: ONSET: ON-GOING

## 2019-01-21 ASSESSMENT — PAIN DESCRIPTION - FREQUENCY: FREQUENCY: INTERMITTENT

## 2019-01-21 ASSESSMENT — PAIN DESCRIPTION - PAIN TYPE
TYPE: CHRONIC PAIN
TYPE: CHRONIC PAIN

## 2019-01-21 ASSESSMENT — PAIN DESCRIPTION - PROGRESSION: CLINICAL_PROGRESSION: GRADUALLY WORSENING

## 2019-03-04 DIAGNOSIS — J44.9 CHRONIC OBSTRUCTIVE PULMONARY DISEASE, UNSPECIFIED COPD TYPE (HCC): ICD-10-CM

## 2019-03-06 RX ORDER — BUDESONIDE AND FORMOTEROL FUMARATE DIHYDRATE 160; 4.5 UG/1; UG/1
2 AEROSOL RESPIRATORY (INHALATION) 2 TIMES DAILY
Qty: 1 INHALER | Refills: 3 | Status: SHIPPED | OUTPATIENT
Start: 2019-03-06 | End: 2019-04-08 | Stop reason: SDUPTHER

## 2019-04-08 DIAGNOSIS — J44.9 CHRONIC OBSTRUCTIVE PULMONARY DISEASE, UNSPECIFIED COPD TYPE (HCC): ICD-10-CM

## 2019-04-08 RX ORDER — BUDESONIDE AND FORMOTEROL FUMARATE DIHYDRATE 160; 4.5 UG/1; UG/1
2 AEROSOL RESPIRATORY (INHALATION) 2 TIMES DAILY
Qty: 1 INHALER | Refills: 3 | Status: SHIPPED | OUTPATIENT
Start: 2019-04-08 | End: 2019-07-30 | Stop reason: SDUPTHER

## 2019-05-20 ENCOUNTER — OFFICE VISIT (OUTPATIENT)
Dept: PULMONOLOGY | Age: 59
End: 2019-05-20
Payer: MEDICARE

## 2019-05-20 VITALS
DIASTOLIC BLOOD PRESSURE: 80 MMHG | WEIGHT: 122 LBS | OXYGEN SATURATION: 95 % | HEIGHT: 61 IN | SYSTOLIC BLOOD PRESSURE: 134 MMHG | HEART RATE: 108 BPM | TEMPERATURE: 97.5 F | BODY MASS INDEX: 23.03 KG/M2 | RESPIRATION RATE: 16 BRPM

## 2019-05-20 DIAGNOSIS — J44.9 CHRONIC OBSTRUCTIVE PULMONARY DISEASE, UNSPECIFIED COPD TYPE (HCC): Primary | ICD-10-CM

## 2019-05-20 DIAGNOSIS — R09.02 HYPOXIA: ICD-10-CM

## 2019-05-20 DIAGNOSIS — Z72.0 TOBACCO ABUSE: ICD-10-CM

## 2019-05-20 PROCEDURE — 99214 OFFICE O/P EST MOD 30 MIN: CPT | Performed by: INTERNAL MEDICINE

## 2019-05-20 RX ORDER — NAPROXEN 500 MG/1
500 TABLET ORAL
COMMUNITY
End: 2019-07-16 | Stop reason: SDUPTHER

## 2019-05-20 RX ORDER — FLUTICASONE PROPIONATE 50 MCG
1 SPRAY, SUSPENSION (ML) NASAL
COMMUNITY
Start: 2018-11-06

## 2019-05-20 NOTE — PROGRESS NOTES
PO) Take 1 tablet by mouth      naproxen (NAPROSYN) 500 MG tablet Take 500 mg by mouth      POLYETHYLENE GLYCOL 3350 PO Take 17 g by mouth      fluticasone (FLONASE) 50 MCG/ACT nasal spray 1 spray by NOT APPLICABLE route      oxyCODONE-acetaminophen (PERCOCET) 5-325 MG per tablet Take 1 tablet by mouth every 6-8 hours as needed for Pain for up to 30 days. Dose reduction as discussed 100 tablet 0    baclofen (LIORESAL) 10 MG tablet Take 1 tablet by mouth 3 times daily 270 tablet 2    budesonide-formoterol (SYMBICORT) 160-4.5 MCG/ACT AERO Inhale 2 puffs into the lungs 2 times daily 1 Inhaler 3    aspirin 81 MG EC tablet Take 1 tablet by mouth 2 times daily 30 tablet 0    Multiple Vitamins-Minerals (THERAPEUTIC MULTIVITAMIN-MINERALS) tablet Take 1 tablet by mouth daily      Magnesium Oxide 500 MG CAPS Take 1 capsule by mouth 2 times daily      oxybutynin (DITROPAN-XL) 10 MG extended release tablet Take 10 mg by mouth daily      simvastatin (ZOCOR) 10 MG tablet Take 10 mg by mouth nightly      Oxygen Concentrator Inhale 3 L into the lungs continuous       albuterol sulfate HFA (VENTOLIN HFA) 108 (90 Base) MCG/ACT inhaler Inhale 2 puffs into the lungs every 6 hours as needed for Wheezing 1 Inhaler 3    DULoxetine (CYMBALTA) 60 MG capsule       albuterol (PROVENTIL) (2.5 MG/3ML) 0.083% nebulizer solution Take 3 mLs by nebulization every 6 hours as needed for Wheezing 120 each 5     No current facility-administered medications for this visit. Results for orders placed during the hospital encounter of 01/02/19   XR CHEST STANDARD (2 VW)    Narrative EXAMINATION: XR CHEST (2 VW)    REASON FOR EXAM: COPD atelectasis     FINDINGS: 2 views of the chest reveal lung fields slightly hyperinflated. There is borderline cardiomegaly. The pulmonary vasculature is within normal limits. Atherosclerotic changes in the aorta noted. Since January 4, 2019 endotracheal tube and NG tube have been removed.     Lung fields clear with no evidence of pneumonia. No evidence of pulmonary edema. Bones and soft tissues intact. Postsurgical changes overlie the lower cervical spine. Impression NO ACTIVE DISEASE IN THE CHEST.      ]  Results for orders placed during the hospital encounter of 01/02/19   XR CHEST PORTABLE    Narrative Portable chest radiograph    History: Respiratory failure    Technique: AP portable view of the chest obtained. Comparison: CT from January 3, 2019; chest x-ray from January 3, 2019    Findings:    Endotracheal tube remains, not significantly changed in position. Enteric tube traverses the diaphragm however its distal tip is not visualized. The cardiomediastinal silhouette is within normal limits. No pneumothorax, pleural effusion, or focal   consolidation. Linear left lung base opacity compatible with subsegmental atelectasis is not significantly changed. Lungs are hyperinflated. Coarsening of the pulmonary interstitium. Degenerative changes of the spine. Impression No significant interval change in left base subsegmental atelectasis. XR CHEST PORTABLE    Narrative EXAMINATION: CHEST PORTABLE VIEW     CLINICAL HISTORY: Intubation    COMPARISONS: January 2, 2018     FINDINGS:    Single  views of the chest is submitted. There is a endotracheal tube. The tip lies approximately 2.1 cm above the [place]. There is a nasogastric tube. The tip is not seen but does lie below the hemidiaphragm. The cardiac silhouette is enlarged. Unchanged configuration. The mediastinum is unremarkable. Pulmonary vascular unremarkable. Right sided trachea. Left lower lobe infiltrate/consolidation with trace left pleural effusion. No Pneumothoraces.                                                                                     Impression LEFT LOWER LOBE INFILTRATE/CONSOLIDATION WITH TRACE LEFT PLEURAL EFFUSION   XR CHEST PORTABLE    Narrative EXAMINATION: XR CHEST PORTABLE, 1/2/2019 2:15 PM     History: 25-year-old female, shortness of breath, dyspnea    COMPARISON:  October 2, 2018    FINDINGS:  Chest-AP erect portable:   Hyperaeration lung fields. There are chronic interstitial changes. Asymmetric subtle increased density in the left lung base, suggestive of infiltrate. There is blunting of the right costophrenic angle, may represent focal atelectasis versus small   pleural effusion. Cardiac silhouette at the upper limits of normal. Aortic arch calcification. The pulmonary vascularity is normal size. There are degenerative changes of the spine. Monitoring leads project across the thorax. Impression 1. Asymmetric hazy density in the left lung base suspicious for developing infiltrate. 2. Small right pleural effusion versus atelectasis. 3. Chronic interstitial changes and hyperaeration lung fields, correlate for COPD. Assessment/Plan:     1. Chronic obstructive pulmonary disease, unspecified COPD type ([address]  She is having short of breath with exertion, she had a PFT done which shows restriction. There is response to bronchodilator admit flow. She is currently on nebulizer with albuterol when necessary, ProAir HFA when necessary and Symbicort 2 puff twice a day. On 2 L O2 with sleep continue O2 and bronchodilator as before. Chest x-ray shows left basilar atelectasis patient has no symptoms of cough or sputum production no fever or chills. 2. Hypoxia  She is on 2 lit O2 with sleep continue O2 to keep saturation 90% or above. 3. Tobacco abuse  Patient advised try to quit smoking. Risks related to smoking explained to the patient. Different ways to help him quit smoking were discussed today. Return in about 4 months (around 9/20/2019) for COPD, hypoxia on O2.       Alida Hilliard MD

## 2019-05-27 ASSESSMENT — ENCOUNTER SYMPTOMS
ABDOMINAL PAIN: 0
EYE ITCHING: 0
NAUSEA: 0
RHINORRHEA: 0
SORE THROAT: 0
COUGH: 0
WHEEZING: 0
TROUBLE SWALLOWING: 0
SINUS PRESSURE: 0
EYE DISCHARGE: 0
DIARRHEA: 0
SHORTNESS OF BREATH: 1
CHEST TIGHTNESS: 0
VOMITING: 0
VOICE CHANGE: 0

## 2019-07-08 ENCOUNTER — OFFICE VISIT (OUTPATIENT)
Dept: PULMONOLOGY | Age: 59
End: 2019-07-08
Payer: MEDICARE

## 2019-07-08 VITALS
HEART RATE: 87 BPM | SYSTOLIC BLOOD PRESSURE: 126 MMHG | BODY MASS INDEX: 23.22 KG/M2 | WEIGHT: 123 LBS | HEIGHT: 61 IN | DIASTOLIC BLOOD PRESSURE: 62 MMHG | OXYGEN SATURATION: 97 % | RESPIRATION RATE: 16 BRPM | TEMPERATURE: 96.7 F

## 2019-07-08 DIAGNOSIS — J44.9 CHRONIC OBSTRUCTIVE PULMONARY DISEASE, UNSPECIFIED COPD TYPE (HCC): Primary | ICD-10-CM

## 2019-07-08 DIAGNOSIS — R09.02 HYPOXIA: ICD-10-CM

## 2019-07-08 DIAGNOSIS — Z72.0 TOBACCO ABUSE: ICD-10-CM

## 2019-07-08 PROCEDURE — 99214 OFFICE O/P EST MOD 30 MIN: CPT | Performed by: INTERNAL MEDICINE

## 2019-07-08 ASSESSMENT — ENCOUNTER SYMPTOMS
CHEST TIGHTNESS: 0
VOMITING: 0
DIARRHEA: 0
EYE ITCHING: 0
EYE DISCHARGE: 0
TROUBLE SWALLOWING: 0
WHEEZING: 0
SINUS PRESSURE: 0
SORE THROAT: 0
RHINORRHEA: 0
COUGH: 0
VOICE CHANGE: 0
ABDOMINAL PAIN: 0
SHORTNESS OF BREATH: 1
NAUSEA: 0

## 2019-07-30 DIAGNOSIS — J44.9 CHRONIC OBSTRUCTIVE PULMONARY DISEASE, UNSPECIFIED COPD TYPE (HCC): ICD-10-CM

## 2019-07-30 RX ORDER — BUDESONIDE AND FORMOTEROL FUMARATE DIHYDRATE 160; 4.5 UG/1; UG/1
AEROSOL RESPIRATORY (INHALATION)
Qty: 30.6 INHALER | Refills: 1 | Status: SHIPPED | OUTPATIENT
Start: 2019-07-30 | End: 2020-04-02 | Stop reason: SDUPTHER

## 2020-04-03 RX ORDER — BUDESONIDE AND FORMOTEROL FUMARATE DIHYDRATE 160; 4.5 UG/1; UG/1
AEROSOL RESPIRATORY (INHALATION)
Qty: 30.6 INHALER | Refills: 1 | Status: SHIPPED | OUTPATIENT
Start: 2020-04-03 | End: 2020-07-09 | Stop reason: SDUPTHER

## 2020-04-03 RX ORDER — ALBUTEROL SULFATE 90 UG/1
2 AEROSOL, METERED RESPIRATORY (INHALATION) EVERY 6 HOURS PRN
Qty: 1 INHALER | Refills: 3 | Status: SHIPPED | OUTPATIENT
Start: 2020-04-03 | End: 2021-03-26 | Stop reason: SDUPTHER

## 2020-07-09 ENCOUNTER — OFFICE VISIT (OUTPATIENT)
Dept: PULMONOLOGY | Age: 60
End: 2020-07-09
Payer: MEDICARE

## 2020-07-09 VITALS
BODY MASS INDEX: 21.9 KG/M2 | TEMPERATURE: 96.1 F | OXYGEN SATURATION: 93 % | HEIGHT: 61 IN | RESPIRATION RATE: 16 BRPM | SYSTOLIC BLOOD PRESSURE: 110 MMHG | HEART RATE: 87 BPM | WEIGHT: 116 LBS | DIASTOLIC BLOOD PRESSURE: 60 MMHG

## 2020-07-09 PROCEDURE — 99214 OFFICE O/P EST MOD 30 MIN: CPT | Performed by: INTERNAL MEDICINE

## 2020-07-09 RX ORDER — ALBUTEROL SULFATE 2.5 MG/3ML
2.5 SOLUTION RESPIRATORY (INHALATION) EVERY 6 HOURS PRN
Qty: 120 EACH | Refills: 3 | Status: SHIPPED | OUTPATIENT
Start: 2020-07-09 | End: 2021-11-24 | Stop reason: SDUPTHER

## 2020-07-09 RX ORDER — BUDESONIDE AND FORMOTEROL FUMARATE DIHYDRATE 160; 4.5 UG/1; UG/1
AEROSOL RESPIRATORY (INHALATION)
Qty: 30.6 INHALER | Refills: 3 | Status: SHIPPED | OUTPATIENT
Start: 2020-07-09 | End: 2021-07-21 | Stop reason: SDUPTHER

## 2020-07-09 ASSESSMENT — ENCOUNTER SYMPTOMS
RHINORRHEA: 0
EYE DISCHARGE: 0
SINUS PRESSURE: 0
VOMITING: 0
ABDOMINAL PAIN: 0
TROUBLE SWALLOWING: 0
SORE THROAT: 0
EYE ITCHING: 0
DIARRHEA: 0
CHEST TIGHTNESS: 0
SHORTNESS OF BREATH: 1
NAUSEA: 0
COUGH: 0
VOICE CHANGE: 0
WHEEZING: 0

## 2020-07-09 NOTE — PROGRESS NOTES
Subjective:     Sharyle Stamps is a 56 y.o. female who complains today of:     Chief Complaint   Patient presents with    Follow-up     f/u for COPD. HPI  Patient is using 2 lit O2 with sleep   C/o shortness of breath , worse with exertion. No Wheezing   No Cough with  Sputum  No Hemoptysis  No Chest tightness   No Chest pain with radiation  or pleuritic pain  No Fever or chills. No Rhinorrhea and postnasal drip. Using bronchodilator with bronchodilator with Symbicort 160/4.5 mcg BID , albuterol neb prn , ventolin  HFA prn    Quit smoking 4 month month ago         Allergies:  Patient has no known allergies.   Past Medical History:   Diagnosis Date    Arthritis     left hip    COPD (chronic obstructive pulmonary disease) ([address]     uses inhalers x 4 yrs    Hyperlipidemia     on meds x 10yrs    Hypertension 2018    Lung disease     Type 2 diabetes mellitus with hyperglycemia, without long-term current use of insulin ([address] 8/15/2018     Past Surgical History:   Procedure Laterality Date    BREAST BIOPSY Right 2003    benign    CERVICAL FUSION  2016    CERVICAL SPINE SURGERY  2004    AVM removal, fusion, embolism removal      SECTION  1979    COLONOSCOPY  2018    HYSTERECTOMY  1997    SD TOTAL HIP ARTHROPLASTY Left 2018    LEFT HIP TOTAL HIP ARTHROPLASTY performed by Pema Shaw MD at [address] History   Problem Relation Age of Onset    Breast Cancer Mother     Cancer Mother         liver ca    No Known Problems Brother     Obesity Son      Social History     Socioeconomic History    Marital status: Single     Spouse name: Not on file    Number of children: Not on file    Years of education: Not on file    Highest education level: Not on file   Occupational History    Not on file   Social Needs    Financial resource strain: Not on file    Food insecurity     Worry: Not on file     Inability: Not on file    Transportation needs     Medical: Not on file Non-medical: Not on file   Tobacco Use    Smoking status: Former Smoker     Packs/day: 0.50     Years: 1.00     Pack years: 0.50     Start date: 2017     Last attempt to quit: 2019     Years since quittin.5    Smokeless tobacco: Never Used    Tobacco comment: smoked at 15yrs old x 30 yrs 0.5ppd    Substance and Sexual Activity    Alcohol use: No     Alcohol/week: 0.0 standard drinks    Drug use: No    Sexual activity: Not on file   Lifestyle    Physical activity     Days per week: Not on file     Minutes per session: Not on file    Stress: Not on file   Relationships    Social connections     Talks on phone: Not on file     Gets together: Not on file     Attends Jehovah's witness service: Not on file     Active member of club or organization: Not on file     Attends meetings of clubs or organizations: Not on file     Relationship status: Not on file    Intimate partner violence     Fear of current or ex partner: Not on file     Emotionally abused: Not on file     Physically abused: Not on file     Forced sexual activity: Not on file   Other Topics Concern    Not on file   Social History Narrative         Lives With: Significant other    Type of Home: House    Home Layout: One level    Home Access: Stairs to enter with rails    Entrance Stairs - Number of Steps: 6    Home Equipment: Rolling walker, Cane, Wheelchair-manual (BSC, leg )    ADL Assistance: Independent    Ambulation Assistance: Independent (with [employer])    Transfer Assistance: Independent    Additional Comments: Multiple neck surgeries with residual spasticity R UE/LE         Review of Systems   Constitutional: Negative for chills, diaphoresis, fatigue and fever. HENT: Negative for congestion, mouth sores, nosebleeds, postnasal drip, rhinorrhea, sinus pressure, sneezing, sore throat, trouble swallowing and voice change. Eyes: Negative for discharge, itching and visual disturbance. Respiratory: Positive for shortness of breath. Negative for cough, chest tightness and wheezing. Cardiovascular: Negative for chest pain, palpitations and leg swelling. Gastrointestinal: Negative for abdominal pain, diarrhea, nausea and vomiting. Genitourinary: Negative for difficulty urinating and hematuria. Musculoskeletal: Negative for arthralgias, joint swelling and myalgias. Skin: Negative for rash. Allergic/Immunologic: Negative for environmental allergies and food allergies. Neurological: Negative for dizziness, tremors, weakness and headaches. Psychiatric/Behavioral: Negative for behavioral problems and sleep disturbance.         :     Vitals:    07/09/20 1024 07/09/20 1030   BP: 110/60    Pulse: 87    Resp: 16    Temp: 96.1 °F (35.6 °C)    TempSrc: Temporal    SpO2: (!) 88% 93%   Weight: 116 lb (52.6 kg)    Height: 5' 1\" (1.549 m)      Wt Readings from Last 3 Encounters:   07/09/20 116 lb (52.6 kg)   07/08/20 117 lb (53.1 kg)   06/09/20 116 lb 12.8 oz (53 kg)         Physical Exam  Constitutional:       General: She is not in acute distress. Appearance: She is well-developed. She is not diaphoretic. HENT:      Head: Normocephalic and atraumatic. Nose: Nose normal.   Eyes:      Pupils: Pupils are equal, round, and reactive to light. Neck:      Thyroid: No thyromegaly. Vascular: No JVD. Trachea: No tracheal deviation. Cardiovascular:      Rate and Rhythm: Normal rate and regular rhythm. Heart sounds: No murmur. No friction rub. No gallop. Pulmonary:      Effort: No respiratory distress. Breath sounds: No wheezing or rales. Comments: diminished Breath sound bilaterally. Chest:      Chest wall: No tenderness. Abdominal:      General: There is no distension. Tenderness: There is no abdominal tenderness. There is no rebound. Musculoskeletal: Normal range of motion. Lymphadenopathy:      Cervical: No cervical adenopathy. Skin:     General: Skin is warm and dry.    Neurological: Mental Status: She is alert and oriented to person, place, and time. Coordination: Coordination normal.         Current Outpatient Medications   Medication Sig Dispense Refill    albuterol (PROVENTIL) (2.5 MG/3ML) 0.083% nebulizer solution Take 3 mLs by nebulization every 6 hours as needed for Wheezing 120 each 3    budesonide-formoterol (SYMBICORT) 160-4.5 MCG/ACT AERO TAKE 2 PUFFS BY MOUTH TWICE A DAY 30.6 Inhaler 3    albuterol sulfate HFA (VENTOLIN HFA) 108 (90 Base) MCG/ACT inhaler Inhale 2 puffs into the lungs every 6 hours as needed for Wheezing 1 Inhaler 3    Multiple Vitamins-Minerals (MULTIVITAMIN ADULT PO) Take 1 tablet by mouth      fluticasone (FLONASE) 50 MCG/ACT nasal spray 1 spray by NOT APPLICABLE route      aspirin 81 MG EC tablet Take 1 tablet by mouth 2 times daily 30 tablet 0    Magnesium Oxide 500 MG CAPS Take 1 capsule by mouth 2 times daily      DULoxetine (CYMBALTA) 60 MG capsule       oxyCODONE-acetaminophen (PERCOCET) 5-325 MG per tablet Take 1 tablet by mouth every 6-8 hours as needed for Pain for up to 30 days. Dose reduction as discussed 100 tablet 0    baclofen (LIORESAL) 10 MG tablet Take 1 tablet by mouth 3 times daily 270 tablet 2    naproxen (NAPROSYN) 500 MG tablet Take 1 tablet by mouth 2 times daily (with meals) 60 tablet 2    POLYETHYLENE GLYCOL 3350 PO Take 17 g by mouth      Multiple Vitamins-Minerals (THERAPEUTIC MULTIVITAMIN-MINERALS) tablet Take 1 tablet by mouth daily      oxybutynin (DITROPAN-XL) 10 MG extended release tablet Take 10 mg by mouth daily      simvastatin (ZOCOR) 10 MG tablet Take 10 mg by mouth nightly      Oxygen Concentrator Inhale 3 L into the lungs continuous        No current facility-administered medications for this visit.         Results for orders placed during the hospital encounter of 01/02/19   XR CHEST STANDARD (2 VW)    Narrative EXAMINATION: XR CHEST (2 VW)    REASON FOR EXAM: COPD atelectasis     FINDINGS: 2 views of the chest reveal lung fields slightly hyperinflated. There is borderline cardiomegaly. The pulmonary vasculature is within normal limits. Atherosclerotic changes in the aorta noted. Since January 4, 2019 endotracheal tube and NG tube have been removed. Lung fields clear with no evidence of pneumonia. No evidence of pulmonary edema. Bones and soft tissues intact. Postsurgical changes overlie the lower cervical spine. Impression NO ACTIVE DISEASE IN THE CHEST.      ]  Results for orders placed during the hospital encounter of 01/02/19   XR CHEST PORTABLE    Narrative Portable chest radiograph    History: Respiratory failure    Technique: AP portable view of the chest obtained. Comparison: CT from January 3, 2019; chest x-ray from January 3, 2019    Findings:    Endotracheal tube remains, not significantly changed in position. Enteric tube traverses the diaphragm however its distal tip is not visualized. The cardiomediastinal silhouette is within normal limits. No pneumothorax, pleural effusion, or focal   consolidation. Linear left lung base opacity compatible with subsegmental atelectasis is not significantly changed. Lungs are hyperinflated. Coarsening of the pulmonary interstitium. Degenerative changes of the spine. Impression No significant interval change in left base subsegmental atelectasis. XR CHEST PORTABLE    Narrative EXAMINATION: CHEST PORTABLE VIEW     CLINICAL HISTORY: Intubation    COMPARISONS: January 2, 2018     FINDINGS:    Single  views of the chest is submitted. There is a endotracheal tube. The tip lies approximately 2.1 cm above the adele. There is a nasogastric tube. The tip is not seen but does lie below the hemidiaphragm. The cardiac silhouette is enlarged. Unchanged configuration. The mediastinum is unremarkable. Pulmonary vascular unremarkable. Right sided trachea. Left lower lobe infiltrate/consolidation with trace left pleural effusion. No Pneumothoraces. Impression LEFT LOWER LOBE INFILTRATE/CONSOLIDATION WITH TRACE LEFT PLEURAL EFFUSION   XR CHEST PORTABLE    Narrative EXAMINATION: XR CHEST PORTABLE, 1/2/2019 2:15 PM     History: 78-year-old female, shortness of breath, dyspnea    COMPARISON:  October 2, 2018    FINDINGS:  Chest-AP erect portable:   Hyperaeration lung fields. There are chronic interstitial changes. Asymmetric subtle increased density in the left lung base, suggestive of infiltrate. There is blunting of the right costophrenic angle, may represent focal atelectasis versus small   pleural effusion. Cardiac silhouette at the upper limits of normal. Aortic arch calcification. The pulmonary vascularity is normal size. There are degenerative changes of the spine. Monitoring leads project across the thorax. Impression 1. Asymmetric hazy density in the left lung base suspicious for developing infiltrate. 2. Small right pleural effusion versus atelectasis. 3. Chronic interstitial changes and hyperaeration lung fields, correlate for COPD. Assessment/Plan:     1. Chronic obstructive pulmonary disease, unspecified COPD type ([address]  She is currently using bronchodilator therapy with Symbicort 160-4.5 mcg 2 puff twice daily nebulizer with albuterol 4 times daily as needed basis Ventolin HFA 2 puffs 4 times daily as needed basis. She is having short of breath with exertion but no wheezing or cough at this time. Continue bronchodilator therapy as before. - albuterol (PROVENTIL) (2.5 MG/3ML) 0.083% nebulizer solution; Take 3 mLs by nebulization every 6 hours as needed for Wheezing  Dispense: 120 each; Refill: 3  - budesonide-formoterol (SYMBICORT) 160-4.5 MCG/ACT AERO; TAKE 2 PUFFS BY MOUTH TWICE A DAY  Dispense: 30.6 Inhaler; Refill: 3    2. Hypoxia  She  is using 2 lit O2 with sleep. Continue O2 to keep saturation 90% or above.     3. Tobacco abuse  Patient advised to stay away from smoking. Risks related to smoking explained. She said she quit smoking on March 2020. Return in about 3 months (around 10/9/2020) for COPD, hypoxia on O2.       Juan Lara MD

## 2020-10-14 ENCOUNTER — OFFICE VISIT (OUTPATIENT)
Dept: PULMONOLOGY | Age: 60
End: 2020-10-14
Payer: MEDICARE

## 2020-10-14 VITALS
RESPIRATION RATE: 16 BRPM | HEART RATE: 106 BPM | OXYGEN SATURATION: 92 % | DIASTOLIC BLOOD PRESSURE: 60 MMHG | SYSTOLIC BLOOD PRESSURE: 124 MMHG | BODY MASS INDEX: 22.84 KG/M2 | WEIGHT: 121 LBS | HEIGHT: 61 IN | TEMPERATURE: 99.7 F

## 2020-10-14 PROCEDURE — G8427 DOCREV CUR MEDS BY ELIG CLIN: HCPCS | Performed by: INTERNAL MEDICINE

## 2020-10-14 PROCEDURE — 99214 OFFICE O/P EST MOD 30 MIN: CPT | Performed by: INTERNAL MEDICINE

## 2020-10-14 PROCEDURE — 3017F COLORECTAL CA SCREEN DOC REV: CPT | Performed by: INTERNAL MEDICINE

## 2020-10-14 PROCEDURE — 1036F TOBACCO NON-USER: CPT | Performed by: INTERNAL MEDICINE

## 2020-10-14 PROCEDURE — G8420 CALC BMI NORM PARAMETERS: HCPCS | Performed by: INTERNAL MEDICINE

## 2020-10-14 PROCEDURE — G8926 SPIRO NO PERF OR DOC: HCPCS | Performed by: INTERNAL MEDICINE

## 2020-10-14 PROCEDURE — G8484 FLU IMMUNIZE NO ADMIN: HCPCS | Performed by: INTERNAL MEDICINE

## 2020-10-14 PROCEDURE — 3023F SPIROM DOC REV: CPT | Performed by: INTERNAL MEDICINE

## 2020-10-14 ASSESSMENT — ENCOUNTER SYMPTOMS
ABDOMINAL PAIN: 0
EYE ITCHING: 0
CHEST TIGHTNESS: 0
NAUSEA: 0
VOICE CHANGE: 0
SORE THROAT: 0
RHINORRHEA: 0
DIARRHEA: 0
COUGH: 0
SHORTNESS OF BREATH: 1
VOMITING: 0
TROUBLE SWALLOWING: 0
WHEEZING: 0
SINUS PRESSURE: 0
EYE DISCHARGE: 0

## 2020-10-14 NOTE — PROGRESS NOTES
Subjective:     Kevyn Champion is a 56 y.o. female who complains today of:     Chief Complaint   Patient presents with    Follow-up     three month f/u for COPD and Hypoxia. HPI  Patient is using 2 lit O2 with sleep . Using bronchodilator with bronchodilator with Symbicort 160/4.5 mcg BID , albuterol neb prn , ventolin  HFA prn  C/o shortness of breath  with exertion. No Wheezing   No Cough or Sputum  No Hemoptysis  No Chest tightness   No Chest pain with radiation  or pleuritic pain  No Fever or chills. No Rhinorrhea and postnasal drip. She said she stop smoking in . Patient said she had Ct chest done at [facility]           Allergies:  Patient has no known allergies.   Past Medical History:   Diagnosis Date    Arthritis     left hip    COPD (chronic obstructive pulmonary disease) ([address]     uses inhalers x 4 yrs    Hyperlipidemia     on meds x 10yrs    Hypertension 2018    Lung disease     Type 2 diabetes mellitus with hyperglycemia, without long-term current use of insulin ([facility] 8/15/2018     Past Surgical History:   Procedure Laterality Date    BREAST BIOPSY Right 2003    benign    CERVICAL FUSION  2016    CERVICAL SPINE SURGERY  2004    AVM removal, fusion, embolism removal      SECTION  1979    COLONOSCOPY  2018    HYSTERECTOMY  1997    DE TOTAL HIP ARTHROPLASTY Left 2018    LEFT HIP TOTAL HIP ARTHROPLASTY performed by Mercedes Nichole MD at [address] History   Problem Relation Age of Onset    Breast Cancer Mother     Cancer Mother         liver ca    No Known Problems Brother     Obesity Son      Social History     Socioeconomic History    Marital status: Single     Spouse name: Not on file    Number of children: Not on file    Years of education: Not on file    Highest education level: Not on file   Occupational History    Not on file   Social Needs    Financial resource strain: Not on file    Food insecurity     Worry: Not on file     Inability: disturbance. Respiratory: Positive for shortness of breath. Negative for cough, chest tightness and wheezing. Cardiovascular: Negative for chest pain, palpitations and leg swelling. Gastrointestinal: Negative for abdominal pain, diarrhea, nausea and vomiting. Genitourinary: Negative for difficulty urinating and hematuria. Musculoskeletal: Negative for arthralgias, joint swelling and myalgias. Skin: Negative for rash. Allergic/Immunologic: Negative for environmental allergies and food allergies. Neurological: Negative for dizziness, tremors, weakness and headaches. Psychiatric/Behavioral: Negative for behavioral problems and sleep disturbance.         :     Vitals:    10/14/20 1052   BP: 124/60   Pulse: 106   Resp: 16   Temp: 99.7 °F (37.6 °C)   TempSrc: Temporal   SpO2: 92%   Weight: 121 lb (54.9 kg)   Height: 5' 1\" (1.549 m)     Wt Readings from Last 3 Encounters:   10/14/20 121 lb (54.9 kg)   10/06/20 119 lb 3.2 oz (54.1 kg)   08/05/20 114 lb (51.7 kg)         Physical Exam  Constitutional:       General: She is not in acute distress. Appearance: She is well-developed. She is not diaphoretic. HENT:      Head: Normocephalic and atraumatic. Nose: Nose normal.   Eyes:      Pupils: Pupils are equal, round, and reactive to light. Neck:      Thyroid: No thyromegaly. Vascular: No JVD. Trachea: No tracheal deviation. Cardiovascular:      Rate and Rhythm: Normal rate and regular rhythm. Heart sounds: No murmur. No friction rub. No gallop. Pulmonary:      Effort: No respiratory distress. Breath sounds: No wheezing or rales. Comments: diminished Breath sound bilaterally. Chest:      Chest wall: No tenderness. Abdominal:      General: There is no distension. Tenderness: There is no abdominal tenderness. There is no rebound. Musculoskeletal: Normal range of motion. Lymphadenopathy:      Cervical: No cervical adenopathy.    Skin:     General: Skin is warm and dry. Neurological:      Mental Status: She is alert and oriented to person, place, and time. Coordination: Coordination normal.         Current Outpatient Medications   Medication Sig Dispense Refill    oxyCODONE-acetaminophen (PERCOCET) 5-325 MG per tablet Take 1 tablet by mouth 2 times daily as needed for Pain for up to 30 days. Dose reduction as discussed 60 tablet 0    gabapentin (NEURONTIN) 300 MG capsule Take 1 capsule by mouth daily for 30 days. 30 capsule 3    budesonide-formoterol (SYMBICORT) 160-4.5 MCG/ACT AERO TAKE 2 PUFFS BY MOUTH TWICE A DAY 30.6 Inhaler 3    baclofen (LIORESAL) 10 MG tablet Take 1 tablet by mouth 3 times daily 270 tablet 2    albuterol sulfate HFA (VENTOLIN HFA) 108 (90 Base) MCG/ACT inhaler Inhale 2 puffs into the lungs every 6 hours as needed for Wheezing 1 Inhaler 3    Multiple Vitamins-Minerals (MULTIVITAMIN ADULT PO) Take 1 tablet by mouth      POLYETHYLENE GLYCOL 3350 PO Take 17 g by mouth      fluticasone (FLONASE) 50 MCG/ACT nasal spray 1 spray by NOT APPLICABLE route      aspirin 81 MG EC tablet Take 1 tablet by mouth 2 times daily 30 tablet 0    Multiple Vitamins-Minerals (THERAPEUTIC MULTIVITAMIN-MINERALS) tablet Take 1 tablet by mouth daily      Magnesium Oxide 500 MG CAPS Take 1 capsule by mouth 2 times daily      oxybutynin (DITROPAN-XL) 10 MG extended release tablet Take 10 mg by mouth daily      simvastatin (ZOCOR) 10 MG tablet Take 10 mg by mouth nightly      Oxygen Concentrator Inhale 3 L into the lungs continuous       DULoxetine (CYMBALTA) 60 MG capsule       albuterol (PROVENTIL) (2.5 MG/3ML) 0.083% nebulizer solution Take 3 mLs by nebulization every 6 hours as needed for Wheezing 120 each 3     No current facility-administered medications for this visit.         Results for orders placed during the hospital encounter of 01/02/19   XR CHEST STANDARD (2 VW)    Narrative EXAMINATION: XR CHEST (2 VW)    REASON FOR EXAM: COPD atelectasis FINDINGS: 2 views of the chest reveal lung fields slightly hyperinflated. There is borderline cardiomegaly. The pulmonary vasculature is within normal limits. Atherosclerotic changes in the aorta noted. Since January 4, 2019 endotracheal tube and NG tube have been removed. Lung fields clear with no evidence of pneumonia. No evidence of pulmonary edema. Bones and soft tissues intact. Postsurgical changes overlie the lower cervical spine. Impression NO ACTIVE DISEASE IN THE CHEST.      ]  Results for orders placed during the hospital encounter of 01/02/19   XR CHEST PORTABLE    Narrative Portable chest radiograph    History: Respiratory failure    Technique: AP portable view of the chest obtained. Comparison: CT from January 3, 2019; chest x-ray from January 3, 2019    Findings:    Endotracheal tube remains, not significantly changed in position. Enteric tube traverses the diaphragm however its distal tip is not visualized. The cardiomediastinal silhouette is within normal limits. No pneumothorax, pleural effusion, or focal   consolidation. Linear left lung base opacity compatible with subsegmental atelectasis is not significantly changed. Lungs are hyperinflated. Coarsening of the pulmonary interstitium. Degenerative changes of the spine. Impression No significant interval change in left base subsegmental atelectasis. XR CHEST PORTABLE    Narrative EXAMINATION: CHEST PORTABLE VIEW     CLINICAL HISTORY: Intubation    COMPARISONS: January 2, 2018     FINDINGS:    Single  views of the chest is submitted. There is a endotracheal tube. The tip lies approximately 2.1 cm above the adele. There is a nasogastric tube. The tip is not seen but does lie below the hemidiaphragm. The cardiac silhouette is enlarged. Unchanged configuration. The mediastinum is unremarkable. Pulmonary vascular unremarkable. Right sided trachea.   Left lower lobe infiltrate/consolidation with trace left pleural effusion. No Pneumothoraces. Impression LEFT LOWER LOBE INFILTRATE/CONSOLIDATION WITH TRACE LEFT PLEURAL EFFUSION   XR CHEST PORTABLE    Narrative EXAMINATION: XR CHEST PORTABLE, 1/2/2019 2:15 PM     History: 49-year-old female, shortness of breath, dyspnea    COMPARISON:  October 2, 2018    FINDINGS:  Chest-AP erect portable:   Hyperaeration lung fields. There are chronic interstitial changes. Asymmetric subtle increased density in the left lung base, suggestive of infiltrate. There is blunting of the right costophrenic angle, may represent focal atelectasis versus small   pleural effusion. Cardiac silhouette at the upper limits of normal. Aortic arch calcification. The pulmonary vascularity is normal size. There are degenerative changes of the spine. Monitoring leads project across the thorax. Impression 1. Asymmetric hazy density in the left lung base suspicious for developing infiltrate. 2. Small right pleural effusion versus atelectasis. 3. Chronic interstitial changes and hyperaeration lung fields, correlate for COPD. CT chest 7/15/20 at [facility]    RESULT: EXAMINATION:   CHEST CT WITHOUT CONTRAST (LOW-DOSE CT LUNG CANCER   SCREENING PROTOCOL)    CLINICAL HISTORY:  Lung cancer LDCT screening ? absence of signs or   symptoms of lung cancer. Personal history of nicotine dependence. Nodule 1: This Solid nodule is located in the Left Upper Lobe on slice   number 95 with an average diameter of 2.6 mm (2.8 mm x 2.3 mm). Other lung nodule comments:  None    Other findings: The central airways are patent without evidence of   endobronchial lesion. Mild lower lobe bronchiectasis is seen. There is   no acute focal lung consolidation. Subsegmental atelectasis is seen in   the lingula. No enlarged supraclavicular, axillary, mediastinal or hilar   lymph nodes are seen.   The aorta and main pulmonary artery are normal in   course and caliber. The heart size is normal.  Small amount of   pericardial fluid is seen anteriorly. The esophagus is nondilated mild   body wall edema is seen in the soft tissues of the chest wall. The   visible portions of the liver, gallbladder, spleen, pancreas, adrenal   glands and kidneys are normal.  The stomach and visible bowel are   unremarkable. No destructive bone lesion is seen. Mild degenerative   changes are seen in the thoracic spine. Emphysema:  Trivial (<5%), Centrilobular, Upper lobe  Coronary Artery Calcifications: Circumflex  None; Left Anterior   Descending Mild; Right Coronary Mild     (topogram) images: Left hip arthroplasty is partially imaged. IMPRESSION  IMPRESSION:    LungRADS category: 2  LungRADS modifier: None  LungRADS 0 reason: n/a    Recommendations:    Continue annual screening with LDCT in 12 months. Other actionable findings:    Assessment/Plan:     1. Chronic obstructive pulmonary disease, unspecified COPD type ([facility] [address]   [facility] which reported category 2 follow-up CT chest in 12 months. I reviewed CT chest results with the patient. She also has a changes of centrilobular emphysema. She said she quit smoking since February 2020. She is currently on using bronchodilator with bronchodilator with Symbicort 160/4.5 mcg BID , albuterol neb prn , ventolin  HFA prn . C/o shortness of breath  with exertion. No Wheezing. No Cough or Sputum. 2. Hypoxia  She is using 2 lit O2 with sleep and PRN. Anne Marie Monge Continue O2 to keep saturation 90% above. Return in about 4 months (around 2/14/2021) for COPD, hypoxia on O2.       Herminio Grijalva MD

## 2021-03-19 ENCOUNTER — HOSPITAL ENCOUNTER (OUTPATIENT)
Dept: MRI IMAGING | Age: 61
Discharge: HOME | End: 2021-03-21
Payer: MEDICARE

## 2021-03-19 DIAGNOSIS — M25.551 PAIN IN JOINT INVOLVING RIGHT PELVIC REGION AND THIGH: ICD-10-CM

## 2021-03-19 PROCEDURE — 6360000004 HC RX CONTRAST MEDICATION: Performed by: ORTHOPAEDIC SURGERY

## 2021-03-19 PROCEDURE — 73723 MRI JOINT LWR EXTR W/O&W/DYE: CPT

## 2021-03-19 PROCEDURE — A9579 GAD-BASE MR CONTRAST NOS,1ML: HCPCS | Performed by: ORTHOPAEDIC SURGERY

## 2021-03-19 RX ADMIN — GADOTERIDOL 15 ML: 279.3 INJECTION, SOLUTION INTRAVENOUS at 15:46

## 2021-03-24 ENCOUNTER — OFFICE VISIT (OUTPATIENT)
Dept: PULMONOLOGY | Age: 61
End: 2021-03-24
Payer: MEDICARE

## 2021-03-24 VITALS
DIASTOLIC BLOOD PRESSURE: 68 MMHG | HEIGHT: 61 IN | SYSTOLIC BLOOD PRESSURE: 119 MMHG | OXYGEN SATURATION: 95 % | BODY MASS INDEX: 24.17 KG/M2 | WEIGHT: 128 LBS | TEMPERATURE: 97.6 F | HEART RATE: 106 BPM | RESPIRATION RATE: 18 BRPM

## 2021-03-24 DIAGNOSIS — Z87.891 PERSONAL HISTORY OF SMOKING: ICD-10-CM

## 2021-03-24 DIAGNOSIS — J44.9 CHRONIC OBSTRUCTIVE PULMONARY DISEASE, UNSPECIFIED COPD TYPE (HCC): Primary | ICD-10-CM

## 2021-03-24 DIAGNOSIS — R09.02 HYPOXIA: ICD-10-CM

## 2021-03-24 PROCEDURE — 1036F TOBACCO NON-USER: CPT | Performed by: INTERNAL MEDICINE

## 2021-03-24 PROCEDURE — G8484 FLU IMMUNIZE NO ADMIN: HCPCS | Performed by: INTERNAL MEDICINE

## 2021-03-24 PROCEDURE — 99214 OFFICE O/P EST MOD 30 MIN: CPT | Performed by: INTERNAL MEDICINE

## 2021-03-24 PROCEDURE — G8427 DOCREV CUR MEDS BY ELIG CLIN: HCPCS | Performed by: INTERNAL MEDICINE

## 2021-03-24 PROCEDURE — G8926 SPIRO NO PERF OR DOC: HCPCS | Performed by: INTERNAL MEDICINE

## 2021-03-24 PROCEDURE — 3023F SPIROM DOC REV: CPT | Performed by: INTERNAL MEDICINE

## 2021-03-24 PROCEDURE — G8420 CALC BMI NORM PARAMETERS: HCPCS | Performed by: INTERNAL MEDICINE

## 2021-03-24 PROCEDURE — 3017F COLORECTAL CA SCREEN DOC REV: CPT | Performed by: INTERNAL MEDICINE

## 2021-03-24 ASSESSMENT — ENCOUNTER SYMPTOMS
DIARRHEA: 0
SORE THROAT: 0
CHEST TIGHTNESS: 0
ABDOMINAL PAIN: 0
EYE DISCHARGE: 0
TROUBLE SWALLOWING: 0
EYE ITCHING: 0
NAUSEA: 0
RHINORRHEA: 0
VOICE CHANGE: 0
SINUS PRESSURE: 0
WHEEZING: 0
VOMITING: 0
COUGH: 0
SHORTNESS OF BREATH: 1

## 2021-03-24 NOTE — PROGRESS NOTES
Subjective:     Emilie Ohara is a 56 y.o. female who complains today of:     Chief Complaint   Patient presents with    Follow-up     four month f/u for COPD and Hypoxia on O2. HPI  She is using 2 lit O2 with sleep , not using during daytime  C/o shortness of breath  with exertion. No Wheezing   No Cough or  Sputum  No Hemoptysis  No Chest tightness   No Chest pain with radiation  or pleuritic pain  No Fever or chills. No Rhinorrhea and postnasal drip. She said she is not smoking since   She is using bronchodilator  with bronchodilator with Symbicort 160/4.5 mcg BID , albuterol neb prn , ventolin  HFA 2 puff  prn    Allergies:  Patient has no known allergies.   Past Medical History:   Diagnosis Date    Arthritis     left hip    COPD (chronic obstructive pulmonary disease) ([address]     uses inhalers x 4 yrs    Hyperlipidemia     on meds x 10yrs    Hypertension 2018    Lung disease     Type 2 diabetes mellitus with hyperglycemia, without long-term current use of insulin ([address] 8/15/2018     Past Surgical History:   Procedure Laterality Date    BREAST BIOPSY Right 2003    benign    CERVICAL FUSION  2016    CERVICAL SPINE SURGERY  2004    AVM removal, fusion, embolism removal      SECTION  1979    COLONOSCOPY  2018    HYSTERECTOMY  1997    [place] TOTAL HIP ARTHROPLASTY Left 2018    LEFT HIP TOTAL HIP ARTHROPLASTY performed by Get Carl MD at [address] History   Problem Relation Age of Onset    Breast Cancer Mother     Cancer Mother         liver ca    No Known Problems Brother     Obesity Son      Social History     Socioeconomic History    Marital status: Single     Spouse name: Not on file    Number of children: Not on file    Years of education: Not on file    Highest education level: Not on file   Occupational History    Not on file   Social Needs    Financial resource strain: Not on file    Food insecurity     Worry: Not on file     Inability: Not on file   [employer] needs     Medical: Not on file     Non-medical: Not on file   Tobacco Use    Smoking status: Former Smoker     Packs/day: 0.50     Years: 1.00     Pack years: 0.50     Start date: 2017     Quit date: 2019     Years since quittin.2    Smokeless tobacco: Never Used    Tobacco comment: smoked at 15yrs old x 30 yrs 0.5ppd    Substance and Sexual Activity    Alcohol use: No     Alcohol/week: 0.0 standard drinks    Drug use: No    Sexual activity: Not on file   Lifestyle    Physical activity     Days per week: Not on file     Minutes per session: Not on file    Stress: Not on file   Relationships    Social connections     Talks on phone: Not on file     Gets together: Not on file     Attends Restoration service: Not on file     Active member of club or organization: Not on file     Attends meetings of clubs or organizations: Not on file     Relationship status: Not on file    Intimate partner violence     Fear of current or ex partner: Not on file     Emotionally abused: Not on file     Physically abused: Not on file     Forced sexual activity: Not on file   Other Topics Concern    Not on file   Social History Narrative         Lives With: Significant other    Type of Home: House    Home Layout: One level    Home Access: Stairs to enter with rails    Entrance Stairs - Number of Steps: 6    Home Equipment: Rolling walker, Cane, Wheelchair-manual (BSC, leg )    ADL Assistance: Independent    Ambulation Assistance: Independent (with [employer])    Transfer Assistance: Independent    Additional Comments: Multiple neck surgeries with residual spasticity R UE/LE         Review of Systems   Constitutional: Negative for chills, diaphoresis, fatigue and fever. HENT: Negative for congestion, mouth sores, nosebleeds, postnasal drip, rhinorrhea, sinus pressure, sneezing, sore throat, trouble swallowing and voice change. Eyes: Negative for discharge, itching and visual disturbance. Respiratory: Positive for shortness of breath. Negative for cough, chest tightness and wheezing. Cardiovascular: Negative for chest pain, palpitations and leg swelling. Gastrointestinal: Negative for abdominal pain, diarrhea, nausea and vomiting. Genitourinary: Negative for difficulty urinating and hematuria. Musculoskeletal: Negative for arthralgias, joint swelling and myalgias. Skin: Negative for rash. Allergic/Immunologic: Negative for environmental allergies and food allergies. Neurological: Negative for dizziness, tremors, weakness and headaches. Psychiatric/Behavioral: Negative for behavioral problems and sleep disturbance.         :     Vitals:    03/24/21 1300   BP: 119/68   Pulse: 106   Resp: 18   Temp: 97.6 °F (36.4 °C)   SpO2: 95%   Weight: 128 lb (58.1 kg)   Height: 5' 1\" (1.549 m)     Wt Readings from Last 3 Encounters:   03/24/21 128 lb (58.1 kg)   01/07/21 131 lb 12.8 oz (59.8 kg)   10/14/20 121 lb (54.9 kg)         Physical Exam  Constitutional:       General: She is not in acute distress. Appearance: She is well-developed. She is not diaphoretic. HENT:      Head: Normocephalic and atraumatic. Nose: Nose normal.   Eyes:      Pupils: Pupils are equal, round, and reactive to light. Neck:      Thyroid: No thyromegaly. Vascular: No JVD. Trachea: No tracheal deviation. Cardiovascular:      Rate and Rhythm: Normal rate and regular rhythm. Heart sounds: No murmur. No friction rub. No gallop. Pulmonary:      Effort: No respiratory distress. Breath sounds: No wheezing or rales. Comments: diminished Breath sound bilaterally. Chest:      Chest wall: No tenderness. Abdominal:      General: There is no distension. Tenderness: There is no abdominal tenderness. There is no rebound. Musculoskeletal: Normal range of motion. Lymphadenopathy:      Cervical: No cervical adenopathy. Skin:     General: Skin is warm and dry.    Neurological: limits. Atherosclerotic changes in the aorta noted. Since January 4, 2019 endotracheal tube and NG tube have been removed. Lung fields clear with no evidence of pneumonia. No evidence of pulmonary edema. Bones and soft tissues intact. Postsurgical changes overlie the lower cervical spine. Impression NO ACTIVE DISEASE IN THE CHEST.      ]  Results for orders placed during the hospital encounter of 01/02/19   XR CHEST PORTABLE    Narrative Portable chest radiograph    History: Respiratory failure    Technique: AP portable view of the chest obtained. Comparison: CT from January 3, 2019; chest x-ray from January 3, 2019    Findings:    Endotracheal tube remains, not significantly changed in position. Enteric tube traverses the diaphragm however its distal tip is not visualized. The cardiomediastinal silhouette is within normal limits. No pneumothorax, pleural effusion, or focal   consolidation. Linear left lung base opacity compatible with subsegmental atelectasis is not significantly changed. Lungs are hyperinflated. Coarsening of the pulmonary interstitium. Degenerative changes of the spine. Impression No significant interval change in left base subsegmental atelectasis. XR CHEST PORTABLE    Narrative EXAMINATION: CHEST PORTABLE VIEW     CLINICAL HISTORY: Intubation    COMPARISONS: January 2, 2018     FINDINGS:    Single  views of the chest is submitted. There is a endotracheal tube. The tip lies approximately 2.1 cm above the adele. There is a nasogastric tube. The tip is not seen but does lie below the hemidiaphragm. The cardiac silhouette is enlarged. Unchanged configuration. The mediastinum is unremarkable. Pulmonary vascular unremarkable. Right sided trachea. Left lower lobe infiltrate/consolidation with trace left pleural effusion. No Pneumothoraces.                                                                                     Impression LEFT LOWER LOBE INFILTRATE/CONSOLIDATION WITH TRACE LEFT PLEURAL EFFUSION   XR CHEST PORTABLE    Narrative EXAMINATION: XR CHEST PORTABLE, 1/2/2019 2:15 PM     History: 58-year-old female, shortness of breath, dyspnea    COMPARISON:  October 2, 2018    FINDINGS:  Chest-AP erect portable:   Hyperaeration lung fields. There are chronic interstitial changes. Asymmetric subtle increased density in the left lung base, suggestive of infiltrate. There is blunting of the right costophrenic angle, may represent focal atelectasis versus small   pleural effusion. Cardiac silhouette at the upper limits of normal. Aortic arch calcification. The pulmonary vascularity is normal size. There are degenerative changes of the spine. Monitoring leads project across the thorax. Impression 1. Asymmetric hazy density in the left lung base suspicious for developing infiltrate. 2. Small right pleural effusion versus atelectasis. 3. Chronic interstitial changes and hyperaeration lung fields, correlate for COPD. RESULT: EXAMINATION:   CHEST CT WITHOUT CONTRAST (LOW-DOSE CT LUNG CANCER   SCREENING PROTOCOL)    CLINICAL HISTORY:  Lung cancer LDCT screening ? absence of signs or   symptoms of lung cancer. Personal history of nicotine dependence. Baseline (initial)    Technique:  Spiral CT acquisition of the chest from the thoracic inlet to   the upper abdomen without contrast.  MQ:  CTLCS_6    Patient characteristics:  *  Date-of-Birth:  1960;   Age at exam: 56 years  *  Gender:  Female  *  Lung Disease:  Asymptomatic (no signs or symptoms of lung disease)  *  Number of Pack Years:  36  *  Current smoker (=0) or Number of Years since Quit:  6  *  Ordering provider and NPI:  Marina BLOUNT [id number]  *  Interpreting radiologist and NPI:  Ananya Xie [id number]    Exam acquisition parameters:  *  Exam Date:  7/15/2020 2:22 PM  *  Site:  [facility]  *  Accession: [id number]  *  CT System :  Siemens  *  CT System Model: Definition AS  *  Tube Current-Time (mA-sec):  11  *  Peak Voltage (kV):   120  *  Scan Time (sec):  5.27  *  Scan Volume (z-length, cm): 33.30  *  Pitch:  1.0  *  Slice Thickness (mm): 1.5  *  CT Dose-Length Product:  28.9 mGy*cm  *  CT Dose Index: 0.82mGy  *  CT Dose Reduction Method:  mAs-kVp adjusted based on patient size-age    COMPARISON:  No prior CT chest is available for comparison. RESULT:    Are nodules present? Yes, 1-5 nodules    If No, go to IMPRESSION. If yes, proceed with characterization of the   FIVE largest nodules. Nodule 1: This Solid nodule is located in the Left Upper Lobe on slice   number 95 with an average diameter of 2.6 mm (2.8 mm x 2.3 mm). Other lung nodule comments:  None    Other findings: The central airways are patent without evidence of   endobronchial lesion. Mild lower lobe bronchiectasis is seen. There is   no acute focal lung consolidation. Subsegmental atelectasis is seen in   the lingula. No enlarged supraclavicular, axillary, mediastinal or hilar   lymph nodes are seen. The aorta and main pulmonary artery are normal in   course and caliber. The heart size is normal.  Small amount of   pericardial fluid is seen anteriorly. The esophagus is nondilated mild   body wall edema is seen in the soft tissues of the chest wall. The   visible portions of the liver, gallbladder, spleen, pancreas, adrenal   glands and kidneys are normal.  The stomach and visible bowel are   unremarkable. No destructive bone lesion is seen. Mild degenerative   changes are seen in the thoracic spine. Emphysema:  Trivial (<5%), Centrilobular, Upper lobe  Coronary Artery Calcifications: Circumflex  None; Left Anterior   Descending Mild; Right Coronary Mild     (topogram) images: Left hip arthroplasty is partially imaged.     IMPRESSION  IMPRESSION:    LungRADS category: 2  LungRADS modifier: None  LungRADS 0 reason: n/a    Recommendations:    Continue annual screening with LDCT in 12 months. Other actionable findings:  Assessment/Plan:     1. Chronic obstructive pulmonary disease, unspecified COPD type ([facility]  She is using 2 lit O2 with sleep , not using during daytime. C/o shortness of breath  with exertion. No Wheezing . No Cough or  Sputum. She is using bronchodilator  with bronchodilator with Symbicort 160/4.5 mcg BID , albuterol neb prn , ventolin  HFA 2 puff  prn . Continue O2 and bronchodilator as before. 2. Hypoxia  She is on 2 lit O2 with sleep , continue O2 to keep Spo2  90% or above     3. Personal history of smoking  She quit 2020, CT chest lung screening done at [facility] category 2      Return in about 4 months (around 7/24/2021) for COPD, hypoxia on O2.       Arlet Rayo MD

## 2021-03-26 ENCOUNTER — TELEPHONE (OUTPATIENT)
Dept: PULMONOLOGY | Age: 61
End: 2021-03-26

## 2021-03-26 RX ORDER — ALBUTEROL SULFATE 90 UG/1
2 AEROSOL, METERED RESPIRATORY (INHALATION) EVERY 6 HOURS PRN
Qty: 1 INHALER | Refills: 3 | Status: SHIPPED | OUTPATIENT
Start: 2021-03-26

## 2021-03-26 NOTE — TELEPHONE ENCOUNTER
Pt called and stated her insurance will not cover the VENTOLIN  (90 Base) MCG/ACT inhaler. She wanted to know if you can prescribe her the generic version.   L/V  3/24/2021  F/U   7/21/2021

## 2021-06-17 ENCOUNTER — HOSPITAL ENCOUNTER (EMERGENCY)
Age: 61
Discharge: HOME | End: 2021-06-17
Attending: EMERGENCY MEDICINE
Payer: MEDICARE

## 2021-06-17 ENCOUNTER — APPOINTMENT (OUTPATIENT)
Dept: GENERAL RADIOLOGY | Age: 61
End: 2021-06-17
Payer: MEDICARE

## 2021-06-17 VITALS
WEIGHT: 137 LBS | BODY MASS INDEX: 25.86 KG/M2 | TEMPERATURE: 98.4 F | SYSTOLIC BLOOD PRESSURE: 132 MMHG | DIASTOLIC BLOOD PRESSURE: 69 MMHG | HEART RATE: 72 BPM | RESPIRATION RATE: 16 BRPM | OXYGEN SATURATION: 94 % | HEIGHT: 61 IN

## 2021-06-17 DIAGNOSIS — M62.830 LUMBAR PARASPINAL MUSCLE SPASM: Primary | ICD-10-CM

## 2021-06-17 DIAGNOSIS — M53.3 PAIN OF RIGHT SACROILIAC JOINT: ICD-10-CM

## 2021-06-17 DIAGNOSIS — M54.41 ACUTE BACK PAIN WITH SCIATICA, RIGHT: ICD-10-CM

## 2021-06-17 PROCEDURE — 96372 THER/PROPH/DIAG INJ SC/IM: CPT

## 2021-06-17 PROCEDURE — 72110 X-RAY EXAM L-2 SPINE 4/>VWS: CPT

## 2021-06-17 PROCEDURE — 99283 EMERGENCY DEPT VISIT LOW MDM: CPT

## 2021-06-17 PROCEDURE — 6360000002 HC RX W HCPCS: Performed by: STUDENT IN AN ORGANIZED HEALTH CARE EDUCATION/TRAINING PROGRAM

## 2021-06-17 RX ORDER — DIAZEPAM 10 MG/1
10 TABLET ORAL EVERY 8 HOURS PRN
Qty: 9 TABLET | Refills: 0 | Status: SHIPPED | OUTPATIENT
Start: 2021-06-17 | End: 2021-06-20

## 2021-06-17 RX ORDER — PREDNISONE 50 MG/1
50 TABLET ORAL DAILY
Qty: 5 TABLET | Refills: 0 | Status: SHIPPED | OUTPATIENT
Start: 2021-06-17 | End: 2021-06-22

## 2021-06-17 RX ORDER — DIAZEPAM 5 MG/ML
10 INJECTION, SOLUTION INTRAMUSCULAR; INTRAVENOUS ONCE
Status: COMPLETED | OUTPATIENT
Start: 2021-06-17 | End: 2021-06-17

## 2021-06-17 RX ORDER — KETOROLAC TROMETHAMINE 30 MG/ML
30 INJECTION, SOLUTION INTRAMUSCULAR; INTRAVENOUS ONCE
Status: COMPLETED | OUTPATIENT
Start: 2021-06-17 | End: 2021-06-17

## 2021-06-17 RX ADMIN — DIAZEPAM 10 MG: 5 INJECTION, SOLUTION INTRAMUSCULAR; INTRAVENOUS at 17:38

## 2021-06-17 RX ADMIN — KETOROLAC TROMETHAMINE 30 MG: 30 INJECTION, SOLUTION INTRAMUSCULAR; INTRAVENOUS at 17:38

## 2021-06-17 ASSESSMENT — PAIN DESCRIPTION - PAIN TYPE: TYPE: ACUTE PAIN

## 2021-06-17 ASSESSMENT — ENCOUNTER SYMPTOMS
ABDOMINAL PAIN: 0
VOMITING: 0
TROUBLE SWALLOWING: 0
BACK PAIN: 1
DIARRHEA: 0
CHEST TIGHTNESS: 0
COUGH: 0
SINUS PRESSURE: 0
SHORTNESS OF BREATH: 0

## 2021-06-17 ASSESSMENT — PAIN SCALES - GENERAL
PAINLEVEL_OUTOF10: 10

## 2021-06-17 ASSESSMENT — PAIN DESCRIPTION - ORIENTATION: ORIENTATION: RIGHT

## 2021-06-17 ASSESSMENT — PAIN DESCRIPTION - FREQUENCY: FREQUENCY: CONTINUOUS

## 2021-06-17 ASSESSMENT — PAIN DESCRIPTION - LOCATION: LOCATION: BACK;BUTTOCKS;LEG

## 2021-06-17 ASSESSMENT — PAIN DESCRIPTION - DESCRIPTORS: DESCRIPTORS: RADIATING;SHARP

## 2021-06-17 NOTE — ED TRIAGE NOTES
Pt c/o lower back pain that radiates into her RLE, denies trauma, sensation and movement intact, ROM limited, denies dysuria

## 2021-06-18 NOTE — ED PROVIDER NOTES
[facility] ED  eMERGENCY dEPARTMENT eNCOUnter      Pt Name: Blas Wooten  MRN: [id number]  [place] 1960  Date of evaluation: 6/17/2021  Provider: Layton Kruse, [address] [place] [address]       Chief Complaint   Patient presents with    Back Pain     pt c/o right side lower back pain that radiates into her RLE with activity for the past week, denies trauma         HISTORY OF PRESENT ILLNESS   (Location/Symptom, Timing/Onset,Context/Setting, Quality, Duration, Modifying Factors, Severity)  Note limiting factors. Blas Wooten is a 59 y.o. female who presents to the emergency department with c/o right-sided low back pain that radiates down the right posterior lateral right lower extremity to the foot. Patient usually walks with a cane but the pain has been gotten worse and the patient is able to move the leg but complains of terrible pain. Patient denies any loss of bowel or bladder control. Patient denies any numbness or tingling to that region. On physical exam patient has paravertebral muscle spasm L3-L5 on the right as well as tenderness to the right sacroiliac joint. The history is provided by the patient. NursingNotes were reviewed. REVIEW OF SYSTEMS    (2-9 systems for level 4, 10 or more for level 5)     Review of Systems   Constitutional: Negative for activity change, appetite change, chills, fever and unexpected weight change. HENT: Negative for drooling, ear pain, nosebleeds, sinus pressure and trouble swallowing. Respiratory: Negative for cough, chest tightness and shortness of breath. Cardiovascular: Negative for chest pain and leg swelling. Gastrointestinal: Negative for abdominal pain, diarrhea and vomiting. Endocrine: Negative for polydipsia and polyphagia. Genitourinary: Negative for dysuria, flank pain and frequency. Musculoskeletal: Positive for back pain. Negative for myalgias. Skin: Negative for pallor and rash.    Neurological: Negative for budesonide-formoterol (SYMBICORT) 160-4.5 MCG/ACT AERO TAKE 2 PUFFS BY MOUTH TWICE A DAY, Disp-30.6 Inhaler,R-3Normal      Multiple Vitamins-Minerals (MULTIVITAMIN ADULT PO) Take 1 tablet by mouthHistorical Med      POLYETHYLENE GLYCOL 3350 PO Take 17 g by mouthHistorical Med      fluticasone (FLONASE) 50 MCG/ACT nasal spray 1 spray by NOT APPLICABLE routeHistorical Med      aspirin 81 MG EC tablet Take 1 tablet by mouth 2 times daily, Disp-30 tablet, R-0Normal      Multiple Vitamins-Minerals (THERAPEUTIC MULTIVITAMIN-MINERALS) tablet Take 1 tablet by mouth dailyHistorical Med      Magnesium Oxide 500 MG CAPS Take 1 capsule by mouth 2 times dailyHistorical Med      oxybutynin (DITROPAN-XL) 10 MG extended release tablet Take 10 mg by mouth dailyHistorical Med      simvastatin (ZOCOR) 10 MG tablet Take 10 mg by mouth nightlyHistorical Med      Oxygen Concentrator CONTINUOUS, Historical Med      DULoxetine (CYMBALTA) 60 MG capsule Historical Med       !! - Potential duplicate medications found. Please discuss with provider. ALLERGIES     Patient has no known allergies.     FAMILY HISTORY       Family History   Problem Relation Age of Onset    Breast Cancer Mother     Cancer Mother         liver ca    No Known Problems Brother     Obesity Son           SOCIAL HISTORY       Social History     Socioeconomic History    Marital status: Single     Spouse name: None    Number of children: None    Years of education: None    Highest education level: None   Occupational History    None   Tobacco Use    Smoking status: Former Smoker     Packs/day: 0.50     Years: 1.00     Pack years: 0.50     Start date: 2017     Quit date: 2019     Years since quittin.4    Smokeless tobacco: Never Used    Tobacco comment: smoked at 15yrs old x 30 yrs 0.5ppd    Vaping Use    Vaping Use: Never used   Substance and Sexual Activity    Alcohol use: No     Alcohol/week: 0.0 standard drinks    Drug use: No    Sexual activity: None   Other Topics Concern    None   Social History Narrative         Lives With: Significant other    Type of Home: House    Home Layout: One level    Home Access: Stairs to enter with rails    Entrance Stairs - Number of Steps: 6    Home Equipment: Rolling walker, Cane, Wheelchair-manual (BSC, leg )    ADL Assistance: Independent    Ambulation Assistance: Independent (with [employer])    Transfer Assistance: Independent    Additional Comments: Multiple neck surgeries with residual spasticity R UE/LE     Social Determinants of Health     Financial Resource Strain:     Difficulty of Paying Living Expenses:    Food Insecurity:     Worried About Running Out of Food in the Last Year:     Ran Out of Food in the Last Year:    Transportation Needs:     Lack of Transportation (Medical):  Lack of Transportation (Non-Medical):    Physical Activity:     Days of Exercise per Week:     Minutes of Exercise per Session:    Stress:     Feeling of Stress :    Social Connections:     Frequency of Communication with Friends and Family:     Frequency of Social Gatherings with Friends and Family:     Attends Cheondoism Services:     Active Member of Clubs or Organizations:     Attends Club or Organization Meetings:     Marital Status:    Intimate Partner Violence:     Fear of Current or Ex-Partner:     Emotionally Abused:     Physically Abused:     Sexually Abused:        SCREENINGS      @FLOW([id number])@      PHYSICAL EXAM    (up to 7 for level 4, 8 or more for level 5)     ED Triage Vitals [06/17/21 1615]   BP Temp Temp Source Pulse Resp SpO2 Height Weight   (!) 137/58 98.4 °F (36.9 °C) Oral 90 16 95 % 5' 1\" (1.549 m) 137 lb (62.1 kg)       Physical Exam  Vitals and nursing note reviewed. Constitutional:       General: She is awake. She is in acute distress. Appearance: Normal appearance. She is well-developed and normal weight. She is not ill-appearing, toxic-appearing or diaphoretic. Comments: No photophobia. No phonophobia. HENT:      Head: Normocephalic and atraumatic. No Magaña's sign. Right Ear: External ear normal.      Left Ear: External ear normal.      Nose: Nose normal. No congestion or rhinorrhea. Mouth/Throat:      Mouth: Mucous membranes are moist.      Pharynx: Oropharynx is clear. No oropharyngeal exudate or posterior oropharyngeal erythema. Eyes:      General: No scleral icterus. Right eye: No foreign body or discharge. Left eye: No discharge. Extraocular Movements: Extraocular movements intact. Conjunctiva/sclera: Conjunctivae normal.      Left eye: No exudate. Pupils: Pupils are equal, round, and reactive to light. Neck:      Vascular: No JVD. Trachea: No tracheal deviation. Comments: No meningismus. Cardiovascular:      Rate and Rhythm: Normal rate and regular rhythm. Heart sounds: Normal heart sounds. Heart sounds not distant. No murmur heard. No friction rub. No gallop. Pulmonary:      Effort: Pulmonary effort is normal. No respiratory distress. Breath sounds: Normal breath sounds. No stridor. No wheezing, rhonchi or rales. Chest:      Chest wall: No tenderness. Abdominal:      General: Abdomen is flat. Bowel sounds are normal. There is no distension. Palpations: Abdomen is soft. There is no mass. Tenderness: There is no abdominal tenderness. There is no right CVA tenderness, left CVA tenderness, guarding or rebound. Hernia: No hernia is present. Musculoskeletal:         General: No swelling, tenderness, deformity or signs of injury. Cervical back: Normal range of motion and neck supple. No rigidity. Thoracic back: Normal.      Lumbar back: Decreased range of motion. Positive right straight leg raise test.        Back:       Comments: No midline tenderness to palpation of C, T or L-spine. Lymphadenopathy:      Head:      Right side of head: No submental adenopathy. Left side of head: No submental adenopathy. Skin:     General: Skin is warm and dry. Capillary Refill: Capillary refill takes less than 2 seconds. Coloration: Skin is not jaundiced or pale. Findings: No bruising, erythema, lesion or rash. Neurological:      General: No focal deficit present. Mental Status: She is alert and oriented to person, place, and time. Mental status is at baseline. Cranial Nerves: No cranial nerve deficit. Sensory: No sensory deficit. Motor: No weakness. Coordination: Coordination normal.      Deep Tendon Reflexes: Reflexes are normal and symmetric. Psychiatric:         Mood and Affect: Mood normal.         Behavior: Behavior normal. Behavior is cooperative. Thought Content: Thought content normal.         Judgment: Judgment normal.         DIAGNOSTIC RESULTS     EKG: All EKG's are interpreted by the Emergency Department Physician who either signs or Co-signsthis chart in the absence of a cardiologist.        RADIOLOGY:   Diego Pizanoler such as CT, Ultrasound and MRI are read by the radiologist. Gilda Mcknight radiographic images are visualized and preliminarily interpreted by the emergency physician with the below findings:    Degenerative arthritic changes, no fracture. Interpretation per the Radiologist below, if available at the time ofthis note:    XR LUMBAR SPINE (MIN 4 VIEWS)   Final Result       There are no acute osseous changes. ED BEDSIDE ULTRASOUND:   Performed by ED Physician - none    LABS:  Labs Reviewed - No data to display    All other labs were within normal range or not returned as of this dictation.     EMERGENCY DEPARTMENT COURSE and DIFFERENTIAL DIAGNOSIS/MDM:   Vitals:    Vitals:    06/17/21 1615 06/17/21 1742   BP: (!) 137/58 132/69   Pulse: 90 72   Resp: 16    Temp: 98.4 °F (36.9 °C)    TempSrc: Oral    SpO2: 95% 94%   Weight: 137 lb (62.1 kg)    Height: 5' 1\" (1.549 m)            MDM  Patient was given intramuscular Valium and also IM Toradol. On reexam the patient has significant improvement of range of motion. Patient is to inform her pain management doctor that she was given a prescription for Valium and she is to call tomorrow morning. The findings were discussed with the patient. The patient was invited to return  to the ER if worse symptoms. The patient verbalized understanding of the care and they have no further questions. CONSULTS:  None    PROCEDURES:  Unless otherwise noted below, none     Procedures    FINAL IMPRESSION      1. Lumbar paraspinal muscle spasm    2. Acute back pain with sciatica, right    3. Pain of right sacroiliac joint          DISPOSITION/PLAN   DISPOSITION Decision To Discharge 06/17/2021 06:25:35 PM      PATIENT REFERRED TO:  Morey Fothergill, MD  [address] [facility] [address]  [phone]    Call in 1 day      Nuris Kessler, [address]  [phone]    Schedule an appointment as soon as possible for a visit   As needed      DISCHARGE MEDICATIONS:  Discharge Medication List as of 6/17/2021  6:28 PM      START taking these medications    Details   diazePAM (VALIUM) 10 MG tablet Take 1 tablet by mouth every 8 hours as needed (pain, spasm) for up to 3 days. , Disp-9 tablet, R-0Print      predniSONE (DELTASONE) 50 MG tablet Take 1 tablet by mouth daily for 5 days, Disp-5 tablet, R-0Print                (Please note that portions of this note were completed with a voice recognition program.  Efforts were made to edit the dictations but occasionally words are mis-transcribed.)    Angella Abel DO (electronically signed)  Attending Emergency Physician          Angella Abel DO  06/17/21 1904

## 2021-06-23 ENCOUNTER — OFFICE VISIT (OUTPATIENT)
Dept: PAIN MANAGEMENT | Age: 61
End: 2021-06-23
Payer: MEDICARE

## 2021-06-23 VITALS
BODY MASS INDEX: 25.89 KG/M2 | HEIGHT: 61 IN | OXYGEN SATURATION: 93 % | DIASTOLIC BLOOD PRESSURE: 78 MMHG | SYSTOLIC BLOOD PRESSURE: 130 MMHG | TEMPERATURE: 97.4 F | HEART RATE: 104 BPM

## 2021-06-23 DIAGNOSIS — M47.817 LUMBOSACRAL SPONDYLOSIS WITHOUT MYELOPATHY: ICD-10-CM

## 2021-06-23 DIAGNOSIS — M54.16 LUMBAR RADICULAR PAIN: Primary | ICD-10-CM

## 2021-06-23 DIAGNOSIS — M47.812 CERVICAL SPONDYLOSIS WITHOUT MYELOPATHY: ICD-10-CM

## 2021-06-23 DIAGNOSIS — M54.16 LUMBAR RADICULOPATHY: ICD-10-CM

## 2021-06-23 DIAGNOSIS — M25.551 PAIN OF RIGHT HIP JOINT: ICD-10-CM

## 2021-06-23 DIAGNOSIS — G89.4 CHRONIC PAIN SYNDROME: ICD-10-CM

## 2021-06-23 DIAGNOSIS — M48.061 SPINAL STENOSIS, LUMBAR REGION, WITHOUT NEUROGENIC CLAUDICATION: ICD-10-CM

## 2021-06-23 DIAGNOSIS — M17.10 KNEE ARTHROPATHY: ICD-10-CM

## 2021-06-23 PROCEDURE — 3017F COLORECTAL CA SCREEN DOC REV: CPT | Performed by: NURSE PRACTITIONER

## 2021-06-23 PROCEDURE — G8419 CALC BMI OUT NRM PARAM NOF/U: HCPCS | Performed by: NURSE PRACTITIONER

## 2021-06-23 PROCEDURE — G8427 DOCREV CUR MEDS BY ELIG CLIN: HCPCS | Performed by: NURSE PRACTITIONER

## 2021-06-23 PROCEDURE — 1036F TOBACCO NON-USER: CPT | Performed by: NURSE PRACTITIONER

## 2021-06-23 PROCEDURE — 99214 OFFICE O/P EST MOD 30 MIN: CPT | Performed by: NURSE PRACTITIONER

## 2021-06-23 RX ORDER — TIZANIDINE 2 MG/1
2 TABLET ORAL 2 TIMES DAILY PRN
Qty: 20 TABLET | Refills: 0 | Status: SHIPPED | OUTPATIENT
Start: 2021-06-23 | End: 2021-07-03

## 2021-06-23 RX ORDER — GABAPENTIN 300 MG/1
300 CAPSULE ORAL 3 TIMES DAILY
Qty: 90 CAPSULE | Refills: 0 | Status: SHIPPED | OUTPATIENT
Start: 2021-06-23 | End: 2021-07-28 | Stop reason: SDUPTHER

## 2021-06-23 RX ORDER — CELECOXIB 100 MG/1
100 CAPSULE ORAL DAILY
Qty: 60 CAPSULE | Refills: 0 | Status: SHIPPED | OUTPATIENT
Start: 2021-06-23 | End: 2021-08-13 | Stop reason: SDUPTHER

## 2021-06-23 NOTE — PROGRESS NOTES
Patient: Logan Nava  YOB: 1960  Date: 21        Subjective:     Logan Nava is a 59 y.o. female who complains today of:    Chief Complaint   Patient presents with    Follow-up         Allergies:  Patient has no known allergies.     Past Medical History:   Diagnosis Date    Arthritis     left hip    COPD (chronic obstructive pulmonary disease) ([address]     uses inhalers x 4 yrs    Hyperlipidemia     on meds x 10yrs    Hypertension 2018    Lung disease     Type 2 diabetes mellitus with hyperglycemia, without long-term current use of insulin ([address] 8/15/2018     Past Surgical History:   Procedure Laterality Date    BREAST BIOPSY Right 2003    benign    CERVICAL FUSION  2016    CERVICAL SPINE SURGERY  2004    AVM removal, fusion, embolism removal      SECTION  1979    COLONOSCOPY  2018    HYSTERECTOMY  1997    [place] TOTAL HIP ARTHROPLASTY Left 2018    LEFT HIP TOTAL HIP ARTHROPLASTY performed by Ignacio Bhatt MD at [address] History   Problem Relation Age of Onset    Breast Cancer Mother     Cancer Mother         liver ca    No Known Problems Brother     Obesity Son      Social History     Socioeconomic History    Marital status: Single     Spouse name: Not on file    Number of children: Not on file    Years of education: Not on file    Highest education level: Not on file   Occupational History    Not on file   Tobacco Use    Smoking status: Former Smoker     Packs/day: 0.50     Years: 1.00     Pack years: 0.50     Start date: 2017     Quit date: 2019     Years since quittin.4    Smokeless tobacco: Never Used    Tobacco comment: smoked at 15yrs old x 30 yrs 0.5ppd    Vaping Use    Vaping Use: Never used   Substance and Sexual Activity    Alcohol use: No     Alcohol/week: 0.0 standard drinks    Drug use: No    Sexual activity: Not on file   Other Topics Concern    Not on file   Social History Narrative         Lives With: Significant other    Type of Home: House    Home Layout: One level    Home Access: Stairs to enter with rails    Entrance Stairs - Number of Steps: 6    Home Equipment: Rolling walker, Cane, Wheelchair-manual (BSC, leg )    ADL Assistance: Independent    Ambulation Assistance: Independent (with [facility])    Transfer Assistance: Independent    Additional Comments: Multiple neck surgeries with residual spasticity R UE/LE     Social Determinants of Health     Financial Resource Strain:     Difficulty of Paying Living Expenses:    Food Insecurity:     Worried About Running Out of Food in the Last Year:     Ran Out of Food in the Last Year:    Transportation Needs:     Lack of Transportation (Medical):      Lack of Transportation (Non-Medical):    Physical Activity:     Days of Exercise per Week:     Minutes of Exercise per Session:    Stress:     Feeling of Stress :    Social Connections:     Frequency of Communication with Friends and Family:     Frequency of Social Gatherings with Friends and Family:     Attends Anabaptist Services:     Active Member of Clubs or Organizations:     Attends Club or Organization Meetings:     Marital Status:    Intimate Partner Violence:     Fear of Current or Ex-Partner:     Emotionally Abused:     Physically Abused:     Sexually Abused:        Current Outpatient Medications on File Prior to Visit   Medication Sig Dispense Refill    albuterol sulfate HFA (VENTOLIN HFA) 108 (90 Base) MCG/ACT inhaler Inhale 2 puffs into the lungs every 6 hours as needed for Wheezing 1 Inhaler 3    VENTOLIN  (90 Base) MCG/ACT inhaler Inhale 2 puffs into the lungs 4 times daily as needed for Wheezing 1 Inhaler 3    budesonide-formoterol (SYMBICORT) 160-4.5 MCG/ACT AERO TAKE 2 PUFFS BY MOUTH TWICE A DAY 30.6 Inhaler 3    Multiple Vitamins-Minerals (MULTIVITAMIN ADULT PO) Take 1 tablet by mouth      POLYETHYLENE GLYCOL 3350 PO Take 17 g by mouth      fluticasone (FLONASE) 50 MCG/ACT nasal spray 1 spray by NOT APPLICABLE route      aspirin 81 MG EC tablet Take 1 tablet by mouth 2 times daily 30 tablet 0    Multiple Vitamins-Minerals (THERAPEUTIC MULTIVITAMIN-MINERALS) tablet Take 1 tablet by mouth daily      Magnesium Oxide 500 MG CAPS Take 1 capsule by mouth 2 times daily      oxybutynin (DITROPAN-XL) 10 MG extended release tablet Take 10 mg by mouth daily      simvastatin (ZOCOR) 10 MG tablet Take 10 mg by mouth nightly      Oxygen Concentrator Inhale 3 L into the lungs continuous       DULoxetine (CYMBALTA) 60 MG capsule       albuterol (PROVENTIL) (2.5 MG/3ML) 0.083% nebulizer solution Take 3 mLs by nebulization every 6 hours as needed for Wheezing 120 each 3     No current facility-administered medications on file prior to visit. Celi Cronin is a 55-year-old female here for following up for chronic low back pain and neck pain. Her back pain is a 2/10 and her neck pain is 0. She is having a little more pain to the right low back and wanted to have facet injections. Unfortunately therapeutic facet injections are no longer available and she does not want RFA which previously worked quite well at least 75% improvement in pain. The procedure was too much for her to tolerate. But she said she will think about it. Her gabapentin was increased to 300 mg 3 times daily formerly twice daily, and ibuprofen 800 mg twice daily was added by Dr. Jessy Giron last month. She states the ibuprofen helps significantly. The medication combination helps her perform ADLs and enjoy a better quality of life. She is now off of her narcotics, this was her choice. She denies any cardiac or stomach issues. She had seen Dr. Marley Lomax recently had an MRI of her right hip in March and he did an injection that she said worked well for her.     HX: COPD requiring 2L oxygen at night. HX:  6/14/16 she had decomressive surgery C2-6 and disectomy C3-4 anterior approach with Dr. Antoni [facility].  Hx of arteriole venous malformation and surgery. She uses her cymbalta 60mg daily.          Review of Systems      Objective:     Vitals:  /78 (Site: Left Upper Arm, Position: Sitting, Cuff Size: Large Adult)   Pulse 104   Temp 97.4 °F (36.3 °C) (Temporal)   Ht 5' 1\" (1.549 m)   LMP  (LMP Unknown)   SpO2 93%   Breastfeeding No   BMI 25.89 kg/m²      Physical Exam  Vitals reviewed. Constitutional:       Appearance: She is well-developed. HENT:      Head: Normocephalic. Nose: Nose normal.   Pulmonary:      Effort: Pulmonary effort is normal.   Musculoskeletal:      Cervical back: Normal range of motion and neck supple. Lymphadenopathy:      Cervical: No cervical adenopathy. Skin:     General: Skin is warm and dry. Findings: No erythema or rash. Neurological:      Mental Status: She is alert and oriented to person, place, and time. Cranial Nerves: No cranial nerve deficit. Deep Tendon Reflexes: Reflexes are normal and symmetric. Reflexes normal.   Psychiatric:         Mood and Affect: Mood normal.         Behavior: Behavior normal.         Thought Content: Thought content normal.         Judgment: Judgment normal.            Assessment:        Diagnosis Orders   1. Lumbar radicular pain  MRI LUMBAR SPINE WO CONTRAST    External Referral to Physical Therapy   2. Spinal stenosis, lumbar region, without neurogenic claudication  External Referral to Physical Therapy    gabapentin (NEURONTIN) 300 MG capsule   3. Cervical spondylosis without myelopathy  gabapentin (NEURONTIN) 300 MG capsule   4. Pain of right hip joint  gabapentin (NEURONTIN) 300 MG capsule   5. Knee arthropathy  gabapentin (NEURONTIN) 300 MG capsule   6. Lumbosacral spondylosis without myelopathy  gabapentin (NEURONTIN) 300 MG capsule   7. Lumbar radiculopathy  gabapentin (NEURONTIN) 300 MG capsule   8.  Chronic pain syndrome  gabapentin (NEURONTIN) 300 MG capsule       Plan:          Orders Placed This Encounter Medications    gabapentin (NEURONTIN) 300 MG capsule     Sig: Take 1 capsule by mouth 3 times daily for 30 days. Patient takes one capsule every morning and 2 capsules at bedtime. Dispense:  90 capsule     Refill:  0    celecoxib (CELEBREX) 100 MG capsule     Sig: Take 1 capsule by mouth daily     Dispense:  60 capsule     Refill:  0    tiZANidine (ZANAFLEX) 2 MG tablet     Sig: Take 1 tablet by mouth 2 times daily as needed (spasm)     Dispense:  20 tablet     Refill:  0       Orders Placed This Encounter   Procedures    MRI LUMBAR SPINE WO CONTRAST     Standing Status:   Future     Standing Expiration Date:   9/21/2021     Order Specific Question:   Reason for exam:     Answer:   acute onset right leg pain, hx neck surgery     Order Specific Question:   Reason for exam:     Answer:   hx left total hip    External Referral to Physical Therapy     Referral Priority:   Routine     Referral Type:   Eval and Treat     Referral Reason:   Specialty Services Required     Requested Specialty:   Physical Therapy     Number of Visits Requested:   1       -she stopped percocet months ago because ineffective  -had steroids from ER, 50mg x5 days with no relief and valium 10mg TID for 3 days; this is done  -unable to ambulate due to pain, in wheelchair  -we'll order MRI wo lumbar spine to eval for stenosis/herniation due to acute right leg pain and inability to walk, s/p ED visit  -stop ibuprofen and start celebrex 100mg daily  -takes ASA 81 mg daily per Dr Demetria Beach      Discussed options with the patient today. Anatomic model pathology was shown and reviewed with pt. All questions were answered. Relevant imaging reviewed, NDP reviewed and pain generators reviewed. Pt verbalized understanding and agrees with above plan. Will continue medications as they do help pt function with ADL and improve quality of life.   Pt understands potential side effects from opioids such as constipation, dry mouth, dizziness, opioid use disorder, and overdose. Pt has failed non opioid therapy and decision was made to prescribe opioids to help with daily function and improve quality of life. [place]ARRS was reviewed. UTOX from 7/2020 appropriate; This NP saw pt under direct supervision of Dr Kassandra Herrera. Controlled Substances Monitoring: Follow up:  Return in about 4 weeks (around 7/21/2021) for f/u after procedure and reassess pain/medications.     Rob Peoples, APRN - CNP

## 2021-06-29 ENCOUNTER — TELEPHONE (OUTPATIENT)
Dept: PAIN MANAGEMENT | Age: 61
End: 2021-06-29

## 2021-07-07 ENCOUNTER — TELEPHONE (OUTPATIENT)
Dept: PAIN MANAGEMENT | Age: 61
End: 2021-07-07

## 2021-07-07 NOTE — TELEPHONE ENCOUNTER
Spoke to patient, [facility] has not contacted her to schedule. Gave her [employer] number ([phone]), she will call to schedule.

## 2021-07-17 ENCOUNTER — HOSPITAL ENCOUNTER (OUTPATIENT)
Dept: MRI IMAGING | Age: 61
Discharge: HOME | End: 2021-07-19
Payer: MEDICARE

## 2021-07-17 DIAGNOSIS — M54.16 LUMBAR RADICULAR PAIN: ICD-10-CM

## 2021-07-17 PROCEDURE — 72148 MRI LUMBAR SPINE W/O DYE: CPT

## 2021-07-21 ENCOUNTER — OFFICE VISIT (OUTPATIENT)
Dept: PULMONOLOGY | Age: 61
End: 2021-07-21
Payer: MEDICARE

## 2021-07-21 VITALS
DIASTOLIC BLOOD PRESSURE: 61 MMHG | TEMPERATURE: 98.2 F | HEART RATE: 76 BPM | HEIGHT: 61 IN | BODY MASS INDEX: 25.86 KG/M2 | WEIGHT: 137 LBS | OXYGEN SATURATION: 98 % | SYSTOLIC BLOOD PRESSURE: 111 MMHG

## 2021-07-21 DIAGNOSIS — J44.9 CHRONIC OBSTRUCTIVE PULMONARY DISEASE, UNSPECIFIED COPD TYPE (HCC): ICD-10-CM

## 2021-07-21 DIAGNOSIS — Z87.891 PERSONAL HISTORY OF SMOKING: ICD-10-CM

## 2021-07-21 DIAGNOSIS — R09.02 HYPOXIA: Primary | ICD-10-CM

## 2021-07-21 PROCEDURE — 3017F COLORECTAL CA SCREEN DOC REV: CPT | Performed by: INTERNAL MEDICINE

## 2021-07-21 PROCEDURE — 1036F TOBACCO NON-USER: CPT | Performed by: INTERNAL MEDICINE

## 2021-07-21 PROCEDURE — G8926 SPIRO NO PERF OR DOC: HCPCS | Performed by: INTERNAL MEDICINE

## 2021-07-21 PROCEDURE — 3023F SPIROM DOC REV: CPT | Performed by: INTERNAL MEDICINE

## 2021-07-21 PROCEDURE — G8419 CALC BMI OUT NRM PARAM NOF/U: HCPCS | Performed by: INTERNAL MEDICINE

## 2021-07-21 PROCEDURE — 99214 OFFICE O/P EST MOD 30 MIN: CPT | Performed by: INTERNAL MEDICINE

## 2021-07-21 PROCEDURE — G8427 DOCREV CUR MEDS BY ELIG CLIN: HCPCS | Performed by: INTERNAL MEDICINE

## 2021-07-21 RX ORDER — BUDESONIDE AND FORMOTEROL FUMARATE DIHYDRATE 160; 4.5 UG/1; UG/1
AEROSOL RESPIRATORY (INHALATION)
Qty: 30.6 INHALER | Refills: 5 | Status: SHIPPED | OUTPATIENT
Start: 2021-07-21 | End: 2021-07-21

## 2021-07-21 RX ORDER — BUDESONIDE AND FORMOTEROL FUMARATE DIHYDRATE 160; 4.5 UG/1; UG/1
AEROSOL RESPIRATORY (INHALATION)
Qty: 3 INHALER | Refills: 3 | Status: SHIPPED | OUTPATIENT
Start: 2021-07-21 | End: 2022-09-01

## 2021-07-21 ASSESSMENT — ENCOUNTER SYMPTOMS
BACK PAIN: 1
DIARRHEA: 0
EYE DISCHARGE: 0
SORE THROAT: 0
COUGH: 0
VOICE CHANGE: 0
SINUS PRESSURE: 0
VOMITING: 0
NAUSEA: 0
WHEEZING: 0
TROUBLE SWALLOWING: 0
CHEST TIGHTNESS: 0
SHORTNESS OF BREATH: 1
ABDOMINAL PAIN: 0
RHINORRHEA: 0
EYE ITCHING: 0

## 2021-07-21 NOTE — PROGRESS NOTES
Subjective:     Tomas Hooper is a 59 y.o. female who complains today of:     Chief Complaint   Patient presents with    COPD     follow up        HPI  She is using 2 lit O2 with sleep , not using during daytime  She is using bronchodilator  with bronchodilator with Symbicort 160/4.5 mcg BID , albuterol neb prn , ventolin  HFA 2 puff  prn.  C/o shortness of breath with exertion. No Wheezing. No Cough with  Sputum. No Hemoptysis. No Chest tightness. No Chest pain with radiation  or pleuritic pain. No  leg edema. No orthopnea. No Fever or chills. No Rhinorrhea and postnasal drip. C/o back pain , seen by PCP and pain management    Allergies:  Patient has no known allergies.   Past Medical History:   Diagnosis Date    Arthritis     left hip    COPD (chronic obstructive pulmonary disease) ([address]     uses inhalers x 4 yrs    Hyperlipidemia     on meds x 10yrs    Hypertension 2018    Lung disease     Type 2 diabetes mellitus with hyperglycemia, without long-term current use of insulin ([address] 8/15/2018     Past Surgical History:   Procedure Laterality Date    BREAST BIOPSY Right 2003    benign    CERVICAL FUSION  2016    CERVICAL SPINE SURGERY  2004    AVM removal, fusion, embolism removal      SECTION  1979    COLONOSCOPY  2018    HYSTERECTOMY  1997    [place] TOTAL HIP ARTHROPLASTY Left 2018    LEFT HIP TOTAL HIP ARTHROPLASTY [facility]     Family History   Problem Relation Age of Onset    Breast Cancer Mother     Cancer Mother         liver ca    No Known Problems Brother     Obesity Son      Social History     Socioeconomic History    Marital status: Single     Spouse name: Not on file    Number of children: Not on file    Years of education: Not on file    Highest education level: Not on file   Occupational History    Not on file   Tobacco Use    Smoking status: Former Smoker     Packs/day: 0.50     Years: 1.00     Pack years: 0.50 Start date: 2017     Quit date: 2019     Years since quittin.5    Smokeless tobacco: Never Used    Tobacco comment: smoked at 15yrs old x 30 yrs 0.5ppd    Vaping Use    Vaping Use: Never used   Substance and Sexual Activity    Alcohol use: No     Alcohol/week: 0.0 standard drinks    Drug use: No    Sexual activity: Not on file   Other Topics Concern    Not on file   Social History Narrative         Lives With: Significant other    Type of Home: House    Home Layout: One level    Home Access: Stairs to enter with rails    Entrance Stairs - Number of Steps: 6    Home Equipment: Rolling walker, Cane, Wheelchair-manual (BSC, leg )    ADL Assistance: Independent    Ambulation Assistance: Independent (with [employer])    Transfer Assistance: Independent    Additional Comments: Multiple neck surgeries with residual spasticity R UE/LE     Social Determinants of Health     Financial Resource Strain:     Difficulty of Paying Living Expenses:    Food Insecurity:     Worried About Running Out of Food in the Last Year:     Ran Out of Food in the Last Year:    Transportation Needs:     Lack of Transportation (Medical):  Lack of Transportation (Non-Medical):    Physical Activity:     Days of Exercise per Week:     Minutes of Exercise per Session:    Stress:     Feeling of Stress :    Social Connections:     Frequency of Communication with Friends and Family:     Frequency of Social Gatherings with Friends and Family:     Attends Taoism Services:     Active Member of Clubs or Organizations:     Attends Club or Organization Meetings:     Marital Status:    Intimate Partner Violence:     Fear of Current or Ex-Partner:     Emotionally Abused:     Physically Abused:     Sexually Abused:          Review of Systems   Constitutional: Negative for chills, diaphoresis, fatigue and fever.    HENT: Negative for congestion, mouth sores, nosebleeds, postnasal drip, rhinorrhea, sinus pressure, sneezing, sore throat, trouble swallowing and voice change. Eyes: Negative for discharge, itching and visual disturbance. Respiratory: Positive for shortness of breath. Negative for cough, chest tightness and wheezing. Cardiovascular: Negative for chest pain, palpitations and leg swelling. Gastrointestinal: Negative for abdominal pain, diarrhea, nausea and vomiting. Genitourinary: Negative for difficulty urinating and hematuria. Musculoskeletal: Positive for back pain. Negative for arthralgias, joint swelling and myalgias. Skin: Negative for rash. Allergic/Immunologic: Negative for environmental allergies and food allergies. Neurological: Negative for dizziness, tremors, weakness and headaches. Psychiatric/Behavioral: Negative for behavioral problems and sleep disturbance.         :     Vitals:    07/21/21 1346   BP: 111/61   Pulse: 76   Temp: 98.2 °F (36.8 °C)   SpO2: 98%   Weight: 137 lb (62.1 kg)   Height: 5' 1\" (1.549 m)     Wt Readings from Last 3 Encounters:   07/21/21 137 lb (62.1 kg)   06/17/21 137 lb (62.1 kg)   05/25/21 137 lb (62.1 kg)         Physical Exam  Constitutional:       General: She is not in acute distress. Appearance: She is well-developed. She is not diaphoretic. HENT:      Head: Normocephalic and atraumatic. Nose: Nose normal.   Eyes:      Pupils: Pupils are equal, round, and reactive to light. Neck:      Thyroid: No thyromegaly. Vascular: No JVD. Trachea: No tracheal deviation. Cardiovascular:      Rate and Rhythm: Normal rate and regular rhythm. Heart sounds: No murmur heard. No friction rub. No gallop. Pulmonary:      Effort: No respiratory distress. Breath sounds: No wheezing or rales. Comments: diminished Breath sound bilaterally. Chest:      Chest wall: No tenderness. Abdominal:      General: There is no distension. Tenderness: There is no abdominal tenderness. There is no rebound.    Musculoskeletal:         General: Normal range of motion. Lymphadenopathy:      Cervical: No cervical adenopathy. Skin:     General: Skin is warm and dry. Neurological:      Mental Status: She is alert and oriented to person, place, and time. Coordination: Coordination normal.         Current Outpatient Medications   Medication Sig Dispense Refill    budesonide-formoterol (SYMBICORT) 160-4.5 MCG/ACT AERO TAKE 2 PUFFS BY MOUTH TWICE A DAY 3 Inhaler 3    gabapentin (NEURONTIN) 300 MG capsule Take 1 capsule by mouth 3 times daily for 30 days. Patient takes one capsule every morning and 2 capsules at bedtime. 90 capsule 0    celecoxib (CELEBREX) 100 MG capsule Take 1 capsule by mouth daily 60 capsule 0    albuterol sulfate HFA (VENTOLIN HFA) 108 (90 Base) MCG/ACT inhaler Inhale 2 puffs into the lungs every 6 hours as needed for Wheezing 1 Inhaler 3    VENTOLIN  (90 Base) MCG/ACT inhaler Inhale 2 puffs into the lungs 4 times daily as needed for Wheezing 1 Inhaler 3    Multiple Vitamins-Minerals (MULTIVITAMIN ADULT PO) Take 1 tablet by mouth      POLYETHYLENE GLYCOL 3350 PO Take 17 g by mouth      fluticasone (FLONASE) 50 MCG/ACT nasal spray 1 spray by NOT APPLICABLE route      aspirin 81 MG EC tablet Take 1 tablet by mouth 2 times daily 30 tablet 0    Multiple Vitamins-Minerals (THERAPEUTIC MULTIVITAMIN-MINERALS) tablet Take 1 tablet by mouth daily      Magnesium Oxide 500 MG CAPS Take 1 capsule by mouth 2 times daily      oxybutynin (DITROPAN-XL) 10 MG extended release tablet Take 10 mg by mouth daily      simvastatin (ZOCOR) 10 MG tablet Take 10 mg by mouth nightly      Oxygen Concentrator Inhale 3 L into the lungs continuous       DULoxetine (CYMBALTA) 60 MG capsule       albuterol (PROVENTIL) (2.5 MG/3ML) 0.083% nebulizer solution Take 3 mLs by nebulization every 6 hours as needed for Wheezing 120 each 3     No current facility-administered medications for this visit.        Results for orders placed during the hospital encounter of 01/02/19    XR CHEST STANDARD (2 VW)    Narrative  EXAMINATION: XR CHEST (2 VW)    REASON FOR EXAM: COPD atelectasis    FINDINGS: 2 views of the chest reveal lung fields slightly hyperinflated. There is borderline cardiomegaly. The pulmonary vasculature is within normal limits. Atherosclerotic changes in the aorta noted. Since January 4, 2019 endotracheal tube and NG tube have been removed. Lung fields clear with no evidence of pneumonia. No evidence of pulmonary edema. Bones and soft tissues intact. Postsurgical changes overlie the lower cervical spine. Impression  NO ACTIVE DISEASE IN THE CHEST. Results for orders placed during the hospital encounter of 10/02/18    XR CHEST STANDARD (2 VW)    Narrative  EXAMINATION: CHEST TWO VIEWS    CLINICAL HISTORY: J44.9 Chronic obstructive pulmonary disease, unspecified COPD type ([address] ICD10, follow-up    COMPARISONS: May 16, 2016    FINDINGS:    Two views of the chest are submitted. The cardiac silhouette is of normal size configuration. The mediastinum is unremarkable. Pulmonary vascular is attenuated, lungs are hyperinflated and there is some widening of the AP diameter the chest. Areas atelectasis both bases. .  Right sided trachea. No focal infiltrates. No effusions. Pneumothoraces. Impression  NO ACUTE ACTIVE CARDIOPULMONARY PROCESS. RADIOGRAPHIC FINDINGS SUGGESTIVE OF COPD. Lorean Snare Results for orders placed during the hospital encounter of 07/27/15    XR Chest Standard TWO VW    Narrative  EXAMINATION TWO VIEWS PA AND LATERAL CHEST    CLINICAL HISTORY  EVALUATE COPD. FINDINGS The heart and lungs are normal, with no evidence of acute  cardiopulmonary disease. IMPRESSION    NEGATIVE FOR ACUTE CARDIOVASCULAR DISEASE, PNEUMONIA, PNEUMOTHORAX OR  CHANGE IN NORMAL HEART SIZE, SINCE 8/8/2014 AND 8/10/2014. [facility] By- Zaria Vizcarra M.D. Released By- Zaria Vizcarra M.D.   Released Date Time- 07/28/15 0919  This document has been electronically signed. ------------------------------------------------------------------------------  ]  Results for orders placed during the hospital encounter of 01/02/19    XR CHEST PORTABLE    Narrative  Portable chest radiograph    History: Respiratory failure    Technique: AP portable view of the chest obtained. Comparison: CT from January 3, 2019; chest x-ray from January 3, 2019    Findings:    Endotracheal tube remains, not significantly changed in position. Enteric tube traverses the diaphragm however its distal tip is not visualized. The cardiomediastinal silhouette is within normal limits. No pneumothorax, pleural effusion, or focal  consolidation. Linear left lung base opacity compatible with subsegmental atelectasis is not significantly changed. Lungs are hyperinflated. Coarsening of the pulmonary interstitium. Degenerative changes of the spine. Impression  No significant interval change in left base subsegmental atelectasis. XR CHEST PORTABLE    Narrative  EXAMINATION: CHEST PORTABLE VIEW    CLINICAL HISTORY: Intubation    COMPARISONS: January 2, 2018    FINDINGS:    Single  views of the chest is submitted. There is a endotracheal tube. The tip lies approximately 2.1 cm above the [place]ele. There is a nasogastric tube. The tip is not seen but does lie below the hemidiaphragm. The cardiac silhouette is enlarged. Unchanged configuration. The mediastinum is unremarkable. Pulmonary vascular unremarkable. Right sided trachea. Left lower lobe infiltrate/consolidation with trace left pleural effusion. No Pneumothoraces. Impression  LEFT LOWER LOBE INFILTRATE/CONSOLIDATION WITH TRACE LEFT PLEURAL EFFUSION      XR CHEST PORTABLE    Narrative  EXAMINATION: XR CHEST PORTABLE, 1/2/2019 2:15 PM    History: 70-year-old female, shortness of breath, dyspnea    COMPARISON:  October 2, 2018    FINDINGS:  Chest-AP erect portable:  Hyperaeration lung fields. There are chronic interstitial changes. Asymmetric subtle increased density in the left lung base, suggestive of infiltrate. There is blunting of the right costophrenic angle, may represent focal atelectasis versus small  pleural effusion. Cardiac silhouette at the upper limits of normal. Aortic arch calcification. The pulmonary vascularity is normal size. There are degenerative changes of the spine. Monitoring leads project across the thorax. Impression  1. Asymmetric hazy density in the left lung base suspicious for developing infiltrate. 2. Small right pleural effusion versus atelectasis. 3. Chronic interstitial changes and hyperaeration lung fields, correlate for COPD. Assessment/Plan:     1. Chronic obstructive pulmonary disease, unspecified COPD type ([address]  She is using bronchodilator  with bronchodilator with Symbicort 160/4.5 mcg BID , albuterol neb prn , ventolin  HFA 2 puff  prn.C/o shortness of breath with exertion. No Wheezing. No Cough with  Sputum. Continue bronchodilator therapy as before    - budesonide-formoterol (SYMBICORT) 160-4.5 MCG/ACT AERO; TAKE 2 PUFFS BY MOUTH TWICE A DAY  Dispense: 3 Inhaler; Refill: 3  - XR CHEST STANDARD (2 VW); Future    2. Hypoxia  She is using 2 lit O2 with sleep , not using during daytime. Continue O2 to keep saturation 90% above    3. Personal history of smoking  She quit 2020, CT chest lung screening done at [facility] category 2, she said she is going to have a follow-up CT chest done next month at [facility]      Return in about 4 months (around 11/21/2021) for COPD, hypoxia on O2.       Mary Mann MD

## 2021-07-28 ENCOUNTER — OFFICE VISIT (OUTPATIENT)
Dept: PAIN MANAGEMENT | Age: 61
End: 2021-07-28
Payer: MEDICARE

## 2021-07-28 VITALS
TEMPERATURE: 97.9 F | BODY MASS INDEX: 26.24 KG/M2 | RESPIRATION RATE: 13 BRPM | HEART RATE: 110 BPM | SYSTOLIC BLOOD PRESSURE: 126 MMHG | WEIGHT: 139 LBS | HEIGHT: 61 IN | DIASTOLIC BLOOD PRESSURE: 68 MMHG | OXYGEN SATURATION: 91 %

## 2021-07-28 DIAGNOSIS — M48.061 SPINAL STENOSIS, LUMBAR REGION, WITHOUT NEUROGENIC CLAUDICATION: ICD-10-CM

## 2021-07-28 DIAGNOSIS — M47.812 CERVICAL SPONDYLOSIS WITHOUT MYELOPATHY: ICD-10-CM

## 2021-07-28 DIAGNOSIS — M17.10 KNEE ARTHROPATHY: ICD-10-CM

## 2021-07-28 DIAGNOSIS — M47.817 LUMBOSACRAL SPONDYLOSIS WITHOUT MYELOPATHY: ICD-10-CM

## 2021-07-28 DIAGNOSIS — M54.16 LUMBAR RADICULOPATHY: Primary | ICD-10-CM

## 2021-07-28 DIAGNOSIS — M51.36 DDD (DEGENERATIVE DISC DISEASE), LUMBAR: ICD-10-CM

## 2021-07-28 DIAGNOSIS — M25.551 PAIN OF RIGHT HIP JOINT: ICD-10-CM

## 2021-07-28 DIAGNOSIS — G89.4 CHRONIC PAIN SYNDROME: ICD-10-CM

## 2021-07-28 PROCEDURE — G8419 CALC BMI OUT NRM PARAM NOF/U: HCPCS | Performed by: NURSE PRACTITIONER

## 2021-07-28 PROCEDURE — 3017F COLORECTAL CA SCREEN DOC REV: CPT | Performed by: NURSE PRACTITIONER

## 2021-07-28 PROCEDURE — 99214 OFFICE O/P EST MOD 30 MIN: CPT | Performed by: NURSE PRACTITIONER

## 2021-07-28 PROCEDURE — G8427 DOCREV CUR MEDS BY ELIG CLIN: HCPCS | Performed by: NURSE PRACTITIONER

## 2021-07-28 PROCEDURE — 1036F TOBACCO NON-USER: CPT | Performed by: NURSE PRACTITIONER

## 2021-07-28 RX ORDER — GABAPENTIN 300 MG/1
300 CAPSULE ORAL 4 TIMES DAILY
Qty: 120 CAPSULE | Refills: 0 | Status: SHIPPED | OUTPATIENT
Start: 2021-07-28 | End: 2021-08-25 | Stop reason: SDUPTHER

## 2021-07-28 ASSESSMENT — ENCOUNTER SYMPTOMS
ABDOMINAL PAIN: 0
COLOR CHANGE: 0
BACK PAIN: 1
RESPIRATORY NEGATIVE: 1

## 2021-07-28 NOTE — PROGRESS NOTES
Patient: Madhu Reyes  YOB: 1960  Date: 21        Subjective:     Madhu Reyes is a 59 y.o. female who complains today of:    Chief Complaint   Patient presents with    Back Pain     pt states that she has had good days and bad days . Allergies:  Patient has no known allergies.     Past Medical History:   Diagnosis Date    Arthritis     left hip    COPD (chronic obstructive pulmonary disease) ([address]     uses inhalers x 4 yrs    Hyperlipidemia     on meds x 10yrs    Hypertension 2018    Lung disease     Type 2 diabetes mellitus with hyperglycemia, without long-term current use of insulin ([address] 8/15/2018     Past Surgical History:   Procedure Laterality Date    BREAST BIOPSY Right 2003    benign    CERVICAL FUSION  2016    CERVICAL SPINE SURGERY  2004    AVM removal, fusion, embolism removal      SECTION  1979    COLONOSCOPY  2018    HYSTERECTOMY  1997    ID TOTAL HIP ARTHROPLASTY Left 2018    LEFT HIP TOTAL HIP ARTHROPLASTY performed by Quan Liriano MD at [address] History   Problem Relation Age of Onset    Breast Cancer Mother     Cancer Mother         liver ca    No Known Problems Brother     Obesity Son      Social History     Socioeconomic History    Marital status: Single     Spouse name: Not on file    Number of children: Not on file    Years of education: Not on file    Highest education level: Not on file   Occupational History    Not on file   Tobacco Use    Smoking status: Former Smoker     Packs/day: 0.50     Years: 1.00     Pack years: 0.50     Start date: 2017     Quit date: 2019     Years since quittin.5    Smokeless tobacco: Never Used    Tobacco comment: smoked at 15yrs old x 30 yrs 0.5ppd    Vaping Use    Vaping Use: Never used   Substance and Sexual Activity    Alcohol use: No     Alcohol/week: 0.0 standard drinks    Drug use: No    Sexual activity: Not on file   Other Topics Concern    Not on file   Social History Narrative         Lives With: Significant other    Type of Home: House    Home Layout: One level    Home Access: Stairs to enter with rails    Entrance Stairs - Number of Steps: 6    Home Equipment: Rolling walker, Cane, Wheelchair-manual (BSC, leg )    ADL Assistance: Independent    Ambulation Assistance: Independent (with [employer])    Transfer Assistance: Independent    Additional Comments: Multiple neck surgeries with residual spasticity R UE/LE     Social Determinants of Health     Financial Resource Strain:     Difficulty of Paying Living Expenses:    Food Insecurity:     Worried About Running Out of Food in the Last Year:     Ran Out of Food in the Last Year:    Transportation Needs:     Lack of Transportation (Medical):      Lack of Transportation (Non-Medical):    Physical Activity:     Days of Exercise per Week:     Minutes of Exercise per Session:    Stress:     Feeling of Stress :    Social Connections:     Frequency of Communication with Friends and Family:     Frequency of Social Gatherings with Friends and Family:     Attends Synagogue Services:     Active Member of Clubs or Organizations:     Attends Club or Organization Meetings:     Marital Status:    Intimate Partner Violence:     Fear of Current or Ex-Partner:     Emotionally Abused:     Physically Abused:     Sexually Abused:        Current Outpatient Medications on File Prior to Visit   Medication Sig Dispense Refill    budesonide-formoterol (SYMBICORT) 160-4.5 MCG/ACT AERO TAKE 2 PUFFS BY MOUTH TWICE A DAY 3 Inhaler 3    celecoxib (CELEBREX) 100 MG capsule Take 1 capsule by mouth daily 60 capsule 0    albuterol sulfate HFA (VENTOLIN HFA) 108 (90 Base) MCG/ACT inhaler Inhale 2 puffs into the lungs every 6 hours as needed for Wheezing 1 Inhaler 3    VENTOLIN  (90 Base) MCG/ACT inhaler Inhale 2 puffs into the lungs 4 times daily as needed for Wheezing 1 Inhaler 3    albuterol (PROVENTIL) (2.5 MG/3ML) 0.083% nebulizer solution Take 3 mLs by nebulization every 6 hours as needed for Wheezing 120 each 3    Multiple Vitamins-Minerals (MULTIVITAMIN ADULT PO) Take 1 tablet by mouth      POLYETHYLENE GLYCOL 3350 PO Take 17 g by mouth      fluticasone (FLONASE) 50 MCG/ACT nasal spray 1 spray by NOT APPLICABLE route      aspirin 81 MG EC tablet Take 1 tablet by mouth 2 times daily 30 tablet 0    Multiple Vitamins-Minerals (THERAPEUTIC MULTIVITAMIN-MINERALS) tablet Take 1 tablet by mouth daily      Magnesium Oxide 500 MG CAPS Take 1 capsule by mouth 2 times daily      oxybutynin (DITROPAN-XL) 10 MG extended release tablet Take 10 mg by mouth daily      simvastatin (ZOCOR) 10 MG tablet Take 10 mg by mouth nightly      Oxygen Concentrator Inhale 3 L into the lungs continuous       DULoxetine (CYMBALTA) 60 MG capsule        No current facility-administered medications on file prior to visit. Mary Hathaway is a 68-year-old female here for following up for chronic low back pain and neck pain. She has not been able to walk much or participate in physical therapy due to her pain. MRI done and we reviewed it today. Her back pain is a 6/10 and her neck pain is 0. She is having a little more pain to the right low back and into legs, R>L and wanted to have facet injections. Unfortunately therapeutic facet injections are no longer available and she does not want RFA which previously worked quite well at least 75% improvement in pain. The procedure was too much for her to tolerate. But she said she will think about it. Her gabapentin was increased to 300 mg 3 times daily formerly twice daily, and ibuprofen 800 mg twice daily was added by Dr. Patrick Rocha two months ago. She states the ibuprofen helps significantly. The medication combination helps her perform ADLs and enjoy a better quality of life. She is now off of her narcotics, this was her choice. She denies any cardiac or stomach issues.   She had seen Dr. Javed Garcia recently had an MRI of her right hip in March and he did an injection that she said worked well for her.     HX: COPD requiring 2L oxygen at night. HX:  6/14/16 she had decomressive surgery C2-6 and disectomy C3-4 anterior approach with Dr. 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 She uses her cymbalta 60mg daily.              Back Pain  Pertinent negatives include no abdominal pain, headaches, numbness or weakness. Review of Systems   Constitutional: Negative. Negative for activity change and unexpected weight change. HENT: Negative. Respiratory: Negative. Gastrointestinal: Negative for abdominal pain. Genitourinary: Negative. Musculoskeletal: Positive for back pain and gait problem. Negative for joint swelling, neck pain and neck stiffness. Skin: Negative for color change and rash. Neurological: Negative for dizziness, seizures, weakness, light-headedness, numbness and headaches. Psychiatric/Behavioral: Negative. Negative for sleep disturbance. Objective:     Vitals:  /68 (Site: Right Upper Arm, Position: Sitting, Cuff Size: Large Adult)   Pulse 110   Temp 97.9 °F (36.6 °C) (Temporal)   Resp 13   Ht 5' 1\" (1.549 m)   Wt 139 lb (63 kg)   LMP  (LMP Unknown)   SpO2 91%   BMI 26.26 kg/m² Pain Score:   6    Physical Exam  Vitals reviewed. Constitutional:       Appearance: She is well-developed. HENT:      Head: Normocephalic. Nose: Nose normal.   Pulmonary:      Effort: Pulmonary effort is normal.   Musculoskeletal:      Cervical back: Normal range of motion and neck supple. Lumbar back: Spasms and tenderness present. Decreased range of motion. Positive right straight leg raise test.   Lymphadenopathy:      Cervical: No cervical adenopathy. Skin:     General: Skin is warm and dry. Findings: No erythema or rash.    Neurological:      Mental Status: She is alert and oriented to person, place, and time. Cranial Nerves: No cranial nerve deficit. Motor: Weakness present. Gait: Gait abnormal.      Deep Tendon Reflexes: Reflexes are normal and symmetric. Reflexes normal.      Comments: Right leg 2/5  AFO right ankle  In wheelchair, stands briefly       Psychiatric:         Mood and Affect: Mood normal.         Behavior: Behavior normal.         Thought Content: Thought content normal.         Judgment: Judgment normal.            Assessment:        Diagnosis Orders   1. Lumbar radiculopathy  gabapentin (NEURONTIN) 300 MG capsule    GA NJX AA&/STRD TFRML EPI LUMBAR/SACRAL 1 LEVEL   2. Spinal stenosis, lumbar region, without neurogenic claudication  gabapentin (NEURONTIN) 300 MG capsule   3. Cervical spondylosis without myelopathy  gabapentin (NEURONTIN) 300 MG capsule   4. Pain of right hip joint  gabapentin (NEURONTIN) 300 MG capsule   5. Knee arthropathy  gabapentin (NEURONTIN) 300 MG capsule   6. Lumbosacral spondylosis without myelopathy  gabapentin (NEURONTIN) 300 MG capsule   7. Chronic pain syndrome  gabapentin (NEURONTIN) 300 MG capsule   8. DDD (degenerative disc disease), lumbar         Plan:     Periodic Controlled Substance Monitoring: Possible medication side effects, risk of tolerance/dependence & alternative treatments discussed., No signs of potential drug abuse or diversion identified. , Assessed functional status. CHIRAG Jason - CNP)    Orders Placed This Encounter   Medications    gabapentin (NEURONTIN) 300 MG capsule     Sig: Take 1 capsule by mouth 4 times daily for 30 days. Patient takes one capsule every morning, one afternoon, and 2 capsules at bedtime.      Dispense:  120 capsule     Refill:  0       Orders Placed This Encounter   Procedures    GA NJX AA&/STRD TFRML EPI LUMBAR/SACRAL 1 LEVEL     Right L5-S1 with CC    Needs to be off ASA for 7 days per Dr Danielle Hurley     Standing Status:   Future     Standing Expiration Date:   10/26/2021     Right AMOR L5-S1 with Dr Veronica Elizondo  Needs to be off ASA for 7 days per Dr Maris Gomez  -I will increase her gabapentin from 300 mg 3 times daily to 300 mg 4 times daily. She will take 1 in the morning, 1 in the afternoon, 2 at bedtime.  -If her leg pain improves we will consider lumbar RFA for her axial back pain  -She declines neurosurgery consult  -She will follow up with her family physician regarding the kidney cyst found on the MRI. Discussed options with the patient today. Anatomic model pathology was shown and reviewed with pt. All questions were answered. Relevant imaging reviewed, NDP reviewed and pain generators reviewed. Pt verbalized understanding and agrees with above plan. Will continue medications as they do help pt function with ADL and improve quality of life. Pt understands potential side effects from opioids such as constipation, dry mouth, dizziness, opioid use disorder, and overdose. Pt has failed non opioid therapy and decision was made to prescribe opioids to help with daily function and improve quality of life. OARRS was reviewed. UTOX from 7/2020 appropriate; This NP saw pt under direct supervision of Dr Simona Morgan. Controlled Substances Monitoring: Periodic Controlled Substance Monitoring: Possible medication side effects, risk of tolerance/dependence & alternative treatments discussed., No signs of potential drug abuse or diversion identified. , Assessed functional status. Steve Fine, APRN - CNP)        Follow up:  Return in about 4 weeks (around 8/25/2021) for f/u after procedure and reassess pain/medications.     Kenisha Romero, APRN - RENEE

## 2021-08-02 ENCOUNTER — TELEPHONE (OUTPATIENT)
Dept: PAIN MANAGEMENT | Age: 61
End: 2021-08-02

## 2021-08-02 NOTE — TELEPHONE ENCOUNTER
BENEFITS:    Insurance: [facility]  Phone: [phone]  Contact Name: BENEDICTO  Effective Date: 2/1/21     Plan year: CLEMENTINA  Deductible: 0      Deductible Met: 0  Allowed/benefits paid at: 100% AFTER $35 COPAY  OOP: $3,900.00, $1,169.83 MET  Freq Limits: 3/YEAR  Prior Auth Requirement: NONE    Notes:     Call Reference #: [id number]    Time of call: 1145AM

## 2021-08-04 ENCOUNTER — PROCEDURE VISIT (OUTPATIENT)
Dept: PAIN MANAGEMENT | Age: 61
End: 2021-08-04
Payer: MEDICARE

## 2021-08-04 DIAGNOSIS — M48.061 SPINAL STENOSIS, LUMBAR REGION, WITHOUT NEUROGENIC CLAUDICATION: ICD-10-CM

## 2021-08-04 DIAGNOSIS — M54.16 LUMBAR RADICULOPATHY: Primary | ICD-10-CM

## 2021-08-04 DIAGNOSIS — M47.817 LUMBOSACRAL SPONDYLOSIS WITHOUT MYELOPATHY: ICD-10-CM

## 2021-08-04 PROCEDURE — 64483 NJX AA&/STRD TFRM EPI L/S 1: CPT | Performed by: ANESTHESIOLOGY

## 2021-08-04 RX ORDER — DEXAMETHASONE SODIUM PHOSPHATE 10 MG/ML
10 INJECTION, EMULSION INTRAMUSCULAR; INTRAVENOUS ONCE
Status: COMPLETED | OUTPATIENT
Start: 2021-08-04 | End: 2021-08-04

## 2021-08-04 RX ORDER — LIDOCAINE HYDROCHLORIDE 10 MG/ML
1 INJECTION, SOLUTION EPIDURAL; INFILTRATION; INTRACAUDAL; PERINEURAL ONCE
Status: COMPLETED | OUTPATIENT
Start: 2021-08-04 | End: 2021-08-04

## 2021-08-04 RX ADMIN — DEXAMETHASONE SODIUM PHOSPHATE 10 MG: 10 INJECTION, EMULSION INTRAMUSCULAR; INTRAVENOUS at 13:47

## 2021-08-04 RX ADMIN — LIDOCAINE HYDROCHLORIDE 1 ML: 10 INJECTION, SOLUTION EPIDURAL; INFILTRATION; INTRACAUDAL; PERINEURAL at 13:48

## 2021-08-04 NOTE — PROGRESS NOTES
[facility]  Neurosurgery and Pain [address] [facility] [address]: [phone]  F: [phone]             Provider: Juanjo Amado MD          Patient Name: Gunjan Aj : 1960        Date: 2021         PROCEDURE: LtS1 Selective Nerve Root Block      Description of Procedure: The procedure and potential complications were explained to the patient and a voluntary informed, signed consent was obtained. The patient was placed prone on the x-ray table and her low back was prepped with Betadine solution. With the image intensifier slightly rotated toward the left, the S1 foramen was identified. A 25-gauge, 3.5 inch curved spinal needle was inserted aiming at the center of the foramen. 0.5 mL of Isovue-300 was injected for a neurogram. A mixture of 10 mg of Decadron mixed with 2 mL of 0.5% Xylocaine was injected. The patient tolerated the procedure very well. Summary and Recommendations:  Prior to the injection, the plan was discussed with the patient. She understood and agreed to proceed. The patient was discharged home in stable condition.                      Juanjo Amado MD

## 2021-08-13 ENCOUNTER — TELEPHONE (OUTPATIENT)
Dept: PAIN MANAGEMENT | Age: 61
End: 2021-08-13

## 2021-08-13 DIAGNOSIS — M25.551 PAIN OF RIGHT HIP JOINT: ICD-10-CM

## 2021-08-13 DIAGNOSIS — M54.16 LUMBAR RADICULOPATHY: Primary | ICD-10-CM

## 2021-08-13 DIAGNOSIS — M48.061 SPINAL STENOSIS, LUMBAR REGION, WITHOUT NEUROGENIC CLAUDICATION: ICD-10-CM

## 2021-08-13 DIAGNOSIS — M17.10 KNEE ARTHROPATHY: ICD-10-CM

## 2021-08-13 DIAGNOSIS — M47.812 CERVICAL SPONDYLOSIS WITHOUT MYELOPATHY: ICD-10-CM

## 2021-08-13 DIAGNOSIS — M47.817 LUMBOSACRAL SPONDYLOSIS WITHOUT MYELOPATHY: ICD-10-CM

## 2021-08-13 RX ORDER — CELECOXIB 100 MG/1
100 CAPSULE ORAL DAILY
Qty: 60 CAPSULE | Refills: 0 | Status: SHIPPED | OUTPATIENT
Start: 2021-08-13 | End: 2021-10-19 | Stop reason: SDUPTHER

## 2021-08-13 NOTE — TELEPHONE ENCOUNTER
Requested Prescriptions     Pending Prescriptions Disp Refills    celecoxib (CELEBREX) 100 MG capsule 60 capsule 0     Sig: Take 1 capsule by mouth daily       Patient last seen on:  8/4  Date of last surgery:  N/A  Date of last refill:  6/23  Pain level:  N/A  Patient complaining of:  Pt asking for refill of celebrex.   Future appts: 8/25

## 2021-08-25 ENCOUNTER — OFFICE VISIT (OUTPATIENT)
Dept: PAIN MANAGEMENT | Age: 61
End: 2021-08-25
Payer: MEDICARE

## 2021-08-25 VITALS
TEMPERATURE: 98 F | DIASTOLIC BLOOD PRESSURE: 82 MMHG | BODY MASS INDEX: 25.68 KG/M2 | HEIGHT: 61 IN | SYSTOLIC BLOOD PRESSURE: 112 MMHG | WEIGHT: 136 LBS

## 2021-08-25 DIAGNOSIS — M48.061 SPINAL STENOSIS, LUMBAR REGION, WITHOUT NEUROGENIC CLAUDICATION: ICD-10-CM

## 2021-08-25 DIAGNOSIS — M54.16 LUMBAR RADICULOPATHY: ICD-10-CM

## 2021-08-25 DIAGNOSIS — M17.10 KNEE ARTHROPATHY: ICD-10-CM

## 2021-08-25 DIAGNOSIS — M47.817 LUMBOSACRAL SPONDYLOSIS WITHOUT MYELOPATHY: ICD-10-CM

## 2021-08-25 DIAGNOSIS — M25.551 PAIN OF RIGHT HIP JOINT: ICD-10-CM

## 2021-08-25 DIAGNOSIS — M47.812 CERVICAL SPONDYLOSIS WITHOUT MYELOPATHY: ICD-10-CM

## 2021-08-25 DIAGNOSIS — G89.4 CHRONIC PAIN SYNDROME: ICD-10-CM

## 2021-08-25 PROCEDURE — G8427 DOCREV CUR MEDS BY ELIG CLIN: HCPCS | Performed by: NURSE PRACTITIONER

## 2021-08-25 PROCEDURE — 1036F TOBACCO NON-USER: CPT | Performed by: NURSE PRACTITIONER

## 2021-08-25 PROCEDURE — 99213 OFFICE O/P EST LOW 20 MIN: CPT | Performed by: NURSE PRACTITIONER

## 2021-08-25 PROCEDURE — G8419 CALC BMI OUT NRM PARAM NOF/U: HCPCS | Performed by: NURSE PRACTITIONER

## 2021-08-25 PROCEDURE — 3017F COLORECTAL CA SCREEN DOC REV: CPT | Performed by: NURSE PRACTITIONER

## 2021-08-25 RX ORDER — CELECOXIB 100 MG/1
100 CAPSULE ORAL DAILY
Qty: 60 CAPSULE | Refills: 0 | Status: CANCELLED | OUTPATIENT
Start: 2021-08-25

## 2021-08-25 RX ORDER — GABAPENTIN 300 MG/1
300 CAPSULE ORAL 4 TIMES DAILY
Qty: 120 CAPSULE | Refills: 0 | Status: SHIPPED | OUTPATIENT
Start: 2021-08-25 | End: 2021-09-21 | Stop reason: SDUPTHER

## 2021-08-25 ASSESSMENT — ENCOUNTER SYMPTOMS
BACK PAIN: 1
SHORTNESS OF BREATH: 0
TROUBLE SWALLOWING: 0
CONSTIPATION: 0
DIARRHEA: 0
EYES NEGATIVE: 1
COUGH: 0
GASTROINTESTINAL NEGATIVE: 1

## 2021-08-25 NOTE — PROGRESS NOTES
Ninoska Red  (1960)    2021    Subjective:     Ninoska Red is 59 y.o. female who complains today of:    Chief Complaint   Patient presents with    Back Pain         Allergies:  Patient has no known allergies.     Past Medical History:   Diagnosis Date    Arthritis     left hip    COPD (chronic obstructive pulmonary disease) ([facility]     uses inhalers x 4 yrs    Hyperlipidemia     on meds x 10yrs    Hypertension 2018    Lung disease     Type 2 diabetes mellitus with hyperglycemia, without long-term current use of insulin ([facility] 8/15/2018     Past Surgical History:   Procedure Laterality Date    BREAST BIOPSY Right 2003    benign    CERVICAL FUSION  2016    CERVICAL SPINE SURGERY  2004    AVM removal, fusion, embolism removal      SECTION  1979    COLONOSCOPY  2018    HYSTERECTOMY      UT TOTAL HIP ARTHROPLASTY Left 2018    LEFT HIP TOTAL HIP ARTHROPLASTY performed by Don Thompson MD at [address] History   Problem Relation Age of Onset    Breast Cancer Mother     Cancer Mother         liver ca    No Known Problems Brother     Obesity Son      Social History     Socioeconomic History    Marital status: Single     Spouse name: Not on file    Number of children: Not on file    Years of education: Not on file    Highest education level: Not on file   Occupational History    Not on file   Tobacco Use    Smoking status: Former Smoker     Packs/day: 0.50     Years: 1.00     Pack years: 0.50     Start date: 2017     Quit date: 2019     Years since quittin.6    Smokeless tobacco: Never Used    Tobacco comment: smoked at 15yrs old x 30 yrs 0.5ppd    Vaping Use    Vaping Use: Never used   Substance and Sexual Activity    Alcohol use: No     Alcohol/week: 0.0 standard drinks    Drug use: No    Sexual activity: Not on file   Other Topics Concern    Not on file   Social History Narrative         Lives With: Significant other    Type of Home: House    Home Layout: One level    Home Access: Stairs to enter with rails    Entrance Stairs - Number of Steps: 6    Home Equipment: Rolling walker, Cane, Wheelchair-manual (BSC, leg )    ADL Assistance: Independent    Ambulation Assistance: Independent (with [employer])    Transfer Assistance: Independent    Additional Comments: Multiple neck surgeries with residual spasticity R UE/LE     Social Determinants of Health     Financial Resource Strain:     Difficulty of Paying Living Expenses:    Food Insecurity:     Worried About Running Out of Food in the Last Year:     Ran Out of Food in the Last Year:    Transportation Needs:     Lack of Transportation (Medical):      Lack of Transportation (Non-Medical):    Physical Activity:     Days of Exercise per Week:     Minutes of Exercise per Session:    Stress:     Feeling of Stress :    Social Connections:     Frequency of Communication with Friends and Family:     Frequency of Social Gatherings with Friends and Family:     Attends Rastafari Services:     Active Member of Clubs or Organizations:     Attends Club or Organization Meetings:     Marital Status:    Intimate Partner Violence:     Fear of Current or Ex-Partner:     Emotionally Abused:     Physically Abused:     Sexually Abused:        Current Outpatient Medications on File Prior to Visit   Medication Sig Dispense Refill    celecoxib (CELEBREX) 100 MG capsule Take 1 capsule by mouth daily 60 capsule 0    budesonide-formoterol (SYMBICORT) 160-4.5 MCG/ACT AERO TAKE 2 PUFFS BY MOUTH TWICE A DAY 3 Inhaler 3    albuterol sulfate HFA (VENTOLIN HFA) 108 (90 Base) MCG/ACT inhaler Inhale 2 puffs into the lungs every 6 hours as needed for Wheezing 1 Inhaler 3    VENTOLIN  (90 Base) MCG/ACT inhaler Inhale 2 puffs into the lungs 4 times daily as needed for Wheezing 1 Inhaler 3    albuterol (PROVENTIL) (2.5 MG/3ML) 0.083% nebulizer solution Take 3 mLs by nebulization every 6 hours as needed for malformation and surgery with residual right side weakness and drop foot. She uses her cymbalta 60mg daily. Uses gabapentin 300mg 1 tab BID and 2 tabs QHS and celebrex 100mg daily. Pt feels pain level 2/10. Pt feels that \"everything I do\", weather change, standing/walking makes the pain worse, and medication, injection, ice, heat, laying down makes the pain better. Pt feels her medication helps   her function and improve her quality of life. Pt admits to occasional LE radiating numbness and tingling. Denies recent falls, injuries or trauma. Pt denies new weakness. Pt reports PT has been tried in the past.         Review of Systems   Constitutional: Negative. Negative for fatigue. HENT: Negative. Negative for trouble swallowing. Eyes: Negative. Respiratory: Negative for cough and shortness of breath. Cardiovascular: Negative for chest pain. Gastrointestinal: Negative. Negative for constipation and diarrhea. Endocrine: Negative. US of kidney d/t cyst noted on MRI report - yet to hear report   Genitourinary: Negative. Musculoskeletal: Positive for back pain. Negative for neck pain. Skin: Negative. Neurological: Negative for dizziness, weakness and headaches. Hematological: Negative. Psychiatric/Behavioral: Negative. Objective:     Vitals:  /82   Temp 98 °F (36.7 °C) (Infrared)   Ht 5' 1\" (1.549 m)   Wt 136 lb (61.7 kg)   LMP  (LMP Unknown)   BMI 25.70 kg/m²        Physical Exam  Vitals and nursing note reviewed. This is a thin pleasant female who answers questions appropriately and follows commands. Pt is alert and oriented x 3. Recent and remote memory is intact. Mood and affect, judgement and insight are normal. SOB with exertion. HEENT: PERRL. Neck is supple, trachea midline. No lymphadenopathy noted.  Decreased ROM with flexion, extension and rotation of cervical spine. Non-tender with palpation to neck. Strong grasp B/L.  Pulses are intact. Decreased ROM with flexion and extension of low back. Minimally tender with palpation to low back. Negative SLR. Rt foot with edema noted. Tightness in both hamstrings noted. Seen in w/c today.    Cranial nerves II-XII are intact. Assessment:      Diagnosis Orders   1. Lumbar radiculopathy  gabapentin (NEURONTIN) 300 MG capsule    Ambulatory referral to Physical Therapy   2. Spinal stenosis, lumbar region, without neurogenic claudication  gabapentin (NEURONTIN) 300 MG capsule    Ambulatory referral to Physical Therapy   3. Cervical spondylosis without myelopathy  gabapentin (NEURONTIN) 300 MG capsule   4. Pain of right hip joint  gabapentin (NEURONTIN) 300 MG capsule   5. Knee arthropathy  gabapentin (NEURONTIN) 300 MG capsule   6. Lumbosacral spondylosis without myelopathy  gabapentin (NEURONTIN) 300 MG capsule    Ambulatory referral to Physical Therapy   7. Chronic pain syndrome  gabapentin (NEURONTIN) 300 MG capsule       Plan:     Periodic Controlled Substance Monitoring: No signs of potential drug abuse or diversion identified. (Sho Muñoz, APRN - CNP)    Orders Placed This Encounter   Medications    gabapentin (NEURONTIN) 300 MG capsule     Sig: Take 1 capsule by mouth 4 times daily for 30 days. Patient takes one capsule every morning, one afternoon, and 2 capsules at bedtime. Dispense:  120 capsule     Refill:  0       Orders Placed This Encounter   Procedures    Ambulatory referral to Physical Therapy     Referral Priority:   Routine     Referral Type:   Physical Medicine     Requested Specialty:   Physical Therapy     Number of Visits Requested:   1     Discussed options with the patient today. Anatomic model pathology was shown and reviewed with pt. patient did well status post SNRB, hopefully this is lasting. We will continue her increased dose of the gabapentin 300 mg 1 twice a day and 2 caps at bedtime.   Patient requests physical therapy order for rehab consultants for her low back and lower extremity pain which is given today for strengthening and stretching exercises. She says she does not need Celebrex at this time. All questions were answered. Discussed home exercise program.  Relevant imaging and pain generators reviewed. Pt verbalized understanding and agrees with above plan. Pt has chronic pain. OARRS was reviewed. This NP saw pt under direct supervision of Dr. Kendra Osborne. Follow up:  Return in about 4 weeks (around 9/22/2021) for review meds and reassess pain.     Pilar Sanchez, CHIRAG - CNP

## 2021-09-21 ENCOUNTER — TELEPHONE (OUTPATIENT)
Dept: PAIN MANAGEMENT | Age: 61
End: 2021-09-21

## 2021-09-21 ENCOUNTER — OFFICE VISIT (OUTPATIENT)
Dept: PAIN MANAGEMENT | Age: 61
End: 2021-09-21
Payer: MEDICARE

## 2021-09-21 VITALS
SYSTOLIC BLOOD PRESSURE: 111 MMHG | TEMPERATURE: 97.1 F | WEIGHT: 139 LBS | HEIGHT: 61 IN | BODY MASS INDEX: 26.24 KG/M2 | DIASTOLIC BLOOD PRESSURE: 72 MMHG

## 2021-09-21 DIAGNOSIS — M47.817 LUMBOSACRAL SPONDYLOSIS WITHOUT MYELOPATHY: ICD-10-CM

## 2021-09-21 DIAGNOSIS — M17.11 ARTHRITIS OF KNEE, RIGHT: ICD-10-CM

## 2021-09-21 DIAGNOSIS — M17.11 ARTHRITIS OF KNEE, RIGHT: Primary | ICD-10-CM

## 2021-09-21 DIAGNOSIS — M48.061 SPINAL STENOSIS, LUMBAR REGION, WITHOUT NEUROGENIC CLAUDICATION: ICD-10-CM

## 2021-09-21 DIAGNOSIS — M17.10 KNEE ARTHROPATHY: ICD-10-CM

## 2021-09-21 DIAGNOSIS — M54.16 LUMBAR RADICULOPATHY: ICD-10-CM

## 2021-09-21 DIAGNOSIS — G89.4 CHRONIC PAIN SYNDROME: ICD-10-CM

## 2021-09-21 DIAGNOSIS — M25.561 CHRONIC PAIN OF RIGHT KNEE: Primary | ICD-10-CM

## 2021-09-21 DIAGNOSIS — M47.812 CERVICAL SPONDYLOSIS WITHOUT MYELOPATHY: ICD-10-CM

## 2021-09-21 DIAGNOSIS — M25.551 PAIN OF RIGHT HIP JOINT: ICD-10-CM

## 2021-09-21 DIAGNOSIS — G89.29 CHRONIC PAIN OF RIGHT KNEE: Primary | ICD-10-CM

## 2021-09-21 PROCEDURE — G8419 CALC BMI OUT NRM PARAM NOF/U: HCPCS | Performed by: NURSE PRACTITIONER

## 2021-09-21 PROCEDURE — G8427 DOCREV CUR MEDS BY ELIG CLIN: HCPCS | Performed by: NURSE PRACTITIONER

## 2021-09-21 PROCEDURE — 3017F COLORECTAL CA SCREEN DOC REV: CPT | Performed by: NURSE PRACTITIONER

## 2021-09-21 PROCEDURE — 1036F TOBACCO NON-USER: CPT | Performed by: NURSE PRACTITIONER

## 2021-09-21 PROCEDURE — 99213 OFFICE O/P EST LOW 20 MIN: CPT | Performed by: NURSE PRACTITIONER

## 2021-09-21 RX ORDER — GABAPENTIN 300 MG/1
300 CAPSULE ORAL 4 TIMES DAILY
Qty: 120 CAPSULE | Refills: 0 | Status: SHIPPED | OUTPATIENT
Start: 2021-09-24 | End: 2021-10-19 | Stop reason: DRUGHIGH

## 2021-09-21 ASSESSMENT — ENCOUNTER SYMPTOMS
CONSTIPATION: 0
TROUBLE SWALLOWING: 0
BACK PAIN: 1
COUGH: 0
EYES NEGATIVE: 1
SHORTNESS OF BREATH: 0
GASTROINTESTINAL NEGATIVE: 1
DIARRHEA: 0

## 2021-09-21 NOTE — TELEPHONE ENCOUNTER
[facility] where patient receives physical therapy called to ask Dr. Yesi Ochoa to place an order for a right knee stability brace and a right ankle AFO.  Fax number [phone]

## 2021-09-21 NOTE — PROGRESS NOTES
House    Home Layout: One level    Home Access: Stairs to enter with rails    Entrance Stairs - Number of Steps: 6    Home Equipment: Rolling walker, Cane, Wheelchair-manual (BSC, leg )    ADL Assistance: Independent    Ambulation Assistance: Independent (with [employer])    Transfer Assistance: Independent    Additional Comments: Multiple neck surgeries with residual spasticity R UE/LE     Social Determinants of Health     Financial Resource Strain:     Difficulty of Paying Living Expenses:    Food Insecurity:     Worried About Running Out of Food in the Last Year:     Ran Out of Food in the Last Year:    Transportation Needs:     Lack of Transportation (Medical):      Lack of Transportation (Non-Medical):    Physical Activity:     Days of Exercise per Week:     Minutes of Exercise per Session:    Stress:     Feeling of Stress :    Social Connections:     Frequency of Communication with Friends and Family:     Frequency of Social Gatherings with Friends and Family:     Attends Roman [employer] Services:     Active Member of Clubs or Organizations:     Attends Club or Organization Meetings:     Marital Status:    Intimate Partner Violence:     Fear of Current or Ex-Partner:     Emotionally Abused:     Physically Abused:     Sexually Abused:        Current Outpatient Medications on File Prior to Visit   Medication Sig Dispense Refill    celecoxib (CELEBREX) 100 MG capsule Take 1 capsule by mouth daily 60 capsule 0    budesonide-formoterol (SYMBICORT) 160-4.5 MCG/ACT AERO TAKE 2 PUFFS BY MOUTH TWICE A DAY 3 Inhaler 3    albuterol sulfate HFA (VENTOLIN HFA) 108 (90 Base) MCG/ACT inhaler Inhale 2 puffs into the lungs every 6 hours as needed for Wheezing 1 Inhaler 3    VENTOLIN  (90 Base) MCG/ACT inhaler Inhale 2 puffs into the lungs 4 times daily as needed for Wheezing 1 Inhaler 3    albuterol (PROVENTIL) (2.5 MG/3ML) 0.083% nebulizer solution Take 3 mLs by nebulization every 6 hours as needed for Wheezing 120 each 3    Multiple Vitamins-Minerals (MULTIVITAMIN ADULT PO) Take 1 tablet by mouth      POLYETHYLENE GLYCOL 3350 PO Take 17 g by mouth      fluticasone (FLONASE) 50 MCG/ACT nasal spray 1 spray by NOT APPLICABLE route      aspirin 81 MG EC tablet Take 1 tablet by mouth 2 times daily 30 tablet 0    Multiple Vitamins-Minerals (THERAPEUTIC MULTIVITAMIN-MINERALS) tablet Take 1 tablet by mouth daily      Magnesium Oxide 500 MG CAPS Take 1 capsule by mouth 2 times daily      oxybutynin (DITROPAN-XL) 10 MG extended release tablet Take 10 mg by mouth daily      simvastatin (ZOCOR) 10 MG tablet Take 10 mg by mouth nightly      Oxygen Concentrator Inhale 3 L into the lungs continuous       DULoxetine (CYMBALTA) 60 MG capsule        No current facility-administered medications on file prior to visit. Pt presents today for a f/u of her pain. PCP is Dr. Tino Luu DO. Last saw this NP. On 8/4/2021 had  left S1 SNRB with >80% relief. She is able to walk more. Physical therapy order for rehab consultants for her low back and lower extremity pain given last OV. She has  chronic low back pain and neck pain. MRI done and we reviewed it today. Unfortunately therapeutic facet injections are no longer available and she does not want RFA which previously worked quite well at least 75% improvement in pain. The procedure was too much for her to tolerate. But she said she will think about it. She is having more Rt knee pain. Says swelling and buckling. Worse since started PT she says. She does feel the PT is helpful for the back. She states the ibuprofen helps significantly. The medication combination helps her perform ADLs and enjoy a better quality of life. She is now off of her narcotics, this was her choice. She denies any cardiac or stomach issues.   She had seen Dr. Priti Lua recently had an MRI of her right hip in March and he did an injection that she said worked well for her.     HX: COPD requiring 2L oxygen at night. HX:  6/14/16 she had decomressive surgery C2-6 and disectomy C3-4 anterior approach with Dr. Thomas Hays, [facility]. 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        She uses her cymbalta 60mg daily. Uses gabapentin 300mg 1 tab BID and 2 tabs QHS and celebrex 100mg daily. Pt feels pain level 3/10. Pt feels that standing, walking, PT for knee makes the pain worse, and PT for low back, medication makes the pain better. Pt feels her medication helps   her function and improve her quality of life. Pt admits to Rt >Lt LE radiating numbness and tingling. Denies recent falls, injuries or trauma. Pt denies new weakness. Pt reports PT has been doing. Review of Systems   Constitutional: Negative. Negative for fatigue. HENT: Negative. Negative for trouble swallowing. Eyes: Negative. Respiratory: Negative for cough and shortness of breath. Cardiovascular: Negative for chest pain. Gastrointestinal: Negative. Negative for constipation and diarrhea. Endocrine: Negative. Genitourinary: Negative. Musculoskeletal: Positive for back pain and neck pain. Skin: Negative. Neurological: Negative for dizziness, weakness and headaches. Hematological: Negative. Psychiatric/Behavioral: Negative. Objective:     Vitals:  /72   Temp 97.1 °F (36.2 °C)   Ht 5' 1\" (1.549 m)   Wt 139 lb (63 kg)   LMP  (LMP Unknown)   BMI 26.26 kg/m² Pain Score:   3      Physical Exam  Vitals and nursing note reviewed. This is a thin pleasant female who answers questions appropriately and follows commands. Pt is alert and oriented x 3. Recent and remote memory is intact. Mood and affect, judgement and insight are normal. SOB with exertion.  HEENT: PERRL. Neck is supple, trachea midline. No lymphadenopathy noted.  Decreased ROM with flexion, extension and rotation of cervical spine. Non-tender with palpation to neck.  Strong today. Anatomic model pathology was shown and reviewed with pt. Her SNRB continues to help her LE Sx. Will order Rt knee MRI to evaluate pain and weakness further. XR done in 2020 showing some arthritis changes. Continue PT. Will continue gabapentin 300 mg 1 twice a day and 2 caps at bedtime. . All questions were answered. Discussed home exercise program.  Relevant imaging and pain generators reviewed. Pt verbalized understanding and agrees with above plan. Pt has chronic pain. Will continue medications for chronic pain that has been previously directed as they do help pt function with ADL and improve quality of life. OARRS was reviewed. This NP saw pt under direct supervision of Dr. Jeovanny Cuba. Follow up:  Return in about 4 weeks (around 10/19/2021) for review meds and reassess pain.     Dawna Sanchez, APRN - CNP

## 2021-09-22 ENCOUNTER — TELEPHONE (OUTPATIENT)
Dept: PAIN MANAGEMENT | Age: 61
End: 2021-09-22

## 2021-09-22 NOTE — TELEPHONE ENCOUNTER
BENEFITS:    Insurance: [facility] [address]  Phone: [phone]  Contact Name: Ashanti Patel  Effective Date: 2/1/21     Plan year: CAL YEAR  Deductible: N/A      Deductible Met: N/A  Allowed/benefits paid at: 80%  OOP: $3900 WITH $1374.13 MET  Freq Limits: FOLLOWS  GUIDELINES  Prior Auth Requirement: NO AUTH REQUIRED    Notes:     Call Reference #: [id number]    Time of call: 9:13AM

## 2021-09-22 NOTE — TELEPHONE ENCOUNTER
ORDER PLACED:    Date: 9/21/21  Description: [employer]  Order Number: [id number]  Ordering Provider:   Performing Provider:   CPT Codes:   ICD10 Codes: M17.11

## 2021-09-28 ENCOUNTER — TELEPHONE (OUTPATIENT)
Dept: PAIN MANAGEMENT | Age: 61
End: 2021-09-28

## 2021-09-28 NOTE — TELEPHONE ENCOUNTER
PHYSICAL THERAPY PLAN OF CARE (08/31/21) FROM  [facility] SIGNED BY HOMER Shankar AND FAXED [phone] ON 09/27/2021

## 2021-10-01 ENCOUNTER — HOSPITAL ENCOUNTER (OUTPATIENT)
Dept: MRI IMAGING | Age: 61
Discharge: HOME | End: 2021-10-03
Payer: MEDICARE

## 2021-10-01 DIAGNOSIS — M17.11 ARTHRITIS OF KNEE, RIGHT: ICD-10-CM

## 2021-10-01 DIAGNOSIS — M25.561 CHRONIC PAIN OF RIGHT KNEE: ICD-10-CM

## 2021-10-01 DIAGNOSIS — M17.10 KNEE ARTHROPATHY: ICD-10-CM

## 2021-10-01 DIAGNOSIS — G89.29 CHRONIC PAIN OF RIGHT KNEE: ICD-10-CM

## 2021-10-01 PROCEDURE — 73721 MRI JNT OF LWR EXTRE W/O DYE: CPT

## 2021-10-04 ENCOUNTER — TELEPHONE (OUTPATIENT)
Dept: PAIN MANAGEMENT | Age: 61
End: 2021-10-04

## 2021-10-19 ENCOUNTER — OFFICE VISIT (OUTPATIENT)
Dept: PAIN MANAGEMENT | Age: 61
End: 2021-10-19
Payer: MEDICARE

## 2021-10-19 VITALS
SYSTOLIC BLOOD PRESSURE: 124 MMHG | TEMPERATURE: 97.2 F | HEIGHT: 61 IN | WEIGHT: 139 LBS | BODY MASS INDEX: 26.24 KG/M2 | DIASTOLIC BLOOD PRESSURE: 66 MMHG

## 2021-10-19 DIAGNOSIS — M47.817 LUMBOSACRAL SPONDYLOSIS WITHOUT MYELOPATHY: ICD-10-CM

## 2021-10-19 DIAGNOSIS — M25.551 PAIN OF RIGHT HIP JOINT: ICD-10-CM

## 2021-10-19 DIAGNOSIS — M17.10 KNEE ARTHROPATHY: ICD-10-CM

## 2021-10-19 DIAGNOSIS — M47.812 CERVICAL SPONDYLOSIS WITHOUT MYELOPATHY: ICD-10-CM

## 2021-10-19 DIAGNOSIS — M54.16 LUMBAR RADICULOPATHY: ICD-10-CM

## 2021-10-19 DIAGNOSIS — G89.4 CHRONIC PAIN SYNDROME: ICD-10-CM

## 2021-10-19 DIAGNOSIS — M48.061 SPINAL STENOSIS, LUMBAR REGION, WITHOUT NEUROGENIC CLAUDICATION: Primary | ICD-10-CM

## 2021-10-19 PROCEDURE — G8484 FLU IMMUNIZE NO ADMIN: HCPCS | Performed by: NURSE PRACTITIONER

## 2021-10-19 PROCEDURE — G8419 CALC BMI OUT NRM PARAM NOF/U: HCPCS | Performed by: NURSE PRACTITIONER

## 2021-10-19 PROCEDURE — 1036F TOBACCO NON-USER: CPT | Performed by: NURSE PRACTITIONER

## 2021-10-19 PROCEDURE — G8427 DOCREV CUR MEDS BY ELIG CLIN: HCPCS | Performed by: NURSE PRACTITIONER

## 2021-10-19 PROCEDURE — 99213 OFFICE O/P EST LOW 20 MIN: CPT | Performed by: NURSE PRACTITIONER

## 2021-10-19 PROCEDURE — 3017F COLORECTAL CA SCREEN DOC REV: CPT | Performed by: NURSE PRACTITIONER

## 2021-10-19 RX ORDER — GABAPENTIN 300 MG/1
300 CAPSULE ORAL 4 TIMES DAILY
Qty: 120 CAPSULE | Refills: 0 | Status: CANCELLED | OUTPATIENT
Start: 2021-10-21 | End: 2021-11-20

## 2021-10-19 RX ORDER — GABAPENTIN 600 MG/1
600 TABLET ORAL 3 TIMES DAILY
Qty: 90 TABLET | Refills: 0 | Status: SHIPPED | OUTPATIENT
Start: 2021-10-19 | End: 2021-11-22 | Stop reason: SDUPTHER

## 2021-10-19 RX ORDER — CELECOXIB 100 MG/1
100 CAPSULE ORAL DAILY
Qty: 30 CAPSULE | Refills: 1 | Status: SHIPPED | OUTPATIENT
Start: 2021-10-19 | End: 2021-12-17 | Stop reason: SDUPTHER

## 2021-10-19 ASSESSMENT — ENCOUNTER SYMPTOMS
CONSTIPATION: 0
BACK PAIN: 1
EYES NEGATIVE: 1
GASTROINTESTINAL NEGATIVE: 1
SHORTNESS OF BREATH: 0
DIARRHEA: 0
TROUBLE SWALLOWING: 0
COUGH: 0

## 2021-10-19 NOTE — PROGRESS NOTES
Ronnie Diez  (1960)    10/19/2021    Subjective:     Ronnie Diez is 59 y.o. female who complains today of:    Chief Complaint   Patient presents with    Back Pain    Knee Pain     Right         Allergies:  Patient has no known allergies.     Past Medical History:   Diagnosis Date    Arthritis     left hip    COPD (chronic obstructive pulmonary disease) ([address]     uses inhalers x 4 yrs    Hyperlipidemia     on meds x 10yrs    Hypertension 2018    Lung disease     Type 2 diabetes mellitus with hyperglycemia, without long-term current use of insulin ([address] 8/15/2018     Past Surgical History:   Procedure Laterality Date    BREAST BIOPSY Right 2003    benign    CERVICAL FUSION  2016    CERVICAL SPINE SURGERY  2004    AVM removal, fusion, embolism removal      SECTION  1979    COLONOSCOPY  2018    HYSTERECTOMY      DE TOTAL HIP ARTHROPLASTY Left 2018    LEFT HIP TOTAL HIP ARTHROPLASTY performed by Dontae Merritt MD at [address] History   Problem Relation Age of Onset    Breast Cancer Mother     Cancer Mother         liver ca    No Known Problems Brother     Obesity Son      Social History     Socioeconomic History    Marital status: Single     Spouse name: Not on file    Number of children: Not on file    Years of education: Not on file    Highest education level: Not on file   Occupational History    Not on file   Tobacco Use    Smoking status: Former Smoker     Packs/day: 0.50     Years: 1.00     Pack years: 0.50     Start date: 2017     Quit date: 2019     Years since quittin.8    Smokeless tobacco: Never Used    Tobacco comment: smoked at 15yrs old x 30 yrs 0.5ppd    Vaping Use    Vaping Use: Never used   Substance and Sexual Activity    Alcohol use: No     Alcohol/week: 0.0 standard drinks    Drug use: No    Sexual activity: Not on file   Other Topics Concern    Not on file   Social History Narrative         Lives With: Significant other    Type of Home: House    Home Layout: One level    Home Access: Stairs to enter with rails    Entrance Stairs - Number of Steps: 6    Home Equipment: Rolling walker, Cane, Wheelchair-manual (BSC, leg )    ADL Assistance: Independent    Ambulation Assistance: Independent (with [employer])    Transfer Assistance: Independent    Additional Comments: Multiple neck surgeries with residual spasticity R UE/LE     Social Determinants of Health     Financial Resource Strain:     Difficulty of Paying Living Expenses:    Food Insecurity:     Worried About Running Out of Food in the Last Year:     Ran Out of Food in the Last Year:    Transportation Needs:     Lack of Transportation (Medical):      Lack of Transportation (Non-Medical):    Physical Activity:     Days of Exercise per Week:     Minutes of Exercise per Session:    Stress:     Feeling of Stress :    Social Connections:     Frequency of Communication with Friends and Family:     Frequency of Social Gatherings with Friends and Family:     Attends Muslim Services:     Active Member of Clubs or Organizations:     Attends Club or Organization Meetings:     Marital Status:    Intimate Partner Violence:     Fear of Current or Ex-Partner:     Emotionally Abused:     Physically Abused:     Sexually Abused:        Current Outpatient Medications on File Prior to Visit   Medication Sig Dispense Refill    budesonide-formoterol (SYMBICORT) 160-4.5 MCG/ACT AERO TAKE 2 PUFFS BY MOUTH TWICE A DAY 3 Inhaler 3    albuterol sulfate HFA (VENTOLIN HFA) 108 (90 Base) MCG/ACT inhaler Inhale 2 puffs into the lungs every 6 hours as needed for Wheezing 1 Inhaler 3    VENTOLIN  (90 Base) MCG/ACT inhaler Inhale 2 puffs into the lungs 4 times daily as needed for Wheezing 1 Inhaler 3    albuterol (PROVENTIL) (2.5 MG/3ML) 0.083% nebulizer solution Take 3 mLs by nebulization every 6 hours as needed for Wheezing 120 each 3    Multiple Vitamins-Minerals (MULTIVITAMIN ADULT PO) Take 1 tablet by mouth      POLYETHYLENE GLYCOL 3350 PO Take 17 g by mouth      fluticasone (FLONASE) 50 MCG/ACT nasal spray 1 spray by NOT APPLICABLE route      aspirin 81 MG EC tablet Take 1 tablet by mouth 2 times daily 30 tablet 0    Multiple Vitamins-Minerals (THERAPEUTIC MULTIVITAMIN-MINERALS) tablet Take 1 tablet by mouth daily      Magnesium Oxide 500 MG CAPS Take 1 capsule by mouth 2 times daily      oxybutynin (DITROPAN-XL) 10 MG extended release tablet Take 10 mg by mouth daily      simvastatin (ZOCOR) 10 MG tablet Take 10 mg by mouth nightly      Oxygen Concentrator Inhale 3 L into the lungs continuous       DULoxetine (CYMBALTA) 60 MG capsule        No current facility-administered medications on file prior to visit. Pt presents today for a f/u of her pain. PCP is Dr. Stephania Jamison DO. Last saw this NP. MRI of knee ordered showing Femoral, tibial and patellar osseous lesions and advised to see ortho for consult. She says she did see Dr. Miguel Teran who did not offer surgery. She did get Rt knee injection which helped. She says overall the Rt knee pain feels better. No longer \"buckling\". On 8/4/2021 had  left S1 SNRB with >80% relief. She is able to walk more. Physical therapy order for rehab consultants for her low back and lower extremity pain given last OV. She has  chronic low back pain and neck pain. MRI done and we reviewed it today. Unfortunately therapeutic facet injections are no longer available and she does not want RFA which previously worked quite well at least 75% improvement in pain. The procedure was too much for her to tolerate she says.       She does feel the PT is helpful for the back. She states the ibuprofen helps significantly. The medication combination helps her perform ADLs and enjoy a better quality of life. She is now off of her narcotics, this was her choice. She denies any cardiac or stomach issues.   She had seen Dr. Miguel Teran recently had an MRI of her right hip in March and he did an injection that she said worked well for her.     HX: COPD requiring 2L oxygen at night. HX:  6/14/16 she had decomressive surgery C2-6 and disectomy C3-4 anterior approach with Dr. Eldon Lesches, [facility]. 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 She is now getting a new AFO, and will be getting KFO and hopes this will help with her knee as well.     She uses her cymbalta 60mg daily. Uses gabapentin 300mg 1 tab BID and 2 tabs QHS and celebrex 100mg daily. She says Dr. Dickey is her neurologist and wonders if her gabapentin can be increased to 600mg. She needs refills on celebrex.      Pt feels pain level 2/10. Pt feels that standing, walking, weather change makes the pain worse, and medication, rest, ice/heat makes the pain better. Pt feels her medication helps   her function and improve her quality of life, specifically says allows to do ADL's. Pt denies change radiating numbness and tingling. Denies recent falls, injuries or trauma. Pt denies new weakness. Pt reports PT has been done in the past.         Review of Systems   Constitutional: Negative. Negative for fatigue. HENT: Negative. Negative for trouble swallowing. Eyes: Negative. Respiratory: Negative for cough and shortness of breath. Cardiovascular: Negative for chest pain. Gastrointestinal: Negative. Negative for constipation and diarrhea. Endocrine: Negative. Genitourinary: Negative. Musculoskeletal: Positive for arthralgias and back pain. Negative for neck pain. Skin: Negative. Neurological: Negative for dizziness, weakness and headaches. Hematological: Negative. Psychiatric/Behavioral: Negative. Objective:     Vitals:  /66   Temp 97.2 °F (36.2 °C)   Ht 5' 1\" (1.549 m)   Wt 139 lb (63 kg)   LMP  (LMP Unknown)   BMI 26.26 kg/m² Pain Score:   2      Physical Exam  Vitals and nursing note reviewed. This is a thin pleasant female who answers questions appropriately and follows commands. Pt is alert and oriented x 3. Recent and remote memory is intact. Mood and affect, judgement and insight are normal. SOB with exertion.  HEENT: PERRL. Neck is supple, trachea midline. No lymphadenopathy noted.  Decreased ROM with flexion, extension and rotation of cervical spine.  Posterior neck scar present. Non-tender with palpation to neck. Strong grasp B/L. Pulses are intact. Decreased ROM with flexion and extension of low back. Minimally tender with palpation to low back.  Negative SLR. Rt foot with edema noted.    Tightness in both hamstrings noted. Using walker. Wearing Rt AFO. Rt knee with decreased ROM. Mild tenderness noted with palpation. Mild swelling noted with arthritic deformity noted. Crepitus with ROM. Gait antalgic. Cranial nerves II-XII are intact.       Assessment:      Diagnosis Orders   1. Spinal stenosis, lumbar region, without neurogenic claudication  gabapentin (NEURONTIN) 600 MG tablet    celecoxib (CELEBREX) 100 MG capsule   2. Lumbar radiculopathy  gabapentin (NEURONTIN) 600 MG tablet    celecoxib (CELEBREX) 100 MG capsule   3. Cervical spondylosis without myelopathy  celecoxib (CELEBREX) 100 MG capsule   4. Lumbosacral spondylosis without myelopathy  celecoxib (CELEBREX) 100 MG capsule   5. Chronic pain syndrome  gabapentin (NEURONTIN) 600 MG tablet    celecoxib (CELEBREX) 100 MG capsule   6. Pain of right hip joint  celecoxib (CELEBREX) 100 MG capsule   7. Knee arthropathy  celecoxib (CELEBREX) 100 MG capsule       Plan:     Periodic Controlled Substance Monitoring: No signs of potential drug abuse or diversion identified. (Sho Muñoz, APRN - CNP)    Orders Placed This Encounter   Medications    gabapentin (NEURONTIN) 600 MG tablet     Sig: Take 1 tablet by mouth 3 times daily for 30 days.      Dispense:  90 tablet     Refill:  0    celecoxib (CELEBREX) 100 MG capsule     Sig: Take 1 capsule by mouth daily     Dispense:  30 capsule     Refill:  1       No orders of the defined types were placed in this encounter. Discussed options with the patient today. Anatomic model pathology was shown and reviewed with pt. Will try increasing her gabapentin 600mg TID and refill celebrex 100mg daily as requested. She will f/u with neurosurgery Dr. Ananya Bravo at [facility] for her low back she reports. All questions were answered. Discussed home exercise program.  Relevant imaging and pain generators reviewed. Pt verbalized understanding and agrees with above plan. Pt has chronic pain. Will continue medications for chronic pain that has been previously directed as they do help pt function with ADL and improve quality of life. ARRS was reviewed. This NP saw pt under direct supervision of Dr. Dylan Robert. Follow up:  Return in about 4 weeks (around 11/16/2021) for review meds and reassess pain.     Cristian Sanchez, CHIRAG - CNP

## 2021-11-22 ENCOUNTER — OFFICE VISIT (OUTPATIENT)
Dept: PAIN MANAGEMENT | Age: 61
End: 2021-11-22
Payer: MEDICARE

## 2021-11-22 VITALS — BODY MASS INDEX: 26.81 KG/M2 | TEMPERATURE: 96.4 F | HEIGHT: 61 IN | WEIGHT: 142 LBS

## 2021-11-22 DIAGNOSIS — M48.061 SPINAL STENOSIS, LUMBAR REGION, WITHOUT NEUROGENIC CLAUDICATION: ICD-10-CM

## 2021-11-22 DIAGNOSIS — G89.4 CHRONIC PAIN SYNDROME: ICD-10-CM

## 2021-11-22 DIAGNOSIS — M54.16 LUMBAR RADICULOPATHY: ICD-10-CM

## 2021-11-22 PROBLEM — M70.60 GREATER TROCHANTERIC BURSITIS: Status: ACTIVE | Noted: 2021-11-22

## 2021-11-22 PROBLEM — I25.10 ARTERIOSCLEROSIS OF CORONARY ARTERY: Status: ACTIVE | Noted: 2021-11-22

## 2021-11-22 PROCEDURE — G8484 FLU IMMUNIZE NO ADMIN: HCPCS | Performed by: NURSE PRACTITIONER

## 2021-11-22 PROCEDURE — G8427 DOCREV CUR MEDS BY ELIG CLIN: HCPCS | Performed by: NURSE PRACTITIONER

## 2021-11-22 PROCEDURE — 1036F TOBACCO NON-USER: CPT | Performed by: NURSE PRACTITIONER

## 2021-11-22 PROCEDURE — 99213 OFFICE O/P EST LOW 20 MIN: CPT | Performed by: NURSE PRACTITIONER

## 2021-11-22 PROCEDURE — G8419 CALC BMI OUT NRM PARAM NOF/U: HCPCS | Performed by: NURSE PRACTITIONER

## 2021-11-22 PROCEDURE — 3017F COLORECTAL CA SCREEN DOC REV: CPT | Performed by: NURSE PRACTITIONER

## 2021-11-22 RX ORDER — GABAPENTIN 600 MG/1
600 TABLET ORAL 3 TIMES DAILY
Qty: 90 TABLET | Refills: 0 | Status: SHIPPED | OUTPATIENT
Start: 2021-11-22 | End: 2021-12-17 | Stop reason: SDUPTHER

## 2021-11-22 ASSESSMENT — ENCOUNTER SYMPTOMS
EYES NEGATIVE: 1
DIARRHEA: 0
CONSTIPATION: 0
COUGH: 0
TROUBLE SWALLOWING: 0
SHORTNESS OF BREATH: 0
BACK PAIN: 1
GASTROINTESTINAL NEGATIVE: 1

## 2021-11-22 NOTE — PROGRESS NOTES
Abraham Garcia  (1960)    2021    Subjective:     Abraham Garcia is 59 y.o. female who complains today of:    Chief Complaint   Patient presents with    Back Pain    Neck Pain         Allergies:  Patient has no known allergies.     Past Medical History:   Diagnosis Date    Arthritis     left hip    COPD (chronic obstructive pulmonary disease) ([facility]     uses inhalers x 4 yrs    Hyperlipidemia     on meds x 10yrs    Hypertension 2018    Lung disease     Type 2 diabetes mellitus with hyperglycemia, without long-term current use of insulin ([facility] 8/15/2018     Past Surgical History:   Procedure Laterality Date    BREAST BIOPSY Right 2003    benign    CERVICAL FUSION  2016    CERVICAL SPINE SURGERY  2004    AVM removal, fusion, embolism removal      SECTION  1979    COLONOSCOPY  2018    HYSTERECTOMY      GA TOTAL HIP ARTHROPLASTY Left 2018    LEFT HIP TOTAL HIP ARTHROPLASTY performed by Chevy Martin MD at [address] History   Problem Relation Age of Onset    Breast Cancer Mother     Cancer Mother         liver ca    No Known Problems Brother     Obesity Son      Social History     Socioeconomic History    Marital status: Single     Spouse name: Not on file    Number of children: Not on file    Years of education: Not on file    Highest education level: Not on file   Occupational History    Not on file   Tobacco Use    Smoking status: Former Smoker     Packs/day: 0.50     Years: 1.00     Pack years: 0.50     Start date: 2017     Quit date: 2019     Years since quittin.8    Smokeless tobacco: Never Used    Tobacco comment: smoked at 15yrs old x 30 yrs 0.5ppd    Vaping Use    Vaping Use: Never used   Substance and Sexual Activity    Alcohol use: No     Alcohol/week: 0.0 standard drinks    Drug use: No    Sexual activity: Not on file   Other Topics Concern    Not on file   Social History Narrative         Lives With: Significant other Type of Home: House    Home Layout: One level    Home Access: Stairs to enter with rails    Entrance Stairs - Number of Steps: 6    Home Equipment: Rolling walker, Cane, Wheelchair-manual (BSC, leg )    ADL Assistance: Independent    Ambulation Assistance: Independent (with [employer])    Transfer Assistance: Independent    Additional Comments: Multiple neck surgeries with residual spasticity R UE/LE     Social Determinants of Health     Financial Resource Strain:     Difficulty of Paying Living Expenses: Not on file   Food Insecurity:     Worried About Running Out of Food in the Last Year: Not on file    Elsa of Food in the Last Year: Not on file   Transportation Needs:     Lack of Transportation (Medical): Not on file    Lack of Transportation (Non-Medical):  Not on file   Physical Activity:     Days of Exercise per Week: Not on file    Minutes of Exercise per Session: Not on file   Stress:     Feeling of Stress : Not on file   Social Connections:     Frequency of Communication with Friends and Family: Not on file    Frequency of Social Gatherings with Friends and Family: Not on file    Attends [employer] Services: Not on file    Active Member of [address] [place] [address] [employer] or Organizations: Not on file    Attends Club or Organization Meetings: Not on file    Marital Status: Not on file   Intimate Partner Violence:     Fear of Current or Ex-Partner: Not on file    Emotionally Abused: Not on file    Physically Abused: Not on file    Sexually Abused: Not on file   Housing Stability:     Unable to Pay for Housing in the Last Year: Not on file    Number of [place] in the Last Year: Not on file    Unstable Housing in the Last Year: Not on file       Current Outpatient Medications on File Prior to Visit   Medication Sig Dispense Refill    celecoxib (CELEBREX) 100 MG capsule Take 1 capsule by mouth daily 30 capsule 1    budesonide-formoterol (SYMBICORT) 160-4.5 MCG/ACT AERO TAKE 2 PUFFS BY MOUTH TWICE A DAY 3 Inhaler 3    albuterol sulfate HFA (VENTOLIN HFA) 108 (90 Base) MCG/ACT inhaler Inhale 2 puffs into the lungs every 6 hours as needed for Wheezing 1 Inhaler 3    VENTOLIN  (90 Base) MCG/ACT inhaler Inhale 2 puffs into the lungs 4 times daily as needed for Wheezing 1 Inhaler 3    albuterol (PROVENTIL) (2.5 MG/3ML) 0.083% nebulizer solution Take 3 mLs by nebulization every 6 hours as needed for Wheezing 120 each 3    Multiple Vitamins-Minerals (MULTIVITAMIN ADULT PO) Take 1 tablet by mouth      POLYETHYLENE GLYCOL 3350 PO Take 17 g by mouth      fluticasone (FLONASE) 50 MCG/ACT nasal spray 1 spray by NOT APPLICABLE route      aspirin 81 MG EC tablet Take 1 tablet by mouth 2 times daily 30 tablet 0    Multiple Vitamins-Minerals (THERAPEUTIC MULTIVITAMIN-MINERALS) tablet Take 1 tablet by mouth daily      Magnesium Oxide 500 MG CAPS Take 1 capsule by mouth 2 times daily      oxybutynin (DITROPAN-XL) 10 MG extended release tablet Take 10 mg by mouth daily      simvastatin (ZOCOR) 10 MG tablet Take 10 mg by mouth nightly      Oxygen Concentrator Inhale 3 L into the lungs continuous       DULoxetine (CYMBALTA) 60 MG capsule        No current facility-administered medications on file prior to visit. Pt presents today for a f/u of her pain. PCP is Dr. Justen Zepeda DO. Last saw this NP - gabapentin increased to 600mg TID last OV and felt this offered significant relief \"I feel good\". Has refills of celebrex. MRI of knee report showed femoral, tibial and patellar osseous lesions and advised to see ortho for consult. She says she did see Dr. Pete Stanley who did not offer surgery. She did get Rt knee injection which helped. On 8/4/2021 had  left S1 SNRB with >80% relief. She is able to walk more. Physical therapy order for rehab consultants for her low back and lower extremity pain given last OV. She got her new AFO and says she is able to walk better and has more stability even in her Rt knee.   She sees neurosurgery in [facility] soon,  Dr. Chris Mckeon on December 6th. She has  chronic low back pain and neck pain. MRI done and we reviewed it today. Unfortunately therapeutic facet injections are no longer available and she does not want RFA which previously worked quite well at least 75% improvement in pain. The procedure was too much for her to tolerate she says.       She does feel the PT is helpful for the back. She states the ibuprofen helps significantly. The medication combination helps her perform ADLs and enjoy a better quality of life. She is now off of her narcotics, this was her choice. She denies any cardiac or stomach issues. She had seen Dr. Sunil Nance recently had an MRI of her right hip in March and he did an injection that she said worked well for her.     HX: COPD requiring 2L oxygen at night. HX:  6/14/16 she had decomressive surgery C2-6 and disectomy C3-4 anterior approach with Dr. Brodie Guerrero, [facility]. 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 She is now getting a new AFO, and will be getting KFO and hopes this will help with her knee as well. Has neck brace if needed which helps when she has neck pain, but says \"neck is doing good\".     She uses her cymbalta 60mg daily from Dr. Raheem Joshi. Uses gabapentin 600mg TID and celebrex 100mg daily. She says Dr. Raheem Joshi is her neurologist.     Pt feels pain level 1/10. Pt feels that \"whatever I do\", activity, weather change makes the pain worse, and medication, ice, heat, laying down makes the pain better. Pt feels her medication helps   her function and improve her quality of life, specifically says allows to be more active. Pt admits to Lt LE radiating numbness and tingling. Denies recent falls, injuries or trauma. Pt denies new weakness. Pt reports PT has been doing. Review of Systems   Constitutional: Negative. Negative for fatigue. HENT: Negative. Negative for trouble swallowing. Eyes: Negative. Respiratory: Negative for cough and shortness of breath. Cardiovascular: Negative for chest pain. Gastrointestinal: Negative. Negative for constipation and diarrhea. Endocrine: Negative. Genitourinary: Negative. Musculoskeletal: Positive for arthralgias and back pain. Negative for neck pain. Skin: Negative. Neurological: Negative for dizziness, weakness and headaches. Hematological: Negative. Psychiatric/Behavioral: Negative. Objective:     Vitals:  Temp 96.4 °F (35.8 °C)   Ht 5' 1\" (1.549 m)   Wt 142 lb (64.4 kg)   LMP  (LMP Unknown)   BMI 26.83 kg/m² Pain Score:   1      Physical Exam  Vitals and nursing note reviewed. This is a thin pleasant female who answers questions appropriately and follows commands. Pt is alert and oriented x 3. Recent and remote memory is intact. Mood and affect, judgement and insight are normal. SOB with exertion.  HEENT: PERRL. Neck is supple, trachea midline. No lymphadenopathy noted.  Decreased ROM with flexion, extension and rotation of cervical spine.  Posterior neck scar present. Non-tender with palpation to neck. Strong grasp B/L. Pulses are intact. Decreased ROM with flexion and extension of low back. Minimally tender with palpation to low back.  Negative SLR but reproduces low back pain. Rt foot with edema noted.    Tightness in both hamstrings noted. Using walker. Wearing Rt AFO. Rt knee with decreased ROM.  Mild tenderness noted with palpation. Mild swelling noted with arthritic deformity noted.  Crepitus with ROM.  Gait antalgic.  Cranial nerves II-XII are intact. Assessment:      Diagnosis Orders   1. Lumbar radiculopathy  gabapentin (NEURONTIN) 600 MG tablet   2. Spinal stenosis, lumbar region, without neurogenic claudication  gabapentin (NEURONTIN) 600 MG tablet   3.  Chronic pain syndrome  gabapentin (NEURONTIN) 600 MG tablet       Plan:     Periodic Controlled Substance Monitoring: No signs of potential drug abuse or diversion identified. (CHIRAG Clarke CNP)    Orders Placed This Encounter   Medications    gabapentin (NEURONTIN) 600 MG tablet     Sig: Take 1 tablet by mouth 3 times daily for 30 days. Dispense:  90 tablet     Refill:  0       No orders of the defined types were placed in this encounter. Discussed options with the patient today. Anatomic model pathology was shown and reviewed with pt. She is doing well with the increase of the gabapentin 600 mg 3 times daily and will continue with his current dose. She has enough Celebrex. We will follow-up in a month and at that time can hopefully review the surgeons note from [facility] as discussed. Hold injections at this time. Continue therapy. Consider follow-up 2 to 3 months next office visit as discussed. . All questions were answered. Discussed home exercise. Relevant imaging and pain generators reviewed. Pt verbalized understanding and agrees with above plan. Pt has chronic pain. Will continue medications for chronic pain that has been previously directed as they do help pt function with ADL and improve quality of life. NDP reviewed. OARRS was reviewed. This NP saw pt under direct supervision of Dr. Edin Lindo. Follow up:  Return in about 4 weeks (around 12/20/2021) for review meds and reassess pain.     CHIRAG Kramer CNP

## 2021-11-24 ENCOUNTER — HOSPITAL ENCOUNTER (OUTPATIENT)
Dept: GENERAL RADIOLOGY | Age: 61
Discharge: HOME | End: 2021-11-26
Payer: MEDICARE

## 2021-11-24 ENCOUNTER — OFFICE VISIT (OUTPATIENT)
Dept: PULMONOLOGY | Age: 61
End: 2021-11-24
Payer: MEDICARE

## 2021-11-24 VITALS
BODY MASS INDEX: 26.43 KG/M2 | TEMPERATURE: 98.6 F | HEART RATE: 101 BPM | OXYGEN SATURATION: 98 % | DIASTOLIC BLOOD PRESSURE: 68 MMHG | SYSTOLIC BLOOD PRESSURE: 138 MMHG | WEIGHT: 140 LBS | HEIGHT: 61 IN

## 2021-11-24 DIAGNOSIS — R09.02 HYPOXIA: ICD-10-CM

## 2021-11-24 DIAGNOSIS — J44.9 CHRONIC OBSTRUCTIVE PULMONARY DISEASE, UNSPECIFIED COPD TYPE (HCC): ICD-10-CM

## 2021-11-24 DIAGNOSIS — Z87.891 PERSONAL HISTORY OF SMOKING: ICD-10-CM

## 2021-11-24 DIAGNOSIS — J44.9 CHRONIC OBSTRUCTIVE PULMONARY DISEASE, UNSPECIFIED COPD TYPE (HCC): Primary | ICD-10-CM

## 2021-11-24 PROCEDURE — 71046 X-RAY EXAM CHEST 2 VIEWS: CPT

## 2021-11-24 PROCEDURE — 3023F SPIROM DOC REV: CPT | Performed by: INTERNAL MEDICINE

## 2021-11-24 PROCEDURE — G8926 SPIRO NO PERF OR DOC: HCPCS | Performed by: INTERNAL MEDICINE

## 2021-11-24 PROCEDURE — 1036F TOBACCO NON-USER: CPT | Performed by: INTERNAL MEDICINE

## 2021-11-24 PROCEDURE — 3017F COLORECTAL CA SCREEN DOC REV: CPT | Performed by: INTERNAL MEDICINE

## 2021-11-24 PROCEDURE — G8419 CALC BMI OUT NRM PARAM NOF/U: HCPCS | Performed by: INTERNAL MEDICINE

## 2021-11-24 PROCEDURE — G8484 FLU IMMUNIZE NO ADMIN: HCPCS | Performed by: INTERNAL MEDICINE

## 2021-11-24 PROCEDURE — 99214 OFFICE O/P EST MOD 30 MIN: CPT | Performed by: INTERNAL MEDICINE

## 2021-11-24 PROCEDURE — G8427 DOCREV CUR MEDS BY ELIG CLIN: HCPCS | Performed by: INTERNAL MEDICINE

## 2021-11-24 RX ORDER — ALBUTEROL SULFATE 2.5 MG/3ML
2.5 SOLUTION RESPIRATORY (INHALATION) EVERY 6 HOURS PRN
Qty: 120 EACH | Refills: 3 | Status: SHIPPED | OUTPATIENT
Start: 2021-11-24 | End: 2022-08-11

## 2021-11-24 ASSESSMENT — ENCOUNTER SYMPTOMS
VOMITING: 0
TROUBLE SWALLOWING: 0
EYE ITCHING: 0
RHINORRHEA: 0
SORE THROAT: 0
VOICE CHANGE: 0
CHEST TIGHTNESS: 0
COUGH: 0
SHORTNESS OF BREATH: 1
NAUSEA: 0
DIARRHEA: 0
EYE DISCHARGE: 0
SINUS PRESSURE: 0
WHEEZING: 0
ABDOMINAL PAIN: 0

## 2021-11-24 NOTE — PROGRESS NOTES
Subjective:     Pauline James is a 59 y.o. female who complains today of:     Chief Complaint   Patient presents with    COPD     4 month f/u       HPI  She is using  2 lit O2 with sleep , not using during daytime  bronchodilator  with bronchodilator with Symbicort 160/4.5 mcg BID ,   albuterol neb prn , ventolin  HFA 2 puff  prn. C/o shortness of breath with exertion. No Wheezing. No Cough or  Sputum. No Hemoptysis. No Chest tightness. No Chest pain with radiation  or pleuritic pain. No  leg edema. No orthopnea. No Fever or chills. No Rhinorrhea and postnasal drip. Allergies:  Patient has no known allergies.   Past Medical History:   Diagnosis Date    Arthritis     left hip    COPD (chronic obstructive pulmonary disease) ([address]     uses inhalers x 4 yrs    Hyperlipidemia     on meds x 10yrs    Hypertension 2018    Lung disease     Type 2 diabetes mellitus with hyperglycemia, without long-term current use of insulin ([address] 8/15/2018     Past Surgical History:   Procedure Laterality Date    BREAST BIOPSY Right 2003    benign    CERVICAL FUSION  2016    CERVICAL SPINE SURGERY  2004    AVM removal, fusion, embolism removal      SECTION  1979    COLONOSCOPY  2018    HYSTERECTOMY  1997    SC TOTAL HIP ARTHROPLASTY Left 2018    LEFT HIP TOTAL HIP ARTHROPLASTY performed by Arabella Hassan MD at [address] History   Problem Relation Age of Onset    Breast Cancer Mother     Cancer Mother         liver ca    No Known Problems Brother     Obesity Son      Social History     Socioeconomic History    Marital status: Single     Spouse name: Not on file    Number of children: Not on file    Years of education: Not on file    Highest education level: Not on file   Occupational History    Not on file   Tobacco Use    Smoking status: Former Smoker     Packs/day: 0.50     Years: 1.00     Pack years: 0.50     Start date: 2017     Quit date: 2019     Years since quittin.8    Smokeless tobacco: Never Used    Tobacco comment: smoked at 15yrs old x 30 yrs 0.5ppd    Vaping Use    Vaping Use: Never used   Substance and Sexual Activity    Alcohol use: No     Alcohol/week: 0.0 standard drinks    Drug use: No    Sexual activity: Not on file   Other Topics Concern    Not on file   Social History Narrative         Lives With: Significant other    Type of Home: House    Home Layout: One level    Home Access: Stairs to enter with rails    Entrance Stairs - Number of Steps: 6    Home Equipment: Rolling walker, Cane, Wheelchair-manual (BSC, leg )    ADL Assistance: Independent    Ambulation Assistance: Independent (with [employer])    Transfer Assistance: Independent    Additional Comments: Multiple neck surgeries with residual spasticity R UE/LE     Social Determinants of Health     Financial Resource Strain:     Difficulty of Paying Living Expenses: Not on file   Food Insecurity:     Worried About Running Out of Food in the Last Year: Not on file    Elsa of Food in the Last Year: Not on file   Transportation Needs:     Lack of Transportation (Medical): Not on file    Lack of Transportation (Non-Medical):  Not on file   Physical Activity:     Days of Exercise per Week: Not on file    Minutes of Exercise per Session: Not on file   Stress:     Feeling of Stress : Not on file   Social Connections:     Frequency of Communication with Friends and Family: Not on file    Frequency of Social Gatherings with Friends and Family: Not on file    Attends Hoahaoism Services: Not on file    Active Member of Clubs or Organizations: Not on file    Attends Club or Organization Meetings: Not on file    Marital Status: Not on file   Intimate Partner Violence:     Fear of Current or Ex-Partner: Not on file    Emotionally Abused: Not on file    Physically Abused: Not on file    Sexually Abused: Not on file   Housing Stability:     Unable to Pay for Housing in the Last Year: Not on file    Number of Places Lived in the Last Year: Not on file    Unstable Housing in the Last Year: Not on file         Review of Systems   Constitutional: Negative for chills, diaphoresis, fatigue and fever. HENT: Negative for congestion, mouth sores, nosebleeds, postnasal drip, rhinorrhea, sinus pressure, sneezing, sore throat, trouble swallowing and voice change. Eyes: Negative for discharge, itching and visual disturbance. Respiratory: Positive for shortness of breath. Negative for cough, chest tightness and wheezing. Cardiovascular: Negative for chest pain, palpitations and leg swelling. Gastrointestinal: Negative for abdominal pain, diarrhea, nausea and vomiting. Genitourinary: Negative for difficulty urinating and hematuria. Musculoskeletal: Negative for arthralgias, joint swelling and myalgias. Skin: Negative for rash. Allergic/Immunologic: Negative for environmental allergies and food allergies. Neurological: Negative for dizziness, tremors, weakness and headaches. Psychiatric/Behavioral: Negative for behavioral problems and sleep disturbance.         :     Vitals:    11/24/21 1301   BP: 138/68   Pulse: 101   Temp: 98.6 °F (37 °C)   SpO2: 98%   Weight: 140 lb (63.5 kg)   Height: 5' 1\" (1.549 m)     Wt Readings from Last 3 Encounters:   11/24/21 140 lb (63.5 kg)   11/22/21 142 lb (64.4 kg)   10/19/21 139 lb (63 kg)         Physical Exam  Constitutional:       General: She is not in acute distress. Appearance: She is well-developed. She is not diaphoretic. Comments: Sitting in wheel chair    HENT:      Head: Normocephalic and atraumatic. Nose: Nose normal.   Eyes:      Pupils: Pupils are equal, round, and reactive to light. Neck:      Thyroid: No thyromegaly. Vascular: No JVD. Trachea: No tracheal deviation. Cardiovascular:      Rate and Rhythm: Normal rate and regular rhythm. Heart sounds: No murmur heard. No friction rub. No gallop.     Pulmonary: Effort: No respiratory distress. Breath sounds: No wheezing or rales. Chest:      Chest wall: No tenderness. Abdominal:      General: There is no distension. Tenderness: There is no abdominal tenderness. There is no rebound. Musculoskeletal:         General: Normal range of motion. Lymphadenopathy:      Cervical: No cervical adenopathy. Skin:     General: Skin is warm and dry. Neurological:      Mental Status: She is alert and oriented to person, place, and time. Coordination: Coordination normal.         Current Outpatient Medications   Medication Sig Dispense Refill    VENTOLIN  (90 Base) MCG/ACT inhaler Inhale 2 puffs into the lungs 4 times daily as needed for Wheezing 1 each 1    albuterol (PROVENTIL) (2.5 MG/3ML) 0.083% nebulizer solution Take 3 mLs by nebulization every 6 hours as needed for Wheezing 120 each 3    gabapentin (NEURONTIN) 600 MG tablet Take 1 tablet by mouth 3 times daily for 30 days.  90 tablet 0    celecoxib (CELEBREX) 100 MG capsule Take 1 capsule by mouth daily 30 capsule 1    budesonide-formoterol (SYMBICORT) 160-4.5 MCG/ACT AERO TAKE 2 PUFFS BY MOUTH TWICE A DAY 3 Inhaler 3    albuterol sulfate HFA (VENTOLIN HFA) 108 (90 Base) MCG/ACT inhaler Inhale 2 puffs into the lungs every 6 hours as needed for Wheezing 1 Inhaler 3    Multiple Vitamins-Minerals (MULTIVITAMIN ADULT PO) Take 1 tablet by mouth      POLYETHYLENE GLYCOL 3350 PO Take 17 g by mouth      fluticasone (FLONASE) 50 MCG/ACT nasal spray 1 spray by NOT APPLICABLE route      aspirin 81 MG EC tablet Take 1 tablet by mouth 2 times daily 30 tablet 0    Multiple Vitamins-Minerals (THERAPEUTIC MULTIVITAMIN-MINERALS) tablet Take 1 tablet by mouth daily      Magnesium Oxide 500 MG CAPS Take 1 capsule by mouth 2 times daily      oxybutynin (DITROPAN-XL) 10 MG extended release tablet Take 10 mg by mouth daily      simvastatin (ZOCOR) 10 MG tablet Take 10 mg by mouth nightly      Oxygen Concentrator Inhale 3 L into the lungs continuous       DULoxetine (CYMBALTA) 60 MG capsule        No current facility-administered medications for this visit. Results for orders placed during the hospital encounter of 01/02/19    XR CHEST STANDARD (2 VW)    Narrative  EXAMINATION: XR CHEST (2 VW)    REASON FOR EXAM: COPD atelectasis    FINDINGS: 2 views of the chest reveal lung fields slightly hyperinflated. There is borderline cardiomegaly. The pulmonary vasculature is within normal limits. Atherosclerotic changes in the aorta noted. Since January 4, 2019 endotracheal tube and NG tube have been removed. Lung fields clear with no evidence of pneumonia. No evidence of pulmonary edema. Bones and soft tissues intact. Postsurgical changes overlie the lower cervical spine. Impression  NO ACTIVE DISEASE IN THE CHEST. Results for orders placed during the hospital encounter of 10/02/18    XR CHEST STANDARD (2 VW)    Narrative  EXAMINATION: CHEST TWO VIEWS    CLINICAL HISTORY: J44.9 Chronic obstructive pulmonary disease, unspecified COPD type ([address] ICD10, follow-up    COMPARISONS: May 16, 2016    FINDINGS:    Two views of the chest are submitted. The cardiac silhouette is of normal size configuration. The mediastinum is unremarkable. Pulmonary vascular is attenuated, lungs are hyperinflated and there is some widening of the AP diameter the chest. Areas atelectasis both bases. .  Right sided trachea. No focal infiltrates. No effusions. Pneumothoraces. Impression  NO ACUTE ACTIVE CARDIOPULMONARY PROCESS. RADIOGRAPHIC FINDINGS SUGGESTIVE OF COPD. [employer] Results for orders placed during the hospital encounter of 07/27/15    XR Chest Standard TWO VW    Narrative  EXAMINATION TWO VIEWS PA AND LATERAL CHEST    CLINICAL HISTORY  EVALUATE COPD. FINDINGS The heart and lungs are normal, with no evidence of acute  cardiopulmonary disease.     IMPRESSION    NEGATIVE FOR ACUTE CARDIOVASCULAR DISEASE, PNEUMONIA, PNEUMOTHORAX OR  CHANGE IN NORMAL HEART SIZE, SINCE 8/8/2014 AND 8/10/2014. Allyne Flatter By- Quique Curtis M.D. Released By- Quique Curtis M.D. Released Date Time- 07/28/15 0919  This document has been electronically signed. ------------------------------------------------------------------------------  ]  Results for orders placed during the hospital encounter of 01/02/19    XR CHEST PORTABLE    Narrative  Portable chest radiograph    History: Respiratory failure    Technique: AP portable view of the chest obtained. Comparison: CT from January 3, 2019; chest x-ray from January 3, 2019    Findings:    Endotracheal tube remains, not significantly changed in position. Enteric tube traverses the diaphragm however its distal tip is not visualized. The cardiomediastinal silhouette is within normal limits. No pneumothorax, pleural effusion, or focal  consolidation. Linear left lung base opacity compatible with subsegmental atelectasis is not significantly changed. Lungs are hyperinflated. Coarsening of the pulmonary interstitium. Degenerative changes of the spine. Impression  No significant interval change in left base subsegmental atelectasis. XR CHEST PORTABLE    Narrative  EXAMINATION: CHEST PORTABLE VIEW    CLINICAL HISTORY: Intubation    COMPARISONS: January 2, 2018    FINDINGS:    Single  views of the chest is submitted. There is a endotracheal tube. The tip lies approximately 2.1 cm above the [place]. There is a nasogastric tube. The tip is not seen but does lie below the hemidiaphragm. The cardiac silhouette is enlarged. Unchanged configuration. The mediastinum is unremarkable. Pulmonary vascular unremarkable. Right sided trachea. Left lower lobe infiltrate/consolidation with trace left pleural effusion. No Pneumothoraces.     Impression  LEFT LOWER LOBE INFILTRATE/CONSOLIDATION WITH TRACE LEFT PLEURAL EFFUSION      XR CHEST PORTABLE    Narrative  EXAMINATION: XR CHEST PORTABLE, 1/2/2019 2:15 PM    History: 59-year-old female, shortness of breath, dyspnea    COMPARISON:  October 2, 2018    FINDINGS:  Chest-AP erect portable:  Hyperaeration lung fields. There are chronic interstitial changes. Asymmetric subtle increased density in the left lung base, suggestive of infiltrate. There is blunting of the right costophrenic angle, may represent focal atelectasis versus small  pleural effusion. Cardiac silhouette at the upper limits of normal. Aortic arch calcification. The pulmonary vascularity is normal size. There are degenerative changes of the spine. Monitoring leads project across the thorax. Impression  1. Asymmetric hazy density in the left lung base suspicious for developing infiltrate. 2. Small right pleural effusion versus atelectasis. 3. Chronic interstitial changes and hyperaeration lung fields, correlate for COPD. Assessment/Plan:     1. Chronic obstructive pulmonary disease, unspecified COPD type ([address]  She is on bronchodilator with Symbicort 160/4.5 mcg BID , albuterol neb prn , ventolin  HFA 2 puff  prn. C/o shortness of breath with exertion. No Wheezing. No Cough or Sputum. Going to have CXR done today. - VENTOLIN  (90 Base) MCG/ACT inhaler; Inhale 2 puffs into the lungs 4 times daily as needed for Wheezing  Dispense: 1 each; Refill: 1  - albuterol (PROVENTIL) (2.5 MG/3ML) 0.083% nebulizer solution; Take 3 mLs by nebulization every 6 hours as needed for Wheezing  Dispense: 120 each; Refill: 3    2. Hypoxia  She is using  2 lit O2 with sleep , not using during daytime continue O2 to keep saturation 90% or above. 3. Personal history of smoking  She quit 1/2019      Return in about 4 months (around 3/24/2022) for COPD.       Landy Taylor MD

## 2021-12-17 ENCOUNTER — VIRTUAL VISIT (OUTPATIENT)
Dept: PAIN MANAGEMENT | Age: 61
End: 2021-12-17
Payer: MEDICARE

## 2021-12-17 DIAGNOSIS — G89.4 CHRONIC PAIN SYNDROME: ICD-10-CM

## 2021-12-17 DIAGNOSIS — M17.10 KNEE ARTHROPATHY: ICD-10-CM

## 2021-12-17 DIAGNOSIS — M47.812 CERVICAL SPONDYLOSIS WITHOUT MYELOPATHY: ICD-10-CM

## 2021-12-17 DIAGNOSIS — M25.551 PAIN OF RIGHT HIP JOINT: ICD-10-CM

## 2021-12-17 DIAGNOSIS — M54.16 LUMBAR RADICULOPATHY: ICD-10-CM

## 2021-12-17 DIAGNOSIS — M47.817 LUMBOSACRAL SPONDYLOSIS WITHOUT MYELOPATHY: ICD-10-CM

## 2021-12-17 DIAGNOSIS — M48.061 SPINAL STENOSIS, LUMBAR REGION, WITHOUT NEUROGENIC CLAUDICATION: Primary | ICD-10-CM

## 2021-12-17 PROBLEM — G47.9 SLEEP DISTURBANCE: Status: ACTIVE | Noted: 2021-12-17

## 2021-12-17 PROBLEM — M79.2 NEUROGENIC PAIN: Status: ACTIVE | Noted: 2021-12-17

## 2021-12-17 PROCEDURE — 99442 PR PHYS/QHP TELEPHONE EVALUATION 11-20 MIN: CPT | Performed by: NURSE PRACTITIONER

## 2021-12-17 RX ORDER — GABAPENTIN 600 MG/1
600 TABLET ORAL 3 TIMES DAILY
Qty: 90 TABLET | Refills: 2 | Status: SHIPPED | OUTPATIENT
Start: 2021-12-17 | End: 2022-03-16 | Stop reason: SDUPTHER

## 2021-12-17 RX ORDER — CELECOXIB 100 MG/1
100 CAPSULE ORAL DAILY
Qty: 30 CAPSULE | Refills: 2 | Status: SHIPPED | OUTPATIENT
Start: 2021-12-17 | End: 2022-03-16 | Stop reason: SDUPTHER

## 2021-12-17 ASSESSMENT — ENCOUNTER SYMPTOMS
BACK PAIN: 1
TROUBLE SWALLOWING: 0
EYES NEGATIVE: 1
SHORTNESS OF BREATH: 0
COUGH: 0
GASTROINTESTINAL NEGATIVE: 1
DIARRHEA: 0
CONSTIPATION: 0

## 2021-12-17 NOTE — PROGRESS NOTES
Zuly Hernandes  (1960)    2021    TELEHEALTH EVALUATION -- Audio Only (During KKTMQ-89 public health emergency)    Due to Ear[place] 19 outbreak, patient's office visit was converted to a virtual visit. Patient was contacted and agreed to proceed with a audio only telehealth service. Patient understands that this encounter is a billable visit and that insurance coverage and co-pays are up to their individual insurance plans. At the beginning of this telehealth encounter, I verified with the patient their name and date of birth. Verbal consent for telehealth encounter was obtained. Subjective:       Chief Complaint   Patient presents with    Back Pain       Zuly Hernandes is 59 y.o. female who complains today of: Allergies:  Patient has no known allergies.     History:  Past Medical History:   Diagnosis Date    Arthritis     left hip    COPD (chronic obstructive pulmonary disease) ([address]     uses inhalers x 4 yrs    Hyperlipidemia     on meds x 10yrs    Hypertension 2018    Lung disease     Type 2 diabetes mellitus with hyperglycemia, without long-term current use of insulin ([address] 8/15/2018     Past Surgical History:   Procedure Laterality Date    BREAST BIOPSY Right 2003    benign    CERVICAL FUSION  2016    CERVICAL SPINE SURGERY  2004    AVM removal, fusion, embolism removal      SECTION  1979    COLONOSCOPY  2018    HYSTERECTOMY      [facility] TOTAL H[facility]ROP[facility]Y Left 2018    LEFT HIP TOTAL HIP ARTHROPLASTY performed by Penelope Sharp MD at [address] History   Problem Relation Age of Onset    Breast Cancer Mother     Cancer Mother         liver ca    No Known Problems Brother     Obesity Son      Social History     Socioeconomic History    Marital status: Single     Spouse name: Not on file    Number of children: Not on file    Years of education: Not on file    Highest education level: Not on file   Occupational History    Not on file   Tobacco Use    Smoking status: Former Smoker     Packs/day: 0.50     Years: 1.00     Pack years: 0.50     Start date: 2017     Quit date: 2019     Years since quittin.9    Smokeless tobacco: Never Used    Tobacco comment: smoked at 15yrs old x 30 yrs 0.5ppd    Vaping Use    Vaping Use: Never used   Substance and Sexual Activity    Alcohol use: No     Alcohol/week: 0.0 standard drinks    Drug use: No    Sexual activity: Not on file   Other Topics Concern    Not on file   Social History Narrative         Lives With: Significant other    Type of Home: House    Home Layout: One level    Home Access: Stairs to enter with rails    Entrance Stairs - Number of Steps: 6    Home Equipment: Rolling walker, Cane, Wheelchair-manual (BSC, leg )    ADL Assistance: Independent    Ambulation Assistance: Independent (with [facility])    Transfer Assistance: Independent    Additional Comments: Multiple neck surgeries with residual spasticity R UE/LE     Social Determinants of Health     Financial Resource Strain:     Difficulty of Paying Living Expenses: Not on file   Food Insecurity:     Worried About Running Out of Food in the Last Year: Not on file    Elsa of Food in the Last Year: Not on file   Transportation Needs:     Lack of Transportation (Medical): Not on file    Lack of Transportation (Non-Medical):  Not on file   Physical Activity:     Days of Exercise per Week: Not on file    Minutes of Exercise per Session: Not on file   Stress:     Feeling of Stress : Not on file   Social Connections:     Frequency of Communication with Friends and Family: Not on file    Frequency of Social Gatherings with Friends and Family: Not on file    Attends Scientology Services: Not on file    Active Member of Clubs or Organizations: Not on file    Attends Club or Organization Meetings: Not on file    Marital Status: Not on file   Intimate Partner Violence:     Fear of Current or Ex-Partner: Not on file   [employer] Abused: Not on file    Physically Abused: Not on file    Sexually Abused: Not on file   Housing Stability:     Unable to Pay for Housing in the Last Year: Not on file    Number of [place] in the Last Year: Not on file    Unstable Housing in the Last Year: Not on file       Current Outpatient Medications on File Prior to Visit   Medication Sig Dispense Refill    VENTOLIN  (90 Base) MCG/ACT inhaler Inhale 2 puffs into the lungs 4 times daily as needed for Wheezing 1 each 1    albuterol (PROVENTIL) (2.5 MG/3ML) 0.083% nebulizer solution Take 3 mLs by nebulization every 6 hours as needed for Wheezing 120 each 3    budesonide-formoterol (SYMBICORT) 160-4.5 MCG/ACT AERO TAKE 2 PUFFS BY MOUTH TWICE A DAY 3 Inhaler 3    albuterol sulfate HFA (VENTOLIN HFA) 108 (90 Base) MCG/ACT inhaler Inhale 2 puffs into the lungs every 6 hours as needed for Wheezing 1 Inhaler 3    Multiple Vitamins-Minerals (MULTIVITAMIN ADULT PO) Take 1 tablet by mouth      POLYETHYLENE GLYCOL 3350 PO Take 17 g by mouth      fluticasone (FLONASE) 50 MCG/ACT nasal spray 1 spray by NOT APPLICABLE route      aspirin 81 MG EC tablet Take 1 tablet by mouth 2 times daily 30 tablet 0    Multiple Vitamins-Minerals (THERAPEUTIC MULTIVITAMIN-MINERALS) tablet Take 1 tablet by mouth daily      Magnesium Oxide 500 MG CAPS Take 1 capsule by mouth 2 times daily      oxybutynin (DITROPAN-XL) 10 MG extended release tablet Take 10 mg by mouth daily      simvastatin (ZOCOR) 10 MG tablet Take 10 mg by mouth nightly      Oxygen Concentrator Inhale 3 L into the lungs continuous       DULoxetine (CYMBALTA) 60 MG capsule        No current facility-administered medications on file prior to visit. Pt's f/u done today with a telehealth visit for her pain d/t Covid 19 pandemic. PCP is Dr. Jen Roa DO. Last saw this NP. She feels the increase of her gabapentin has been greatly beneficial.  Takes pain away. Has refills of celebrex.  MRI of knee report showed femoral, tibial and patellar osseous lesions and advised to see ortho for consult. She says she did see Dr. Tianna Hoyos who did not offer surgery. She did get Rt knee injection which helped. On 8/4/2021 had  left S1 SNRB with >80% relief. She is able to walk more. Physical therapy order for rehab consultants for her low back and lower extremity pain given last OV. She got her new AFO and says she is able to walk better and has more stability even in her Rt knee. She sees neurosurgery in [address] [place] [address] soon, Dr. Nguyen Key and says didn't need surgery she says. Advised ok to continue miriam.          She has  chronic low back pain and neck pain. MRI done and we reviewed it today. Unfortunately therapeutic facet injections are no longer available and she does not want RFA which previously worked quite well at least 75% improvement in pain. The procedure was too much for her to tolerate she says.       She does feel the PT is helpful for the back. She states the ibuprofen helps significantly. The medication combination helps her perform ADLs and enjoy a better quality of life. She is now off of her narcotics, this was her choice. She denies any cardiac or stomach issues. She had seen Dr. Tianna Hoyos recently had an MRI of her right hip in March and he did an injection that she said worked well for her.     HX: COPD requiring 2L oxygen at night. HX:  6/14/16 she had decomressive surgery C2-6 and disectomy C3-4 anterior approach with Dr. Timbo Montero, [address] [place] [address]. 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 She is now getting a new AFO, and will be getting KFO and hopes this will help with her knee as well. Has neck brace if needed which helps when she has neck pain, but says \"neck is doing good\".     She uses her cymbalta 60mg daily from Dr. Lorenzo Faust. Uses gabapentin 600mg TID and celebrex 100mg daily.   She says Dr. Lorenzo Faust is her neurologist.     Pt feels pain level with her medication is 0/10, and 6/10 without medication. Pt feels that \"everything I do\" makes the pain worse, and ice, heat, PT exercises, laying down, medications makes the pain better. Pt feels her medication helps   her function and improve her quality of life. Pt admits to Lt UE and LE radiating numbness and tingling - says gabapentin controls this. Denies recent falls, injuries or trauma. Pt denies new weakness. Pt reports PT has been done. Review of Systems   Constitutional: Negative. Negative for fatigue. HENT: Negative. Negative for trouble swallowing. Eyes: Negative. Respiratory: Negative for cough and shortness of breath. Cardiovascular: Negative for chest pain. Gastrointestinal: Negative. Negative for constipation and diarrhea. Endocrine: Negative. Genitourinary: Negative. Musculoskeletal: Positive for back pain and neck pain. Skin: Negative. Neurological: Positive for numbness. Negative for dizziness, weakness and headaches. Hematological: Negative. Psychiatric/Behavioral: Negative. Objective:     Vitals:  LMP  (LMP Unknown)      PHYSICAL EXAMINATION:    [x] Alert  [x] Oriented to person/place/time  [x] No apparent distress      [x] Mood and affect normal  [x] Recent and remote memory intact  [x] Judgement and insight normal     [] OTHER:      Due to this being a TeleHealth encounter, evaluation of the following organ systems is limited: Vitals/Constitutional/EENT/Resp/CV/GI//MS/Neuro/Skin/Heme-Lymph-Imm. Assessment:      Diagnosis Orders   1. Spinal stenosis, lumbar region, without neurogenic claudication  gabapentin (NEURONTIN) 600 MG tablet    celecoxib (CELEBREX) 100 MG capsule   2. Lumbar radiculopathy  gabapentin (NEURONTIN) 600 MG tablet    celecoxib (CELEBREX) 100 MG capsule   3. Chronic pain syndrome  gabapentin (NEURONTIN) 600 MG tablet    celecoxib (CELEBREX) 100 MG capsule   4.  Cervical spondylosis without myelopathy  celecoxib (CELEBREX) 100 MG capsule   5. Lumbosacral spondylosis without myelopathy  celecoxib (CELEBREX) 100 MG capsule   6. Pain of right hip joint  celecoxib (CELEBREX) 100 MG capsule   7. Knee arthropathy  celecoxib (CELEBREX) 100 MG capsule       Plan:     Periodic Controlled Substance Monitoring: Possible medication side effects, risk of tolerance/dependence & alternative treatments discussed., No signs of potential drug abuse or diversion identified. , Assessed functional status., Obtaining appropriate analgesic effect of treatment. (CHIRAG Lange - CNP)    Orders Placed This Encounter   Medications    gabapentin (NEURONTIN) 600 MG tablet     Sig: Take 1 tablet by mouth 3 times daily for 90 days. Dispense:  90 tablet     Refill:  2    celecoxib (CELEBREX) 100 MG capsule     Sig: Take 1 capsule by mouth daily     Dispense:  30 capsule     Refill:  2       No orders of the defined types were placed in this encounter. Discussed options with the patient today. Telehealth visit d/t COVID-19 as noted above. She is doing very well on increased dose of gabapentin 600mg TID and will continue this with refills. Continue celebrex 100mg daily - advised next OV will take holiday from this and advised pt to f/u with PCP for labs to monitor kidney function, etc. She verbalized understanding. All questions were answered. Discussed home exercise program.  Relevant imaging and pain generators reviewed. Pt verbalized understanding and agrees with above plan. Pt has chronic pain. Will continue medications for chronic pain that has been previously directed as they do help pt function with ADL and improve quality of life. OARRS was reviewed. This NP saw pt under direct supervision of Dr. Marielos Christina. Follow up:  Return in about 3 months (around 3/17/2022) for review meds and reassess pain.     CHIRAG Lange - CNP      >50% of 11 minute appointment was spent with discussion, addressing questions and concerns, including prognosis, treatment options, patient education, and recommendations. [id number]    Pursuant to the emergency declaration under the [facility], [facility] waiver authority and the [employer] and [employer] Act, this Virtual  Visit was conducted, with patient's consent, to reduce the patient's risk of exposure to COVID-19 and provide continuity of care for an established patient. Services were provided through an audio discussion to substitute for in-person clinic visit.

## 2021-12-22 NOTE — TELEPHONE ENCOUNTER
I called and spoke to the patient to ask if she had already received her knee brace and AFO from '[facility]'. A telephone encounter on 09/21/21 states that '[facility]' requested a knee brace and AFO for patient and Dr. Too Mann ask that the orders to be fax to them. Patient states that she did receive her braces. However, it is just a one unit brace that supports both her ankle and her knee.

## 2022-01-19 ENCOUNTER — OFFICE VISIT (OUTPATIENT)
Dept: PAIN MANAGEMENT | Age: 62
End: 2022-01-19
Payer: MEDICARE

## 2022-01-19 VITALS
HEIGHT: 61 IN | SYSTOLIC BLOOD PRESSURE: 118 MMHG | DIASTOLIC BLOOD PRESSURE: 68 MMHG | TEMPERATURE: 97.9 F | BODY MASS INDEX: 26.81 KG/M2 | WEIGHT: 142 LBS

## 2022-01-19 DIAGNOSIS — M48.061 SPINAL STENOSIS, LUMBAR REGION, WITHOUT NEUROGENIC CLAUDICATION: ICD-10-CM

## 2022-01-19 DIAGNOSIS — M54.16 LUMBAR RADICULOPATHY: Primary | ICD-10-CM

## 2022-01-19 PROCEDURE — G8484 FLU IMMUNIZE NO ADMIN: HCPCS | Performed by: NURSE PRACTITIONER

## 2022-01-19 PROCEDURE — G8419 CALC BMI OUT NRM PARAM NOF/U: HCPCS | Performed by: NURSE PRACTITIONER

## 2022-01-19 PROCEDURE — 1036F TOBACCO NON-USER: CPT | Performed by: NURSE PRACTITIONER

## 2022-01-19 PROCEDURE — 3017F COLORECTAL CA SCREEN DOC REV: CPT | Performed by: NURSE PRACTITIONER

## 2022-01-19 PROCEDURE — G8427 DOCREV CUR MEDS BY ELIG CLIN: HCPCS | Performed by: NURSE PRACTITIONER

## 2022-01-19 PROCEDURE — 99214 OFFICE O/P EST MOD 30 MIN: CPT | Performed by: NURSE PRACTITIONER

## 2022-01-19 ASSESSMENT — ENCOUNTER SYMPTOMS
RESPIRATORY NEGATIVE: 1
CONSTIPATION: 0
BACK PAIN: 1
DIARRHEA: 0

## 2022-01-19 NOTE — PROGRESS NOTES
Patient: Cortney Sims  YOB: 1960  Date: 22        Subjective:     Cortney Sims is a 59 y.o. female who complains today of:    No chief complaint on file. Allergies:  Patient has no known allergies.     Past Medical History:   Diagnosis Date    Arthritis     left hip    COPD (chronic obstructive pulmonary disease) ([address]     uses inhalers x 4 yrs    Hyperlipidemia     on meds x 10yrs    Hypertension 2018    Lung disease     Type 2 diabetes mellitus with hyperglycemia, without long-term current use of insulin ([address] 8/15/2018     Past Surgical History:   Procedure Laterality Date    BREAST BIOPSY Right 2003    benign    CERVICAL FUSION  2016    CERVICAL SPINE SURGERY  2004    AVM removal, fusion, embolism removal      SECTION  1979    COLONOSCOPY  2018    HYSTERECTOMY      [place] TOTAL HIP ARTHROPLASTY Left 2018    LEFT HIP TOTAL HIP ARTHROPLASTY performed by Heaven Arreola MD at [address] History   Problem Relation Age of Onset    Breast Cancer Mother     Cancer Mother         liver ca    No Known Problems Brother     Obesity Son      Social History     Socioeconomic History    Marital status: Single     Spouse name: Not on file    Number of children: Not on file    Years of education: Not on file    Highest education level: Not on file   Occupational History    Not on file   Tobacco Use    Smoking status: Former Smoker     Packs/day: 0.50     Years: 1.00     Pack years: 0.50     Start date: 2017     Quit date: 2019     Years since quitting: 3.0    Smokeless tobacco: Never Used    Tobacco comment: smoked at 15yrs old x 30 yrs 0.5ppd    Vaping Use    Vaping Use: Never used   Substance and Sexual Activity    Alcohol use: No     Alcohol/week: 0.0 standard drinks    Drug use: No    Sexual activity: Not on file   Other Topics Concern    Not on file   Social History Narrative         Lives With: Significant other    Type of Home: House    Home Layout: One level    Home Access: Stairs to enter with rails    Entrance Stairs - Number of Steps: 6    Home Equipment: Rolling walker, Cane, Wheelchair-manual (BSC, leg )    ADL Assistance: Independent    Ambulation Assistance: Independent (with [employer])    Transfer Assistance: Independent    Additional Comments: Multiple neck surgeries with residual spasticity R UE/LE     Social Determinants of Health     Financial Resource Strain:     Difficulty of Paying Living Expenses: Not on file   Food Insecurity:     Worried About Running Out of Food in the Last Year: Not on file    Elsa of Food in the Last Year: Not on file   Transportation Needs:     Lack of Transportation (Medical): Not on file    Lack of Transportation (Non-Medical): Not on file   Physical Activity:     Days of Exercise per Week: Not on file    Minutes of Exercise per Session: Not on file   Stress:     Feeling of Stress : Not on file   Social Connections:     Frequency of Communication with Friends and Family: Not on file    Frequency of Social Gatherings with Friends and Family: Not on file    Attends Jainism Services: Not on file    Active Member of [address] [place] [address] or Organizations: Not on file    Attends Club or Organization Meetings: Not on file    Marital Status: Not on file   Intimate Partner Violence:     Fear of Current or Ex-Partner: Not on file    Emotionally Abused: Not on file    Physically Abused: Not on file    Sexually Abused: Not on file   Housing Stability:     Unable to Pay for Housing in the Last Year: Not on file    Number of [place] in the Last Year: Not on file    Unstable Housing in the Last Year: Not on file       Current Outpatient Medications on File Prior to Visit   Medication Sig Dispense Refill    gabapentin (NEURONTIN) 600 MG tablet Take 1 tablet by mouth 3 times daily for 90 days.  90 tablet 2    celecoxib (CELEBREX) 100 MG capsule Take 1 capsule by mouth daily 30 capsule 2    VENTOLIN  (90 Base) MCG/ACT inhaler Inhale 2 puffs into the lungs 4 times daily as needed for Wheezing 1 each 1    budesonide-formoterol (SYMBICORT) 160-4.5 MCG/ACT AERO TAKE 2 PUFFS BY MOUTH TWICE A DAY 3 Inhaler 3    albuterol sulfate HFA (VENTOLIN HFA) 108 (90 Base) MCG/ACT inhaler Inhale 2 puffs into the lungs every 6 hours as needed for Wheezing 1 Inhaler 3    Multiple Vitamins-Minerals (MULTIVITAMIN ADULT PO) Take 1 tablet by mouth      POLYETHYLENE GLYCOL 3350 PO Take 17 g by mouth      fluticasone (FLONASE) 50 MCG/ACT nasal spray 1 spray by NOT APPLICABLE route      aspirin 81 MG EC tablet Take 1 tablet by mouth 2 times daily 30 tablet 0    Multiple Vitamins-Minerals (THERAPEUTIC MULTIVITAMIN-MINERALS) tablet Take 1 tablet by mouth daily      Magnesium Oxide 500 MG CAPS Take 1 capsule by mouth 2 times daily      oxybutynin (DITROPAN-XL) 10 MG extended release tablet Take 10 mg by mouth daily      simvastatin (ZOCOR) 10 MG tablet Take 10 mg by mouth nightly      Oxygen Concentrator Inhale 3 L into the lungs continuous       DULoxetine (CYMBALTA) 60 MG capsule       albuterol (PROVENTIL) (2.5 MG/3ML) 0.083% nebulizer solution Take 3 mLs by nebulization every 6 hours as needed for Wheezing 120 each 3     No current facility-administered medications on file prior to visit. Michelle Luther is a 58-year-old female here for following up for chronic low back pain and neck pain. Her back pain is a 4/10 and her neck pain is 0, both legs are painful and she would like injections. Her right leg causes so much pain hat she feels like she will fall. She would also like injection for back pain. Unfortunately therapeutic facet injections are no longer available and she does not want RFA which previously worked quite well at least 75% improvement in pain. The procedure was too much for her to tolerate. But she said she will think about it.   Her gabapentin was increased to 300 mg 3 times daily formerly twice daily, and ibuprofen 800 mg twice daily was added by Dr. Paulo Pastrana two months ago. She states the ibuprofen helps significantly. The medication combination helps her perform ADLs and enjoy a better quality of life. She is now off of her narcotics, this was her choice. She denies any cardiac or stomach issues.       HX: COPD requiring 2L oxygen at night. HX:  6/14/16 she had decomressive surgery C2-6 and disectomy C3-4 anterior approach with Dr. Serena Reynolds, [facility]. 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 She uses her cymbalta 60mg daily.                Review of Systems   Constitutional: Negative. Negative for activity change and unexpected weight change. HENT: Negative. Negative for hearing loss. Respiratory: Negative. Cardiovascular: Negative for leg swelling. Gastrointestinal: Negative for constipation and diarrhea. Genitourinary: Negative. Musculoskeletal: Positive for back pain and gait problem. Negative for joint swelling and neck pain. Skin: Negative for rash. Neurological: Positive for weakness. Negative for dizziness, numbness and headaches. Psychiatric/Behavioral: Negative. Negative for sleep disturbance. Objective:     Vitals:  /68   Temp 97.9 °F (36.6 °C)   Ht 5' 1\" (1.549 m)   Wt 142 lb (64.4 kg)   LMP  (LMP Unknown)   BMI 26.83 kg/m² Pain Score:   4    Physical Exam  Vitals reviewed. Constitutional:       Appearance: She is well-developed. HENT:      Head: Normocephalic. Nose: Nose normal.   Pulmonary:      Effort: Pulmonary effort is normal.   Musculoskeletal:      Cervical back: Normal range of motion and neck supple. Lumbar back: Tenderness present. Decreased range of motion. Positive right straight leg raise test and positive left straight leg raise test.   Lymphadenopathy:      Cervical: No cervical adenopathy. Skin:     General: Skin is warm and dry.       Findings: No erythema or rash.   Neurological:      Mental Status: She is alert and oriented to person, place, and time. Cranial Nerves: No cranial nerve deficit. Deep Tendon Reflexes: Reflexes are normal and symmetric. Reflexes normal.   Psychiatric:         Mood and Affect: Mood normal.         Behavior: Behavior normal.         Thought Content: Thought content normal.         Judgment: Judgment normal.            Assessment:        Diagnosis Orders   1. Lumbar radiculopathy  [place] NJX AA&/STRD TFRML EPI LUMBAR/SACRAL 1 LEVEL   2. Spinal stenosis, lumbar region, without neurogenic claudication         Plan:          No orders of the defined types were placed in this encounter. Orders Placed This Encounter   Procedures    [place] NJX AA&/STRD TFRML EPI LUMBAR/SACRAL 1 LEVEL     AMOR bilat L5-S1 with CC    She has been off asa for more than one week     Standing Status:   Future     Standing Expiration Date:   4/19/2022     We will go ahead and order bilateral epidural L5-S1 with Dr. Jeni Richardson. She said she has been off her baby aspirin more than a week anticipating the injections. Consider offering RFA next month    Follow up:  Return in about 4 weeks (around 2/16/2022) for f/u after procedure and reassess pain/medications.     Jeancarlos Godwin, APRN - CNP

## 2022-01-20 ENCOUNTER — TELEPHONE (OUTPATIENT)
Dept: PAIN MANAGEMENT | Age: 62
End: 2022-01-20

## 2022-01-20 NOTE — TELEPHONE ENCOUNTER
BELLO L5-S1 AMOR    NO AUTH REQUIRED    OK to schedule procedure approved as above. Please note sides/levels approved and date range.    (If applicable, sides/levels approved may differ from those ordered)    TO BE SCHEDULED WITH

## 2022-03-10 ENCOUNTER — PROCEDURE VISIT (OUTPATIENT)
Dept: PAIN MANAGEMENT | Age: 62
End: 2022-03-10
Payer: MEDICARE

## 2022-03-10 DIAGNOSIS — M54.16 LUMBAR RADICULOPATHY: Primary | ICD-10-CM

## 2022-03-10 DIAGNOSIS — M48.061 SPINAL STENOSIS, LUMBAR REGION, WITHOUT NEUROGENIC CLAUDICATION: ICD-10-CM

## 2022-03-10 PROCEDURE — 64483 NJX AA&/STRD TFRM EPI L/S 1: CPT | Performed by: ANESTHESIOLOGY

## 2022-03-10 RX ORDER — DEXAMETHASONE SODIUM PHOSPHATE 10 MG/ML
10 INJECTION, EMULSION INTRAMUSCULAR; INTRAVENOUS ONCE
Status: SHIPPED | OUTPATIENT
Start: 2022-03-10

## 2022-03-10 RX ORDER — DEXAMETHASONE SODIUM PHOSPHATE 10 MG/ML
10 INJECTION, SOLUTION INTRAMUSCULAR; INTRAVENOUS ONCE
Status: COMPLETED | OUTPATIENT
Start: 2022-03-10 | End: 2022-03-10

## 2022-03-10 RX ORDER — SODIUM CHLORIDE 9 MG/ML
1 INJECTION INTRAVENOUS ONCE
Status: COMPLETED | OUTPATIENT
Start: 2022-03-10 | End: 2022-03-10

## 2022-03-10 RX ORDER — LIDOCAINE HYDROCHLORIDE 10 MG/ML
1 INJECTION, SOLUTION EPIDURAL; INFILTRATION; INTRACAUDAL; PERINEURAL ONCE
Status: COMPLETED | OUTPATIENT
Start: 2022-03-10 | End: 2022-03-10

## 2022-03-10 RX ADMIN — DEXAMETHASONE SODIUM PHOSPHATE 10 MG: 10 INJECTION, SOLUTION INTRAMUSCULAR; INTRAVENOUS at 14:04

## 2022-03-10 RX ADMIN — LIDOCAINE HYDROCHLORIDE 1 ML: 10 INJECTION, SOLUTION EPIDURAL; INFILTRATION; INTRACAUDAL; PERINEURAL at 13:56

## 2022-03-10 RX ADMIN — SODIUM CHLORIDE 1 ML: 9 INJECTION INTRAVENOUS at 13:56

## 2022-03-11 DIAGNOSIS — M47.817 LUMBOSACRAL SPONDYLOSIS WITHOUT MYELOPATHY: ICD-10-CM

## 2022-03-11 DIAGNOSIS — M54.16 LUMBAR RADICULOPATHY: ICD-10-CM

## 2022-03-11 DIAGNOSIS — G89.4 CHRONIC PAIN SYNDROME: ICD-10-CM

## 2022-03-11 DIAGNOSIS — M17.10 KNEE ARTHROPATHY: ICD-10-CM

## 2022-03-11 DIAGNOSIS — M48.061 SPINAL STENOSIS, LUMBAR REGION, WITHOUT NEUROGENIC CLAUDICATION: ICD-10-CM

## 2022-03-11 DIAGNOSIS — M25.551 PAIN OF RIGHT HIP JOINT: ICD-10-CM

## 2022-03-11 DIAGNOSIS — M47.812 CERVICAL SPONDYLOSIS WITHOUT MYELOPATHY: ICD-10-CM

## 2022-03-11 RX ORDER — CELECOXIB 100 MG/1
CAPSULE ORAL
Qty: 30 CAPSULE | Refills: 2 | OUTPATIENT
Start: 2022-03-11

## 2022-03-16 ENCOUNTER — OFFICE VISIT (OUTPATIENT)
Dept: PAIN MANAGEMENT | Age: 62
End: 2022-03-16
Payer: MEDICARE

## 2022-03-16 VITALS
BODY MASS INDEX: 27.19 KG/M2 | HEIGHT: 61 IN | SYSTOLIC BLOOD PRESSURE: 138 MMHG | TEMPERATURE: 97.3 F | DIASTOLIC BLOOD PRESSURE: 72 MMHG | WEIGHT: 144 LBS

## 2022-03-16 DIAGNOSIS — M25.551 PAIN OF RIGHT HIP JOINT: ICD-10-CM

## 2022-03-16 DIAGNOSIS — M54.16 LUMBAR RADICULOPATHY: ICD-10-CM

## 2022-03-16 DIAGNOSIS — M47.817 LUMBOSACRAL SPONDYLOSIS WITHOUT MYELOPATHY: ICD-10-CM

## 2022-03-16 DIAGNOSIS — G89.4 CHRONIC PAIN SYNDROME: ICD-10-CM

## 2022-03-16 DIAGNOSIS — M48.061 SPINAL STENOSIS, LUMBAR REGION, WITHOUT NEUROGENIC CLAUDICATION: ICD-10-CM

## 2022-03-16 DIAGNOSIS — M47.812 CERVICAL SPONDYLOSIS WITHOUT MYELOPATHY: ICD-10-CM

## 2022-03-16 DIAGNOSIS — M17.10 KNEE ARTHROPATHY: ICD-10-CM

## 2022-03-16 PROBLEM — H40.003 GLAUCOMA SUSPECT OF BOTH EYES: Status: ACTIVE | Noted: 2022-01-14

## 2022-03-16 PROCEDURE — 99213 OFFICE O/P EST LOW 20 MIN: CPT | Performed by: NURSE PRACTITIONER

## 2022-03-16 PROCEDURE — 3017F COLORECTAL CA SCREEN DOC REV: CPT | Performed by: NURSE PRACTITIONER

## 2022-03-16 PROCEDURE — G8419 CALC BMI OUT NRM PARAM NOF/U: HCPCS | Performed by: NURSE PRACTITIONER

## 2022-03-16 PROCEDURE — G8484 FLU IMMUNIZE NO ADMIN: HCPCS | Performed by: NURSE PRACTITIONER

## 2022-03-16 PROCEDURE — G8427 DOCREV CUR MEDS BY ELIG CLIN: HCPCS | Performed by: NURSE PRACTITIONER

## 2022-03-16 PROCEDURE — 1036F TOBACCO NON-USER: CPT | Performed by: NURSE PRACTITIONER

## 2022-03-16 RX ORDER — CELECOXIB 100 MG/1
100 CAPSULE ORAL DAILY
Qty: 30 CAPSULE | Refills: 2 | Status: SHIPPED | OUTPATIENT
Start: 2022-03-16 | End: 2022-06-13 | Stop reason: SDUPTHER

## 2022-03-16 RX ORDER — GABAPENTIN 600 MG/1
600 TABLET ORAL 3 TIMES DAILY
Qty: 90 TABLET | Refills: 2 | Status: SHIPPED | OUTPATIENT
Start: 2022-03-16 | End: 2022-06-13 | Stop reason: SDUPTHER

## 2022-03-16 ASSESSMENT — ENCOUNTER SYMPTOMS
CONSTIPATION: 0
TROUBLE SWALLOWING: 0
GASTROINTESTINAL NEGATIVE: 1
DIARRHEA: 0
BACK PAIN: 1
COUGH: 0
EYES NEGATIVE: 1
SHORTNESS OF BREATH: 0

## 2022-03-16 NOTE — PROGRESS NOTES
Jose Nicole  (1960)    3/16/2022    Subjective:     Jose Nicole is 59 y.o. female who complains today of:    Chief Complaint   Patient presents with    Back Pain         Allergies:  Patient has no known allergies.     Past Medical History:   Diagnosis Date    Arthritis     left hip    COPD (chronic obstructive pulmonary disease) ([facility]     uses inhalers x 4 yrs    Hyperlipidemia     on meds x 10yrs    Hypertension 2018    Lung disease     Type 2 diabetes mellitus with hyperglycemia, without long-term current use of insulin ([facility] [facility] 8/15/2018     Past Surgical History:   Procedure Laterality Date    BREAST BIOPSY Right 2003    benign    CERVICAL FUSION  2016    CERVICAL SPINE SURGERY  2004    AVM removal, fusion, embolism removal      SECTION  1979    COLONOSCOPY  2018    HYSTERECTOMY      [place] TOTAL HIP ARTHROPLASTY Left 2018    LEFT HIP TOTAL HIP ARTHROPLASTY performed by Adelina Mcclellan MD at [address] History   Problem Relation Age of Onset    Breast Cancer Mother     Cancer Mother         liver ca    No Known Problems Brother     Obesity Son      Social History     Socioeconomic History    Marital status: Single     Spouse name: Not on file    Number of children: Not on file    Years of education: Not on file    Highest education level: Not on file   Occupational History    Not on file   Tobacco Use    Smoking status: Former Smoker     Packs/day: 0.50     Years: 1.00     Pack years: 0.50     Start date: 2017     Quit date: 2019     Years since quitting: 3.2    Smokeless tobacco: Never Used    Tobacco comment: smoked at 15yrs old x 30 yrs 0.5ppd    Vaping Use    Vaping Use: Never used   Substance and Sexual Activity    Alcohol use: No     Alcohol/week: 0.0 standard drinks    Drug use: No    Sexual activity: Not on file   Other Topics Concern    Not on file   Social History Narrative         Lives With: Significant other    Type of Home: House    Home Layout: One level    Home Access: Stairs to enter with rails    Entrance Stairs - Number of Steps: 6    Home Equipment: Rolling walker, Cane, Wheelchair-manual (BSC, leg )    ADL Assistance: Independent    Ambulation Assistance: Independent (with [employer])    Transfer Assistance: Independent    Additional Comments: Multiple neck surgeries with residual spasticity R UE/LE     Social Determinants of Health     Financial Resource Strain:     Difficulty of Paying Living Expenses: Not on file   Food Insecurity:     Worried About Running Out of Food in the Last Year: Not on file    Elsa of Food in the Last Year: Not on file   Transportation Needs:     Lack of Transportation (Medical): Not on file    Lack of Transportation (Non-Medical):  Not on file   Physical Activity:     Days of Exercise per Week: Not on file    Minutes of Exercise per Session: Not on file   Stress:     Feeling of Stress : Not on file   Social Connections:     Frequency of Communication with Friends and Family: Not on file    Frequency of Social Gatherings with Friends and Family: Not on file    Attends Sabianist Services: Not on file    Active Member of [address] [place] [address] [employer] or Organizations: Not on file    Attends Club or Organization Meetings: Not on file    Marital Status: Not on file   Intimate Partner Violence:     Fear of Current or Ex-Partner: Not on file    Emotionally Abused: Not on file    Physically Abused: Not on file    Sexually Abused: Not on file   Housing Stability:     Unable to Pay for Housing in the Last Year: Not on file    Number of [place] in the Last Year: Not on file    Unstable Housing in the Last Year: Not on file       Current Outpatient Medications on File Prior to Visit   Medication Sig Dispense Refill    VENTOLIN  (90 Base) MCG/ACT inhaler Inhale 2 puffs into the lungs 4 times daily as needed for Wheezing 1 each 1    albuterol (PROVENTIL) (2.5 MG/3ML) 0.083% nebulizer solution Take 3 mLs by nebulization every 6 hours as needed for Wheezing 120 each 3    budesonide-formoterol (SYMBICORT) 160-4.5 MCG/ACT AERO TAKE 2 PUFFS BY MOUTH TWICE A DAY 3 Inhaler 3    albuterol sulfate HFA (VENTOLIN HFA) 108 (90 Base) MCG/ACT inhaler Inhale 2 puffs into the lungs every 6 hours as needed for Wheezing 1 Inhaler 3    Multiple Vitamins-Minerals (MULTIVITAMIN ADULT PO) Take 1 tablet by mouth      POLYETHYLENE GLYCOL 3350 PO Take 17 g by mouth      fluticasone (FLONASE) 50 MCG/ACT nasal spray 1 spray by NOT APPLICABLE route      aspirin 81 MG EC tablet Take 1 tablet by mouth 2 times daily 30 tablet 0    Multiple Vitamins-Minerals (THERAPEUTIC MULTIVITAMIN-MINERALS) tablet Take 1 tablet by mouth daily      Magnesium Oxide 500 MG CAPS Take 1 capsule by mouth 2 times daily      oxybutynin (DITROPAN-XL) 10 MG extended release tablet Take 10 mg by mouth daily      simvastatin (ZOCOR) 10 MG tablet Take 10 mg by mouth nightly      Oxygen Concentrator Inhale 3 L into the lungs continuous       DULoxetine (CYMBALTA) 60 MG capsule        Current Facility-Administered Medications on File Prior to Visit   Medication Dose Route Frequency Provider Last Rate Last Admin    dexamethasone (PF) (DECADRON) injection 10 mg  10 mg Other Once Bright Spence MD               Pt presents today for a f/u of her pain. PCP is Dr. Mega Yusuf DO. Patient last saw Dr. Breanne Camacho on 3/10/2022 for bilateral S1 SNRB which offered significant relief >80%. She says she will consider repeating RF ablation if needed in the future. She is doing ok with her low back pain currently. In January she saw Merly Muñoz for chronic low back pain and neck pain. Unfortunately, therapeutic facet injections are no longer available and she does not want to pursue the RF ablation which previously worked quite well greater than 75% pain improvement. She says she will does think about this. HX: COPD requiring 2L oxygen at night.   HX:  6/14/16 she had decomressive surgery C2-6 and disectomy C3-4 anterior approach with Dr. Serena Reynolds, [facility]. 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 She uses her cymbalta 60mg daily. She continues her increase gabapentin 600 mg 3 times daily and celebrex 100mg daily. She denies any cardiac or stomach issues. Pt feels pain level 2/10. Pt feels that \"everything\" makes the pain worse, and ice, rest, recent injection, medication makes the pain better. Pt feels her medication helps   her function and improve her quality of life. Pt reports improved LE radiating numbness and tingling. Denies recent falls, injuries or trauma. Pt denies new weakness. Pt reports PT has been done. Review of Systems   Constitutional: Negative. Negative for fatigue. HENT: Negative. Negative for trouble swallowing. Eyes: Negative. Respiratory: Negative for cough and shortness of breath. Cardiovascular: Negative for chest pain. Gastrointestinal: Negative. Negative for constipation and diarrhea. Endocrine: Negative. Genitourinary: Negative. Musculoskeletal: Positive for back pain. Negative for neck pain. Skin: Negative. Neurological: Positive for numbness. Negative for dizziness, weakness and headaches. Hematological: Negative. Psychiatric/Behavioral: Negative. Objective:     Vitals:  /72   Temp 97.3 °F (36.3 °C)   Ht 5' 1\" (1.549 m)   Wt 144 lb (65.3 kg)   LMP  (LMP Unknown)   BMI 27.21 kg/m² Pain Score:   2      Physical Exam  Vitals and nursing note reviewed. This is a thin pleasant female who answers questions appropriately and follows commands. Pt is alert and oriented x 3. Recent and remote memory is intact. Mood and affect, judgement and insight are normal. SOB with exertion.  HEENT: PERRL. Neck is supple, trachea midline.  No lymphadenopathy noted.  Decreased ROM with flexion, extension and rotation of cervical spine.  Posterior neck scar present.  Non-tender with palpation to neck. Strong grasp B/L. Pulses are intact. Decreased ROM with flexion and extension of low back. Minimally tender with palpation to low back.  Negative SLR. Rt foot with edema noted.    Tightness in both hamstrings noted. Using walker. Wearing Rt AFO. Rt knee with decreased ROM.   Mild tenderness noted with palpation. Mild swelling noted with arthritic deformity noted.  Crepitus with ROM.  Gait antalgic.  Cranial nerves II-XII are intact. Assessment:      Diagnosis Orders   1. Lumbar radiculopathy  gabapentin (NEURONTIN) 600 MG tablet    celecoxib (CELEBREX) 100 MG capsule   2. Spinal stenosis, lumbar region, without neurogenic claudication  gabapentin (NEURONTIN) 600 MG tablet    celecoxib (CELEBREX) 100 MG capsule   3. Chronic pain syndrome  gabapentin (NEURONTIN) 600 MG tablet    celecoxib (CELEBREX) 100 MG capsule   4. Cervical spondylosis without myelopathy  celecoxib (CELEBREX) 100 MG capsule   5. Lumbosacral spondylosis without myelopathy  celecoxib (CELEBREX) 100 MG capsule   6. Pain of right hip joint  celecoxib (CELEBREX) 100 MG capsule   7. Knee arthropathy  celecoxib (CELEBREX) 100 MG capsule       Plan:     Periodic Controlled Substance Monitoring: No signs of potential drug abuse or diversion identified. (Sho Muñoz, APRN - CNP)    Orders Placed This Encounter   Medications    gabapentin (NEURONTIN) 600 MG tablet     Sig: Take 1 tablet by mouth 3 times daily for 90 days. Dispense:  90 tablet     Refill:  2    celecoxib (CELEBREX) 100 MG capsule     Sig: Take 1 capsule by mouth daily     Dispense:  30 capsule     Refill:  2       No orders of the defined types were placed in this encounter. Discussed options with the patient today. Anatomic model pathology was shown and reviewed with pt. We will continue Celebrex 100 mg daily and gabapentin 600 mg up to 3 times daily. She did well status post SNR B.   She will follow up with PCP for her colonoscopy and labs that were ordered. She is not ready to pursue RF ablation, but will consider when she feels it is necessary. She last had RF ablation lumbar spine L2-3-4 5 back in 2020 significant relief at that time. All questions were answered. Discussed home exercise program.  Relevant imaging and pain generators reviewed. Pt verbalized understanding and agrees with above plan. Pt has chronic pain. Will continue medications for chronic pain that has been previously directed as they do help pt function with ADL and improve quality of life. OARRS was reviewed. This NP saw pt under direct supervision of Dr. Yogi Miller. Follow up:  Return in about 3 months (around 6/16/2022) for review meds and reassess pain.     Gosia Sanchez, APRN - CNP

## 2022-04-12 ENCOUNTER — OFFICE VISIT (OUTPATIENT)
Dept: PULMONOLOGY | Age: 62
End: 2022-04-12
Payer: MEDICARE

## 2022-04-12 VITALS
WEIGHT: 144 LBS | TEMPERATURE: 98.5 F | BODY MASS INDEX: 27.19 KG/M2 | SYSTOLIC BLOOD PRESSURE: 119 MMHG | HEIGHT: 61 IN | HEART RATE: 88 BPM | DIASTOLIC BLOOD PRESSURE: 65 MMHG | OXYGEN SATURATION: 92 %

## 2022-04-12 DIAGNOSIS — Z87.891 PERSONAL HISTORY OF SMOKING: ICD-10-CM

## 2022-04-12 DIAGNOSIS — J44.9 CHRONIC OBSTRUCTIVE PULMONARY DISEASE, UNSPECIFIED COPD TYPE (HCC): Primary | ICD-10-CM

## 2022-04-12 DIAGNOSIS — R09.02 HYPOXIA: ICD-10-CM

## 2022-04-12 PROCEDURE — G8427 DOCREV CUR MEDS BY ELIG CLIN: HCPCS | Performed by: INTERNAL MEDICINE

## 2022-04-12 PROCEDURE — 3017F COLORECTAL CA SCREEN DOC REV: CPT | Performed by: INTERNAL MEDICINE

## 2022-04-12 PROCEDURE — G8419 CALC BMI OUT NRM PARAM NOF/U: HCPCS | Performed by: INTERNAL MEDICINE

## 2022-04-12 PROCEDURE — 3023F SPIROM DOC REV: CPT | Performed by: INTERNAL MEDICINE

## 2022-04-12 PROCEDURE — 99213 OFFICE O/P EST LOW 20 MIN: CPT | Performed by: INTERNAL MEDICINE

## 2022-04-12 PROCEDURE — 1036F TOBACCO NON-USER: CPT | Performed by: INTERNAL MEDICINE

## 2022-04-12 RX ORDER — ALBUTEROL SULFATE 90 UG/1
2 AEROSOL, METERED RESPIRATORY (INHALATION) EVERY 6 HOURS PRN
Qty: 18 G | Refills: 3 | Status: SHIPPED | OUTPATIENT
Start: 2022-04-12 | End: 2022-08-02

## 2022-04-12 RX ORDER — BACLOFEN 10 MG/1
10 TABLET ORAL 3 TIMES DAILY
COMMUNITY

## 2022-04-12 ASSESSMENT — ENCOUNTER SYMPTOMS
DIARRHEA: 0
EYE ITCHING: 0
SINUS PRESSURE: 0
ABDOMINAL PAIN: 0
CHEST TIGHTNESS: 0
SORE THROAT: 0
VOMITING: 0
VOICE CHANGE: 0
TROUBLE SWALLOWING: 0
SHORTNESS OF BREATH: 1
EYE DISCHARGE: 0
WHEEZING: 0
NAUSEA: 0
COUGH: 0
RHINORRHEA: 0

## 2022-04-12 NOTE — PROGRESS NOTES
Subjective:     Shelba Sever is a 57 y.o. female who complains today of:     Chief Complaint   Patient presents with    COPD     4 month f/u       HPI  She is using  2 lit O2 with sleep , bronchodilator with Symbicort 160/4.5 mcg BID ,   albuterol neb prn , proair  HFA 2 puff  prn. C/o shortness of breath with exertion. No Wheezing. No Cough or  Sputum. No Hemoptysis. No Chest tightness. No Chest pain with radiation  or pleuritic pain. No  leg edema. No orthopnea. No Fever or chills. No Rhinorrhea and postnasal drip. Allergies:  Patient has no known allergies.   Past Medical History:   Diagnosis Date    Arthritis     left hip    COPD (chronic obstructive pulmonary disease) ([address]     uses inhalers x 4 yrs    Hyperlipidemia     on meds x 10yrs    Hypertension 2018    Lung disease     Type 2 diabetes mellitus with hyperglycemia, without long-term current use of insulin ([address] 8/15/2018     Past Surgical History:   Procedure Laterality Date    BREAST BIOPSY Right 2003    benign    CERVICAL FUSION  2016    CERVICAL SPINE SURGERY  2004    AVM removal, fusion, embolism removal      SECTION  1979    COLONOSCOPY  2018    HYSTERECTOMY  1997    [place] TOTAL HIP ARTHROPLASTY Left 2018    LEFT HIP TOTAL HIP ARTHROPLASTY performed by Anna Hahn MD at [address] History   Problem Relation Age of Onset    Breast Cancer Mother     Cancer Mother         liver ca    No Known Problems Brother     Obesity Son      Social History     Socioeconomic History    Marital status: Single     Spouse name: Not on file    Number of children: Not on file    Years of education: Not on file    Highest education level: Not on file   Occupational History    Not on file   Tobacco Use    Smoking status: Former Smoker     Packs/day: 0.50     Years: 1.00     Pack years: 0.50     Start date: 2017     Quit date: 2019     Years since quitting: 3.2    Smokeless tobacco: Never Used  Tobacco comment: smoked at 15yrs old x 30 yrs 0.5ppd    Vaping Use    Vaping Use: Never used   Substance and Sexual Activity    Alcohol use: No     Alcohol/week: 0.0 standard drinks    Drug use: No    Sexual activity: Not on file   Other Topics Concern    Not on file   Social History Narrative         Lives With: Significant other    Type of Home: House    Home Layout: One level    Home Access: Stairs to enter with rails    Entrance Stairs - Number of Steps: 6    Home Equipment: Rolling walker, Cane, Wheelchair-manual (BSC, leg )    ADL Assistance: Independent    Ambulation Assistance: Independent (with [address] [place] [address])    Transfer Assistance: Independent    Additional Comments: Multiple neck surgeries with residual spasticity R UE/LE     Social Determinants of Health     Financial Resource Strain:     Difficulty of Paying Living Expenses: Not on file   Food Insecurity:     Worried About Running Out of Food in the Last Year: Not on file    Elsa of Food in the Last Year: Not on file   Transportation Needs:     Lack of Transportation (Medical): Not on file    Lack of Transportation (Non-Medical):  Not on file   Physical Activity:     Days of Exercise per Week: Not on file    Minutes of Exercise per Session: Not on file   Stress:     Feeling of Stress : Not on file   Social Connections:     Frequency of Communication with Friends and Family: Not on file    Frequency of Social Gatherings with Friends and Family: Not on file    Attends Yazidi Services: Not on file    Active Member of Clubs or Organizations: Not on file    Attends Club or Organization Meetings: Not on file    Marital Status: Not on file   Intimate Partner Violence:     Fear of Current or Ex-Partner: Not on file    Emotionally Abused: Not on file    Physically Abused: Not on file    Sexually Abused: Not on file   Housing Stability:     Unable to Pay for Housing in the Last Year: Not on file    Number of [place] in the Last Chest:      Chest wall: No tenderness. Abdominal:      General: There is no distension. Tenderness: There is no abdominal tenderness. There is no rebound. Musculoskeletal:         General: Normal range of motion. Right lower leg: Edema present. Left lower leg: Edema present. Lymphadenopathy:      Cervical: No cervical adenopathy. Skin:     General: Skin is warm and dry. Neurological:      Mental Status: She is alert and oriented to person, place, and time. Coordination: Coordination normal.         Current Outpatient Medications   Medication Sig Dispense Refill    baclofen (LIORESAL) 10 MG tablet Take 10 mg by mouth 3 times daily      albuterol sulfate HFA (PROAIR HFA) 108 (90 Base) MCG/ACT inhaler Inhale 2 puffs into the lungs every 6 hours as needed for Wheezing 18 g 3    gabapentin (NEURONTIN) 600 MG tablet Take 1 tablet by mouth 3 times daily for 90 days.  90 tablet 2    celecoxib (CELEBREX) 100 MG capsule Take 1 capsule by mouth daily 30 capsule 2    VENTOLIN  (90 Base) MCG/ACT inhaler Inhale 2 puffs into the lungs 4 times daily as needed for Wheezing 1 each 1    budesonide-formoterol (SYMBICORT) 160-4.5 MCG/ACT AERO TAKE 2 PUFFS BY MOUTH TWICE A DAY 3 Inhaler 3    albuterol sulfate HFA (VENTOLIN HFA) 108 (90 Base) MCG/ACT inhaler Inhale 2 puffs into the lungs every 6 hours as needed for Wheezing 1 Inhaler 3    Multiple Vitamins-Minerals (MULTIVITAMIN ADULT PO) Take 1 tablet by mouth      POLYETHYLENE GLYCOL 3350 PO Take 17 g by mouth      fluticasone (FLONASE) 50 MCG/ACT nasal spray 1 spray by NOT APPLICABLE route      aspirin 81 MG EC tablet Take 1 tablet by mouth 2 times daily 30 tablet 0    Multiple Vitamins-Minerals (THERAPEUTIC MULTIVITAMIN-MINERALS) tablet Take 1 tablet by mouth daily      Magnesium Oxide 500 MG CAPS Take 1 capsule by mouth 2 times daily      oxybutynin (DITROPAN-XL) 10 MG extended release tablet Take 10 mg by mouth daily      simvastatin (ZOCOR) 10 MG tablet Take 10 mg by mouth nightly      Oxygen Concentrator Inhale 3 L into the lungs continuous       DULoxetine (CYMBALTA) 60 MG capsule       albuterol (PROVENTIL) (2.5 MG/3ML) 0.083% nebulizer solution Take 3 mLs by nebulization every 6 hours as needed for Wheezing 120 each 3     Current Facility-Administered Medications   Medication Dose Route Frequency Provider Last Rate Last Admin    dexamethasone (PF) (DECADRON) injection 10 mg  10 mg Other Once Kayce Owens MD           Results for orders placed during the hospital encounter of 11/24/21    XR CHEST (2 VW)    Narrative  Exam: XR CHEST (2 VW)    CLINICAL HISTORY: J44.9 Chronic obstructive pulmonary disease, unspecified COPD type ([address] ICD10    COMPARISON: None    CHEST X-ray, 2 VIEWS    FINDINGS:      The cardiomediastinal silhouette is within normal limits. There are no infiltrates, consolidations or effusions. Bones of the thorax appear intact. Impression  No radiographic evidence of acute intrathoracic process. Results for orders placed during the hospital encounter of 01/02/19    XR CHEST STANDARD (2 VW)    Narrative  EXAMINATION: XR CHEST (2 VW)    REASON FOR EXAM: COPD atelectasis    FINDINGS: 2 views of the chest reveal lung fields slightly hyperinflated. There is borderline cardiomegaly. The pulmonary vasculature is within normal limits. Atherosclerotic changes in the aorta noted. Since January 4, 2019 endotracheal tube and NG tube have been removed. Lung fields clear with no evidence of pneumonia. No evidence of pulmonary edema. Bones and soft tissues intact. Postsurgical changes overlie the lower cervical spine. Impression  NO ACTIVE DISEASE IN THE CHEST.       Results for orders placed during the hospital encounter of 10/02/18    XR CHEST STANDARD (2 VW)    Narrative  EXAMINATION: CHEST TWO VIEWS    CLINICAL HISTORY: J44.9 Chronic obstructive pulmonary disease, unspecified COPD type ([address] ICD10, follow-up    COMPARISONS: May 16, 2016    FINDINGS:    Two views of the chest are submitted. The cardiac silhouette is of normal size configuration. The mediastinum is unremarkable. Pulmonary vascular is attenuated, lungs are hyperinflated and there is some widening of the AP diameter the chest. Areas atelectasis both bases. .  Right sided trachea. No focal infiltrates. No effusions. Pneumothoraces. Impression  NO ACUTE ACTIVE CARDIOPULMONARY PROCESS. RADIOGRAPHIC FINDINGS SUGGESTIVE OF COPD. Nader Falcon  Results for orders placed during the hospital encounter of 01/02/19    XR CHEST PORTABLE    Narrative  Portable chest radiograph    History: Respiratory failure    Technique: AP portable view of the chest obtained. Comparison: CT from January 3, 2019; chest x-ray from January 3, 2019    Findings:    Endotracheal tube remains, not significantly changed in position. Enteric tube traverses the diaphragm however its distal tip is not visualized. The cardiomediastinal silhouette is within normal limits. No pneumothorax, pleural effusion, or focal  consolidation. Linear left lung base opacity compatible with subsegmental atelectasis is not significantly changed. Lungs are hyperinflated. Coarsening of the pulmonary interstitium. Degenerative changes of the spine. Impression  No significant interval change in left base subsegmental atelectasis. XR CHEST PORTABLE    Narrative  EXAMINATION: CHEST PORTABLE VIEW    CLINICAL HISTORY: Intubation    COMPARISONS: January 2, 2018    FINDINGS:    Single  views of the chest is submitted. There is a endotracheal tube. The tip lies approximately 2.1 cm above the adele. There is a nasogastric tube. The tip is not seen but does lie below the hemidiaphragm. The cardiac silhouette is enlarged. Unchanged configuration. The mediastinum is unremarkable. Pulmonary vascular unremarkable. Right sided trachea.   Left lower lobe infiltrate/consolidation with trace left pleural effusion. No Pneumothoraces. Impression  LEFT LOWER LOBE INFILTRATE/CONSOLIDATION WITH TRACE LEFT PLEURAL EFFUSION      XR CHEST PORTABLE    Narrative  EXAMINATION: XR CHEST PORTABLE, 1/2/2019 2:15 PM    History: 49-year-old female, shortness of breath, dyspnea    COMPARISON:  October 2, 2018    FINDINGS:  Chest-AP erect portable:  Hyperaeration lung fields. There are chronic interstitial changes. Asymmetric subtle increased density in the left lung base, suggestive of infiltrate. There is blunting of the right costophrenic angle, may represent focal atelectasis versus small  pleural effusion. Cardiac silhouette at the upper limits of normal. Aortic arch calcification. The pulmonary vascularity is normal size. There are degenerative changes of the spine. Monitoring leads project across the thorax. Impression  1. Asymmetric hazy density in the left lung base suspicious for developing infiltrate. 2. Small right pleural effusion versus atelectasis. 3. Chronic interstitial changes and hyperaeration lung fields, correlate for COPD. Assessment/Plan:     1. Chronic obstructive pulmonary disease, unspecified COPD type ([address]  She is using  2 lit O2 with sleep , bronchodilator with Symbicort 160/4.5 mcg BID , albuterol neb prn , proair  HFA 2 puff  prn. C/o shortness of breath with exertion. No Wheezing. No Cough or  Sputum. - albuterol sulfate HFA (PROAIR HFA) 108 (90 Base) MCG/ACT inhaler; Inhale 2 puffs into the lungs every 6 hours as needed for Wheezing  Dispense: 18 g; Refill: 3    2. Hypoxia  On 2 L O2 during sleep. Continue O2 to keep saturation 90% or above. 3. Personal history of smoking  Quit smoking in 2019      Return in about 4 months (around 8/12/2022) for COPD, hypoxia on O2.       Sachin Ceballos MD

## 2022-06-13 ENCOUNTER — OFFICE VISIT (OUTPATIENT)
Dept: PAIN MANAGEMENT | Age: 62
End: 2022-06-13
Payer: MEDICARE

## 2022-06-13 VITALS
DIASTOLIC BLOOD PRESSURE: 72 MMHG | WEIGHT: 146 LBS | SYSTOLIC BLOOD PRESSURE: 124 MMHG | HEIGHT: 61 IN | BODY MASS INDEX: 27.56 KG/M2 | TEMPERATURE: 97.2 F

## 2022-06-13 DIAGNOSIS — M48.061 SPINAL STENOSIS, LUMBAR REGION, WITHOUT NEUROGENIC CLAUDICATION: ICD-10-CM

## 2022-06-13 DIAGNOSIS — M25.551 PAIN OF RIGHT HIP JOINT: ICD-10-CM

## 2022-06-13 DIAGNOSIS — G89.4 CHRONIC PAIN SYNDROME: ICD-10-CM

## 2022-06-13 DIAGNOSIS — M17.10 KNEE ARTHROPATHY: ICD-10-CM

## 2022-06-13 DIAGNOSIS — M54.16 LUMBAR RADICULOPATHY: ICD-10-CM

## 2022-06-13 DIAGNOSIS — M47.812 CERVICAL SPONDYLOSIS WITHOUT MYELOPATHY: ICD-10-CM

## 2022-06-13 DIAGNOSIS — M47.817 LUMBOSACRAL SPONDYLOSIS WITHOUT MYELOPATHY: ICD-10-CM

## 2022-06-13 PROCEDURE — G8419 CALC BMI OUT NRM PARAM NOF/U: HCPCS | Performed by: NURSE PRACTITIONER

## 2022-06-13 PROCEDURE — 1036F TOBACCO NON-USER: CPT | Performed by: NURSE PRACTITIONER

## 2022-06-13 PROCEDURE — 3017F COLORECTAL CA SCREEN DOC REV: CPT | Performed by: NURSE PRACTITIONER

## 2022-06-13 PROCEDURE — G8427 DOCREV CUR MEDS BY ELIG CLIN: HCPCS | Performed by: NURSE PRACTITIONER

## 2022-06-13 PROCEDURE — 99213 OFFICE O/P EST LOW 20 MIN: CPT | Performed by: NURSE PRACTITIONER

## 2022-06-13 RX ORDER — CELECOXIB 100 MG/1
100 CAPSULE ORAL DAILY
Qty: 30 CAPSULE | Refills: 2 | Status: SHIPPED | OUTPATIENT
Start: 2022-06-13 | End: 2022-09-12 | Stop reason: SDUPTHER

## 2022-06-13 RX ORDER — GABAPENTIN 600 MG/1
600 TABLET ORAL 3 TIMES DAILY
Qty: 90 TABLET | Refills: 2 | Status: SHIPPED | OUTPATIENT
Start: 2022-06-22 | End: 2022-09-12 | Stop reason: SDUPTHER

## 2022-06-13 ASSESSMENT — ENCOUNTER SYMPTOMS
DIARRHEA: 0
SHORTNESS OF BREATH: 0
CONSTIPATION: 0
EYES NEGATIVE: 1
BACK PAIN: 1
COUGH: 0
TROUBLE SWALLOWING: 0
GASTROINTESTINAL NEGATIVE: 1

## 2022-06-13 NOTE — PROGRESS NOTES
Kevyn Champion  ([id number])    2022    Subjective:     Kevyn Champion is 57 y.o. female who complains today of:    Chief Complaint   Patient presents with    Back Pain         Allergies:  Glucagon    Past Medical History:   Diagnosis Date    Arthritis     left hip    COPD (chronic obstructive pulmonary disease) ([facility] [address]     uses inhalers x 4 yrs    Hyperlipidemia     on meds x 10yrs    Hypertension 2018    Lung disease     Type 2 diabetes mellitus with hyperglycemia, without long-term current use of insulin ([facility] [address] 8/15/2018     Past Surgical History:   Procedure Laterality Date    BREAST BIOPSY Right 2003    benign    CERVICAL FUSION  2016    CERVICAL SPINE SURGERY  2004    AVM removal, fusion, embolism removal      SECTION  1979    COLONOSCOPY  2018    HYSTERECTOMY (CERVIX STATUS UNKNOWN)  1997    CO TOTAL HIP ARTHROPLASTY Left 2018    LEFT HIP TOTAL HIP ARTHROPLASTY performed by Mercedes Nichole MD at [address] History   Problem Relation Age of Onset    Breast Cancer Mother     Cancer Mother         liver ca    No Known Problems Brother     Obesity Son      Social History     Socioeconomic History    Marital status: Single     Spouse name: Not on file    Number of children: Not on file    Years of education: Not on file    Highest education level: Not on file   Occupational History    Not on file   Tobacco Use    Smoking status: Former Smoker     Packs/day: 0.50     Years: 1.00     Pack years: 0.50     Start date: 2017     Quit date: 2019     Years since quitting: 3.4    Smokeless tobacco: Never Used    Tobacco comment: smoked at 15yrs old x 30 yrs 0.5ppd    Vaping Use    Vaping Use: Never used   Substance and Sexual Activity    Alcohol use: No     Alcohol/week: 0.0 standard drinks    Drug use: No    Sexual activity: Not on file   Other Topics Concern    Not on file   Social History Narrative         Lives With: Significant other    Type of Home: House    Home Layout: One level    Home Access: Stairs to enter with rails    Entrance Stairs - Number of Steps: 6    Home Equipment: Rolling walker, Cane, Wheelchair-manual (BSC, leg )    ADL Assistance: Independent    Ambulation Assistance: Independent (with [employer])    Transfer Assistance: Independent    Additional Comments: Multiple neck surgeries with residual spasticity R UE/LE     Social Determinants of Health     Financial Resource Strain:     Difficulty of Paying Living Expenses: Not on file   Food Insecurity:     Worried About Running Out of Food in the Last Year: Not on file    Elsa of Food in the Last Year: Not on file   Transportation Needs:     Lack of Transportation (Medical): Not on file    Lack of Transportation (Non-Medical):  Not on file   Physical Activity:     Days of Exercise per Week: Not on file    Minutes of Exercise per Session: Not on file   Stress:     Feeling of Stress : Not on file   Social Connections:     Frequency of Communication with Friends and Family: Not on file    Frequency of Social Gatherings with Friends and Family: Not on file    Attends Church Services: Not on file    Active Member of [address] [place] [address] or Organizations: Not on file    Attends Club or Organization Meetings: Not on file    Marital Status: Not on file   Intimate Partner Violence:     Fear of Current or Ex-Partner: Not on file    Emotionally Abused: Not on file    Physically Abused: Not on file    Sexually Abused: Not on file   Housing Stability:     Unable to Pay for Housing in the Last Year: Not on file    Number of [place] in the Last Year: Not on file    Unstable Housing in the Last Year: Not on file       Current Outpatient Medications on File Prior to Visit   Medication Sig Dispense Refill    baclofen (LIORESAL) 10 MG tablet Take 10 mg by mouth 3 times daily      albuterol sulfate HFA (PROAIR HFA) 108 (90 Base) MCG/ACT inhaler Inhale 2 puffs into the lungs every 6 hours as needed for Wheezing 18 g 3    VENTOLIN  (90 Base) MCG/ACT inhaler Inhale 2 puffs into the lungs 4 times daily as needed for Wheezing 1 each 1    budesonide-formoterol (SYMBICORT) 160-4.5 MCG/ACT AERO TAKE 2 PUFFS BY MOUTH TWICE A DAY 3 Inhaler 3    albuterol sulfate HFA (VENTOLIN HFA) 108 (90 Base) MCG/ACT inhaler Inhale 2 puffs into the lungs every 6 hours as needed for Wheezing 1 Inhaler 3    Multiple Vitamins-Minerals (MULTIVITAMIN ADULT PO) Take 1 tablet by mouth      POLYETHYLENE GLYCOL 3350 PO Take 17 g by mouth      fluticasone (FLONASE) 50 MCG/ACT nasal spray 1 spray by NOT APPLICABLE route      aspirin 81 MG EC tablet Take 1 tablet by mouth 2 times daily 30 tablet 0    Multiple Vitamins-Minerals (THERAPEUTIC MULTIVITAMIN-MINERALS) tablet Take 1 tablet by mouth daily      Magnesium Oxide 500 MG CAPS Take 1 capsule by mouth 2 times daily      oxybutynin (DITROPAN-XL) 10 MG extended release tablet Take 10 mg by mouth daily      simvastatin (ZOCOR) 10 MG tablet Take 10 mg by mouth nightly      Oxygen Concentrator Inhale 3 L into the lungs continuous       DULoxetine (CYMBALTA) 60 MG capsule       albuterol (PROVENTIL) (2.5 MG/3ML) 0.083% nebulizer solution Take 3 mLs by nebulization every 6 hours as needed for Wheezing 120 each 3     Current Facility-Administered Medications on File Prior to Visit   Medication Dose Route Frequency Provider Last Rate Last Admin    dexamethasone (PF) (DECADRON) injection 10 mg  10 mg Other Once Khari Florez MD               Pt presents today for a f/u of her pain. PCP is Dr. Giuliano Henson DO. Patient last saw this NP in March. Seen Dr. Treva Lee on 3/10/2022 for bilateral S1 SNRB which offered significant relief >80%. She says she will consider repeating RF ablation if needed in the future - past RF ablation in 2020 helped signfiicantly . She is doing ok with her low back pain currently. She has chronic low back pain and neck pain.   Unfortunately, therapeutic facet injections are no longer available and she does not want to pursue the RF ablation which previously worked quite well greater than 75% pain improvement. HX: COPD requiring 2L oxygen at night. HX:  6/14/16 she had decomressive surgery C2-6 and disectomy C3-4 anterior approach with Dr. Zonia Wiley, [facility]. 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        She uses her cymbalta 60mg daily. She continues gabapentin 600 mg 3 times daily and celebrex 100mg daily. She denies any cardiac or stomach issues. Neurology started her on baclofen TID she reports     Pt feels pain level 3/10. Pt feels that \"everything I do\" makes the pain worse, and Copper fit socks, past injections, medication makes the pain better. Pt feels her medication helps   her function and improve her quality of life. Pt admits to UE and LE >Lt leg radiating numbness and tingling. Denies recent falls, injuries or trauma. Pt denies new weakness. Pt reports PT has been done in the past.         Review of Systems   Constitutional: Negative. Negative for fatigue. HENT: Negative. Negative for trouble swallowing. Eyes: Negative. Respiratory: Negative for cough and shortness of breath. Cardiovascular: Negative for chest pain. Gastrointestinal: Negative. Negative for constipation and diarrhea. She says when she had her colonoscopy she broke out in hives   Endocrine: Negative. Genitourinary: Negative. Musculoskeletal: Positive for arthralgias and back pain. Skin: Negative. Neurological: Negative for dizziness, weakness and headaches. Hematological: Negative. Psychiatric/Behavioral: Negative. Objective:     Vitals:  /72   Temp 97.2 °F (36.2 °C)   Ht 5' 1\" (1.549 m)   Wt 146 lb (66.2 kg)   LMP  (LMP Unknown)   BMI 27.59 kg/m² Pain Score:   3      Physical Exam  Vitals and nursing note reviewed.        This is a thin pleasant female who answers questions appropriately and follows commands. Pt is alert and oriented x 3. Recent and remote memory is intact. Mood and affect, judgement and insight are normal. SOB with exertion.  HEENT: PERRL. Neck is supple, trachea midline. No lymphadenopathy noted.  Decreased ROM with flexion, extension and rotation of cervical spine.  Posterior neck scar present.  Non-tender with palpation to neck. Strong grasp B/L. Pulses are intact. Decreased ROM with flexion and extension of low back. Minimally tender with palpation to low back.  Negative SLR. Rt foot with edema noted, wearing AFO.     Tightness in both hamstrings noted. Using walker. Gait antalgic. Rt knee with decreased ROM.   Mild tenderness noted with palpation. Mild swelling noted with arthritic deformity noted.  Crepitus with ROM.  Cranial nerves II-XII are intact.            Assessment:      Diagnosis Orders   1. Lumbar radiculopathy  gabapentin (NEURONTIN) 600 MG tablet    celecoxib (CELEBREX) 100 MG capsule   2. Spinal stenosis, lumbar region, without neurogenic claudication  gabapentin (NEURONTIN) 600 MG tablet    celecoxib (CELEBREX) 100 MG capsule   3. Chronic pain syndrome  gabapentin (NEURONTIN) 600 MG tablet    celecoxib (CELEBREX) 100 MG capsule   4. Cervical spondylosis without myelopathy  celecoxib (CELEBREX) 100 MG capsule   5. Lumbosacral spondylosis without myelopathy  celecoxib (CELEBREX) 100 MG capsule   6. Pain of right hip joint  celecoxib (CELEBREX) 100 MG capsule   7. Knee arthropathy  celecoxib (CELEBREX) 100 MG capsule       Plan:     Periodic Controlled Substance Monitoring: No signs of potential drug abuse or diversion identified. (Sho Muñoz, APRN - CNP)    Orders Placed This Encounter   Medications    gabapentin (NEURONTIN) 600 MG tablet     Sig: Take 1 tablet by mouth 3 times daily for 90 days.      Dispense:  90 tablet     Refill:  2    celecoxib (CELEBREX) 100 MG capsule     Sig: Take 1 capsule by mouth daily     Dispense:  30 capsule Refill:  2       No orders of the defined types were placed in this encounter. Discussed options with the patient today. Anatomic model pathology was shown and reviewed with pt. We will continue Celebrex 100 mg daily and gabapentin 600 mg up to 3 times daily. She last had RF ablation lumbar spine L2-3-4 5 back in 2020 significant relief at that time. Hold injections. Consider holiday from celebrex next OV. All questions were answered. Discussed home exercise program.  Relevant imaging and pain generators reviewed. Pt verbalized understanding and agrees with above plan. Pt has chronic pain. Check Utox and f/u with report - last took gabapentin    Will continue medications for chronic pain that has been previously directed as they do help pt function with ADL and improve quality of life. [place]ARRS was reviewed. This NP saw pt under direct supervision of Dr. Haylee Kruse d/t Dr. Quinton lerma. Follow up:  Return in about 3 months (around 9/13/2022) for review meds and reassess pain.     Lashawn Sanchez, APRN - CNP

## 2022-08-01 DIAGNOSIS — J44.9 CHRONIC OBSTRUCTIVE PULMONARY DISEASE, UNSPECIFIED COPD TYPE (HCC): ICD-10-CM

## 2022-08-02 NOTE — TELEPHONE ENCOUNTER
Rx requested:  Requested Prescriptions     Pending Prescriptions Disp Refills    PROAIR  (90 Base) MCG/ACT inhaler [Pharmacy Med Name: Konrad Hash 90 MCG INHALER] 8.5 each 3     Sig: TAKE 2 PUFFS BY MOUTH EVERY 6 HOURS AS NEEDED FOR WHEEZE       Last Office Visit:   4/12/2022      Next Visit Date:  Future Appointments   Date Time Provider Dave Lacy   8/11/2022  2:00 PM Ny Cuello MD [facility]   9/12/2022  1:30  [address], APRN - CNP [facility] EM[facility] AT [place]

## 2022-08-11 ENCOUNTER — OFFICE VISIT (OUTPATIENT)
Dept: PULMONOLOGY | Age: 62
End: 2022-08-11
Payer: MEDICARE

## 2022-08-11 VITALS
HEIGHT: 61 IN | DIASTOLIC BLOOD PRESSURE: 48 MMHG | BODY MASS INDEX: 27.19 KG/M2 | SYSTOLIC BLOOD PRESSURE: 91 MMHG | HEART RATE: 105 BPM | OXYGEN SATURATION: 93 % | TEMPERATURE: 97.8 F | WEIGHT: 144 LBS

## 2022-08-11 DIAGNOSIS — R09.02 HYPOXIA: ICD-10-CM

## 2022-08-11 DIAGNOSIS — Z87.891 PERSONAL HISTORY OF SMOKING: ICD-10-CM

## 2022-08-11 DIAGNOSIS — J44.9 CHRONIC OBSTRUCTIVE PULMONARY DISEASE, UNSPECIFIED COPD TYPE (HCC): Primary | ICD-10-CM

## 2022-08-11 PROCEDURE — G8419 CALC BMI OUT NRM PARAM NOF/U: HCPCS | Performed by: INTERNAL MEDICINE

## 2022-08-11 PROCEDURE — 99213 OFFICE O/P EST LOW 20 MIN: CPT | Performed by: INTERNAL MEDICINE

## 2022-08-11 PROCEDURE — G8427 DOCREV CUR MEDS BY ELIG CLIN: HCPCS | Performed by: INTERNAL MEDICINE

## 2022-08-11 PROCEDURE — 1036F TOBACCO NON-USER: CPT | Performed by: INTERNAL MEDICINE

## 2022-08-11 PROCEDURE — 3017F COLORECTAL CA SCREEN DOC REV: CPT | Performed by: INTERNAL MEDICINE

## 2022-08-11 PROCEDURE — 3023F SPIROM DOC REV: CPT | Performed by: INTERNAL MEDICINE

## 2022-08-11 RX ORDER — FEXOFENADINE HCL 180 MG/1
TABLET ORAL
COMMUNITY
Start: 2022-07-15

## 2022-08-11 ASSESSMENT — ENCOUNTER SYMPTOMS: SHORTNESS OF BREATH: 1

## 2022-08-11 NOTE — PROGRESS NOTES
Subjective:     Juan C House is a 57 y.o. female who complains today of:     Chief Complaint   Patient presents with    Follow-up     4m f/u - COPD Hypoxia       HPI  She is using  2 lit O2 with sleep , bronchodilator with Symbicort 160/4.5 mcg BID ,  albuterol neb prn , proair  HFA 2 puff  prn.  C/o shortness of breath with exertion. No Wheezing. No Cough or  Sputum. No Hemoptysis. No Chest tightness. No Chest pain with radiation  or pleuritic pain. No  leg edema. No orthopnea. No Fever or chills. No Rhinorrhea and postnasal drip.     Allergies:  Glucagon  Past Medical History:   Diagnosis Date    Arthritis     left hip    COPD (chronic obstructive pulmonary disease) ([address]     uses inhalers x 4 yrs    Hyperlipidemia     on meds x 10yrs    Hypertension 2018    Lung disease     Type 2 diabetes mellitus with hyperglycemia, without long-term current use of insulin ([address] 8/15/2018     Past Surgical History:   Procedure Laterality Date    BREAST BIOPSY Right 2003    benign    CERVICAL FUSION  2016    CERVICAL SPINE SURGERY  2004    AVM removal, fusion, embolism removal      SECTION  1979    COLONOSCOPY  2018    HYSTERECTOMY (CERVIX STATUS UNKNOWN)  1997    NV TOTAL HIP ARTHROPLASTY Left 2018    LEFT HIP TOTAL HIP ARTHROPLASTY performed by Tanya Hall MD at [address] History   Problem Relation Age of Onset    Breast Cancer Mother     Cancer Mother         liver ca    No Known Problems Brother     Obesity Son      Social History     Socioeconomic History    Marital status: Single     Spouse name: Not on file    Number of children: Not on file    Years of education: Not on file    Highest education level: Not on file   Occupational History    Not on file   Tobacco Use    Smoking status: Former     Packs/day: 0.50     Years: 1.00     Pack years: 0.50     Types: Cigarettes     Start date: 2017     Quit date: 2019     Years since quitting: 3.6     Passive exposure: Current (boyfriend smokes)    Smokeless tobacco: Never    Tobacco comments:     smoked at 15yrs old x 30 yrs 0.5ppd    Vaping Use    Vaping Use: Never used   Substance and Sexual Activity    Alcohol use: No     Alcohol/week: 0.0 standard drinks    Drug use: No    Sexual activity: Not Currently     Partners: Male   Other Topics Concern    Not on file   Social History Narrative         Lives With: Significant other    Type of Home: House    Home Layout: One level    Home Access: Stairs to enter with rails    Entrance Stairs - Number of Steps: 6    Home Equipment: Rolling walker, Cane, Wheelchair-manual (BSC, leg )    ADL Assistance: Independent    Ambulation Assistance: Independent (with [facility])    Transfer Assistance: Independent    Additional Comments: Multiple neck surgeries with residual spasticity R UE/LE     Social Determinants of Health     Financial Resource Strain: Not on file   Food Insecurity: Not on file   Transportation Needs: Not on file   Physical Activity: Not on file   Stress: Not on file   Social Connections: Not on file   Intimate Partner Violence: Not on file   Housing Stability: Not on file         Review of Systems   Constitutional:  Negative for chills, diaphoresis, fatigue and fever. HENT:  Negative for congestion, mouth sores, nosebleeds, postnasal drip, rhinorrhea, sinus pressure, sneezing, sore throat, trouble swallowing and voice change. Eyes:  Negative for discharge, itching and visual disturbance. Respiratory:  Positive for shortness of breath. Negative for cough, chest tightness and wheezing. Cardiovascular:  Negative for chest pain, palpitations and leg swelling. Gastrointestinal:  Negative for abdominal pain, diarrhea, nausea and vomiting. Genitourinary:  Negative for difficulty urinating and hematuria. Musculoskeletal:  Negative for arthralgias, joint swelling and myalgias. Skin:  Negative for rash.    Allergic/Immunologic: Negative for environmental allergies and food allergies. Neurological:  Negative for dizziness, tremors, weakness and headaches. Psychiatric/Behavioral:  Negative for behavioral problems and sleep disturbance.        :     Vitals:    08/11/22 1432 08/11/22 1436   BP: (!) 112/54 (!) 91/48   Site: Right Upper Arm Right Upper Arm   Position: Sitting Sitting   Cuff Size: Medium Adult Medium Adult   Pulse: (!) 105 (!) 105   Temp: 97.8 °F (36.6 °C)    TempSrc: Temporal    SpO2: 96% 93%   Weight: 144 lb (65.3 kg)    Height: 5' 1\" (1.549 m)      Wt Readings from Last 3 Encounters:   08/11/22 144 lb (65.3 kg)   06/13/22 146 lb (66.2 kg)   04/12/22 144 lb (65.3 kg)         Physical Exam  Constitutional:       General: She is not in acute distress. Appearance: She is well-developed. She is not diaphoretic. Comments: Sitting in chair   HENT:      Head: Normocephalic and atraumatic. Nose: Nose normal.   Eyes:      Pupils: Pupils are equal, round, and reactive to light. Neck:      Thyroid: No thyromegaly. Vascular: No JVD. Trachea: No tracheal deviation. Cardiovascular:      Rate and Rhythm: Normal rate and regular rhythm. Heart sounds: No murmur heard. No friction rub. No gallop. Pulmonary:      Effort: No respiratory distress. Breath sounds: No wheezing or rales. Comments: diminished Breath sound bilaterally. Chest:      Chest wall: No tenderness. Abdominal:      General: There is no distension. Tenderness: There is no abdominal tenderness. There is no rebound. Musculoskeletal:         General: Normal range of motion. Lymphadenopathy:      Cervical: No cervical adenopathy. Skin:     General: Skin is warm and dry. Neurological:      Mental Status: She is alert and oriented to person, place, and time.       Coordination: Coordination normal.   Psychiatric:         Mood and Affect: Mood normal.         Behavior: Behavior normal.       Current Outpatient Medications   Medication Sig Dispense Refill fexofenadine (ALLEGRA) 180 MG tablet TAKE 1 TABLET BY MOUTH EVERY DAY      PROAIR  (90 Base) MCG/ACT inhaler TAKE 2 PUFFS BY MOUTH EVERY 6 HOURS AS NEEDED FOR WHEEZE 1 each 3    gabapentin (NEURONTIN) 600 MG tablet Take 1 tablet by mouth 3 times daily for 90 days.  90 tablet 2    celecoxib (CELEBREX) 100 MG capsule Take 1 capsule by mouth daily 30 capsule 2    baclofen (LIORESAL) 10 MG tablet Take 10 mg by mouth 3 times daily      VENTOLIN  (90 Base) MCG/ACT inhaler Inhale 2 puffs into the lungs 4 times daily as needed for Wheezing 1 each 1    albuterol (PROVENTIL) (2.5 MG/3ML) 0.083% nebulizer solution Take 3 mLs by nebulization every 6 hours as needed for Wheezing 120 each 3    budesonide-formoterol (SYMBICORT) 160-4.5 MCG/ACT AERO TAKE 2 PUFFS BY MOUTH TWICE A DAY 3 Inhaler 3    albuterol sulfate HFA (VENTOLIN HFA) 108 (90 Base) MCG/ACT inhaler Inhale 2 puffs into the lungs every 6 hours as needed for Wheezing 1 Inhaler 3    Multiple Vitamins-Minerals (MULTIVITAMIN ADULT PO) Take 1 tablet by mouth      fluticasone (FLONASE) 50 MCG/ACT nasal spray 1 spray by NOT APPLICABLE route      aspirin 81 MG EC tablet Take 1 tablet by mouth 2 times daily 30 tablet 0    Multiple Vitamins-Minerals (THERAPEUTIC MULTIVITAMIN-MINERALS) tablet Take 1 tablet by mouth daily      Magnesium Oxide 500 MG CAPS Take 1 capsule by mouth 2 times daily      oxybutynin (DITROPAN-XL) 10 MG extended release tablet Take 10 mg by mouth daily      simvastatin (ZOCOR) 10 MG tablet Take 10 mg by mouth nightly      Oxygen Concentrator Inhale 3 L into the lungs continuous       DULoxetine (CYMBALTA) 60 MG capsule       POLYETHYLENE GLYCOL 3350 PO Take 17 g by mouth (Patient not taking: Reported on 8/11/2022)       Current Facility-Administered Medications   Medication Dose Route Frequency Provider Last Rate Last Admin    dexamethasone (PF) (DECADRON) injection 10 mg  10 mg Other Once Swapna Ramos MD           Results for orders placed during the hospital encounter of 11/24/21    XR CHEST (2 VW)    Narrative  Exam: XR CHEST (2 VW)    CLINICAL HISTORY: J44.9 Chronic obstructive pulmonary disease, unspecified COPD type ([address] ICD10    COMPARISON: None    CHEST X-ray, 2 VIEWS    FINDINGS:      The cardiomediastinal silhouette is within normal limits. There are no infiltrates, consolidations or effusions. Bones of the thorax appear intact. Impression  No radiographic evidence of acute intrathoracic process. Results for orders placed during the hospital encounter of 01/02/19    XR CHEST STANDARD (2 VW)    Narrative  EXAMINATION: XR CHEST (2 VW)    REASON FOR EXAM: COPD atelectasis    FINDINGS: 2 views of the chest reveal lung fields slightly hyperinflated. There is borderline cardiomegaly. The pulmonary vasculature is within normal limits. Atherosclerotic changes in the aorta noted. Since January 4, 2019 endotracheal tube and NG tube have been removed. Lung fields clear with no evidence of pneumonia. No evidence of pulmonary edema. Bones and soft tissues intact. Postsurgical changes overlie the lower cervical spine. Impression  NO ACTIVE DISEASE IN THE CHEST. Results for orders placed during the hospital encounter of 10/02/18    XR CHEST STANDARD (2 VW)    Narrative  EXAMINATION: CHEST TWO VIEWS    CLINICAL HISTORY: J44.9 Chronic obstructive pulmonary disease, unspecified COPD type ([address] ICD10, follow-up    COMPARISONS: May 16, 2016    FINDINGS:    Two views of the chest are submitted. The cardiac silhouette is of normal size configuration. The mediastinum is unremarkable. Pulmonary vascular is attenuated, lungs are hyperinflated and there is some widening of the AP diameter the chest. Areas atelectasis both bases. .  Right sided trachea. No focal infiltrates. No effusions. Pneumothoraces. Impression  NO ACUTE ACTIVE CARDIOPULMONARY PROCESS. RADIOGRAPHIC FINDINGS SUGGESTIVE OF COPD. Roney Root   ]  Results for orders placed during the hospital encounter of 01/02/19    XR CHEST PORTABLE    Narrative  Portable chest radiograph    History: Respiratory failure    Technique: AP portable view of the chest obtained. Comparison: CT from January 3, 2019; chest x-ray from January 3, 2019    Findings:    Endotracheal tube remains, not significantly changed in position. Enteric tube traverses the diaphragm however its distal tip is not visualized. The cardiomediastinal silhouette is within normal limits. No pneumothorax, pleural effusion, or focal  consolidation. Linear left lung base opacity compatible with subsegmental atelectasis is not significantly changed. Lungs are hyperinflated. Coarsening of the pulmonary interstitium. Degenerative changes of the spine. Impression  No significant interval change in left base subsegmental atelectasis. XR CHEST PORTABLE    Narrative  EXAMINATION: CHEST PORTABLE VIEW    CLINICAL HISTORY: Intubation    COMPARISONS: January 2, 2018    FINDINGS:    Single  views of the chest is submitted. There is a endotracheal tube. The tip lies approximately 2.1 cm above the adele. There is a nasogastric tube. The tip is not seen but does lie below the hemidiaphragm. The cardiac silhouette is enlarged. Unchanged configuration. The mediastinum is unremarkable. Pulmonary vascular unremarkable. Right sided trachea. Left lower lobe infiltrate/consolidation with trace left pleural effusion. No Pneumothoraces. Impression  LEFT LOWER LOBE INFILTRATE/CONSOLIDATION WITH TRACE LEFT PLEURAL EFFUSION      XR CHEST PORTABLE    Narrative  EXAMINATION: XR CHEST PORTABLE, 1/2/2019 2:15 PM    History: 78-year-old female, shortness of breath, dyspnea    COMPARISON:  October 2, 2018    FINDINGS:  Chest-AP erect portable:  Hyperaeration lung fields. There are chronic interstitial changes. Asymmetric subtle increased density in the left lung base, suggestive of infiltrate.  There is blunting of the right costophrenic angle, may represent focal atelectasis versus small  pleural effusion. Cardiac silhouette at the upper limits of normal. Aortic arch calcification. The pulmonary vascularity is normal size. There are degenerative changes of the spine. Monitoring leads project across the thorax. Impression  1. Asymmetric hazy density in the left lung base suspicious for developing infiltrate. 2. Small right pleural effusion versus atelectasis. 3. Chronic interstitial changes and hyperaeration lung fields, correlate for COPD. Assessment/Plan:     1. Chronic obstructive pulmonary disease, unspecified COPD type ([address]  She is using  2 lit O2 with sleep , bronchodilator with Symbicort 160/4.5 mcg BID ,  albuterol neb prn , proair  HFA 2 puff  prn.  C/o shortness of breath with exertion. No Wheezing. No Cough or  Sputum. Continue bronchodilator therapy as before    2. Hypoxia  She is using  2 lit O2 with sleep. Continue O2 to keep saturation 90% or above. 3. Personal history of smoking  CT chest lung screening. Done at [facility] in March 2022 reported category 2  Ct  lung small lung nodule, emphysema. Return in about 4 months (around 12/11/2022) for COPD.       Aura Wilkes MD

## 2022-08-17 ASSESSMENT — ENCOUNTER SYMPTOMS
DIARRHEA: 0
COUGH: 0
EYE DISCHARGE: 0
EYE ITCHING: 0
VOMITING: 0
RHINORRHEA: 0
CHEST TIGHTNESS: 0
SORE THROAT: 0
ABDOMINAL PAIN: 0
WHEEZING: 0
VOICE CHANGE: 0
SINUS PRESSURE: 0
TROUBLE SWALLOWING: 0
NAUSEA: 0

## 2022-09-01 DIAGNOSIS — J44.9 CHRONIC OBSTRUCTIVE PULMONARY DISEASE, UNSPECIFIED COPD TYPE (HCC): ICD-10-CM

## 2022-09-01 RX ORDER — BUDESONIDE AND FORMOTEROL FUMARATE DIHYDRATE 160; 4.5 UG/1; UG/1
AEROSOL RESPIRATORY (INHALATION)
Qty: 30.6 EACH | Refills: 6 | Status: SHIPPED | OUTPATIENT
Start: 2022-09-01

## 2022-09-12 ENCOUNTER — OFFICE VISIT (OUTPATIENT)
Dept: PAIN MANAGEMENT | Age: 62
End: 2022-09-12
Payer: MEDICARE

## 2022-09-12 VITALS
WEIGHT: 147 LBS | SYSTOLIC BLOOD PRESSURE: 116 MMHG | HEIGHT: 61 IN | BODY MASS INDEX: 27.75 KG/M2 | TEMPERATURE: 97.1 F | DIASTOLIC BLOOD PRESSURE: 68 MMHG

## 2022-09-12 DIAGNOSIS — M48.061 SPINAL STENOSIS, LUMBAR REGION, WITHOUT NEUROGENIC CLAUDICATION: ICD-10-CM

## 2022-09-12 DIAGNOSIS — M17.10 KNEE ARTHROPATHY: ICD-10-CM

## 2022-09-12 DIAGNOSIS — G89.4 CHRONIC PAIN SYNDROME: ICD-10-CM

## 2022-09-12 DIAGNOSIS — M47.812 CERVICAL SPONDYLOSIS WITHOUT MYELOPATHY: ICD-10-CM

## 2022-09-12 DIAGNOSIS — M47.817 LUMBOSACRAL SPONDYLOSIS WITHOUT MYELOPATHY: ICD-10-CM

## 2022-09-12 DIAGNOSIS — M54.16 LUMBAR RADICULOPATHY: Primary | ICD-10-CM

## 2022-09-12 DIAGNOSIS — M25.551 PAIN OF RIGHT HIP JOINT: ICD-10-CM

## 2022-09-12 PROCEDURE — 99214 OFFICE O/P EST MOD 30 MIN: CPT | Performed by: NURSE PRACTITIONER

## 2022-09-12 RX ORDER — GABAPENTIN 600 MG/1
600 TABLET ORAL 3 TIMES DAILY
Qty: 90 TABLET | Refills: 2 | Status: SHIPPED | OUTPATIENT
Start: 2022-09-12 | End: 2022-12-11

## 2022-09-12 RX ORDER — CELECOXIB 100 MG/1
100 CAPSULE ORAL DAILY
Qty: 30 CAPSULE | Refills: 2 | Status: SHIPPED | OUTPATIENT
Start: 2022-09-12 | End: 2022-12-11

## 2022-09-12 ASSESSMENT — ENCOUNTER SYMPTOMS
TROUBLE SWALLOWING: 0
DIARRHEA: 0
CONSTIPATION: 0
EYES NEGATIVE: 1
BACK PAIN: 1
GASTROINTESTINAL NEGATIVE: 1
SHORTNESS OF BREATH: 0
COUGH: 0

## 2022-09-12 NOTE — PROGRESS NOTES
Lives With: Significant other    Type of Home: House    Home Layout: One level    Home Access: Stairs to enter with rails    Entrance Stairs - Number of Steps: 6    Home Equipment: Rolling walker, Cane, Wheelchair-manual (BSC, leg )    ADL Assistance: Independent    Ambulation Assistance: Independent (with [employer])    Transfer Assistance: Independent    Additional Comments: Multiple neck surgeries with residual spasticity R UE/LE     Social Determinants of Health     Financial Resource Strain: Not on file   Food Insecurity: Not on file   Transportation Needs: Not on file   Physical Activity: Not on file   Stress: Not on file   Social Connections: Not on file   Intimate Partner Violence: Not on file   Housing Stability: Not on file       Current Outpatient Medications on File Prior to Visit   Medication Sig Dispense Refill    budesonide-formoterol (SYMBICORT) 160-4.5 MCG/ACT AERO TAKE 2 PUFFS BY MOUTH TWICE A DAY 30.6 each 6    fexofenadine (ALLEGRA) 180 MG tablet TAKE 1 TABLET BY MOUTH EVERY DAY      PROAIR  (90 Base) MCG/ACT inhaler TAKE 2 PUFFS BY MOUTH EVERY 6 HOURS AS NEEDED FOR WHEEZE 1 each 3    baclofen (LIORESAL) 10 MG tablet Take 10 mg by mouth 3 times daily      VENTOLIN  (90 Base) MCG/ACT inhaler Inhale 2 puffs into the lungs 4 times daily as needed for Wheezing 1 each 1    albuterol sulfate HFA (VENTOLIN HFA) 108 (90 Base) MCG/ACT inhaler Inhale 2 puffs into the lungs every 6 hours as needed for Wheezing 1 Inhaler 3    Multiple Vitamins-Minerals (MULTIVITAMIN ADULT PO) Take 1 tablet by mouth      POLYETHYLENE GLYCOL 3350 PO Take 17 g by mouth      fluticasone (FLONASE) 50 MCG/ACT nasal spray 1 spray by NOT APPLICABLE route      aspirin 81 MG EC tablet Take 1 tablet by mouth 2 times daily 30 tablet 0    Multiple Vitamins-Minerals (THERAPEUTIC MULTIVITAMIN-MINERALS) tablet Take 1 tablet by mouth daily      Magnesium Oxide 500 MG CAPS Take 1 capsule by mouth 2 times daily oxybutynin (DITROPAN-XL) 10 MG extended release tablet Take 10 mg by mouth daily      simvastatin (ZOCOR) 10 MG tablet Take 10 mg by mouth nightly      Oxygen Concentrator Inhale 3 L into the lungs continuous       DULoxetine (CYMBALTA) 60 MG capsule       albuterol (PROVENTIL) (2.5 MG/3ML) 0.083% nebulizer solution Take 3 mLs by nebulization every 6 hours as needed for Wheezing 120 each 3     Current Facility-Administered Medications on File Prior to Visit   Medication Dose Route Frequency Provider Last Rate Last Admin    dexamethasone (PF) (DECADRON) injection 10 mg  10 mg Other Once Leonard Mckee MD               Pt presents today for a f/u of her pain. PCP is Dr. Justen Zepeda DO. Patient last saw this NP in June. Seen Dr. Gabbi Hawk on 3/10/2022 for bilateral S1 SNRB which offered significant relief >80%. She is having more radiating leg pain and numbness that will radiate across her low back as well. Continues seeing neurology and advised PT but says needs injection first if possible d/t pain and radiculopathy. She says she will consider repeating RF ablation if needed in the future - past RF ablation in 2020 helped signfiicantly. She has chronic low back pain and neck pain. Unfortunately, therapeutic facet injections are no longer available and she does not want to pursue the RF ablation which previously worked quite well greater than 75% pain improvement. HX: COPD requiring 2L oxygen at night. HX:  6/14/16 she had decomressive surgery C2-6 and disectomy C3-4 anterior approach with Dr. Linda Estrada, [facility]. 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 She uses her cymbalta 60mg daily. She continues gabapentin 600 mg 3 times daily and celebrex 100mg daily. She denies any cardiac or stomach issues. Neurology started her on baclofen TID she reports    Pt feels pain level 3/10.   Pt feels that weather change, activity makes the pain worse, and medication, laying down/rest, alternating between ice and heat makes the pain better. Pt feels her medication helps   her function and improve her quality of life. Pt admits to LE radiating numbness and tingling. She says she fell out of bed not too long ago. Pt denies new weakness. Pt reports PT has been done. Review of Systems   Constitutional: Negative. Negative for fatigue. HENT: Negative. Negative for trouble swallowing. Eyes: Negative. Respiratory:  Negative for cough and shortness of breath. Cardiovascular:  Negative for chest pain. Gastrointestinal: Negative. Negative for constipation and diarrhea. Endocrine: Negative. Genitourinary: Negative. Musculoskeletal:  Positive for back pain. Skin: Negative. Neurological:  Positive for weakness and numbness. Negative for dizziness and headaches. Hematological: Negative. Psychiatric/Behavioral: Negative. Objective:     Vitals:  /68   Temp 97.1 °F (36.2 °C)   Ht 5' 1\" (1.549 m)   Wt 147 lb (66.7 kg)   LMP  (LMP Unknown)   BMI 27.78 kg/m² Pain Score:   3      Physical Exam  Vitals and nursing note reviewed. This is a thin pleasant female who answers questions appropriately and follows commands. Pt is alert and oriented x 3. Recent and remote memory is intact. Mood and affect, judgement and insight are normal. SOB with exertion. HEENT: PERRL. Neck is supple, trachea midline. No lymphadenopathy noted. Decreased ROM with flexion, extension and rotation of cervical spine. Posterior neck scar present. Non-tender with palpation to neck. Strong grasp B/L. Pulses are intact. Decreased ROM with flexion and extension of low back. Minimally tender with palpation to low back. Positive SLR and reproduces low back pain bilaterally. Rt foot with edema noted, wearing AFO. Tightness in both hamstrings noted. Using walker. Gait antalgic dragging Rt LE and slightly flexed spine position. Rt knee with decreased ROM.   Mild tenderness noted with palpation. Mild swelling noted with arthritic deformity noted. Crepitus with ROM. Cranial nerves II-XII are intact. Assessment:      Diagnosis Orders   1. Lumbar radiculopathy  gabapentin (NEURONTIN) 600 MG tablet    celecoxib (CELEBREX) 100 MG capsule    [place] NJX AA&/STRD TFRML EPI LUMBAR/SACRAL 1 LEVEL    CHG FLUOR NEEDLE/CATH SPINE/PARASPINAL DX/THER ADDON      2. Spinal stenosis, lumbar region, without neurogenic claudication  gabapentin (NEURONTIN) 600 MG tablet    celecoxib (CELEBREX) 100 MG capsule    [place] NJX AA&/STRD TFRML EPI LUMBAR/SACRAL 1 LEVEL    CHG FLUOR NEEDLE/CATH SPINE/PARASPINAL DX/THER ADDON      3. Chronic pain syndrome  gabapentin (NEURONTIN) 600 MG tablet    celecoxib (CELEBREX) 100 MG capsule      4. Cervical spondylosis without myelopathy  celecoxib (CELEBREX) 100 MG capsule      5. Lumbosacral spondylosis without myelopathy  celecoxib (CELEBREX) 100 MG capsule      6. Pain of right hip joint  celecoxib (CELEBREX) 100 MG capsule      7. Knee arthropathy  celecoxib (CELEBREX) 100 MG capsule          Plan:     Periodic Controlled Substance Monitoring: No signs of potential drug abuse or diversion identified. (Sho Muñoz, APRN - CNP)    Orders Placed This Encounter   Medications    gabapentin (NEURONTIN) 600 MG tablet     Sig: Take 1 tablet by mouth 3 times daily for 90 days. Dispense:  90 tablet     Refill:  2    celecoxib (CELEBREX) 100 MG capsule     Sig: Take 1 capsule by mouth daily     Dispense:  30 capsule     Refill:  2       Orders Placed This Encounter   Procedures    CHG FLUOR NEEDLE/CATH SPINE/PARASPINAL DX/THER ADDON     Standing Status:   Future     Standing Expiration Date:   12/11/2022    [place] NJX AA&/STRD TFRML EPI LUMBAR/SACRAL 1 LEVEL     B/L L5-S1 miriam with SM     Standing Status:   Future     Standing Expiration Date:   12/11/2022     Discussed options with the patient today. Anatomic model pathology was shown and reviewed with pt.   John order B/L L5-S1 miriam with Dr. Merle Barone to [address] [place] [address] her radicular Sx - past injections helped significantly with Dr. Katheryn Garcia. We will continue Celebrex 100 mg daily and gabapentin 600 mg up to 3 times daily. All questions were answered. Discussed home exercise program and  smoking cessation. Relevant imaging and pain generators reviewed. Pt verbalized understanding and agrees with above plan. Pt has chronic pain. Will continue medications for chronic pain that has been previously directed as they do help pt function with ADL and improve quality of life. OARRS was reviewed. This NP saw pt under direct supervision of Dr. Merle Barone d/t Dr. Maria Victoria lerma. Follow up:  Return in about 3 months (around 12/12/2022) for review meds and reassess pain to establish care with Dr. Merle Barone.   Sara Sanchez, CHIRAG - CNP

## 2022-10-17 ENCOUNTER — TELEPHONE (OUTPATIENT)
Dept: PAIN MANAGEMENT | Age: 62
End: 2022-10-17

## 2022-10-17 NOTE — TELEPHONE ENCOUNTER
BILAT L5-S1 AMOR    NO AUTH REQUIRED    OK to schedule procedure approved as above. Please note sides/levels approved and date range. (If applicable, sides/levels approved may differ from those ordered)    TO BE SCHEDULED WITH DR. Skyla Garcia

## 2022-11-08 ENCOUNTER — PROCEDURE VISIT (OUTPATIENT)
Dept: PAIN MANAGEMENT | Age: 62
End: 2022-11-08
Payer: MEDICARE

## 2022-11-08 DIAGNOSIS — M54.16 LUMBAR RADICULOPATHY: Primary | ICD-10-CM

## 2022-11-08 PROCEDURE — 64483 NJX AA&/STRD TFRM EPI L/S 1: CPT | Performed by: PHYSICAL MEDICINE & REHABILITATION

## 2022-11-08 RX ORDER — SODIUM CHLORIDE 9 MG/ML
3 INJECTION INTRAVENOUS ONCE
Status: COMPLETED | OUTPATIENT
Start: 2022-11-08 | End: 2022-11-08

## 2022-11-08 RX ORDER — LIDOCAINE HYDROCHLORIDE 10 MG/ML
5 INJECTION, SOLUTION INFILTRATION; PERINEURAL ONCE
Status: COMPLETED | OUTPATIENT
Start: 2022-11-08 | End: 2022-11-08

## 2022-11-08 RX ORDER — DEXAMETHASONE SODIUM PHOSPHATE 10 MG/ML
10 INJECTION, SOLUTION INTRAMUSCULAR; INTRAVENOUS ONCE
Status: COMPLETED | OUTPATIENT
Start: 2022-11-08 | End: 2022-11-08

## 2022-11-08 RX ADMIN — DEXAMETHASONE SODIUM PHOSPHATE 10 MG: 10 INJECTION, SOLUTION INTRAMUSCULAR; INTRAVENOUS at 16:21

## 2022-11-08 RX ADMIN — LIDOCAINE HYDROCHLORIDE 5 ML: 10 INJECTION, SOLUTION INFILTRATION; PERINEURAL at 16:21

## 2022-11-08 RX ADMIN — Medication 0.5 MEQ: at 16:21

## 2022-11-08 RX ADMIN — SODIUM CHLORIDE 3 ML: 9 INJECTION INTRAVENOUS at 16:21

## 2022-11-08 NOTE — PROGRESS NOTES
Lumbar Transforaminal Epidural Steroid Injection (TFESI)    Patient Name: Senthil Webb   : 1960  Date: 2022     Physician: Alexis Sharp MD     INDICATIONS: Senthil Webb is a 57 y.o. female who presents with symptoms and physical exam findings consistent with lumbar radiculopathy. She has severe pain in both sides of her low back with pain down both legs. A focused physical exam demonstrates bilateral L5 paresthesias with a positive straight leg raise. She has had greater than 3 months of greater than 50% pain relief from prior lumbar epidurals. Her pain has returned in a similar character, intensity, and distribution necessitating repeat treatment. She has had persistent pain that limits her activities of daily living. The pain is persistent despite conservative measures. She has significant functional and psychological impairment due to this condition. Given her symptoms, physical exam findings, impairment in activities of daily living, and lack of response to conservative measures, consideration for lumbar transforaminal epidural corticosteroid injection was given. Discussed the risks including but not limited to bleeding, infection, worsened pain, damage to surrounding structures, side effects, toxicity, allergic reactions to medications used, need for surgery, headache, vision changes, difficulty with chewing and/or swallowing, immune and stress-response dysfunction, fat necrosis, skin pigmentation changes, blood sugar elevation, as well as catastrophic injury such as vision loss, paralysis, spinal cord or plexus injury, cerebral brainstem or spinal cord infarction, intrathecal injection, spinal cord puncture, arachnoiditis, discitis, stroke, bowel or bladder incontinence, ventilator dependence, loss of use of the arms and/or legs, and death. Discussed off-label use of corticosteroids and how the Food and Drug Administration (FDA) has not approved corticosteroids for epidural use.  Discussed the risks, benefits, alternative procedures, and alternatives to the procedure including no procedure at all. Discussed that we cannot undo any permanent neurologic or orthopaedic damage or change the course of any underlying disease. After thorough discussion, patient expressed understanding and willingness to proceed. Written informed consent was obtained and is in the chart. Verbal consent to proceed was obtained. PROCEDURE: Standard ASIPP guidelines were followed and sterile technique used. Area was cleaned with Betadine three times. Fluoroscopic guidance was used for this procedure. The L5 vertebral body was taken as the first lumbar-appearing vertebral body directly above the sacrum on a lateral view. The skin and subcutaneous tissue was anesthetized with 2 mL of 1% preservative-free lidocaine and 0.5 mEq sodium bicarbonate with a 27 gauge 1.5 inch needle. Then a 25 gauge 5 inch spinal needle was used for the remainder of the procedure. There was appropriate spread of contrast in the anterior epidural space and appropriate spread of contrast around the exiting nerve root bilaterally. The 6 oclock position of the pedicle was identified. Multiple views of fluoroscopy including lateral were used to confirm accurate needle placement of depth. Injection of 2 mL of contrast dye was free of any subdural or vascular spread or any other aberrant uptake. Live fluoroscopy was used for contrast injection. Aspiration was negative throughout the procedure. An injectate containing 1 mL of 10 mg of Dexamethasone and 3 ml of 0.9% preservative-free normal saline was injected slowly and without difficulty and divided equally between the two sites. Patient tolerated the procedure well, no obvious complications occurred during the procedure. Patient was appropriately monitored and discharged home in stable condition with her usual motor strength. Post-procedure instructions were given to patient.             [x] Bilateral [] T12-L1    [] L1-2   [] Right [] L2-3    [] L3-4   [] Left [] L4-5    [x] L5-S1                [address] [facility]  Phone [phone]/[facility] [phone]

## 2022-12-19 ENCOUNTER — OFFICE VISIT (OUTPATIENT)
Dept: PAIN MANAGEMENT | Age: 62
End: 2022-12-19
Payer: MEDICARE

## 2022-12-19 VITALS
TEMPERATURE: 97.1 F | HEIGHT: 61 IN | DIASTOLIC BLOOD PRESSURE: 62 MMHG | WEIGHT: 146 LBS | SYSTOLIC BLOOD PRESSURE: 116 MMHG | BODY MASS INDEX: 27.56 KG/M2

## 2022-12-19 DIAGNOSIS — M79.602 LEFT ARM PAIN: ICD-10-CM

## 2022-12-19 DIAGNOSIS — M79.605 BILATERAL LEG PAIN: ICD-10-CM

## 2022-12-19 DIAGNOSIS — M54.12 CERVICAL MYELOPATHY WITH CERVICAL RADICULOPATHY (HCC): Primary | ICD-10-CM

## 2022-12-19 DIAGNOSIS — M17.10 KNEE ARTHROPATHY: ICD-10-CM

## 2022-12-19 DIAGNOSIS — Z98.890 HISTORY OF CERVICAL SPINAL SURGERY: ICD-10-CM

## 2022-12-19 DIAGNOSIS — M79.604 BILATERAL LEG PAIN: ICD-10-CM

## 2022-12-19 DIAGNOSIS — M54.16 LUMBAR RADICULOPATHY: ICD-10-CM

## 2022-12-19 DIAGNOSIS — M47.812 CERVICAL SPONDYLOSIS WITHOUT MYELOPATHY: ICD-10-CM

## 2022-12-19 DIAGNOSIS — M25.551 PAIN OF RIGHT HIP JOINT: ICD-10-CM

## 2022-12-19 DIAGNOSIS — M47.817 LUMBOSACRAL SPONDYLOSIS WITHOUT MYELOPATHY: ICD-10-CM

## 2022-12-19 DIAGNOSIS — G56.22 NEURITIS OF LEFT ULNAR NERVE: ICD-10-CM

## 2022-12-19 DIAGNOSIS — G95.9 CERVICAL MYELOPATHY WITH CERVICAL RADICULOPATHY (HCC): Primary | ICD-10-CM

## 2022-12-19 DIAGNOSIS — M87.051: ICD-10-CM

## 2022-12-19 DIAGNOSIS — M25.522 LEFT ELBOW PAIN: ICD-10-CM

## 2022-12-19 PROBLEM — E11.65 TYPE 2 DIABETES MELLITUS WITH HYPERGLYCEMIA, WITHOUT LONG-TERM CURRENT USE OF INSULIN (HCC): Status: RESOLVED | Noted: 2018-08-15 | Resolved: 2022-12-19

## 2022-12-19 PROCEDURE — 99214 OFFICE O/P EST MOD 30 MIN: CPT | Performed by: PHYSICAL MEDICINE & REHABILITATION

## 2022-12-19 PROCEDURE — 3078F DIAST BP <80 MM HG: CPT | Performed by: PHYSICAL MEDICINE & REHABILITATION

## 2022-12-19 PROCEDURE — 3074F SYST BP LT 130 MM HG: CPT | Performed by: PHYSICAL MEDICINE & REHABILITATION

## 2022-12-19 RX ORDER — LIDOCAINE 40 MG/G
CREAM TOPICAL
Qty: 45 G | Refills: 1 | Status: SHIPPED | OUTPATIENT
Start: 2022-12-19

## 2022-12-19 RX ORDER — CELECOXIB 100 MG/1
100 CAPSULE ORAL DAILY
Qty: 30 CAPSULE | Refills: 2 | Status: SHIPPED | OUTPATIENT
Start: 2022-12-19 | End: 2023-03-19

## 2022-12-19 RX ORDER — GABAPENTIN 600 MG/1
600 TABLET ORAL 3 TIMES DAILY
Qty: 90 TABLET | Refills: 2 | Status: SHIPPED | OUTPATIENT
Start: 2022-12-19 | End: 2023-03-19

## 2022-12-19 ASSESSMENT — ENCOUNTER SYMPTOMS
NAUSEA: 0
BACK PAIN: 1
CONSTIPATION: 0
SHORTNESS OF BREATH: 0
DIARRHEA: 0

## 2022-12-19 NOTE — PROGRESS NOTES
Shana Leal  (1960)    12/19/2022    Subjective:     Shana Leal is 57 y.o. female who complains today of:    Chief Complaint   Patient presents with    Back Pain    Elbow Pain     LEFT ELBOW    Neck Pain    Knee Pain       Shana Leal is a 57 y.o. female who presents as transfer of care from Dr Rory Murphy. She has struggled with pain for over 10 years. She denies any immediately-preceding traumatic or inciting events. She has been previously evaluated by Dr Philly Drake whose records are reviewed below. She describes pain located in both sides of her low back. She also has pain into both legs. Pain is a constant ache and is currently a 6/10 and gets up to a 10/10 at its worst and goes down to a 5/10 at its best. Pain is worse with walking and activity. Pain is better with rest and injections. Pain is located 60% on the right and 40% on the left. Pain is located 50% in the back and 50% in the legs. Neck pain is a 3/10. Gets to a 6/10. Located on the right worse than the left. No arm pain. Worse with turning her neck and activity. Pain is better with rest and pain medicine. Constant ache since 2003. There are no other associated symptoms or contextual factors. She denies any classic radicular symptoms, new weakness, saddle anesthesia, bowel or bladder dysfunction, or falls. Right knee pain is a 4/10. Gets to a 7/10. Worse with moving the knee. Better with rest. Constant ache for over 1 year. Patient had an MRI of the right knee done in Oct 2021. Concern for possible bone infarction. She states she saw Orthopedic surgery who did not recommend any surgery at that time, no further treatment or evaluation recommended. She states that she saw a specialist for her kidney cyst and told that no further treatment recommended at this time, had ultrasound done. Left elbow pain is a 2/10. Gets to a 6/10. She gets numbness into her left hand. Pain is worse with activity.  Better with rest. Constant ache for over 2 months. Right arm is ok. No problems. Right arm and right leg are weak after cervical spine surgery. She denies any numbness, tingling, weakness, bowel or bladder dysfunction, saddle anesthesia, falls, history of cancer, unexplained weight loss, persistent night pain and sweats, fever, IV drug abuse, immunocompromise, chronic prednisone or antibiotic use, or any other red flag symptoms. Mood is good, denies any suicidal or homicidal ideation. Sleep is poor, awakes fatigued. She has tried:  Home exercise program with minimal relief  Cervical spine decompression for AVM 2003  Cervical C2-6 discectomy and C3-4 anterior ? fusion with Dr Everton Gayle in 2004  PT currently being done at [facility] [place] in [facility]. 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 She used to enjoy going out with her friends, going to dinner.      Allergies:  Glucagon    Past Medical History:   Diagnosis Date    Arthritis     left hip    COPD (chronic obstructive pulmonary disease) ([facility] [address]     uses inhalers x 4 yrs    Hyperlipidemia     on meds x 10yrs    Hypertension 08/14/2018    Lung disease      Past Surgical History:   Procedure Laterality Date    BREAST BIOPSY Right 2003    benign    CERVICAL FUSION  2016    CERVICAL SPINE SURGERY  2004    AVM removal, fusion, embolism removal      SECTION  1979    COLONOSCOPY  2018    HYSTERECTOMY (CERVIX STATUS UNKNOWN)  1997    HI TOTAL HIP ARTHROPLASTY Left 2018    LEFT HIP TOTAL HIP ARTHROPLASTY performed by Sudheer Huerta MD at [address] History   Problem Relation Age of Onset    Breast Cancer Mother     Cancer Mother         liver ca    No Known Problems Brother     Obesity Son      Social History     Socioeconomic History    Marital status: Single     Spouse name: Not on file    Number of children: Not on file    Years of education: Not on file    Highest education level: Not on file   Occupational History    Not on file   Tobacco Use    Smoking status: Former     Packs/day: 0.50     Years: 1.00     Pack years: 0.50     Types: Cigarettes     Start date: 2017     Quit date: 2019     Years since quitting: 3.9     Passive exposure: Current (boyfriend smokes)    Smokeless tobacco: Never    Tobacco comments:     smoked at 15yrs old x 30 yrs 0.5ppd    Vaping Use    Vaping Use: Never used   Substance and Sexual Activity    Alcohol use: No     Alcohol/week: 0.0 standard drinks    Drug use: No    Sexual activity: Not Currently     Partners: Male   Other Topics Concern    Not on file   Social History Narrative         Lives With: Significant other    Type of Home: House    Home Layout: One level    Home Access: Stairs to enter with rails    Entrance Stairs - Number of Steps: 6    Home Equipment: Rolling walker, Cane, Wheelchair-manual (BSC, leg )    ADL Assistance: Independent    Ambulation Assistance: Independent (with [employer])    Transfer Assistance: Independent    Additional Comments: Multiple neck surgeries with residual spasticity R UE/LE     Social Determinants of Health     Financial Resource Strain: Not on file   Food Insecurity: Not on file   Transportation Needs: Not on file   Physical Activity: Not on file   Stress: Not on file   Social Connections: Not on file Intimate Partner Violence: Not on file   Housing Stability: Not on file       Current Outpatient Medications on File Prior to Visit   Medication Sig Dispense Refill    budesonide-formoterol (SYMBICORT) 160-4.5 MCG/ACT AERO TAKE 2 PUFFS BY MOUTH TWICE A DAY 30.6 each 6    fexofenadine (ALLEGRA) 180 MG tablet TAKE 1 TABLET BY MOUTH EVERY DAY      PROAIR  (90 Base) MCG/ACT inhaler TAKE 2 PUFFS BY MOUTH EVERY 6 HOURS AS NEEDED FOR WHEEZE 1 each 3    baclofen (LIORESAL) 10 MG tablet Take 10 mg by mouth 3 times daily      VENTOLIN  (90 Base) MCG/ACT inhaler Inhale 2 puffs into the lungs 4 times daily as needed for Wheezing 1 each 1    albuterol sulfate HFA (VENTOLIN HFA) 108 (90 Base) MCG/ACT inhaler Inhale 2 puffs into the lungs every 6 hours as needed for Wheezing 1 Inhaler 3    Multiple Vitamins-Minerals (MULTIVITAMIN ADULT PO) Take 1 tablet by mouth      POLYETHYLENE GLYCOL 3350 PO Take 17 g by mouth      fluticasone (FLONASE) 50 MCG/ACT nasal spray 1 spray by NOT APPLICABLE route      aspirin 81 MG EC tablet Take 1 tablet by mouth 2 times daily 30 tablet 0    Multiple Vitamins-Minerals (THERAPEUTIC MULTIVITAMIN-MINERALS) tablet Take 1 tablet by mouth daily      Magnesium Oxide 500 MG CAPS Take 1 capsule by mouth 2 times daily      oxybutynin (DITROPAN-XL) 10 MG extended release tablet Take 10 mg by mouth daily      simvastatin (ZOCOR) 10 MG tablet Take 10 mg by mouth nightly      Oxygen Concentrator Inhale 3 L into the lungs continuous       DULoxetine (CYMBALTA) 60 MG capsule       albuterol (PROVENTIL) (2.5 MG/3ML) 0.083% nebulizer solution Take 3 mLs by nebulization every 6 hours as needed for Wheezing 120 each 3     Current Facility-Administered Medications on File Prior to Visit   Medication Dose Route Frequency Provider Last Rate Last Admin    iopamidol (ISOVUE-300) 61 % injection 2 mL  2 mL Other ONCE PRN Uyen Phipps MD        dexamethasone (PF) (DECADRON) injection 10 mg  10 mg Other Once Casey Proctor MD           Review of Systems   Constitutional:  Negative for fever. HENT:  Negative for hearing loss. Respiratory:  Negative for shortness of breath. Gastrointestinal:  Negative for constipation, diarrhea and nausea. Genitourinary:  Negative for difficulty urinating. Musculoskeletal:  Positive for back pain. Negative for neck pain. Skin:  Negative for rash. Neurological:  Negative for headaches. Hematological:  Does not bruise/bleed easily. Psychiatric/Behavioral:  Negative for sleep disturbance. Objective:     Vitals:  /62 (Site: Left Upper Arm)   Temp 97.1 °F (36.2 °C) (Temporal)   Ht 5' 1\" (1.549 m)   Wt 146 lb (66.2 kg)   LMP  (LMP Unknown)   BMI 27.59 kg/m² Pain Score:   3      Exam performed under Coronavirus precautions  Gen: No acute distress  Neck: Grossly symmetric without any significant thyromegaly or masses appreciated. Eyes: No scleral icterus or lid lag appreciated bilaterally. Irises without gross defects bilaterally. HEENT: Hearing grossly intact bilaterally. Normocephalic, external ears and visible portions of nose and mouth atraumatic. Lymph: No gross neck or axillary lymphadenopathy  Cardio: No significant lower extremity edema, pulses intact without significant digit ischemia. Abd: No gross masses or large hernias appreciated. Skin: Visualized skin without any dermatomal rashes or sores. Palpation free of any tightening or subcutaneous nodules. MSK: Gait is deferred, in manual wheelchair. No significant upper limb digit ischemia appreciated. Psych: Pleasant and cooperative with the history and exam. Mood and Affect normal. Appropriately dressed with good eye contact. Judgement and insight normal. Recent and remote memory intact. Alert and Oriented x3. Neuro: Cranial nerves II-XII grossly intact. No significant pathologic reflexes appreciated. Gait deferred, in manual wheelchair. +rollator.  +right AFO    Heel and toe walk deferred. Limited lumbar spine range of motion. Rotation and extension reproduces axial low back pain. Other facet provocative maneuvers are positive. No gross step offs noted. Tenderness to palpation over the mid to low lumbar spinous processes and bilateral lumbar paraspinals from L2 down to the sacrum. No tenderness over bilateral PSIS. No tenderness over bilateral greater trochanters. No tenderness over bilateral deep gluteal regions. Sensation grossly intact in both legs except for bilateral S1 paresthesias   Reflexes and strength functional for ambulation, no abnormal reflexes appreciated on exam today  Strength greater than 3/5 bilateral legs  Straight leg raise negative bilaterally. Sensation intact in both arms except for numbness digits 1-5 bilaterally, left forearm medial paresthesias  Reflexes and strength functional for arm use, no abnormal reflexes appreciated on exam today  Strength greater than 3/5 in both arms  Spurling's negative on exam today    +Tinel's positive on the left elbow with ulnar paresthesias  Also some left elbow medial epicondyle tenderness. Tender right knee medial joint line          Outside record review:  Dr Krissy Fitzpatrick 4/30/21: transfer from Dr Campuzano  service to me, history of AVM in the upper thoracic spine which was operated on in 2004. Developed right foot drop and started wearing AFO brace. Gabapentin, tried Baclofen, lumbar facet denervation and bilateral L5 selective nerve root blocks in the past which have helped. Uses a walker for ambulation. Bilateral Lumbar L5/S1 TFESI 11/8/22 provided her with greater than 70% pain relief, she is pleased, easier to walk around. Dr Krissy Fitzpatrick Bilateral S1 SNRB 3/10/22  Dr. Krissy Fitzpatrick left L2 L3-L4-L5 radiofrequency ablation 12/23/2020  Dr. Krissy Fitzpatrick right L2-3-4 5 radiofrequency ablation 12/10/2020    MRI R knee 10/1/2021: Osseous lesions distal femoral proximal tibial and patella.,  Possible bone infarcts.   Recommended dedicated x-rays tibia and fibula to assess stability. MRI L-spine 7/17/2021: Degenerative disc disease and facet arthropathy. Scoliosis. Iliopsoas muscle on the left fatty atrophy. T10-11, T11-12, T12-L1, L1-L2 normal canal foramen. L2-L3 moderate left foraminal narrowing, normal right foramen, normal canal.  L3-L4 normal canal, very mild bilateral foraminal narrowing. L4-L5 left-sided disc bulge, normal canal, moderate left and up to moderate right foraminal narrowing. L5-S1 severe right foraminal stenosis, up to severe left foraminal stenosis, normal canal.  Localizer: Left total hip replacement, left kidney cyst.  XR L-spine 6/17/2021: No fracture, degenerative disc disease and facet arthropathy  MRI R hip 3/19/2021: Mild hip osteoarthritis, high-grade tear right common hamstring tendon at the ischial tuberosity. 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 Contrast study. Anterolisthesis C3 and C4. Absence of the posterior elements noted from C3-C6. Degenerative disc disease. Abnormal cervical cord. Severe cervical cord narrowing C3-4 to C5-6, area 1.4 cm hyperintensity right lateral aspect cervical cord at the level C5-6 consistent with myelomalacia. Creatinine 0.94 normal on 6/21/22  Platelet 697 normal on 6/21/22  HgA1c 5.7 elevated on 7/9/21.         Family history of alcohol abuse 0  Family history of illegal drug abuse 0  Family history of prescription drug abuse 0    Personal history of alcohol abuse/DUI 0  Personal history of illegal drug abuse 0  Personal history of prescription drug abuse 0    Age between 17-45 0    History of preadolescent sexual abuse 0    Personal history of obsessive compulsive disorder 0  Personal history of attention deficit disorder 0  Personal history of bipolar disorder 0  Personal history of schizophrenia 0  Personal history of depression 0    Score = 0, low risk  Assessment: Diagnosis Orders   1. Cervical myelopathy with cervical radiculopathy (HCC)  XR CERVICAL SPINE (2-3 VIEWS)    MRI CERVICAL SPINE W WO CONTRAST      2. Lumbar radiculopathy  gabapentin (NEURONTIN) 600 MG tablet    celecoxib (CELEBREX) 100 MG capsule      3. Cervical spondylosis without myelopathy  celecoxib (CELEBREX) 100 MG capsule    MRI CERVICAL SPINE W WO CONTRAST      4. Lumbosacral spondylosis without myelopathy  celecoxib (CELEBREX) 100 MG capsule    XR LUMBAR SPINE (2-3 VIEWS)      5. Pain of right hip joint  celecoxib (CELEBREX) 100 MG capsule      6. Knee arthropathy  celecoxib (CELEBREX) 100 MG capsule    XR KNEE RIGHT (3 VIEWS)    lidocaine (LMX) 4 % cream      7. History of cervical spinal surgery  XR CERVICAL SPINE (2-3 VIEWS)    MRI CERVICAL SPINE W WO CONTRAST      8. 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 Neuritis of left ulnar nerve  [address]      10. Left elbow pain  XR ELBOW LEFT (2 VIEWS)    [facility] [place]    lidocaine (LMX) 4 % cream      11. Bilateral leg pain  EMG      12. Left arm pain  EMG          Plan:     Periodic Controlled Substance Monitoring: Assessed functional status. Kylah Velazquez MD)    Orders Placed This Encounter   Medications    gabapentin (NEURONTIN) 600 MG tablet     Sig: Take 1 tablet by mouth 3 times daily for 90 days.      Dispense:  90 tablet     Refill:  2    celecoxib (CELEBREX) 100 MG capsule     Sig: Take 1 capsule by mouth daily     Dispense:  30 capsule     Refill:  2    lidocaine (LMX) 4 % cream     Sig: Apply a half dollar sized amount to intact skin topically up to twice daily as needed for pain     Dispense:  45 g     Refill:  1       Orders Placed This Encounter   Procedures    XR LUMBAR SPINE (2-3 VIEWS)     Standing Status:   Future     Standing Expiration Date:   12/19/2023     Order Specific Question:   Reason for exam:     Answer:   Please evaluate for the presence of lumbar facet arthropathy    XR CERVICAL SPINE (2-3 VIEWS)     Standing Status:   Future     Standing Expiration Date:   12/19/2023     Order Specific Question:   Reason for exam:     Answer:   Please evaluate for the presence of cervical facet arthropathy    XR KNEE RIGHT (3 VIEWS)     Standing Status:   Future     Standing Expiration Date:   3/19/2023     Order Specific Question:   Reason for exam:     Answer:   Evaluate for osteoarthritis    MRI CERVICAL SPINE W WO CONTRAST     Standing Status:   Future     Standing Expiration Date:   3/19/2023     Order Specific Question:   STAT Creatinine as needed:     Answer:   Yes     Order Specific Question:   Reason for exam:     Answer:   Right upper extremity weakness and left upper extremity numbness. History of resection of an AVM from the cervical spinal cord    XR ELBOW LEFT (2 VIEWS)     Standing Status:   Future     Standing Expiration Date:   12/19/2023     Order Specific Question:   Reason for exam:     Answer:   eval fracture    [facility] [place]     Referral Priority:   Routine     Referral Type:   Eval and Treat     Referral Reason:   Specialty Services Required     Requested Specialty:   Occupational Therapy     Number of Visits Requested:   1    EMG     Standing Status:   Future     Standing Expiration Date:   12/19/2023     Order Specific Question:   Which body part? Answer:   Bilateral lower extremities    EMG     Standing Status:   Future     Standing Expiration Date:   12/19/2023     Order Specific Question:   Which body part? Answer:   Bilateral Upper Extremities       -Continue with therapy for neck pain  -OT for left elbow pain  - Encourage follow-up with orthopedics for possible bone infarct right knee.   Encourage follow-up with primary care team/urology for left kidney cyst. She states she has already had evaluation for both of these problems  --XR LS Spine AP LAT to evaluate for lumbar facet arthropathy  -XR cervical spine AP LAT eval's arthropathy, hardware placement  -XR L elbow evaluate fracture  -XR R knee evaluate osteoarthritis  -EMG BUE evaluate left arm pain  -EMG BLE evaluate bilateral leg pain  -Given significant cervical spine surgery history of myelomalacia, possible interval further cervical spine surgery without advanced imaging, residual right arm and leg weakness, recommend:  - MRI cervical spine with and without contrast evaluate cervical cord myelomalacia, history of cervical spine surgery  -Consideration for updated MRI R Knee and LS Spine without contrast may be given if her symptoms do not improve with conservative treatment  -Lidocaine 4% ointment topical BID prn #1 tube one refill start 12/19/2022   -Continue Celebrex 100 mg by mouth daily #30 two refills start Dec - March 2023. Avoid all other NSAIDs and steroids. -Advised that I do not recommend daily NSAID use. Moving forward we will provide 60 tablets for 3-month supply so that she can take a 4-week break from all NSAIDs. She may discuss her concerns with her primary care physician if she would like. Consider NSAID holiday March - April prior to continuing Celebrex  -Continue Gabapentin 600 mg TID #90 no refills start 12/19/2022. [place] reviewed on 12/19/2022   -Consideration for opioids may be given  -The patient is not currently interested in repeating her lumbar radiofrequency ablation. Consideration for cervical MBB/RFA may be given  -Left ulnar nerve block under US with Dr Marco Lawrence. No anticoagulation, no antibiotics, no diabetes mellitus, no osteoporosis, no bleeding or platelet dysfunction, IV Dye okay, allergies reviewed. 15 minute procedure. Consider 3 mg betamethasone        Controlled Substance Monitoring:    Acute and Chronic Pain Monitoring:   RX Monitoring 12/19/2022   Attestation -   Acute Pain Prescriptions -   Periodic Controlled Substance Monitoring Assessed functional status.    Chronic Pain > 50 MEDD - Chronic Pain > 80 MEDD -          Discussed the risks, side effects, and symptoms that would warrant urgent or emergent physician evaluation of all medications prescribed today. The patient was advised that NSAID-type medications have three important potential side effects: gastrointestinal irritation including hemorrhage, myocardial injury including possible infarction, and kidney injury. She was asked to take the medication with food, avoid any other NSAIDs or steroids, and discontinue if she develops any concerning symptoms. She should immediately stop the medication and proceed to the emergency department for chest pain, dark urine or difficulty with producing urine, vomiting, abdominal pain or black/tarry stools. The patient expresses understanding of these issues and all questions were answered. Discussed the risks of the above recommended procedures including but not limited to bleeding, infection, worsened pain, damage to surrounding structures, side effects, toxicity, allergic reactions to medications used, immune and stress-response dysfunction, fat necrosis, decreased bone mineralization, cartilage loss, increased fracture risk, avascular necrosis, skin pigmentation changes, blood sugar elevation, need for surgery, premature damage or degeneration of the joint, as well as catastrophic injury such as vision loss, paralysis, stroke, bowel or bladder incontinence, ventilator dependence, loss of use of the joint and/or extremity, and death. Discussed the risks, benefits, alternative procedures, and alternatives to the procedure including no procedure at all. Discussed that we cannot undo any permanent neurologic or orthopaedic damage or change the course of any underlying disease. The patient appears to be a good candidate for the above recommended procedures, but no guarantees expressed or implied are given regarding the outcome of any procedure.   After thorough discussion, patient expressed complete understanding and willingness to proceed. Discussed the risks of the above recommended procedures including but not limited to bleeding, infection, worsened pain, damage to surrounding structures, side effects, toxicity, allergic reactions to medications used, immune and stress-response dysfunction, fat necrosis, skin pigmentation changes, blood sugar elevation, headache, vision changes, need for surgery, as well as catastrophic injury such as vision loss, paralysis, stroke, spinal cord and/or plexus infarction or injury, intrathecal injection, spinal cord puncture, arachnoiditis, discitis, bowel or bladder incontinence, ventilator dependence, loss of use of the arms and/or legs, and death. Discussed the risks, benefits, alternative procedures, and alternatives to the procedure including no procedure at all. Discussed that we cannot undo any permanent neurologic damage or change the course of any underlying disease. The patient appears to be a good candidate for the above recommended procedures, but no guarantees expressed or implied are given regarding the outcome of any procedure. After thorough discussion, patient expressed understanding and willingness to proceed. Provided education and counseling regarding the diagnosis, prognosis, and treatment options. All questions were answered. Encouraged her to follow-up with her primary care physician and/or specialists as required for her overall health and management of her comorbidities as well as any new positive symptoms mentioned in review of systems above. Care was provided within the definitions and limitations of our specialty practice. Encouraged lifestyle interventions including healthy habits, lifestyle changes, regular aerobic exercise and appropriate weight maintenance as advised by their primary care physician or cardiovascular health provider. Discussed well care and disease prevention/maintenance.      All recommendations for therapy are provided to improve function with activities of daily living, decrease pain, and help develop an exercise program. All recommendations for medications are meant to help decrease pain, improve function with activities of daily living, maintain compliance with home exercise program, and improve quality of life. All recommendations for therapeutic injections are meant to help decrease pain, improve function with activities of daily living, maintain compliance with home exercise program, improve quality of life, and decrease reliance upon oral medications. Encouraged compliance with her home exercise program. Recommended compliance with physical therapy program as outlined above. Discussed the elevated risks of excessive sedation while on pain medications. Advised her against driving or operating heavy machinery or performing any activities where she may harm herself or others while on pain medications. Particular caution was emphasized especially during dose adjustments and medication changes. Discussed the elevated risks of respiratory depression and death while on opioid medications, especially when combined with other sedative substances. Discussed the risks of temporary disability, permanent disability, morbidity, and mortality with poorly-managed or undiagnosed medical conditions and comorbidities. Emphasized the importance of timely medical evaluation and treatment as previously recommended by us or other medical professionals. Risks of not pursing these recommendations were emphasized. The patient was offered a treatment at our facility. The physician and patient have discussed in detail the risk of exposure to and/or potential harm posed by the COVID-19 virus with having office visits and procedures at this time versus the risk of delaying the visits and procedures.  It is not possible to know either the risk of delaying the visits or procedure or chance of getting an infection with perfect accuracy, but a joint decision was made between the patient and the physician to proceed at this time with the scheduled visits and procedures. Advised her that any lab testing, imaging, or other diagnostic test results are best discussed in person in the office so that we can provide a clear explanation of their significance and best treatment based upon these results. It is her responsibility to make and keep a follow up appointment to discuss these test results in person to discuss the significance of the findings and appropriate follow-up steps. She expressed complete understanding and agreement with the entire plan as outlined above. Portions of this note may have been typed, auto-populated, dictated or transcribed by voice recognition resulting in errors, omissions, or close substitutions which may be missed despite careful proofreading. Please contact the author for any questions or concerns. Follow up:  Return in about 1 month (around 1/19/2023) for reassessment of pain and symptoms, EMG Internal, P.T. Internal Ref.     Rosana Finn MD

## 2022-12-20 ENCOUNTER — TELEPHONE (OUTPATIENT)
Dept: PAIN MANAGEMENT | Age: 62
End: 2022-12-20

## 2022-12-20 NOTE — TELEPHONE ENCOUNTER
BELLO LOWER EMG    NO AUTH REQUIRED    OK to schedule procedure approved as above. Please note sides/levels approved and date range. (If applicable, sides/levels approved may differ from those ordered)    TO BE SCHEDULED WITH DR. Tr Mitchell

## 2022-12-20 NOTE — TELEPHONE ENCOUNTER
BELLO UPPER EMG     NO AUTH REQUIRED    OK to schedule procedure approved as above. Please note sides/levels approved and date range. (If applicable, sides/levels approved may differ from those ordered)    TO BE SCHEDULED WITH DR. Kehinde Burns

## 2023-01-09 ENCOUNTER — HOSPITAL ENCOUNTER (OUTPATIENT)
Dept: MRI IMAGING | Age: 63
Discharge: HOME | End: 2023-01-11
Payer: MEDICARE

## 2023-01-09 DIAGNOSIS — Z98.890 HISTORY OF CERVICAL SPINAL SURGERY: ICD-10-CM

## 2023-01-09 DIAGNOSIS — M47.812 CERVICAL SPONDYLOSIS WITHOUT MYELOPATHY: ICD-10-CM

## 2023-01-09 DIAGNOSIS — M54.12 CERVICAL MYELOPATHY WITH CERVICAL RADICULOPATHY (HCC): ICD-10-CM

## 2023-01-09 DIAGNOSIS — G95.9 CERVICAL MYELOPATHY WITH CERVICAL RADICULOPATHY (HCC): ICD-10-CM

## 2023-01-09 PROCEDURE — 72156 MRI NECK SPINE W/O & W/DYE: CPT

## 2023-01-09 PROCEDURE — A9579 GAD-BASE MR CONTRAST NOS,1ML: HCPCS | Performed by: PHYSICAL MEDICINE & REHABILITATION

## 2023-01-09 PROCEDURE — 6360000004 HC RX CONTRAST MEDICATION: Performed by: PHYSICAL MEDICINE & REHABILITATION

## 2023-01-09 RX ADMIN — GADOTERIDOL 15 ML: 279.3 INJECTION, SOLUTION INTRAVENOUS at 15:12

## 2023-01-12 ENCOUNTER — OFFICE VISIT (OUTPATIENT)
Dept: PULMONOLOGY | Age: 63
End: 2023-01-12
Payer: MEDICARE

## 2023-01-12 VITALS — SYSTOLIC BLOOD PRESSURE: 112 MMHG | OXYGEN SATURATION: 91 % | DIASTOLIC BLOOD PRESSURE: 54 MMHG | HEART RATE: 95 BPM

## 2023-01-12 DIAGNOSIS — J44.9 CHRONIC OBSTRUCTIVE PULMONARY DISEASE, UNSPECIFIED COPD TYPE (HCC): Primary | ICD-10-CM

## 2023-01-12 DIAGNOSIS — R09.02 HYPOXIA: ICD-10-CM

## 2023-01-12 DIAGNOSIS — Z87.891 PERSONAL HISTORY OF SMOKING: ICD-10-CM

## 2023-01-12 PROCEDURE — 3017F COLORECTAL CA SCREEN DOC REV: CPT | Performed by: INTERNAL MEDICINE

## 2023-01-12 PROCEDURE — 3078F DIAST BP <80 MM HG: CPT | Performed by: INTERNAL MEDICINE

## 2023-01-12 PROCEDURE — 3023F SPIROM DOC REV: CPT | Performed by: INTERNAL MEDICINE

## 2023-01-12 PROCEDURE — G8484 FLU IMMUNIZE NO ADMIN: HCPCS | Performed by: INTERNAL MEDICINE

## 2023-01-12 PROCEDURE — G8427 DOCREV CUR MEDS BY ELIG CLIN: HCPCS | Performed by: INTERNAL MEDICINE

## 2023-01-12 PROCEDURE — G8419 CALC BMI OUT NRM PARAM NOF/U: HCPCS | Performed by: INTERNAL MEDICINE

## 2023-01-12 PROCEDURE — 99214 OFFICE O/P EST MOD 30 MIN: CPT | Performed by: INTERNAL MEDICINE

## 2023-01-12 PROCEDURE — 3074F SYST BP LT 130 MM HG: CPT | Performed by: INTERNAL MEDICINE

## 2023-01-12 PROCEDURE — 1036F TOBACCO NON-USER: CPT | Performed by: INTERNAL MEDICINE

## 2023-01-12 RX ORDER — ALBUTEROL SULFATE 2.5 MG/3ML
2.5 SOLUTION RESPIRATORY (INHALATION) EVERY 6 HOURS PRN
Qty: 120 EACH | Refills: 3 | Status: SHIPPED | OUTPATIENT
Start: 2023-01-12 | End: 2023-02-11

## 2023-01-12 ASSESSMENT — ENCOUNTER SYMPTOMS
EYE ITCHING: 0
CHEST TIGHTNESS: 0
RHINORRHEA: 0
SHORTNESS OF BREATH: 1
ABDOMINAL PAIN: 0
VOICE CHANGE: 0
EYE DISCHARGE: 0
COUGH: 0
DIARRHEA: 0
NAUSEA: 0
TROUBLE SWALLOWING: 0
VOMITING: 0
WHEEZING: 0
SORE THROAT: 0
SINUS PRESSURE: 0

## 2023-01-12 NOTE — PROGRESS NOTES
Subjective:     Lukas Silva is a 57 y.o. female who complains today of:     Chief Complaint   Patient presents with    Follow-up     4m f/u for COPD       HPI  She is using  2 lit O2 with sleep , bronchodilator with Symbicort 160/4.5 mcg BID   albuterol neb prn , proair  HFA 2 puff prn.  C/o shortness of breath with exertion. No Wheezing. No Cough or Sputum. No Hemoptysis. No Chest tightness. No Chest pain with radiation  or pleuritic pain. No  leg edema. No orthopnea. No Fever or chills. No Rhinorrhea and postnasal drip.     Allergies:  Glucagon  Past Medical History:   Diagnosis Date    Arthritis     left hip    COPD (chronic obstructive pulmonary disease) ([address]     uses inhalers x 4 yrs    Hyperlipidemia     on meds x 10yrs    Hypertension 2018    Lung disease      Past Surgical History:   Procedure Laterality Date    BREAST BIOPSY Right 2003    benign    CERVICAL FUSION  2016    CERVICAL SPINE SURGERY  2004    AVM removal, fusion, embolism removal      SECTION  1979    COLONOSCOPY  2018    HYSTERECTOMY (CERVIX STATUS UNKNOWN)  1997    OR TOTAL HIP ARTHROPLASTY Left 2018    LEFT HIP TOTAL HIP ARTHROPLASTY performed by Rodrigo Culver MD at [address] History   Problem Relation Age of Onset    Breast Cancer Mother     Cancer Mother         liver ca    No Known Problems Brother     Obesity Son      Social History     Socioeconomic History    Marital status: Single     Spouse name: Not on file    Number of children: Not on file    Years of education: Not on file    Highest education level: Not on file   Occupational History    Not on file   Tobacco Use    Smoking status: Former     Packs/day: 0.50     Years: 1.00     Pack years: 0.50     Types: Cigarettes     Start date: 2017     Quit date: 2019     Years since quittin.0     Passive exposure: Current (boyfriend smokes)    Smokeless tobacco: Never    Tobacco comments:     smoked at 15yrs old x 30 yrs 0.5ppd    Vaping Use    Vaping Use: Never used   Substance and Sexual Activity    Alcohol use: No     Alcohol/week: 0.0 standard drinks    Drug use: No    Sexual activity: Not Currently     Partners: Male   Other Topics Concern    Not on file   Social History Narrative         Lives With: Significant other    Type of Home: House    Home Layout: One level    Home Access: Stairs to enter with rails    Entrance Stairs - Number of Steps: 6    Home Equipment: Rolling walker, Cane, Wheelchair-manual (BSC, leg )    ADL Assistance: Independent    Ambulation Assistance: Independent (with [facility])    Transfer Assistance: Independent    Additional Comments: Multiple neck surgeries with residual spasticity R UE/LE     Social Determinants of Health     Financial Resource Strain: Not on file   Food Insecurity: Not on file   Transportation Needs: Not on file   Physical Activity: Not on file   Stress: Not on file   Social Connections: Not on file   Intimate Partner Violence: Not on file   Housing Stability: Not on file         Review of Systems   Constitutional:  Negative for chills, diaphoresis, fatigue and fever. HENT:  Negative for congestion, mouth sores, nosebleeds, postnasal drip, rhinorrhea, sinus pressure, sneezing, sore throat, trouble swallowing and voice change. Eyes:  Negative for discharge, itching and visual disturbance. Respiratory:  Positive for shortness of breath. Negative for cough, chest tightness and wheezing. Cardiovascular:  Negative for chest pain, palpitations and leg swelling. Gastrointestinal:  Negative for abdominal pain, diarrhea, nausea and vomiting. Genitourinary:  Negative for difficulty urinating and hematuria. Musculoskeletal:  Negative for arthralgias, joint swelling and myalgias. Skin:  Negative for rash. Allergic/Immunologic: Negative for environmental allergies and food allergies. Neurological:  Negative for dizziness, tremors, weakness and headaches.    Psychiatric/Behavioral:  Negative for behavioral problems and sleep disturbance.        :     Vitals:    01/12/23 1506 01/12/23 1508   BP: (!) 112/54    Site: Right Upper Arm    Position: Sitting    Cuff Size: Medium Adult    Pulse: 96 95   SpO2: (!) 87% 91%     Wt Readings from Last 3 Encounters:   01/13/23 147 lb (66.7 kg)   12/19/22 146 lb (66.2 kg)   09/12/22 147 lb (66.7 kg)         Physical Exam  Constitutional:       General: She is not in acute distress. Appearance: She is well-developed. She is not diaphoretic. HENT:      Head: Normocephalic and atraumatic. Nose: Nose normal.   Eyes:      Pupils: Pupils are equal, round, and reactive to light. Neck:      Thyroid: No thyromegaly. Vascular: No JVD. Trachea: No tracheal deviation. Cardiovascular:      Rate and Rhythm: Normal rate and regular rhythm. Heart sounds: No murmur heard. No friction rub. No gallop. Pulmonary:      Effort: No respiratory distress. Breath sounds: No wheezing or rales. Comments: diminished Breath sound bilaterally. Chest:      Chest wall: No tenderness. Abdominal:      General: There is no distension. Tenderness: There is no abdominal tenderness. There is no rebound. Musculoskeletal:         General: Normal range of motion. Lymphadenopathy:      Cervical: No cervical adenopathy. Skin:     General: Skin is warm and dry. Neurological:      Mental Status: She is alert and oriented to person, place, and time. Coordination: Coordination normal.   Psychiatric:         Mood and Affect: Mood normal.         Behavior: Behavior normal.       Current Outpatient Medications   Medication Sig Dispense Refill    albuterol (PROVENTIL) (2.5 MG/3ML) 0.083% nebulizer solution Take 3 mLs by nebulization every 6 hours as needed for Wheezing 120 each 3    gabapentin (NEURONTIN) 600 MG tablet Take 1 tablet by mouth 3 times daily for 90 days.  90 tablet 2    celecoxib (CELEBREX) 100 MG capsule Take 1 capsule by mouth daily 30 capsule 2    lidocaine (LMX) 4 % cream Apply a half dollar sized amount to intact skin topically up to twice daily as needed for pain 45 g 1    budesonide-formoterol (SYMBICORT) 160-4.5 MCG/ACT AERO TAKE 2 PUFFS BY MOUTH TWICE A DAY 30.6 each 6    fexofenadine (ALLEGRA) 180 MG tablet TAKE 1 TABLET BY MOUTH EVERY DAY      PROAIR  (90 Base) MCG/ACT inhaler TAKE 2 PUFFS BY MOUTH EVERY 6 HOURS AS NEEDED FOR WHEEZE 1 each 3    baclofen (LIORESAL) 10 MG tablet Take 10 mg by mouth 3 times daily      VENTOLIN  (90 Base) MCG/ACT inhaler Inhale 2 puffs into the lungs 4 times daily as needed for Wheezing 1 each 1    albuterol sulfate HFA (VENTOLIN HFA) 108 (90 Base) MCG/ACT inhaler Inhale 2 puffs into the lungs every 6 hours as needed for Wheezing 1 Inhaler 3    Multiple Vitamins-Minerals (MULTIVITAMIN ADULT PO) Take 1 tablet by mouth      POLYETHYLENE GLYCOL 3350 PO Take 17 g by mouth      fluticasone (FLONASE) 50 MCG/ACT nasal spray 1 spray by NOT APPLICABLE route      aspirin 81 MG EC tablet Take 1 tablet by mouth 2 times daily 30 tablet 0    Multiple Vitamins-Minerals (THERAPEUTIC MULTIVITAMIN-MINERALS) tablet Take 1 tablet by mouth daily      Magnesium Oxide 500 MG CAPS Take 1 capsule by mouth 2 times daily      oxybutynin (DITROPAN-XL) 10 MG extended release tablet Take 10 mg by mouth daily      simvastatin (ZOCOR) 10 MG tablet Take 10 mg by mouth nightly      Oxygen Concentrator Inhale 3 L into the lungs continuous       DULoxetine (CYMBALTA) 60 MG capsule        Current Facility-Administered Medications   Medication Dose Route Frequency Provider Last Rate Last Admin    iopamidol (ISOVUE-300) 61 % injection 2 mL  2 mL Other ONCE PRN Yara Wilcox MD        dexamethasone (PF) (DECADRON) injection 10 mg  10 mg Other Once Teresa Plummer MD           Results for orders placed during the hospital encounter of 11/24/21    XR CHEST (2 VW)    Narrative  Exam: XR CHEST (2 VW)    CLINICAL HISTORY: J44.9 Chronic obstructive pulmonary disease, unspecified COPD type ([address] ICD10    COMPARISON: None    CHEST X-ray, 2 VIEWS    FINDINGS:      The cardiomediastinal silhouette is within normal limits. There are no infiltrates, consolidations or effusions. Bones of the thorax appear intact. Impression  No radiographic evidence of acute intrathoracic process. Results for orders placed during the hospital encounter of 01/02/19    XR CHEST STANDARD (2 VW)    Narrative  EXAMINATION: XR CHEST (2 VW)    REASON FOR EXAM: COPD atelectasis    FINDINGS: 2 views of the chest reveal lung fields slightly hyperinflated. There is borderline cardiomegaly. The pulmonary vasculature is within normal limits. Atherosclerotic changes in the aorta noted. Since January 4, 2019 endotracheal tube and NG tube have been removed. Lung fields clear with no evidence of pneumonia. No evidence of pulmonary edema. Bones and soft tissues intact. Postsurgical changes overlie the lower cervical spine. Impression  NO ACTIVE DISEASE IN THE CHEST. Results for orders placed during the hospital encounter of 10/02/18    XR CHEST STANDARD (2 VW)    Narrative  EXAMINATION: CHEST TWO VIEWS    CLINICAL HISTORY: J44.9 Chronic obstructive pulmonary disease, unspecified COPD type ([address] ICD10, follow-up    COMPARISONS: May 16, 2016    FINDINGS:    Two views of the chest are submitted. The cardiac silhouette is of normal size configuration. The mediastinum is unremarkable. Pulmonary vascular is attenuated, lungs are hyperinflated and there is some widening of the AP diameter the chest. Areas atelectasis both bases. .  Right sided trachea. No focal infiltrates. No effusions. Pneumothoraces. Impression  NO ACUTE ACTIVE CARDIOPULMONARY PROCESS. RADIOGRAPHIC FINDINGS SUGGESTIVE OF COPD. Enid James   ]  Results for orders placed during the hospital encounter of 01/02/19    XR CHEST PORTABLE    Narrative  Portable chest radiograph    History: Respiratory failure    Technique: AP portable view of the chest obtained. Comparison: CT from January 3, 2019; chest x-ray from January 3, 2019    Findings:    Endotracheal tube remains, not significantly changed in position. Enteric tube traverses the diaphragm however its distal tip is not visualized. The cardiomediastinal silhouette is within normal limits. No pneumothorax, pleural effusion, or focal  consolidation. Linear left lung base opacity compatible with subsegmental atelectasis is not significantly changed. Lungs are hyperinflated. Coarsening of the pulmonary interstitium. Degenerative changes of the spine. Impression  No significant interval change in left base subsegmental atelectasis. XR CHEST PORTABLE    Narrative  EXAMINATION: CHEST PORTABLE VIEW    CLINICAL HISTORY: Intubation    COMPARISONS: January 2, 2018    FINDINGS:    Single  views of the chest is submitted. There is a endotracheal tube. The tip lies approximately 2.1 cm above the adele. There is a nasogastric tube. The tip is not seen but does lie below the hemidiaphragm. The cardiac silhouette is enlarged. Unchanged configuration. The mediastinum is unremarkable. Pulmonary vascular unremarkable. Right sided trachea. Left lower lobe infiltrate/consolidation with trace left pleural effusion. No Pneumothoraces. Impression  LEFT LOWER LOBE INFILTRATE/CONSOLIDATION WITH TRACE LEFT PLEURAL EFFUSION      XR CHEST PORTABLE    Narrative  EXAMINATION: XR CHEST PORTABLE, 1/2/2019 2:15 PM    History: 58-year-old female, shortness of breath, dyspnea    COMPARISON:  October 2, 2018    FINDINGS:  Chest-AP erect portable:  Hyperaeration lung fields. There are chronic interstitial changes. Asymmetric subtle increased density in the left lung base, suggestive of infiltrate. There is blunting of the right costophrenic angle, may represent focal atelectasis versus small  pleural effusion.  Cardiac silhouette at the upper limits of normal. Aortic arch calcification. The pulmonary vascularity is normal size. There are degenerative changes of the spine. Monitoring leads project across the thorax. Impression  1. Asymmetric hazy density in the left lung base suspicious for developing infiltrate. 2. Small right pleural effusion versus atelectasis. 3. Chronic interstitial changes and hyperaeration lung fields, correlate for COPD. Assessment/Plan:     1. Chronic obstructive pulmonary disease, unspecified COPD type ([address]  She is using  2 lit O2 with sleep , bronchodilator with Symbicort 160/4.5 mcg BID albuterol neb prn , proair  HFA 2 puff prn.C/o shortness of breath with exertion. No Wheezing. No Cough or Sputum. - albuterol (PROVENTIL) (2.5 MG/3ML) 0.083% nebulizer solution; Take 3 mLs by nebulization every 6 hours as needed for Wheezing  Dispense: 120 each; Refill: 3    2. Hypoxia  She is using 2 L O2 with sleep. Continue O2 to keep saturation 90% or above. 3. Personal history of smoking  CT lung screening before next visit       Return in about 3 months (around 4/12/2023) for COPD.       Amy Johnson MD

## 2023-01-13 ENCOUNTER — PROCEDURE VISIT (OUTPATIENT)
Dept: PAIN MANAGEMENT | Age: 63
End: 2023-01-13

## 2023-01-13 VITALS — BODY MASS INDEX: 27.75 KG/M2 | TEMPERATURE: 97.1 F | WEIGHT: 147 LBS | HEIGHT: 61 IN

## 2023-01-13 DIAGNOSIS — G56.22 NEURITIS OF LEFT ULNAR NERVE: ICD-10-CM

## 2023-01-13 DIAGNOSIS — M79.602 LEFT ARM PAIN: Primary | ICD-10-CM

## 2023-01-13 NOTE — PROGRESS NOTES
-Recommend occupational therapy for left ulnar mononeuropathy.   -Consideration for left ulnar nerve block under ultrasound guidance may be given if her symptoms do not improve with conservative measures as outlined above or to help facilitate a formal physical therapy program. Discussed the risks including but not limited to bleeding, infection, worsened pain, damage to surrounding structures, side effects, toxicity, allergic reactions to medications used, need for surgery, premature damage or degeneration of the joint, nerve, tendon, or vascular injury, as well as catastrophic injury such as vision loss, paralysis, stroke, bowel or bladder incontinence, ventilator dependence, loss of use of the wrists joint and/or extremities, and death. Discussed the risks, benefits, alternative procedures, and alternatives to the procedure including no procedure at all. Discussed that we cannot undo any permanent neurologic or orthopaedic damage or change the course of any underlying disease. After thorough discussion, patient expressed understanding and willingness to proceed.

## 2023-01-18 ENCOUNTER — PROCEDURE VISIT (OUTPATIENT)
Dept: PAIN MANAGEMENT | Age: 63
End: 2023-01-18

## 2023-01-18 DIAGNOSIS — M79.605 BILATERAL LEG PAIN: ICD-10-CM

## 2023-01-18 DIAGNOSIS — M79.604 BILATERAL LEG PAIN: ICD-10-CM

## 2023-01-18 NOTE — PROGRESS NOTES
Electromyography (EMG)/Nerve conduction studies (NCS) Report: Lower Extremity    Name: Michelle Crockett   YOB: 1960  Date of Service: 1/18/2023   Provider: Lillian Cesar MD        INDICATIONS:  Michelle Crockett is a 57 y.o. female who presents for electrodiagnostic evaluation for bilateral leg pain. Both limbs are necessary to examine in order to evaluate for any evidence of systemic disease as well as establish normal baseline values from which to compare any abnormal unilateral findings. The study is explained and verbal consent to proceed is obtained. NERVE CONDUCTION STUDIES:    Sensory nerve conduction studies: Bilateral sural and superficial peroneal sensory nerve conduction studies demonstrate normal peak latencies and amplitudes. Waveforms are limited in all studies. Bilateral lower limb temperatures are normal.     Motor nerve conduction studies: Bilateral peroneal motor nerve conduction studies with pickup over the extensor digitorum brevis demonstrate normal distal latencies and amplitudes. Right sided amplitude is borderline-normal.  Bilateral tibial motor nerve conduction studies with pickup over the abductor hallucis demonstrate normal distal latencies and amplitudes. H reflex: Bilateral H reflexes are symmetric and not prolonged. ELECTROMYOGRAPHY: A disposable monopolar needle is used to evaluate the right vastus medialis, tibialis anterior, extensor hallucis longus, flexor digitorum longus, peroneus longus, medial gastrocnemius, and lateral gastrocnemius. Right extensor hallucis longus demonstrates increased insertional activity without any clear abnormal spontaneous activity. Right peroneus longus demonstrates increased insertional activity and +2 abnormal spontaneous activity. All of the other muscles sampled are free of any increased insertional activity or any abnormal spontaneous activity. Motor unit recruitment is unremarkable.      A disposable monopolar needle is used to evaluate the left vastus medialis, tibialis anterior, extensor hallucis longus, flexor digitorum longus, peroneus longus, medial gastrocnemius, and lateral gastrocnemius. All of the muscles sampled are free of any increased insertional activity or any abnormal spontaneous activity. Motor unit recruitment is unremarkable. Right lumbar paraspinals demonstrate increased insertional activity. Left low lumbar paraspinal muscle sampling is free of any increased insertional activity or any abnormal spontaneous activity. SUMMARY:  This study is limited, but abnormal:  1. There is limited electrodiagnostic evidence for a Right L5-S1 lumbosacral radiculitis. 2. Although limited, there is no clear electrodiagnostic evidence for a generalized large fiber sensorimotor peripheral polyneuropathy. RECOMMENDATIONS: Today's study does not explain the patient's left leg pain. The patient should follow up as previously instructed. If her symptoms persist or worsen, further electrodiagnostic evaluation may be considered if the patient is agreeable. Clinical correlation is recommended.

## 2023-01-22 NOTE — PROGRESS NOTES
Electromyography (EMG)/Nerve conduction studies (NCS) Report: Upper Extremities    Name: Malika Lima   : 1960  Date: 2023   Physician: Zhanna Hogan MD        INDICATIONS: Malika Lima is a 57 y.o. female who presents for electrodiagnostic evaluation for left arm pain. She is right handed. She confirms a history of posterior cervical spine surgery and bilateral carpal tunnel release surgeries. Both limbs are necessary to examine in order to evaluate for any evidence of systemic disease as well as establish normal baseline values from which to compare any abnormal unilateral findings. The study is explained and verbal consent to proceed is obtained. NERVE CONDUCTION STUDIES:  Sensory nerve conduction studies: Left median sensory nerve conduction study to the second digit demonstrates borderline-prolonged peak latency and diminished amplitude. Right median sensory nerve conduction study to the second digit demonstrates normal peak latency and diminished amplitude. Bilateral ulnar sensory nerve conduction studies to the fifth digit demonstrate normal peak latencies and amplitudes, waveform is limited on the right. Bilateral radial sensory nerve conduction studies to the base of the thumb demonstrate normal peak latencies and amplitudes. Bilateral upper limb temperatures are normal.     Motor nerve conduction studies: Bilateral median motor nerve conduction studies with pickup over the abductor pollicis brevis demonstrate normal distal latencies and amplitudes. Left ulnar motor nerve conduction study with pickup over the abductor digiti minimi demonstrates normal distal latency, diminished amplitudes, and normal conduction velocities in the left forearm and across the left elbow. Given this abnormality, an additional motor study is performed.  Left ulnar motor nerve conduction study with pickup over the first dorsal interosseous demonstrates normal distal latency, normal amplitudes, and normal conduction velocities in the left forearm and across the left elbow. Right ulnar motor nerve conduction study with pickup over the abductor digiti minimi demonstrates normal distal latency and normal amplitude. ELECTROMYOGRAPHY: A disposable monopolar needle is used to evaluate the left deltoid, biceps, triceps, brachioradialis, extensor indicis proprius, flexor carpi ulnaris, flexor digitorum profundus to digits 4 and 5, first dorsal interosseous, and opponens pollicis. Left flexor carpi ulnaris demonstrates increased insertional activity and trace abnormal spontaneous activity. All of the other muscles sampled are free of any increased insertional activity or any abnormal spontaneous activity. Left first dorsal interosseous demonstrates decreased recruitment and increased duration consistent with neurogenic units with amplitudes in the 8-10 mV range. Motor unit recruitment in all other muscles is unremarkable. The right deltoid, biceps, triceps, brachioradialis, extensor indicis proprius, first dorsal interosseous, and opponens pollicis are also sampled. All of the muscles sampled are free of any increased insertional activity or any abnormal spontaneous activity. Motor unit recruitment is unremarkable. Cervical paraspinal muscle sampling is deferred given history of posterior cervical spine surgery. SUMMARY:  This study is limited, but abnormal:  1. There is limited electrodiagnostic evidence for a Left ulnar mononeuropathy at or proximal to the left flexor carpi ulnaris. There are limited changes on electromyography. Conduction velocity across the left elbow is preserved in two motor studies. 2. There is current electrodiagnostic evidence for a bilateral median mononeuropathy at the wrist consistent with a clinical diagnosis of Bilateral carpal tunnel syndrome. This is mild in degree by electrical criteria bilaterally. There is no active denervation on electromyography bilaterally.    3. There is no current evidence for an active bilateral cervical motor radiculopathy or generalized large fiber sensorimotor peripheral polyneuropathy. RECOMMENDATIONS: The patient should follow up as previously instructed. If her symptoms persist or worsen, further electrodiagnostic evaluation may be considered if the patient is agreeable. Clinical correlation is recommended.

## 2023-02-13 ENCOUNTER — OFFICE VISIT (OUTPATIENT)
Dept: PAIN MANAGEMENT | Age: 63
End: 2023-02-13

## 2023-02-13 VITALS
SYSTOLIC BLOOD PRESSURE: 108 MMHG | HEIGHT: 61 IN | BODY MASS INDEX: 26.81 KG/M2 | DIASTOLIC BLOOD PRESSURE: 64 MMHG | TEMPERATURE: 97.8 F | WEIGHT: 142 LBS

## 2023-02-13 DIAGNOSIS — M47.817 LUMBOSACRAL SPONDYLOSIS WITHOUT MYELOPATHY: ICD-10-CM

## 2023-02-13 DIAGNOSIS — M17.10 KNEE ARTHROPATHY: ICD-10-CM

## 2023-02-13 DIAGNOSIS — G56.22 NEURITIS OF LEFT ULNAR NERVE: Primary | ICD-10-CM

## 2023-02-13 DIAGNOSIS — M25.551 PAIN OF RIGHT HIP JOINT: ICD-10-CM

## 2023-02-13 DIAGNOSIS — M47.812 CERVICAL SPONDYLOSIS WITHOUT MYELOPATHY: ICD-10-CM

## 2023-02-13 DIAGNOSIS — M54.16 LUMBAR RADICULOPATHY: ICD-10-CM

## 2023-02-13 ASSESSMENT — ENCOUNTER SYMPTOMS
SHORTNESS OF BREATH: 0
DIARRHEA: 0
NAUSEA: 0
CONSTIPATION: 0
BACK PAIN: 1

## 2023-02-13 NOTE — PROGRESS NOTES
Lore Morris  (1960)    2/13/2023    Subjective:     Lore Morris is 57 y.o. female who complains today of:    Chief Complaint   Patient presents with    Back Pain     Lower back pain    Elbow Injury     Pain in left elbow     Last seen by me 12/19/22: EMG B UE and EMG B LE done, reviewed below. Left ulnar nerve block not done, appears that there is insurance review. MRI C-spine done, reviewed below. XR L-spine, C-spine, right knee, left elbow not done. No new physical therapy. Didn't see Urology. Saw Ortho 2022, said she would need total knee arthroplasty. She is not interested in updated MRI LS Spine at this time. Lidocaine ointment didn't receive. She finds Celebrex helpful. No other tests therapy or updates from other physicians, No ER visits. Gabapentin 600 mg TID and Celebrex 100 mg daily help with remaining functional with chores, personal hygiene, remaining compliant with home exercise program, maintaining her quality of life, and performing activities of daily living. She obtains greater than 50% pain relief without any significant side effects. She is clear to avoid driving or operating heavy machinery or to perform any activities where she may harm herself or others while on pain medications. Low back pain is a 3/10. Gets to a 8/10. Located in both sides of her low back. Back pain is worse than leg pain. Constant ache for over 10 years. Worse with walking and activity. Pain is better with rest and pain medicine as well as injections. There are no other associated symptoms or contextual factors. She denies any classic radicular symptoms, new weakness, saddle anesthesia, bowel or bladder dysfunction, or falls. Neck pain is a 3/10. Gets to a 6/10. Her pain is located on the right worse than the left. No arm pain. Worse with turning her neck and activity. Pain is better with rest and pain medicine. Constant ache since 2003. History of C2-6 posterior cervical fusion with C3/4 ACDF. There are no other associated symptoms or contextual factors. She denies any classic radicular symptoms, new weakness, saddle anesthesia, bowel or bladder dysfunction, or falls. Right knee pain is a 2/10. Gets to a 6/10. Worse with moving the knee. Better with rest. Constant ache for over 1 year. Patient had an MRI of the right knee done in Oct 2021. Seen by Ortho who knows about bone infarction, Ortho recommended total knee arthroplasty. Left elbow pain is a 3/10. Gets to a 7/10. She gets numbness into her left hand. Pain is worse with activity. Better with rest. It has been a constant ache for over 3 months. Right arm is ok. No problems. Right arm and right leg are weak after cervical spine surgery.     Allergies:  Glucagon    Past Medical History:   Diagnosis Date    Arthritis     left hip    COPD (chronic obstructive pulmonary disease) ([address]     uses inhalers x 4 yrs    Hyperlipidemia     on meds x 10yrs    Hypertension 2018    Lung disease      Past Surgical History:   Procedure Laterality Date    BREAST BIOPSY Right 2003    benign    CERVICAL FUSION  2016    CERVICAL SPINE SURGERY  2004    AVM removal, fusion, embolism removal      SECTION  1979    COLONOSCOPY  2018    HYSTERECTOMY (CERVIX STATUS UNKNOWN)  1997    [place] ARTHRP ACETBLR/PROX FEM PROSTC AGRFT/ALGRFT Left 2018    LEFT HIP TOTAL HIP ARTHROPLASTY performed by Lidia Arias MD at [address] History   Problem Relation Age of Onset    Breast Cancer Mother     Cancer Mother         liver ca    No Known Problems Brother     Obesity Son      Social History     Socioeconomic History    Marital status: Single     Spouse name: Not on file    Number of children: Not on file    Years of education: Not on file    Highest education level: Not on file   Occupational History    Not on file   Tobacco Use    Smoking status: Former     Packs/day: 0.50     Years: 1.00     Pack years: 0.50     Types: Cigarettes     Start date: 2017     Quit date: 2019     Years since quittin.1     Passive exposure: Current (boyfriend smokes)    Smokeless tobacco: Never    Tobacco comments:     smoked at 15yrs old x 30 yrs 0.5ppd    Vaping Use    Vaping Use: Never used   Substance and Sexual Activity    Alcohol use: No     Alcohol/week: 0.0 standard drinks    Drug use: No    Sexual activity: Not Currently     Partners: Male   Other Topics Concern    Not on file   Social History Narrative         Lives With: Significant other    Type of Home: House    Home Layout: One level    Home Access: Stairs to enter with rails    Entrance Stairs - Number of Steps: 6    Home Equipment: Rolling walker, Cane, Wheelchair-manual (BSC, leg )    ADL Assistance: Independent    Ambulation Assistance: Independent (with [employer])    Transfer Assistance: Independent    Additional Comments: Multiple neck surgeries with residual spasticity R UE/LE     Social Determinants of Health     Financial Resource Strain: Not on file   Food Insecurity: Not on file   Transportation Needs: Not on file   Physical Activity: Not on file   Stress: Not on file   Social Connections: Not on file   Intimate Partner Violence: Not on file   Housing Stability: Not on file       Current Outpatient Medications on File Prior to Visit   Medication Sig Dispense Refill    gabapentin (NEURONTIN) 600 MG tablet Take 1 tablet by mouth 3 times daily for 90 days.  90 tablet 2    celecoxib (CELEBREX) 100 MG capsule Take 1 capsule by mouth daily 30 capsule 2    lidocaine (LMX) 4 % cream Apply a half dollar sized amount to intact skin topically up to twice daily as needed for pain 45 g 1    budesonide-formoterol (SYMBICORT) 160-4.5 MCG/ACT AERO TAKE 2 PUFFS BY MOUTH TWICE A DAY 30.6 each 6    fexofenadine (ALLEGRA) 180 MG tablet TAKE 1 TABLET BY MOUTH EVERY DAY      PROAIR  (90 Base) MCG/ACT inhaler TAKE 2 PUFFS BY MOUTH EVERY 6 HOURS AS NEEDED FOR WHEEZE 1 each 3    baclofen (LIORESAL) 10 MG tablet Take 10 mg by mouth 3 times daily      VENTOLIN  (90 Base) MCG/ACT inhaler Inhale 2 puffs into the lungs 4 times daily as needed for Wheezing 1 each 1    albuterol sulfate HFA (VENTOLIN HFA) 108 (90 Base) MCG/ACT inhaler Inhale 2 puffs into the lungs every 6 hours as needed for Wheezing 1 Inhaler 3    Multiple Vitamins-Minerals (MULTIVITAMIN ADULT PO) Take 1 tablet by mouth      POLYETHYLENE GLYCOL 3350 PO Take 17 g by mouth      fluticasone (FLONASE) 50 MCG/ACT nasal spray 1 spray by NOT APPLICABLE route      aspirin 81 MG EC tablet Take 1 tablet by mouth 2 times daily 30 tablet 0    Multiple Vitamins-Minerals (THERAPEUTIC MULTIVITAMIN-MINERALS) tablet Take 1 tablet by mouth daily      Magnesium Oxide 500 MG CAPS Take 1 capsule by mouth 2 times daily      oxybutynin (DITROPAN-XL) 10 MG extended release tablet Take 10 mg by mouth daily      simvastatin (ZOCOR) 10 MG tablet Take 10 mg by mouth nightly      Oxygen Concentrator Inhale 3 L into the lungs continuous       DULoxetine (CYMBALTA) 60 MG capsule       albuterol (PROVENTIL) (2.5 MG/3ML) 0.083% nebulizer solution Take 3 mLs by nebulization every 6 hours as needed for Wheezing 120 each 3     Current Facility-Administered Medications on File Prior to Visit   Medication Dose Route Frequency Provider Last Rate Last Admin    iopamidol (ISOVUE-300) 61 % injection 2 mL  2 mL Other ONCE PRN Leonides Mendoza MD        dexamethasone (PF) (DECADRON) injection 10 mg  10 mg Other Once Taylor Bey MD           Review of Systems   Constitutional:  Negative for fever. HENT:  Negative for hearing loss. Respiratory:  Negative for shortness of breath. Gastrointestinal:  Negative for constipation, diarrhea and nausea. Genitourinary:  Negative for difficulty urinating. Musculoskeletal:  Positive for back pain. Negative for neck pain. Skin:  Negative for rash. Neurological:  Negative for headaches. Hematological:  Does not bruise/bleed easily.   Psychiatric/Behavioral:  Negative for sleep disturbance.        Objective:     Vitals:  /64 (Site: Right Upper Arm)   Temp 97.8 °F (36.6 °C) (Temporal)   Ht 5' 1\" (1.549 m)   Wt 142 lb (64.4 kg)   LMP  (LMP Unknown)   BMI 26.83 kg/m² Pain Score:   3    Exam performed under coronavirus precautions  General: No acute distress  Eyes: No scleral icterus or lid lag appreciated bilaterally  ENMT: Moist mucous membranes. Hearing grossly intact bilaterally. No masses or lesions on ears or nose  Neck: Symmetric without any overt masses or lesions, trachea midline  Respiratory: Respirations are non-labored  Skin: Visualized skin is intact  Psych: Mood normal. Affect normal. Recent and remote memory intact. Judgment and insight normal.  Neurologic: Gait antalgic, +rolling walker, +right AFO  Pt is alert and appropriately interactive. Pleasant and cooperative with history and exam. No signs of excessive sedation. Responds promptly and appropriately to questions asked. No aberrant behaviors appreciated.  intact. No significant pathologic reflexes appreciated.    No gross step offs noted. Tenderness to palpation over the mid to low lumbar spinous processes and bilateral lumbar paraspinals from L2 down to the sacrum. No tenderness over bilateral PSIS. No tenderness over bilateral greater trochanters. No tenderness over bilateral deep gluteal regions.    Sensation grossly intact in both legs except for bilateral S1 paresthesias   Reflexes and strength functional for ambulation, no abnormal reflexes appreciated on exam today  Strength greater than 3/5 bilateral legs  Straight leg raise negative bilaterally.    Sensation intact in both arms except for numbness digits 1-5 bilaterally  Reflexes and strength functional for arm use, no abnormal reflexes appreciated on exam today  Strength greater than 3/5 in both arms  Spurling's negative on exam today    +Tinel's positive on the left elbow with ulnar paresthesias  Also some  left elbow medial epicondyle tenderness. Tender right knee medial joint line          Outside record review:  MRI C-spine 1/9/2023: Motion artifact. Scoliosis. ACDF C3-4. Posterior surgical fusion C2-C6. Myelomalacia from C3 down to C6, worse at C3-4. Degenerative disc disease and facet arthropathy. C2-3 normal foramen, posteriorly fused. C3-4 minimal stenosis, normal foramen, fused posteriorly. C4-5 normal foramen, fused posteriorly, up to moderate spinal stenosis. C5-6 normal canal foramen, fused posteriorly. C6-7 normal canal foramen. C7-T1 normal canal foramen. EMG B LE 1/18/23: This study is limited, but abnormal: There is limited electrodiagnostic evidence for a Right L5-S1 lumbosacral radiculitis. Although limited, there is no clear electrodiagnostic evidence for a generalized large fiber sensorimotor peripheral polyneuropathy. EMG B UE 1/13/23: This study is limited, but abnormal: There is limited electrodiagnostic evidence for a Left ulnar mononeuropathy at or proximal to the left flexor carpi ulnaris. There are limited changes on electromyography. Conduction velocity across the left elbow is preserved in two motor studies. There is current electrodiagnostic evidence for a bilateral median mononeuropathy at the wrist consistent with a clinical diagnosis of Bilateral carpal tunnel syndrome. This is mild in degree by electrical criteria bilaterally. There is no active denervation on electromyography bilaterally. There is no current evidence for an active bilateral cervical motor radiculopathy or generalized large fiber sensorimotor peripheral polyneuropathy. MRI R knee 10/1/2021: Osseous lesions distal femoral proximal tibial and patella.,  Possible bone infarcts. Recommended dedicated x-rays tibia and fibula to assess stability. MRI L-spine 7/17/2021: Degenerative disc disease and facet arthropathy. Scoliosis. Iliopsoas muscle on the left fatty atrophy.   T10-11, T11-12, T12-L1, L1-L2 normal canal foramen.  L2-L3 moderate left foraminal narrowing, normal right foramen, normal canal.  L3-L4 normal canal, very mild bilateral foraminal narrowing.  L4-L5 left-sided disc bulge, normal canal, moderate left and up to moderate right foraminal narrowing.  L5-S1 severe right foraminal stenosis, up to severe left foraminal stenosis, normal canal.  Localizer: Left total hip replacement, left kidney cyst.  XR L-spine 6/17/2021: No fracture, degenerative disc disease and facet arthropathy  MRI R hip 3/19/2021: Mild hip osteoarthritis, high-grade tear right common hamstring tendon at the ischial tuberosity.  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  Contrast study.  Anterolisthesis C3 and C4.  Absence of the posterior elements noted from C3-C6.  Degenerative disc disease.  Abnormal cervical cord.  Severe cervical cord narrowing C3-4 to C5-6, area 1.4 cm hyperintensity right lateral aspect cervical cord at the level C5-6 consistent with myelomalacia.    Creatinine 0.94 normal on 6/21/22  Platelet 202 normal on 6/21/22  HgA1c 5.7 elevated on 7/9/21.    Opioid risk tool score 0, low risk.   Assessment:      Diagnosis Orders   1. Neuritis of left ulnar nerve  05182-Ddpov Nerve Inject      2. Lumbar radiculopathy        3. Cervical spondylosis without myelopathy        4. Lumbosacral spondylosis without myelopathy        5. Pain of right hip joint        6. Knee arthropathy        Cervical spine decompression for AVM 2003, Cervical C2-6 discectomy and posterior fusion along with C3-4 ACDF Dr Ramos  in 2004, COPD, AVM surgery cervical/thoracic spine surgery 2003 with residual right-sided weakness and foot drop  Plan:     Periodic Controlled Substance Monitoring: Assessed functional status. (Elayne Arceo MD)    No orders of the defined types were placed in this encounter.      Orders Placed This Encounter  Procedures    06485-Tryqd Nerve Inject     Left ulnar nerve block under US with Dr Les Hunt. No anticoagulation, no antibiotics, no diabetes mellitus, no osteoporosis, no bleeding or platelet dysfunction, IV Dye okay, allergies reviewed. 15 minute procedure       -Encourage therapy for neck pain and left elbow pain  - Encourage follow-up with orthopedics for possible bone infarct right knee. Encourage follow-up with primary care team/urology for left kidney cyst. She states she has already had evaluation for both of these problems  -Encourage XR LS Spine, XR cervical spine, XR L elbow, XR R knee   -Reviewed EMG BUE and EMG BLE above, all questions answered  -Reviewed MRI cervical spine done, reviewed above, all questions answered with -Consideration for updated MRI R Knee and LS Spine without contrast may be given if her symptoms do not improve with conservative treatment  -The patient declines updated MRI LS Spine despite increased risks of myelomalacia, paralysis, wheelchair dependence, spinal cord injury, and death.   -Continue Lidocaine 4% ointment topical BID prn no ref  -Continue Celebrex 100 mg by mouth daily, no ref. Avoid all other NSAIDs and steroids. -Advised that I do not recommend daily NSAID use, will provide 60 tablets for 3-month supply. Consider NSAID holiday March - April prior to continuing Celebrex  -Continue Gabapentin 600 mg TID, no ref  -Consideration for opioids may be given  -The patient is not currently interested in repeating her lumbar radiofrequency ablation. Consideration for cervical MBB/RFA may be given  -Re order Left ulnar nerve block under US with Dr Les Hunt. No anticoagulation, no antibiotics, no diabetes mellitus, no osteoporosis, no bleeding or platelet dysfunction, IV Dye okay, allergies reviewed. 15 minute procedure.  Consider 3 mg betamethasone        Controlled Substance Monitoring:    Acute and Chronic Pain Monitoring:   RX Monitoring 2/13/2023   Attestation -   Acute Pain Prescriptions -   Periodic Controlled Substance Monitoring Assessed functional status. Chronic Pain > 50 MEDD -   Chronic Pain > 80 MEDD -          Discussed the risks, side effects, and symptoms that would warrant urgent or emergent physician evaluation of all medications prescribed today. The patient was advised that NSAID-type medications have three important potential side effects: gastrointestinal irritation including hemorrhage, myocardial injury including possible infarction, and kidney injury. She was asked to take the medication with food, avoid any other NSAIDs or steroids, and discontinue if she develops any concerning symptoms. She should immediately stop the medication and proceed to the emergency department for chest pain, dark urine or difficulty with producing urine, vomiting, abdominal pain or black/tarry stools. The patient expresses understanding of these issues and all questions were answered. Discussed the risks of the above recommended procedures including but not limited to bleeding, infection, worsened pain, damage to surrounding structures, side effects, toxicity, allergic reactions to medications used, immune and stress-response dysfunction, fat necrosis, decreased bone mineralization, cartilage loss, increased fracture risk, avascular necrosis, skin pigmentation changes, blood sugar elevation, need for surgery, premature damage or degeneration of the joint, as well as catastrophic injury such as vision loss, paralysis, stroke, bowel or bladder incontinence, ventilator dependence, loss of use of the joint and/or extremity, and death. Discussed the risks, benefits, alternative procedures, and alternatives to the procedure including no procedure at all. Discussed that we cannot undo any permanent neurologic or orthopaedic damage or change the course of any underlying disease.  The patient appears to be a good candidate for the above recommended procedures, but no guarantees expressed or implied are given regarding the outcome of any procedure. After thorough discussion, patient expressed complete understanding and willingness to proceed. Discussed the risks of the above recommended procedures including but not limited to bleeding, infection, worsened pain, damage to surrounding structures, side effects, toxicity, allergic reactions to medications used, immune and stress-response dysfunction, fat necrosis, skin pigmentation changes, blood sugar elevation, headache, vision changes, need for surgery, as well as catastrophic injury such as vision loss, paralysis, stroke, spinal cord and/or plexus infarction or injury, intrathecal injection, spinal cord puncture, arachnoiditis, discitis, bowel or bladder incontinence, ventilator dependence, loss of use of the arms and/or legs, and death. Discussed the risks, benefits, alternative procedures, and alternatives to the procedure including no procedure at all. Discussed that we cannot undo any permanent neurologic damage or change the course of any underlying disease. The patient appears to be a good candidate for the above recommended procedures, but no guarantees expressed or implied are given regarding the outcome of any procedure. After thorough discussion, patient expressed understanding and willingness to proceed. Provided education and counseling regarding the diagnosis, prognosis, and treatment options. All questions were answered. Encouraged her to follow-up with her primary care physician and/or specialists as required for her overall health and management of her comorbidities as well as any new positive symptoms mentioned in review of systems above. Care was provided within the definitions and limitations of our specialty practice.  Encouraged lifestyle interventions including healthy habits, lifestyle changes, regular aerobic exercise and appropriate weight maintenance as advised by their primary care physician or cardiovascular health provider. Discussed well care and disease prevention/maintenance. All recommendations for therapy are provided to improve function with activities of daily living, decrease pain, and help develop an exercise program. All recommendations for medications are meant to help decrease pain, improve function with activities of daily living, maintain compliance with home exercise program, and improve quality of life. All recommendations for therapeutic injections are meant to help decrease pain, improve function with activities of daily living, maintain compliance with home exercise program, improve quality of life, and decrease reliance upon oral medications. Encouraged compliance with her home exercise program. Recommended compliance with physical therapy program as outlined above. Discussed the elevated risks of excessive sedation while on pain medications. Advised her against driving or operating heavy machinery or performing any activities where she may harm herself or others while on pain medications. Particular caution was emphasized especially during dose adjustments and medication changes. Discussed the elevated risks of respiratory depression and death while on opioid medications, especially when combined with other sedative substances. Discussed the risks of temporary disability, permanent disability, morbidity, and mortality with poorly-managed or undiagnosed medical conditions and comorbidities. Emphasized the importance of timely medical evaluation and treatment as previously recommended by us or other medical professionals. Risks of not pursing these recommendations were emphasized. The patient was offered a treatment at our facility. The physician and patient have discussed in detail the risk of exposure to and/or potential harm posed by the COVID-19 virus with having office visits and procedures at this time versus the risk of delaying the visits and procedures.  It is not possible to know either the risk of delaying the visits or procedure or chance of getting an infection with perfect accuracy, but a joint decision was made between the patient and the physician to proceed at this time with the scheduled visits and procedures. Advised her that any lab testing, imaging, or other diagnostic test results are best discussed in person in the office so that we can provide a clear explanation of their significance and best treatment based upon these results. It is her responsibility to make and keep a follow up appointment to discuss these test results in person to discuss the significance of the findings and appropriate follow-up steps. She expressed complete understanding and agreement with the entire plan as outlined above. Portions of this note may have been typed, auto-populated, dictated or transcribed by voice recognition resulting in errors, omissions, or close substitutions which may be missed despite careful proofreading. Please contact the author for any questions or concerns. Follow up:  Return in about 1 month (around 3/13/2023) for reassessment of pain and symptoms.     Nino Gillespie MD

## 2023-03-09 ENCOUNTER — OFFICE VISIT (OUTPATIENT)
Dept: INTERVENTIONAL RADIOLOGY/VASCULAR | Age: 63
End: 2023-03-09

## 2023-03-09 VITALS
HEART RATE: 71 BPM | DIASTOLIC BLOOD PRESSURE: 64 MMHG | OXYGEN SATURATION: 92 % | SYSTOLIC BLOOD PRESSURE: 106 MMHG | WEIGHT: 142 LBS | HEIGHT: 61 IN | BODY MASS INDEX: 26.81 KG/M2

## 2023-03-09 DIAGNOSIS — R60.0 BILATERAL LEG EDEMA: Primary | ICD-10-CM

## 2023-03-09 ASSESSMENT — ENCOUNTER SYMPTOMS
DIARRHEA: 0
COLOR CHANGE: 0
WHEEZING: 0
TROUBLE SWALLOWING: 0
SHORTNESS OF BREATH: 1
ABDOMINAL PAIN: 0
SORE THROAT: 0
COUGH: 0
NAUSEA: 0
GASTROINTESTINAL NEGATIVE: 1
EYES NEGATIVE: 1
BACK PAIN: 1
VOMITING: 0

## 2023-03-09 NOTE — PROGRESS NOTES
Vascular Medicine and Interventional Radiology:    Mario Herrera, a female of 57 y.o. came to the office 3/9/2023. Chief Complaint   Patient presents with    New Patient     New leg pain- Dr Ellis Torres       3/9/2023 HPI: Mario Herrera referred by pain management Dr Neda Ahumada  for evaluation of bilateral Lower extremity leg pain. Presents in wheelchair due to leg weakness and COPD causing exertional dyspnea. Patient presents with symptoms of: chronic bilateral lower leg edema with RLE > going of for at least a year. RLE edema is daily, LLE edema is not daily. Reports occasional right calf aching, this is intermittent, not daily, mild and tolerable, and does not stop her from adequately performing ADLs. Reports she has no severe bothersome leg or foot symptoms. States has BLE arterial studies done at [facility] and were normal.   Follows with pain management for chronic lower back pain. Denies any BLE troublesome varicose veins. NSAIDS: Tolerates pain, no meds. Leg elevation:  Daily with relief. Stocking use and dates:   Wears class two knee high compression stockings about 3x/week for 6 weeks or greater with relief. Denies claudication. Denies chest pain. Denies worsening dyspnea.      Family History   Problem Relation Age of Onset    Breast Cancer Mother     Cancer Mother         liver ca    No Known Problems Brother     Obesity Son        Past Surgical History:   Procedure Laterality Date    BREAST BIOPSY Right 2003    benign    CERVICAL FUSION  2016    CERVICAL SPINE SURGERY  2004    AVM removal, fusion, embolism removal      SECTION  1979    COLONOSCOPY  2018    HYSTERECTOMY (CERVIX STATUS UNKNOWN)  1997    [place] ARTHRP ACETBLR/PROX FEM PROSTC AGRFT/ALGRFT Left 2018    LEFT HIP TOTAL HIP ARTHROPLASTY performed by Senthil Holcomb MD at [facility]        Past Medical History:   Diagnosis Date    Arthritis     left hip    COPD (chronic obstructive pulmonary disease) ([facility] [address]     uses inhalers x 4 yrs    Hyperlipidemia     on meds x 10yrs    Hypertension 2018    Lung disease        Social History     Socioeconomic History    Marital status: Single   Tobacco Use    Smoking status: Former     Packs/day: 0.50     Years: 1.00     Pack years: 0.50     Types: Cigarettes     Start date: 2017     Quit date: 2019     Years since quittin.1     Passive exposure: Current (boyfriend smokes)    Smokeless tobacco: Never    Tobacco comments:     smoked at 14yrs old x 30 yrs 0.5ppd    Vaping Use    Vaping Use: Never used   Substance and Sexual Activity    Alcohol use: No     Alcohol/week: 0.0 standard drinks    Drug use: No    Sexual activity: Not Currently     Partners: Male   Social History Narrative         Lives With: Significant other    Type of Home: House    Home Layout: One level    Home Access: Stairs to enter with rails    Entrance Stairs - Number of Steps: 6    Home Equipment: Rolling walker, Cane, Wheelchair-manual (BSC, leg )    ADL Assistance: Independent    Ambulation Assistance: Independent (with [employer])    Transfer Assistance: Independent    Additional Comments: Multiple neck surgeries with residual spasticity R UE/LE       Allergies   Allergen Reactions    Glucagon Hives       Current Outpatient Medications on File Prior to Visit   Medication Sig Dispense Refill    gabapentin (NEURONTIN) 600 MG tablet Take 1 tablet by mouth 3 times daily for 90 days. 90 tablet 2    celecoxib (CELEBREX) 100 MG capsule Take 1 capsule by mouth daily 30 capsule 2    lidocaine (LMX) 4 % cream Apply a half dollar sized amount to intact skin topically up to twice daily as needed for pain 45 g 1    budesonide-formoterol (SYMBICORT) 160-4.5 MCG/ACT AERO TAKE 2 PUFFS BY MOUTH TWICE A DAY 30.6 each 6    fexofenadine (ALLEGRA) 180 MG tablet TAKE 1 TABLET BY MOUTH EVERY DAY      PROAIR  (90 Base) MCG/ACT inhaler TAKE 2 PUFFS BY MOUTH EVERY 6 HOURS AS NEEDED FOR WHEEZE 1 each 3    baclofen (LIORESAL) 10 MG  tablet Take 10 mg by mouth 3 times daily      VENTOLIN  (90 Base) MCG/ACT inhaler Inhale 2 puffs into the lungs 4 times daily as needed for Wheezing 1 each 1    albuterol sulfate HFA (VENTOLIN HFA) 108 (90 Base) MCG/ACT inhaler Inhale 2 puffs into the lungs every 6 hours as needed for Wheezing 1 Inhaler 3    Multiple Vitamins-Minerals (MULTIVITAMIN ADULT PO) Take 1 tablet by mouth      POLYETHYLENE GLYCOL 3350 PO Take 17 g by mouth      fluticasone (FLONASE) 50 MCG/ACT nasal spray 1 spray by NOT APPLICABLE route      aspirin 81 MG EC tablet Take 1 tablet by mouth 2 times daily 30 tablet 0    Multiple Vitamins-Minerals (THERAPEUTIC MULTIVITAMIN-MINERALS) tablet Take 1 tablet by mouth daily      Magnesium Oxide 500 MG CAPS Take 1 capsule by mouth 2 times daily      oxybutynin (DITROPAN-XL) 10 MG extended release tablet Take 10 mg by mouth daily      simvastatin (ZOCOR) 10 MG tablet Take 10 mg by mouth nightly      Oxygen Concentrator Inhale 3 L into the lungs continuous       DULoxetine (CYMBALTA) 60 MG capsule       albuterol (PROVENTIL) (2.5 MG/3ML) 0.083% nebulizer solution Take 3 mLs by nebulization every 6 hours as needed for Wheezing 120 each 3     Current Facility-Administered Medications on File Prior to Visit   Medication Dose Route Frequency Provider Last Rate Last Admin    iopamidol (ISOVUE-300) 61 % injection 2 mL  2 mL Other ONCE PRN Charles Carreno MD        dexamethasone (PF) (DECADRON) injection 10 mg  10 mg Other Once Lev Meza MD           Review of Systems   Constitutional: Negative. Negative for chills, fatigue and fever. HENT: Negative. Negative for congestion, ear pain, sore throat and trouble swallowing. Eyes: Negative. Negative for visual disturbance. Respiratory:  Positive for shortness of breath (chronic exertional). Negative for cough and wheezing. Cardiovascular:  Positive for leg swelling (chronic bilateral lower leg edema, RLE >).  Negative for chest pain and palpitations. Gastrointestinal: Negative. Negative for abdominal pain, diarrhea, nausea and vomiting. Endocrine: Negative. Genitourinary: Negative. Negative for difficulty urinating, dysuria and hematuria. Musculoskeletal:  Positive for back pain (chronic lower back pain.) and gait problem (uses walker). Skin: Negative. Negative for color change, rash and wound. Neurological:  Negative for dizziness, weakness, light-headedness, numbness and headaches. Hematological: Negative. Does not bruise/bleed easily. Psychiatric/Behavioral: Negative. The patient is not nervous/anxious. All other systems reviewed and are negative. OBJECTIVE:  /64   Pulse 71   Ht 5' 1\" (1.549 m)   Wt 142 lb (64.4 kg)   LMP  (LMP Unknown)   SpO2 92%   BMI 26.83 kg/m²     Physical Exam  Constitutional:       General: She is not in acute distress. Appearance: Normal appearance. She is well-developed. She is not ill-appearing. HENT:      Head: Normocephalic and atraumatic. Nose: Nose normal. No congestion. Neck:      Thyroid: No thyromegaly. Vascular: No JVD. Trachea: No tracheal deviation. Cardiovascular:      Rate and Rhythm: Normal rate. Pulses: Normal pulses. Dorsalis pedis pulses are 2+ on the right side and 2+ on the left side. Posterior tibial pulses are 2+ on the right side and 2+ on the left side. Heart sounds: No murmur heard. Pulmonary:      Effort: Pulmonary effort is normal.   Abdominal:      General: Bowel sounds are normal. There is no distension. Palpations: Abdomen is soft. Musculoskeletal:         General: No tenderness or signs of injury. Normal range of motion. Cervical back: Normal range of motion. Right lower le+ Edema (ankle and lower leg edema) present. Left lower leg: No edema. Skin:     General: Skin is warm and dry. Capillary Refill: Capillary refill takes 2 to 3 seconds.       Findings: No bruising, erythema or lesion. Neurological:      Mental Status: She is alert and oriented to person, place, and time. Psychiatric:         Mood and Affect: Mood normal.         Behavior: Behavior normal.         Non-Invasive Vascular Laboratory   [facility]       Lower Extremity Arterial Physiology Study   Bilateral/Complete     Date of service/time: 2/2/2023 3:26:31 PM   MRN: [id number]   Accession #: [id number]     Name: Thomas Rodriguez   YOB: 1960   Age: 57 years   Gender: F     Clinical Indication   -------------------   Pain in leg and swelling. TECHNIQUE   --------   An arterial physiological examination was performed, including measurement of   blood pressures using continuous wave Doppler and recording of plethysmographic   with or without Doppler waveforms at the below-mentioned limb segments. FINDINGS   --------   RIGHT SIDE AT REST     Right Pressures   Brachial: 122 mmHg   Ankle dorsalis pedis: 123 mmHg CHI: 1.00   Ankle posterior tibial: 115 mmHg CHI: 0.93     Right PVR Waveforms   Ankle: Normal.   Transmetatarsal: Normal.   Digit: Moderately dampened. LEFT SIDE AT REST     Left Pressures   Brachial: 123 mmHg   Ankle dorsalis pedis: 127 mmHg CHI: 1.03   Ankle posterior tibial: 124 mmHg CHI: 1.01     Left PVR Waveforms   Ankle: Normal.   Transmetatarsal: Normal.   Digit: Normal.     IMPRESSION   ----------     RIGHT SIDE     Resting right ankle brachial index: 1.00     Normal ankle brachial index at rest in the right leg. Right ankle: Normal at rest.     Right small vessel disease versus vasoconstriction. LEFT SIDE     Resting left ankle brachial index: 1.03     Normal ankle brachial index at rest in the left leg.      Left ankle: Normal at rest.     Technologist: Gabino Mckeon [facility]   Ordering physician: Janice Leyva     Interpreting physician: Germaine Cunningham MD, [address]     Electronically signed by Germaine Cunningham MD, [address] on 2/2/2023 at 5:02:11 PM       See Link below for Image  Specimen Collected: 02/02/23 15:26 Last Resulted: 02/02/23 17:02   Received From: [facility]  Result Received: 02/13/23 14:27       ASSESSMENT AND PLAN:    Chart review as noted of referring HCP last OV.     --  BLE CHI studies done at [facility] in 2/2023 reviewed my self and results discussed with patient. Both lower extremity CHI studies are normal.      Diagnosis Orders   1. Bilateral leg edema              Plan:     No orders of the defined types were placed in this encounter.     No orders of the defined types were placed in this encounter.      --  Educated in detail disease process of venous insufficiency, purpose of ultrasound, and purpose of compression stockings. Patient states her only bothersome leg symptom is right lower leg and ankle chronic edema which is tolerable and manageable with wearing knee high compression socks and elevation and she does not feel it necessary at this time to proceed with US scans to evaluate for venous insufficiency as possible cause of edema.   She agreed to PRN future visit should LE edema no longer be managed with compression socks and/or elevation and she will continue with her current compression socks.     --  continue follow up care with pain management at their recommendation    --  Elevate PRN LE's when sitting and/or lying for management of LE edema.     --  Follow up with general practitioner for other medical concerns and management.     Thank You Dr. Arceo  for referral of your patient to our practice for care.     Total time spent for this encounter 15 minutes.    Mikie Kumar, APRN - CNP

## 2023-03-10 DIAGNOSIS — M54.16 LUMBAR RADICULOPATHY: ICD-10-CM

## 2023-03-10 DIAGNOSIS — M25.551 PAIN OF RIGHT HIP JOINT: ICD-10-CM

## 2023-03-10 DIAGNOSIS — M47.817 LUMBOSACRAL SPONDYLOSIS WITHOUT MYELOPATHY: ICD-10-CM

## 2023-03-10 DIAGNOSIS — M47.812 CERVICAL SPONDYLOSIS WITHOUT MYELOPATHY: ICD-10-CM

## 2023-03-10 DIAGNOSIS — M17.10 KNEE ARTHROPATHY: ICD-10-CM

## 2023-03-10 RX ORDER — CELECOXIB 100 MG/1
CAPSULE ORAL
Qty: 30 CAPSULE | Refills: 2 | OUTPATIENT
Start: 2023-03-10

## 2023-03-14 ENCOUNTER — OFFICE VISIT (OUTPATIENT)
Dept: NEUROSURGERY | Age: 63
End: 2023-03-14
Payer: MEDICARE

## 2023-03-14 VITALS
HEIGHT: 61 IN | WEIGHT: 142 LBS | TEMPERATURE: 97.3 F | BODY MASS INDEX: 26.81 KG/M2 | DIASTOLIC BLOOD PRESSURE: 62 MMHG | SYSTOLIC BLOOD PRESSURE: 138 MMHG

## 2023-03-14 DIAGNOSIS — M54.12 CERVICAL MYELOPATHY WITH CERVICAL RADICULOPATHY (HCC): ICD-10-CM

## 2023-03-14 DIAGNOSIS — G89.29 ELBOW PAIN, CHRONIC, LEFT: Primary | ICD-10-CM

## 2023-03-14 DIAGNOSIS — M54.16 LUMBAR RADICULOPATHY: ICD-10-CM

## 2023-03-14 DIAGNOSIS — M25.522 ELBOW PAIN, CHRONIC, LEFT: Primary | ICD-10-CM

## 2023-03-14 DIAGNOSIS — G95.9 CERVICAL MYELOPATHY WITH CERVICAL RADICULOPATHY (HCC): ICD-10-CM

## 2023-03-14 DIAGNOSIS — G56.03 CARPAL TUNNEL SYNDROME, BILATERAL: ICD-10-CM

## 2023-03-14 PROCEDURE — 99214 OFFICE O/P EST MOD 30 MIN: CPT | Performed by: NURSE PRACTITIONER

## 2023-03-14 PROCEDURE — 3078F DIAST BP <80 MM HG: CPT | Performed by: NURSE PRACTITIONER

## 2023-03-14 PROCEDURE — 3075F SYST BP GE 130 - 139MM HG: CPT | Performed by: NURSE PRACTITIONER

## 2023-03-14 RX ORDER — GABAPENTIN 600 MG/1
600 TABLET ORAL 3 TIMES DAILY
Qty: 90 TABLET | Refills: 2 | Status: SHIPPED | OUTPATIENT
Start: 2023-03-14 | End: 2023-06-12

## 2023-03-14 ASSESSMENT — ENCOUNTER SYMPTOMS
BACK PAIN: 1
RESPIRATORY NEGATIVE: 1
CONSTIPATION: 0
DIARRHEA: 0

## 2023-03-14 NOTE — PROGRESS NOTES
Patient: Destiny Pires  YOB: 1960  Date: 3/14/23        Subjective:     Destiny Pires is a 57 y.o. female who complains today of:    Chief Complaint   Patient presents with    Back Pain    Neck Pain         Allergies:  Glucagon    Past Medical History:   Diagnosis Date    Arthritis     left hip    COPD (chronic obstructive pulmonary disease) ([address]     uses inhalers x 4 yrs    Hyperlipidemia     on meds x 10yrs    Hypertension 2018    Lung disease      Past Surgical History:   Procedure Laterality Date    BREAST BIOPSY Right 2003    benign    CERVICAL FUSION  2016    CERVICAL SPINE SURGERY  2004    AVM removal, fusion, embolism removal      SECTION  1979    COLONOSCOPY  2018    HYSTERECTOMY (CERVIX STATUS UNKNOWN)      [place] ARTHRP ACETBLR/PROX FEM PROSTC AGRFT/ALGRFT Left 2018    LEFT HIP TOTAL HIP ARTHROPLASTY performed by August Epley, MD at [address] History   Problem Relation Age of Onset    Breast Cancer Mother     Cancer Mother         liver ca    No Known Problems Brother     Obesity Son      Social History     Socioeconomic History    Marital status: Single     Spouse name: Not on file    Number of children: Not on file    Years of education: Not on file    Highest education level: Not on file   Occupational History    Not on file   Tobacco Use    Smoking status: Former     Packs/day: 0.50     Years: 1.00     Pack years: 0.50     Types: Cigarettes     Start date: 2017     Quit date: 2019     Years since quittin.2     Passive exposure: Current (boyfriend smokes)    Smokeless tobacco: Never    Tobacco comments:     smoked at 15yrs old x 30 yrs 0.5ppd    Vaping Use    Vaping Use: Never used   Substance and Sexual Activity    Alcohol use: No     Alcohol/week: 0.0 standard drinks    Drug use: No    Sexual activity: Not Currently     Partners: Male   Other Topics Concern    Not on file   Social History Narrative         Lives With: Significant other Type of Home: House    Home Layout: One level    Home Access: Stairs to enter with rails    Entrance Stairs - Number of Steps: 6    Home Equipment: Rolling walker, Cane, Wheelchair-manual (BSC, leg )    ADL Assistance: Independent    Ambulation Assistance: Independent (with [employer])    Transfer Assistance: Independent    Additional Comments: Multiple neck surgeries with residual spasticity R UE/LE     Social Determinants of Health     Financial Resource Strain: Not on file   Food Insecurity: Not on file   Transportation Needs: Not on file   Physical Activity: Not on file   Stress: Not on file   Social Connections: Not on file   Intimate Partner Violence: Not on file   Housing Stability: Not on file       Current Outpatient Medications on File Prior to Visit   Medication Sig Dispense Refill    celecoxib (CELEBREX) 100 MG capsule Take 1 capsule by mouth daily 30 capsule 2    lidocaine (LMX) 4 % cream Apply a half dollar sized amount to intact skin topically up to twice daily as needed for pain 45 g 1    budesonide-formoterol (SYMBICORT) 160-4.5 MCG/ACT AERO TAKE 2 PUFFS BY MOUTH TWICE A DAY 30.6 each 6    fexofenadine (ALLEGRA) 180 MG tablet TAKE 1 TABLET BY MOUTH EVERY DAY      PROAIR  (90 Base) MCG/ACT inhaler TAKE 2 PUFFS BY MOUTH EVERY 6 HOURS AS NEEDED FOR WHEEZE 1 each 3    baclofen (LIORESAL) 10 MG tablet Take 10 mg by mouth 3 times daily      VENTOLIN  (90 Base) MCG/ACT inhaler Inhale 2 puffs into the lungs 4 times daily as needed for Wheezing 1 each 1    albuterol sulfate HFA (VENTOLIN HFA) 108 (90 Base) MCG/ACT inhaler Inhale 2 puffs into the lungs every 6 hours as needed for Wheezing 1 Inhaler 3    Multiple Vitamins-Minerals (MULTIVITAMIN ADULT PO) Take 1 tablet by mouth      POLYETHYLENE GLYCOL 3350 PO Take 17 g by mouth      fluticasone (FLONASE) 50 MCG/ACT nasal spray 1 spray by NOT APPLICABLE route      aspirin 81 MG EC tablet Take 1 tablet by mouth 2 times daily 30 tablet 0    Multiple Vitamins-Minerals (THERAPEUTIC MULTIVITAMIN-MINERALS) tablet Take 1 tablet by mouth daily      Magnesium Oxide 500 MG CAPS Take 1 capsule by mouth 2 times daily      oxybutynin (DITROPAN-XL) 10 MG extended release tablet Take 10 mg by mouth daily      simvastatin (ZOCOR) 10 MG tablet Take 10 mg by mouth nightly      Oxygen Concentrator Inhale 3 L into the lungs continuous       DULoxetine (CYMBALTA) 60 MG capsule       albuterol (PROVENTIL) (2.5 MG/3ML) 0.083% nebulizer solution Take 3 mLs by nebulization every 6 hours as needed for Wheezing 120 each 3     Current Facility-Administered Medications on File Prior to Visit   Medication Dose Route Frequency Provider Last Rate Last Admin    iopamidol (ISOVUE-300) 61 % injection 2 mL  2 mL Other ONCE PRN Opal Herring MD        dexamethasone (PF) (DECADRON) injection 10 mg  10 mg Other Once Palma Maradiaga MD             26-year-old female following up for chronic pain. Pain to low back, neck and left elbow. She had vascular medicine consult on 3/9/2023. Right lower leg is painful at times. CHI studies normal.  No further testing indicated. She uses a walker, right side weakness from history of brain surgery. She takes Celebrex 100 mg daily and gabapentin 600 mg 3 times daily which helped with remaining functional with chores, ADLs and maintaining her quality of life. She states she needs a total right knee replacement. Needs to take a holiday from the Celebrex this month. HX: COPD requiring 2L oxygen at night. HX:  6/14/16 she had decomressive surgery C2-6 and disectomy C3-4 anterior approach with Dr. Queen Gao, [facility]. 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 Back Pain  Associated symptoms include weakness. Pertinent negatives include no headaches or numbness. Neck Pain   Associated symptoms include weakness. Pertinent negatives include no headaches or numbness.      Review of Systems Constitutional: Negative. Negative for activity change and unexpected weight change. HENT: Negative. Negative for hearing loss. Respiratory: Negative. Cardiovascular:  Negative for leg swelling. Gastrointestinal:  Negative for constipation and diarrhea. Genitourinary: Negative. Musculoskeletal:  Positive for back pain and neck pain. Negative for gait problem and joint swelling. Skin:  Negative for rash. Neurological:  Positive for weakness. Negative for dizziness, numbness and headaches. Psychiatric/Behavioral: Negative. Negative for sleep disturbance. Objective:     Vitals:  /62 (Site: Left Upper Arm)   Temp 97.3 °F (36.3 °C) (Temporal)   Ht 5' 1\" (1.549 m)   Wt 142 lb (64.4 kg)   LMP  (LMP Unknown)   BMI 26.83 kg/m² Pain Score:   2    Physical Exam  Vitals reviewed. Constitutional:       Appearance: She is well-developed. HENT:      Head: Normocephalic. Nose: Nose normal.   Pulmonary:      Effort: Pulmonary effort is normal.   Musculoskeletal:      Left elbow: Tenderness present in medial epicondyle. Cervical back: Neck supple. Spasms and tenderness present. Pain with movement present. Decreased range of motion. Lumbar back: No tenderness. Decreased range of motion. Negative right straight leg raise test and negative left straight leg raise test.      Comments: Right leg weakness, uses her arms to move her leg  1+ edema right leg   Lymphadenopathy:      Cervical: No cervical adenopathy. Skin:     General: Skin is warm and dry. Findings: No erythema or rash. Neurological:      Mental Status: She is alert and oriented to person, place, and time. Cranial Nerves: No cranial nerve deficit. Deep Tendon Reflexes: Reflexes are normal and symmetric. Reflexes normal.   Psychiatric:         Mood and Affect: Mood normal.         Behavior: Behavior normal.         Thought Content:  Thought content normal.         Judgment: Judgment normal. Outside record review:  MRI C-spine 1/9/2023: Motion artifact. Scoliosis. ACDF C3-4. Posterior surgical fusion C2-C6. Myelomalacia from C3 down to C6, worse at C3-4. Degenerative disc disease and facet arthropathy. C2-3 normal foramen, posteriorly fused. C3-4 minimal stenosis, normal foramen, fused posteriorly. C4-5 normal foramen, fused posteriorly, up to moderate spinal stenosis. C5-6 normal canal foramen, fused posteriorly. C6-7 normal canal foramen. C7-T1 normal canal foramen. EMG B LE 1/18/23: This study is limited, but abnormal: There is limited electrodiagnostic evidence for a Right L5-S1 lumbosacral radiculitis. Although limited, there is no clear electrodiagnostic evidence for a generalized large fiber sensorimotor peripheral polyneuropathy. EMG B UE 1/13/23: This study is limited, but abnormal: There is limited electrodiagnostic evidence for a Left ulnar mononeuropathy at or proximal to the left flexor carpi ulnaris. There are limited changes on electromyography. Conduction velocity across the left elbow is preserved in two motor studies. There is current electrodiagnostic evidence for a bilateral median mononeuropathy at the wrist consistent with a clinical diagnosis of Bilateral carpal tunnel syndrome. This is mild in degree by electrical criteria bilaterally. There is no active denervation on electromyography bilaterally. There is no current evidence for an active bilateral cervical motor radiculopathy or generalized large fiber sensorimotor peripheral polyneuropathy. MRI R knee 10/1/2021: Osseous lesions distal femoral proximal tibial and patella.,  Possible bone infarcts. Recommended dedicated x-rays tibia and fibula to assess stability. MRI L-spine 7/17/2021: Degenerative disc disease and facet arthropathy. Scoliosis. Iliopsoas muscle on the left fatty atrophy. T10-11, T11-12, T12-L1, L1-L2 normal canal foramen.   L2-L3 moderate left foraminal narrowing, normal right foramen, normal canal.  L3-L4 normal canal, very mild bilateral foraminal narrowing. L4-L5 left-sided disc bulge, normal canal, moderate left and up to moderate right foraminal narrowing. L5-S1 severe right foraminal stenosis, up to severe left foraminal stenosis, normal canal.  Localizer: Left total hip replacement, left kidney cyst.  XR L-spine 6/17/2021: No fracture, degenerative disc disease and facet arthropathy  MRI R hip 3/19/2021: Mild hip osteoarthritis, high-grade tear right common hamstring tendon at the ischial tuberosity. 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 Contrast study. Anterolisthesis C3 and C4. Absence of the posterior elements noted from C3-C6. Degenerative disc disease. Abnormal cervical cord. Severe cervical cord narrowing C3-4 to C5-6, area 1.4 cm hyperintensity right lateral aspect cervical cord at the level C5-6 consistent with myelomalacia. Assessment:        Diagnosis Orders   1. Elbow pain, chronic, left  KS INJECTION AA&/STRD OTHER PERIPHERAL NERVE/BRANCH      2. Lumbar radiculopathy  gabapentin (NEURONTIN) 600 MG tablet      3. Cervical myelopathy with cervical radiculopathy (HCC)        4. Carpal tunnel syndrome, bilateral            Plan:          Orders Placed This Encounter   Medications    gabapentin (NEURONTIN) 600 MG tablet     Sig: Take 1 tablet by mouth 3 times daily for 90 days.      Dispense:  90 tablet     Refill:  2         Orders Placed This Encounter   Procedures    [place] INJECTION AA&/STRD OTHER PERIPHERAL NERVE/BRANCH     Ulnar block     Standing Status:   Future     Standing Expiration Date:   6/12/2023     Refill gabapentin 600 mg 3 times daily #90 with 2 refills  Left ulnar block for pain and function  Take a holiday from Celebrex this month and she will call for refills next month  Follow-up in 3 months      Follow up:  Return in about 3 months (around 6/14/2023) for f/u after procedure and reassess pain/medications.     Saintclair Kiang, CHIRAG - CNP

## 2023-03-15 ENCOUNTER — TELEPHONE (OUTPATIENT)
Dept: PAIN MANAGEMENT | Age: 63
End: 2023-03-15

## 2023-03-15 NOTE — TELEPHONE ENCOUNTER
LEFT ULNAR NERVE BLOCK    NO AUTH REQUIRED    OK to schedule procedure approved as above. Please note sides/levels approved and date range. (If applicable, sides/levels approved may differ from those ordered)    TO BE SCHEDULED WITH DR. Coby Michel

## 2023-04-05 ENCOUNTER — PROCEDURE VISIT (OUTPATIENT)
Dept: PAIN MANAGEMENT | Age: 63
End: 2023-04-05

## 2023-04-05 DIAGNOSIS — G56.22 NEURITIS OF LEFT ULNAR NERVE: Primary | ICD-10-CM

## 2023-04-05 RX ORDER — BETAMETHASONE SODIUM PHOSPHATE AND BETAMETHASONE ACETATE 3; 3 MG/ML; MG/ML
3 INJECTION, SUSPENSION INTRA-ARTICULAR; INTRALESIONAL; INTRAMUSCULAR; SOFT TISSUE ONCE
Status: COMPLETED | OUTPATIENT
Start: 2023-04-05 | End: 2023-04-05

## 2023-04-05 RX ORDER — LIDOCAINE HYDROCHLORIDE 10 MG/ML
2.5 INJECTION, SOLUTION INFILTRATION; PERINEURAL ONCE
Status: COMPLETED | OUTPATIENT
Start: 2023-04-05 | End: 2023-04-05

## 2023-04-05 RX ADMIN — BETAMETHASONE SODIUM PHOSPHATE AND BETAMETHASONE ACETATE 3 MG: 3; 3 INJECTION, SUSPENSION INTRA-ARTICULAR; INTRALESIONAL; INTRAMUSCULAR; SOFT TISSUE at 12:49

## 2023-04-05 RX ADMIN — LIDOCAINE HYDROCHLORIDE 2.5 ML: 10 INJECTION, SOLUTION INFILTRATION; PERINEURAL at 12:49

## 2023-04-05 NOTE — PROGRESS NOTES
Ulnar Nerve Block Corticosteroid Injection under Ultrasound Guidance      Patient Name: Bonilla Garcia   : 1960  Date: 2023     Provider: Pawan Perrin MD        PROCEDURE: Left ulnar nerve block at the elbow under ultrasound guidance    INDICATIONS: Discussed the risks of the steroid injection procedure includes but is not limited to bleeding, artery rupture, hematoma, nerve compression, infection, local skin irritation, skin atrophy, calcification, skin color and pigmentation changes, worsened pain and irritation, burning, damage to surrounding structures, side effects, toxicity, allergic reactions to medications used, immune and stress-response dysfunction, fat necrosis, decreased bone mineralization, increased fracture risk, blood sugar elevation, need for surgery, premature damage or degeneration of the nearby joints, loss of use of the nerve and hand/arm function, as well as catastrophic injury such as vision loss, paralysis, stroke, loss of use of the arm, wrist and hand, and death. Discussed the risks, benefits, alternative procedures, and alternatives to the procedure including no procedure at all. Discussed that we cannot undo any permanent neurologic or orthopaedic damage or change the course of any underlying disease. After thorough discussion, patient expressed complete understanding and willingness to proceed. Description of Procedure: The site was marked. A time-out was performed. Ultrasound was used to identify the pertinent anatomy. Care was taken to identify a path to avoid any critical neurovascular structures. The site was then cleaned with alcohol three times and maintained in a sterile manner throughout the entire procedure. Then under ultrasound guidance, a 27-gauge 1.5 inch needle was advanced to the ulnar nerve at multiple sites including approximately 1 inch proximal to the elbow, half inch proximal to the elbow, and at the level of the median epicondyle.  Aspiration for blood

## 2023-04-13 ENCOUNTER — OFFICE VISIT (OUTPATIENT)
Dept: PULMONOLOGY | Age: 63
End: 2023-04-13
Payer: MEDICARE

## 2023-04-13 VITALS
SYSTOLIC BLOOD PRESSURE: 116 MMHG | TEMPERATURE: 97.1 F | OXYGEN SATURATION: 93 % | HEART RATE: 103 BPM | DIASTOLIC BLOOD PRESSURE: 64 MMHG

## 2023-04-13 DIAGNOSIS — J44.9 CHRONIC OBSTRUCTIVE PULMONARY DISEASE, UNSPECIFIED COPD TYPE (HCC): ICD-10-CM

## 2023-04-13 DIAGNOSIS — Z87.891 PERSONAL HISTORY OF SMOKING: ICD-10-CM

## 2023-04-13 DIAGNOSIS — R09.02 HYPOXIA: ICD-10-CM

## 2023-04-13 DIAGNOSIS — R91.1 NODULE OF RIGHT LUNG: Primary | ICD-10-CM

## 2023-04-13 PROCEDURE — 3074F SYST BP LT 130 MM HG: CPT | Performed by: INTERNAL MEDICINE

## 2023-04-13 PROCEDURE — 3078F DIAST BP <80 MM HG: CPT | Performed by: INTERNAL MEDICINE

## 2023-04-13 PROCEDURE — 1123F ACP DISCUSS/DSCN MKR DOCD: CPT | Performed by: INTERNAL MEDICINE

## 2023-04-13 PROCEDURE — 99214 OFFICE O/P EST MOD 30 MIN: CPT | Performed by: INTERNAL MEDICINE

## 2023-04-13 RX ORDER — ALBUTEROL SULFATE 90 UG/1
AEROSOL, METERED RESPIRATORY (INHALATION)
Qty: 1 EACH | Refills: 3 | Status: SHIPPED | OUTPATIENT
Start: 2023-04-13

## 2023-04-13 ASSESSMENT — ENCOUNTER SYMPTOMS
SHORTNESS OF BREATH: 1
EYE ITCHING: 0
VOICE CHANGE: 0
NAUSEA: 0
TROUBLE SWALLOWING: 0
VOMITING: 0
DIARRHEA: 0
ABDOMINAL PAIN: 0
WHEEZING: 0
RHINORRHEA: 0
COUGH: 0
EYE DISCHARGE: 0
SINUS PRESSURE: 0
SORE THROAT: 0
CHEST TIGHTNESS: 0

## 2023-05-22 ENCOUNTER — TELEPHONE (OUTPATIENT)
Dept: PULMONOLOGY | Age: 63
End: 2023-05-22

## 2023-05-22 DIAGNOSIS — J44.9 CHRONIC OBSTRUCTIVE PULMONARY DISEASE, UNSPECIFIED COPD TYPE (HCC): Primary | ICD-10-CM

## 2023-05-22 NOTE — TELEPHONE ENCOUNTER
PT'S INSURANCE WILL NOT COVER THE SYMBICORT BUT THEY STATE THEY WILL COVER ACETYLCYSTEINE. PATIENT IS ASKING IF YOU CAN PRESCRIBE THAT OR SOMETHING ELSE COMPARABLE TO SYMBICORT. PLEASE ADVISE.

## 2023-06-01 ENCOUNTER — TELEPHONE (OUTPATIENT)
Dept: PAIN MANAGEMENT | Age: 63
End: 2023-06-01

## 2023-06-19 ENCOUNTER — OFFICE VISIT (OUTPATIENT)
Dept: PAIN MANAGEMENT | Age: 63
End: 2023-06-19
Payer: MEDICARE

## 2023-06-19 VITALS
BODY MASS INDEX: 26.81 KG/M2 | TEMPERATURE: 97 F | SYSTOLIC BLOOD PRESSURE: 112 MMHG | HEIGHT: 61 IN | DIASTOLIC BLOOD PRESSURE: 64 MMHG | WEIGHT: 142 LBS

## 2023-06-19 DIAGNOSIS — G89.29 CHRONIC PAIN OF RIGHT KNEE: ICD-10-CM

## 2023-06-19 DIAGNOSIS — M47.812 CERVICAL SPONDYLOSIS WITHOUT MYELOPATHY: ICD-10-CM

## 2023-06-19 DIAGNOSIS — M47.817 LUMBOSACRAL SPONDYLOSIS WITHOUT MYELOPATHY: ICD-10-CM

## 2023-06-19 DIAGNOSIS — M54.16 LUMBAR RADICULOPATHY: Primary | ICD-10-CM

## 2023-06-19 DIAGNOSIS — M25.561 CHRONIC PAIN OF RIGHT KNEE: ICD-10-CM

## 2023-06-19 DIAGNOSIS — M25.551 PAIN OF RIGHT HIP JOINT: ICD-10-CM

## 2023-06-19 DIAGNOSIS — M17.10 KNEE ARTHROPATHY: ICD-10-CM

## 2023-06-19 PROCEDURE — 3074F SYST BP LT 130 MM HG: CPT | Performed by: PHYSICAL MEDICINE & REHABILITATION

## 2023-06-19 PROCEDURE — 1123F ACP DISCUSS/DSCN MKR DOCD: CPT | Performed by: PHYSICAL MEDICINE & REHABILITATION

## 2023-06-19 PROCEDURE — 99214 OFFICE O/P EST MOD 30 MIN: CPT | Performed by: PHYSICAL MEDICINE & REHABILITATION

## 2023-06-19 PROCEDURE — 3078F DIAST BP <80 MM HG: CPT | Performed by: PHYSICAL MEDICINE & REHABILITATION

## 2023-06-19 RX ORDER — CELECOXIB 100 MG/1
100 CAPSULE ORAL DAILY
Qty: 60 CAPSULE | Refills: 0 | Status: SHIPPED | OUTPATIENT
Start: 2023-06-19 | End: 2023-09-17

## 2023-06-19 RX ORDER — GABAPENTIN 600 MG/1
600 TABLET ORAL 3 TIMES DAILY
Qty: 90 TABLET | Refills: 2 | Status: SHIPPED | OUTPATIENT
Start: 2023-06-19 | End: 2023-09-17

## 2023-06-19 ASSESSMENT — ENCOUNTER SYMPTOMS
CONSTIPATION: 0
BACK PAIN: 1
NAUSEA: 0
SHORTNESS OF BREATH: 0
DIARRHEA: 0

## 2023-06-19 NOTE — PROGRESS NOTES
Rashida Elizabeth  (1960)    6/19/2023    Subjective:     Rashida Elizabeth is 70 y.o. female who complains today of:    Chief Complaint   Patient presents with    Follow-up    Knee Pain     Both, Right more so    Leg Pain     Both, Right more so     Last seen by me 2/13/23, last seen 3/14/23: seen by Dr Bailey Cameron, advised compression socks. Left ulnar nerve block 4/5/23 provided her with greater 75% pain relief, she is pleased, her elbow and arm are feeling better. Follows with Pulm. She is overwhelmed at this time, told that Currently in therapy. Never saw Ortho for bone infarct. No updates on kidney cyst.  XR R Knee XR L Elbow , XR C Spine, XR LS Spine done, reviewed below. She is requesting refill on Celebrex. She states her kidneys are fine. No other tests therapy or updates from other physicians, No ER visits. Gabapentin 600 mg TID and Celebrex 100 mg daily help with remaining functional with chores, personal hygiene, remaining compliant with home exercise program, maintaining her quality of life, and performing activities of daily living. She obtains greater than 50% pain relief without any significant side effects. She is clear to avoid driving or operating heavy machinery or to perform any activities where she may harm herself or others while on pain medications. Low back pain is a 3/10. Gets to a 5/10. Located in both sides of her low back. Has pain located in the right low back and down the right leg. It has been a constant ache for over 10 years. Worse with walking and activity. Pain is better with rest and pain medicine as well as injections. There are no other associated symptoms or contextual factors. She denies any classic radicular symptoms, new weakness, saddle anesthesia, bowel or bladder dysfunction, or falls. Neck pain is a 3/10. Gets to a 4/10. Her pain is located on the right worse than the left. No arm pain. Worse with turning her neck and activity.  Pain is better with rest and

## 2023-06-20 ENCOUNTER — TELEPHONE (OUTPATIENT)
Dept: PAIN MANAGEMENT | Age: 63
End: 2023-06-20

## 2023-06-20 NOTE — TELEPHONE ENCOUNTER
RIGHT KNEE CORTICOSTEROID INJECTION    NO AUTH REQUIRED    OK to schedule procedure approved as above. Please note sides/levels approved and date range. (If applicable, sides/levels approved may differ from those ordered)    TO BE SCHEDULED WITH DR. Holger Roman

## 2023-06-20 NOTE — TELEPHONE ENCOUNTER
RIGHT L5/S1 TRANSFORAMINAL     NO AUTH REQUIRED    OK to schedule procedure approved as above. Please note sides/levels approved and date range. (If applicable, sides/levels approved may differ from those ordered)    TO BE SCHEDULED WITH DR. Giacomo Berg

## 2023-06-22 ENCOUNTER — PROCEDURE VISIT (OUTPATIENT)
Dept: PAIN MANAGEMENT | Age: 63
End: 2023-06-22

## 2023-06-22 DIAGNOSIS — M54.16 LUMBAR RADICULOPATHY: Primary | ICD-10-CM

## 2023-06-22 RX ORDER — LIDOCAINE HYDROCHLORIDE 10 MG/ML
5 INJECTION, SOLUTION INFILTRATION; PERINEURAL ONCE
Status: COMPLETED | OUTPATIENT
Start: 2023-06-22 | End: 2023-06-22

## 2023-06-22 RX ORDER — DEXAMETHASONE SODIUM PHOSPHATE 10 MG/ML
5 INJECTION, SOLUTION INTRAMUSCULAR; INTRAVENOUS ONCE
Status: COMPLETED | OUTPATIENT
Start: 2023-06-22 | End: 2023-06-22

## 2023-06-22 RX ORDER — SODIUM CHLORIDE 9 MG/ML
3 INJECTION INTRAVENOUS ONCE
Status: COMPLETED | OUTPATIENT
Start: 2023-06-22 | End: 2023-06-22

## 2023-06-22 RX ADMIN — LIDOCAINE HYDROCHLORIDE 5 ML: 10 INJECTION, SOLUTION INFILTRATION; PERINEURAL at 17:31

## 2023-06-22 RX ADMIN — SODIUM CHLORIDE 3 ML: 9 INJECTION INTRAVENOUS at 17:32

## 2023-06-22 RX ADMIN — DEXAMETHASONE SODIUM PHOSPHATE 5 MG: 10 INJECTION, SOLUTION INTRAMUSCULAR; INTRAVENOUS at 17:31

## 2023-06-22 RX ADMIN — Medication 0.5 MEQ: at 17:32

## 2023-06-22 NOTE — PROGRESS NOTES
Lumbar Transforaminal Epidural Steroid Injection (TFESI)    Patient Name: Amelie Tellez   : 1960  Date: 2023     Physician: Abdirashid Walker MD     INDICATIONS: Amelie Tellez is a 70 y.o. female who presents with symptoms and physical exam findings consistent with lumbar radiculopathy. She has lumbosacral paresthesias with a positive straight leg raise on physical exam. She has had persistent pain that limits her activities of daily living. The pain is persistent despite conservative measures. She has significant functional and psychological impairment due to this condition. Given her symptoms, physical exam findings, impairment in activities of daily living, and lack of response to conservative measures, consideration for lumbar transforaminal epidural corticosteroid injection was given. Discussed the risks including but not limited to bleeding, infection, worsened pain, damage to surrounding structures, side effects, toxicity, allergic reactions to medications used, need for surgery, headache, vision changes, difficulty with chewing and/or swallowing, immune and stress-response dysfunction, fat necrosis, skin pigmentation changes, blood sugar elevation, as well as catastrophic injury such as vision loss/blindness, paralysis, spinal cord or plexus injury, cerebral brainstem or spinal cord infarction, intrathecal injection, spinal cord puncture, arachnoiditis, discitis, stroke, bowel/bladder/sexual dysfunction, ventilator dependence, loss of use of the arms and/or legs, and death. Discussed off-label use of corticosteroids and how the Food and Drug Administration (FDA) has not approved corticosteroids for epidural use. Discussed the risks, benefits, alternative procedures, and alternatives to the procedure including no procedure at all. Discussed that we cannot undo any permanent neurologic or orthopaedic damage or change the course of any underlying disease.  After thorough discussion, patient expressed

## 2023-06-29 ENCOUNTER — PROCEDURE VISIT (OUTPATIENT)
Dept: PAIN MANAGEMENT | Age: 63
End: 2023-06-29
Payer: MEDICARE

## 2023-06-29 DIAGNOSIS — M25.561 CHRONIC PAIN OF RIGHT KNEE: Primary | ICD-10-CM

## 2023-06-29 DIAGNOSIS — G89.29 CHRONIC PAIN OF RIGHT KNEE: Primary | ICD-10-CM

## 2023-06-29 PROCEDURE — 77002 NEEDLE LOCALIZATION BY XRAY: CPT | Performed by: PHYSICAL MEDICINE & REHABILITATION

## 2023-06-29 PROCEDURE — 20610 DRAIN/INJ JOINT/BURSA W/O US: CPT | Performed by: PHYSICAL MEDICINE & REHABILITATION

## 2023-06-29 RX ORDER — LIDOCAINE HYDROCHLORIDE 10 MG/ML
5 INJECTION, SOLUTION INFILTRATION; PERINEURAL ONCE
Status: COMPLETED | OUTPATIENT
Start: 2023-06-29 | End: 2023-06-29

## 2023-06-29 RX ORDER — BETAMETHASONE SODIUM PHOSPHATE AND BETAMETHASONE ACETATE 3; 3 MG/ML; MG/ML
6 INJECTION, SUSPENSION INTRA-ARTICULAR; INTRALESIONAL; INTRAMUSCULAR; SOFT TISSUE ONCE
Status: COMPLETED | OUTPATIENT
Start: 2023-06-29 | End: 2023-06-29

## 2023-06-29 RX ADMIN — BETAMETHASONE SODIUM PHOSPHATE AND BETAMETHASONE ACETATE 6 MG: 3; 3 INJECTION, SUSPENSION INTRA-ARTICULAR; INTRALESIONAL; INTRAMUSCULAR; SOFT TISSUE at 13:55

## 2023-06-29 RX ADMIN — Medication 0.5 MEQ: at 13:56

## 2023-06-29 RX ADMIN — LIDOCAINE HYDROCHLORIDE 5 ML: 10 INJECTION, SOLUTION INFILTRATION; PERINEURAL at 13:55

## 2023-07-05 ENCOUNTER — OFFICE VISIT (OUTPATIENT)
Dept: PULMONOLOGY | Age: 63
End: 2023-07-05
Payer: MEDICARE

## 2023-07-05 VITALS
SYSTOLIC BLOOD PRESSURE: 130 MMHG | HEART RATE: 90 BPM | BODY MASS INDEX: 26.07 KG/M2 | WEIGHT: 138 LBS | DIASTOLIC BLOOD PRESSURE: 64 MMHG | OXYGEN SATURATION: 91 % | TEMPERATURE: 97.3 F

## 2023-07-05 DIAGNOSIS — R91.1 NODULE OF RIGHT LUNG: ICD-10-CM

## 2023-07-05 DIAGNOSIS — R09.02 HYPOXIA: ICD-10-CM

## 2023-07-05 DIAGNOSIS — Z87.891 PERSONAL HISTORY OF SMOKING: ICD-10-CM

## 2023-07-05 DIAGNOSIS — J44.9 CHRONIC OBSTRUCTIVE PULMONARY DISEASE, UNSPECIFIED COPD TYPE (HCC): Primary | ICD-10-CM

## 2023-07-05 PROCEDURE — 3078F DIAST BP <80 MM HG: CPT | Performed by: INTERNAL MEDICINE

## 2023-07-05 PROCEDURE — 99214 OFFICE O/P EST MOD 30 MIN: CPT | Performed by: INTERNAL MEDICINE

## 2023-07-05 PROCEDURE — 3074F SYST BP LT 130 MM HG: CPT | Performed by: INTERNAL MEDICINE

## 2023-07-05 PROCEDURE — 1123F ACP DISCUSS/DSCN MKR DOCD: CPT | Performed by: INTERNAL MEDICINE

## 2023-07-05 RX ORDER — BUDESONIDE AND FORMOTEROL FUMARATE DIHYDRATE 160; 4.5 UG/1; UG/1
AEROSOL RESPIRATORY (INHALATION)
Qty: 30.6 EACH | Refills: 6 | Status: SHIPPED | OUTPATIENT
Start: 2023-07-05

## 2023-07-05 RX ORDER — ALBUTEROL SULFATE 2.5 MG/3ML
2.5 SOLUTION RESPIRATORY (INHALATION) EVERY 6 HOURS PRN
Qty: 120 EACH | Refills: 3 | Status: SHIPPED | OUTPATIENT
Start: 2023-07-05 | End: 2023-08-04

## 2023-07-05 ASSESSMENT — ENCOUNTER SYMPTOMS
RHINORRHEA: 0
VOMITING: 0
SORE THROAT: 0
EYE DISCHARGE: 0
NAUSEA: 0
ABDOMINAL PAIN: 0
WHEEZING: 0
EYE ITCHING: 0
COUGH: 0
DIARRHEA: 0
TROUBLE SWALLOWING: 0
SINUS PRESSURE: 0
SHORTNESS OF BREATH: 0
VOICE CHANGE: 0
CHEST TIGHTNESS: 0

## 2023-08-14 DIAGNOSIS — M17.10 KNEE ARTHROPATHY: ICD-10-CM

## 2023-08-14 DIAGNOSIS — M25.551 PAIN OF RIGHT HIP JOINT: ICD-10-CM

## 2023-08-14 DIAGNOSIS — G89.29 CHRONIC PAIN OF RIGHT KNEE: ICD-10-CM

## 2023-08-14 DIAGNOSIS — M25.561 CHRONIC PAIN OF RIGHT KNEE: ICD-10-CM

## 2023-08-14 DIAGNOSIS — M54.16 LUMBAR RADICULOPATHY: ICD-10-CM

## 2023-08-14 DIAGNOSIS — M47.817 LUMBOSACRAL SPONDYLOSIS WITHOUT MYELOPATHY: ICD-10-CM

## 2023-08-14 DIAGNOSIS — M47.812 CERVICAL SPONDYLOSIS WITHOUT MYELOPATHY: ICD-10-CM

## 2023-08-14 RX ORDER — CELECOXIB 100 MG/1
CAPSULE ORAL
Qty: 60 CAPSULE | Refills: 0 | OUTPATIENT
Start: 2023-08-14

## 2023-08-14 NOTE — TELEPHONE ENCOUNTER
Requested Prescriptions     Pending Prescriptions Disp Refills    celecoxib (CELEBREX) 100 MG capsule [Pharmacy Med Name: CELECOXIB 100 MG CAPSULE] 60 capsule 0     Sig: TAKE 1 CAPSULE BY MOUTH EVERY DAY       Patient last seen on: 06/29/23  Date of last refill: 06/19/23  Future appts: 09/19/23

## 2023-09-19 ENCOUNTER — OFFICE VISIT (OUTPATIENT)
Dept: PAIN MANAGEMENT | Age: 63
End: 2023-09-19
Payer: MEDICARE

## 2023-09-19 VITALS
SYSTOLIC BLOOD PRESSURE: 130 MMHG | BODY MASS INDEX: 27.19 KG/M2 | TEMPERATURE: 97.3 F | HEIGHT: 61 IN | WEIGHT: 144 LBS | DIASTOLIC BLOOD PRESSURE: 64 MMHG

## 2023-09-19 DIAGNOSIS — M47.812 CERVICAL SPONDYLOSIS WITHOUT MYELOPATHY: ICD-10-CM

## 2023-09-19 DIAGNOSIS — M54.16 LUMBAR RADICULOPATHY: ICD-10-CM

## 2023-09-19 DIAGNOSIS — M25.551 PAIN OF RIGHT HIP JOINT: ICD-10-CM

## 2023-09-19 DIAGNOSIS — M17.10 KNEE ARTHROPATHY: ICD-10-CM

## 2023-09-19 DIAGNOSIS — M25.561 CHRONIC PAIN OF RIGHT KNEE: ICD-10-CM

## 2023-09-19 DIAGNOSIS — G89.29 CHRONIC PAIN OF RIGHT KNEE: ICD-10-CM

## 2023-09-19 DIAGNOSIS — M47.817 LUMBOSACRAL SPONDYLOSIS WITHOUT MYELOPATHY: ICD-10-CM

## 2023-09-19 PROCEDURE — 1123F ACP DISCUSS/DSCN MKR DOCD: CPT | Performed by: NURSE PRACTITIONER

## 2023-09-19 PROCEDURE — 3078F DIAST BP <80 MM HG: CPT | Performed by: NURSE PRACTITIONER

## 2023-09-19 PROCEDURE — 3075F SYST BP GE 130 - 139MM HG: CPT | Performed by: NURSE PRACTITIONER

## 2023-09-19 PROCEDURE — 99213 OFFICE O/P EST LOW 20 MIN: CPT | Performed by: NURSE PRACTITIONER

## 2023-09-19 RX ORDER — KETOROLAC TROMETHAMINE 5 MG/ML
SOLUTION OPHTHALMIC
COMMUNITY
Start: 2023-09-19

## 2023-09-19 RX ORDER — CELECOXIB 100 MG/1
100 CAPSULE ORAL DAILY
Qty: 60 CAPSULE | Refills: 0 | Status: SHIPPED | OUTPATIENT
Start: 2023-09-19 | End: 2023-12-18

## 2023-09-19 RX ORDER — GABAPENTIN 600 MG/1
600 TABLET ORAL 3 TIMES DAILY
Qty: 90 TABLET | Refills: 2 | Status: SHIPPED | OUTPATIENT
Start: 2023-09-19 | End: 2023-12-18

## 2023-09-19 ASSESSMENT — ENCOUNTER SYMPTOMS
BACK PAIN: 1
ABDOMINAL PAIN: 0
SORE THROAT: 0
SHORTNESS OF BREATH: 0

## 2023-09-19 NOTE — PROGRESS NOTES
drug abuse or diversion identified. BettyCHIRAG Glasgow CNP)    Orders Placed This Encounter   Medications    celecoxib (CELEBREX) 100 MG capsule     Sig: Take 1 capsule by mouth daily     Dispense:  60 capsule     Refill:  0    gabapentin (NEURONTIN) 600 MG tablet     Sig: Take 1 tablet by mouth 3 times daily for 90 days. Dispense:  90 tablet     Refill:  2       No orders of the defined types were placed in this encounter. Discussed options with the patient today. Does not need any injections at this time. Continue Gabapentin 600mg TID (for 90 days) and Celebrex 100 mg by mouth daily, Avoid all other NSAIDs and steroids. #60 for 90 days (take for 2 months, stop for last 1 month). All questions were answered. Pt verbalized understanding and agrees with above plan. Will continue medications for chronic pain as they help pt function with ADL and improve quality of life. [place] was reviewed. Follow up:  Return in about 3 months (around 12/19/2023) for review meds and reassess pain.     CHIRAG Menjivar CNP

## 2023-11-06 ENCOUNTER — OFFICE VISIT (OUTPATIENT)
Dept: PULMONOLOGY | Age: 63
End: 2023-11-06
Payer: MEDICARE

## 2023-11-06 VITALS
HEART RATE: 91 BPM | TEMPERATURE: 96.9 F | SYSTOLIC BLOOD PRESSURE: 114 MMHG | OXYGEN SATURATION: 91 % | DIASTOLIC BLOOD PRESSURE: 50 MMHG

## 2023-11-06 DIAGNOSIS — R91.1 NODULE OF RIGHT LUNG: ICD-10-CM

## 2023-11-06 DIAGNOSIS — R09.02 HYPOXIA: ICD-10-CM

## 2023-11-06 DIAGNOSIS — J44.9 CHRONIC OBSTRUCTIVE PULMONARY DISEASE, UNSPECIFIED COPD TYPE (HCC): Primary | ICD-10-CM

## 2023-11-06 DIAGNOSIS — Z87.891 PERSONAL HISTORY OF SMOKING: ICD-10-CM

## 2023-11-06 PROCEDURE — 1123F ACP DISCUSS/DSCN MKR DOCD: CPT | Performed by: INTERNAL MEDICINE

## 2023-11-06 PROCEDURE — 3078F DIAST BP <80 MM HG: CPT | Performed by: INTERNAL MEDICINE

## 2023-11-06 PROCEDURE — 99214 OFFICE O/P EST MOD 30 MIN: CPT | Performed by: INTERNAL MEDICINE

## 2023-11-06 PROCEDURE — 3074F SYST BP LT 130 MM HG: CPT | Performed by: INTERNAL MEDICINE

## 2023-11-06 ASSESSMENT — ENCOUNTER SYMPTOMS: SHORTNESS OF BREATH: 1

## 2023-11-12 ASSESSMENT — ENCOUNTER SYMPTOMS
SORE THROAT: 0
TROUBLE SWALLOWING: 0
CHEST TIGHTNESS: 0
EYE DISCHARGE: 0
COUGH: 0
WHEEZING: 0
ABDOMINAL PAIN: 0
VOMITING: 0
NAUSEA: 0
SINUS PRESSURE: 0
VOICE CHANGE: 0
DIARRHEA: 0
RHINORRHEA: 0
EYE ITCHING: 0

## 2023-11-14 DIAGNOSIS — M47.812 CERVICAL SPONDYLOSIS WITHOUT MYELOPATHY: ICD-10-CM

## 2023-11-14 DIAGNOSIS — M17.10 KNEE ARTHROPATHY: ICD-10-CM

## 2023-11-14 DIAGNOSIS — M25.551 PAIN OF RIGHT HIP JOINT: ICD-10-CM

## 2023-11-14 DIAGNOSIS — M25.561 CHRONIC PAIN OF RIGHT KNEE: ICD-10-CM

## 2023-11-14 DIAGNOSIS — M54.16 LUMBAR RADICULOPATHY: ICD-10-CM

## 2023-11-14 DIAGNOSIS — G89.29 CHRONIC PAIN OF RIGHT KNEE: ICD-10-CM

## 2023-11-14 DIAGNOSIS — M47.817 LUMBOSACRAL SPONDYLOSIS WITHOUT MYELOPATHY: ICD-10-CM

## 2023-11-14 RX ORDER — CELECOXIB 100 MG/1
CAPSULE ORAL DAILY
Qty: 60 CAPSULE | Refills: 0 | OUTPATIENT
Start: 2023-11-14

## 2023-12-12 ENCOUNTER — OFFICE VISIT (OUTPATIENT)
Dept: PAIN MANAGEMENT | Age: 63
End: 2023-12-12
Payer: MEDICARE

## 2023-12-12 VITALS
TEMPERATURE: 97.3 F | HEIGHT: 61 IN | WEIGHT: 144 LBS | DIASTOLIC BLOOD PRESSURE: 62 MMHG | SYSTOLIC BLOOD PRESSURE: 104 MMHG | BODY MASS INDEX: 27.19 KG/M2

## 2023-12-12 DIAGNOSIS — M54.16 LUMBAR RADICULOPATHY: ICD-10-CM

## 2023-12-12 DIAGNOSIS — M47.817 LUMBOSACRAL SPONDYLOSIS WITHOUT MYELOPATHY: ICD-10-CM

## 2023-12-12 DIAGNOSIS — M17.10 KNEE ARTHROPATHY: ICD-10-CM

## 2023-12-12 DIAGNOSIS — G89.29 CHRONIC PAIN OF RIGHT KNEE: ICD-10-CM

## 2023-12-12 DIAGNOSIS — M25.551 PAIN OF RIGHT HIP JOINT: ICD-10-CM

## 2023-12-12 DIAGNOSIS — M47.812 CERVICAL SPONDYLOSIS WITHOUT MYELOPATHY: ICD-10-CM

## 2023-12-12 DIAGNOSIS — M25.561 CHRONIC PAIN OF RIGHT KNEE: ICD-10-CM

## 2023-12-12 PROCEDURE — 99214 OFFICE O/P EST MOD 30 MIN: CPT | Performed by: NURSE PRACTITIONER

## 2023-12-12 PROCEDURE — 1123F ACP DISCUSS/DSCN MKR DOCD: CPT | Performed by: NURSE PRACTITIONER

## 2023-12-12 PROCEDURE — 3078F DIAST BP <80 MM HG: CPT | Performed by: NURSE PRACTITIONER

## 2023-12-12 PROCEDURE — 3074F SYST BP LT 130 MM HG: CPT | Performed by: NURSE PRACTITIONER

## 2023-12-12 RX ORDER — CELECOXIB 100 MG/1
100 CAPSULE ORAL DAILY
Qty: 60 CAPSULE | Refills: 0 | Status: SHIPPED | OUTPATIENT
Start: 2023-12-12 | End: 2024-03-11

## 2023-12-12 RX ORDER — GABAPENTIN 600 MG/1
600 TABLET ORAL 3 TIMES DAILY
Qty: 270 TABLET | Refills: 0 | Status: SHIPPED | OUTPATIENT
Start: 2023-12-12 | End: 2024-03-11

## 2023-12-12 ASSESSMENT — ENCOUNTER SYMPTOMS
ABDOMINAL PAIN: 0
SORE THROAT: 0
BACK PAIN: 1
SHORTNESS OF BREATH: 0

## 2023-12-12 NOTE — PROGRESS NOTES
Narrative         Lives With: Significant other    Type of Home: House    Home Layout: One level    Home Access: Stairs to enter with rails    Entrance Stairs - Number of Steps: 6    Home Equipment: Rolling walker, Cane, Wheelchair-manual (BSC, leg )    ADL Assistance: Independent    Ambulation Assistance: Independent (with [employer])    Transfer Assistance: Independent    Additional Comments: Multiple neck surgeries with residual spasticity R UE/LE     Social Determinants of Health     Financial Resource Strain: Not on file   Food Insecurity: Not on file   Transportation Needs: Not on file   Physical Activity: Not on file   Stress: Not on file   Social Connections: Not on file   Intimate Partner Violence: Not on file   Housing Stability: Not on file       Current Outpatient Medications on File Prior to Visit   Medication Sig Dispense Refill    ketorolac (ACULAR) 0.5 % ophthalmic solution 1 drop in the operative eye 4 times daily for 10 days after surgery, then taper as directed. (Patient not taking: Reported on 11/6/2023)      budesonide-formoterol (SYMBICORT) 160-4.5 MCG/ACT AERO TAKE 2 PUFFS BY MOUTH TWICE A DAY 30.6 each 6    albuterol (PROVENTIL) (2.5 MG/3ML) 0.083% nebulizer solution Take 3 mLs by nebulization every 6 hours as needed for Wheezing 120 each 3    mometasone-formoterol (DULERA) 200-5 MCG/ACT inhaler Inhale 2 puffs into the lungs in the morning and 2 puffs in the evening.  (Patient not taking: Reported on 11/6/2023) 1 each 1    albuterol sulfate HFA (PROAIR HFA) 108 (90 Base) MCG/ACT inhaler TAKE 2 PUFFS BY MOUTH EVERY 6 HOURS AS NEEDED FOR WHEEZE 1 each 3    lidocaine (LMX) 4 % cream Apply a half dollar sized amount to intact skin topically up to twice daily as needed for pain (Patient not taking: Reported on 11/6/2023) 45 g 1    fexofenadine (ALLEGRA) 180 MG tablet TAKE 1 TABLET BY MOUTH EVERY DAY      baclofen (LIORESAL) 10 MG tablet Take 1 tablet by mouth 3 times daily      POLYETHYLENE GLYCOL

## 2024-03-07 DIAGNOSIS — M25.561 CHRONIC PAIN OF RIGHT KNEE: ICD-10-CM

## 2024-03-07 DIAGNOSIS — M54.16 LUMBAR RADICULOPATHY: ICD-10-CM

## 2024-03-07 DIAGNOSIS — M47.817 LUMBOSACRAL SPONDYLOSIS WITHOUT MYELOPATHY: ICD-10-CM

## 2024-03-07 DIAGNOSIS — G89.29 CHRONIC PAIN OF RIGHT KNEE: ICD-10-CM

## 2024-03-07 DIAGNOSIS — M47.812 CERVICAL SPONDYLOSIS WITHOUT MYELOPATHY: ICD-10-CM

## 2024-03-07 DIAGNOSIS — M17.10 KNEE ARTHROPATHY: ICD-10-CM

## 2024-03-07 DIAGNOSIS — M25.551 PAIN OF RIGHT HIP JOINT: ICD-10-CM

## 2024-03-07 RX ORDER — CELECOXIB 100 MG/1
CAPSULE ORAL
Qty: 60 CAPSULE | Refills: 0 | OUTPATIENT
Start: 2024-03-07

## 2024-03-14 ENCOUNTER — OFFICE VISIT (OUTPATIENT)
Dept: PAIN MANAGEMENT | Age: 64
End: 2024-03-14
Payer: MEDICARE

## 2024-03-14 VITALS
BODY MASS INDEX: 22.47 KG/M2 | TEMPERATURE: 96.8 F | SYSTOLIC BLOOD PRESSURE: 102 MMHG | DIASTOLIC BLOOD PRESSURE: 54 MMHG | HEIGHT: 61 IN | WEIGHT: 119 LBS

## 2024-03-14 DIAGNOSIS — M25.561 CHRONIC PAIN OF RIGHT KNEE: ICD-10-CM

## 2024-03-14 DIAGNOSIS — G89.29 CHRONIC PAIN OF RIGHT KNEE: ICD-10-CM

## 2024-03-14 DIAGNOSIS — M54.16 LUMBAR RADICULOPATHY: ICD-10-CM

## 2024-03-14 DIAGNOSIS — M17.10 KNEE ARTHROPATHY: ICD-10-CM

## 2024-03-14 DIAGNOSIS — M47.817 LUMBOSACRAL SPONDYLOSIS WITHOUT MYELOPATHY: ICD-10-CM

## 2024-03-14 DIAGNOSIS — M25.551 PAIN OF RIGHT HIP JOINT: ICD-10-CM

## 2024-03-14 DIAGNOSIS — M47.812 CERVICAL SPONDYLOSIS WITHOUT MYELOPATHY: ICD-10-CM

## 2024-03-14 PROCEDURE — 1123F ACP DISCUSS/DSCN MKR DOCD: CPT | Performed by: NURSE PRACTITIONER

## 2024-03-14 PROCEDURE — 99214 OFFICE O/P EST MOD 30 MIN: CPT | Performed by: NURSE PRACTITIONER

## 2024-03-14 PROCEDURE — 3074F SYST BP LT 130 MM HG: CPT | Performed by: NURSE PRACTITIONER

## 2024-03-14 PROCEDURE — 3078F DIAST BP <80 MM HG: CPT | Performed by: NURSE PRACTITIONER

## 2024-03-14 RX ORDER — GABAPENTIN 600 MG/1
600 TABLET ORAL 4 TIMES DAILY PRN
Qty: 360 TABLET | Refills: 0 | Status: SHIPPED | OUTPATIENT
Start: 2024-03-14 | End: 2024-06-12

## 2024-03-14 RX ORDER — CELECOXIB 100 MG/1
100 CAPSULE ORAL DAILY
Qty: 60 CAPSULE | Refills: 0 | Status: SHIPPED | OUTPATIENT
Start: 2024-03-14 | End: 2024-06-12

## 2024-03-14 ASSESSMENT — ENCOUNTER SYMPTOMS
ABDOMINAL PAIN: 0
SHORTNESS OF BREATH: 0
BACK PAIN: 1
SORE THROAT: 0

## 2024-03-14 NOTE — PROGRESS NOTES
Significant other    Type of Home: House    Home Layout: One level    Home Access: Stairs to enter with rails    Entrance Stairs - Number of Steps: 6    Home Equipment: Rolling walker, Cane, Wheelchair-manual (BSC, leg )    ADL Assistance: Independent    Ambulation Assistance: Independent (with [employer])    Transfer Assistance: Independent    Additional Comments: Multiple neck surgeries with residual spasticity R UE/LE     Social Determinants of Health     Financial Resource Strain: Not on file   Food Insecurity: Not on file   Transportation Needs: Not on file   Physical Activity: Not on file   Stress: Not on file   Social Connections: Not on file   Intimate Partner Violence: Not on file   Housing Stability: Not on file       Current Outpatient Medications on File Prior to Visit   Medication Sig Dispense Refill    budesonide-formoterol (SYMBICORT) 160-4.5 MCG/ACT AERO TAKE 2 PUFFS BY MOUTH TWICE A DAY 30.6 each 6    mometasone-formoterol (DULERA) 200-5 MCG/ACT inhaler Inhale 2 puffs into the lungs in the morning and 2 puffs in the evening. 1 each 1    albuterol sulfate HFA (PROAIR HFA) 108 (90 Base) MCG/ACT inhaler TAKE 2 PUFFS BY MOUTH EVERY 6 HOURS AS NEEDED FOR WHEEZE 1 each 3    fexofenadine (ALLEGRA) 180 MG tablet TAKE 1 TABLET BY MOUTH EVERY DAY      baclofen (LIORESAL) 10 MG tablet Take 1 tablet by mouth 3 times daily      fluticasone (FLONASE) 50 MCG/ACT nasal spray 1 spray by NOT APPLICABLE route      aspirin 81 MG EC tablet Take 1 tablet by mouth 2 times daily 30 tablet 0    Multiple Vitamins-Minerals (THERAPEUTIC MULTIVITAMIN-MINERALS) tablet Take 1 tablet by mouth daily      Magnesium Oxide 500 MG CAPS Take 1 capsule by mouth 2 times daily      oxybutynin (DITROPAN-XL) 10 MG extended release tablet Take 1 tablet by mouth daily      simvastatin (ZOCOR) 10 MG tablet Take 1 tablet by mouth nightly      Oxygen Concentrator Inhale 3 L into the lungs continuous       DULoxetine (CYMBALTA) 60 MG capsule

## 2024-03-20 ENCOUNTER — OFFICE VISIT (OUTPATIENT)
Dept: PULMONOLOGY | Age: 64
End: 2024-03-20
Payer: MEDICARE

## 2024-03-20 VITALS — DIASTOLIC BLOOD PRESSURE: 60 MMHG | HEART RATE: 79 BPM | OXYGEN SATURATION: 78 % | SYSTOLIC BLOOD PRESSURE: 90 MMHG

## 2024-03-20 DIAGNOSIS — J44.9 CHRONIC OBSTRUCTIVE PULMONARY DISEASE, UNSPECIFIED COPD TYPE (HCC): Primary | ICD-10-CM

## 2024-03-20 DIAGNOSIS — Z87.891 PERSONAL HISTORY OF SMOKING: ICD-10-CM

## 2024-03-20 DIAGNOSIS — R09.02 HYPOXIA: ICD-10-CM

## 2024-03-20 DIAGNOSIS — R91.1 NODULE OF RIGHT LUNG: ICD-10-CM

## 2024-03-20 DIAGNOSIS — R60.0 BILATERAL LEG EDEMA: ICD-10-CM

## 2024-03-20 PROCEDURE — 3074F SYST BP LT 130 MM HG: CPT | Performed by: INTERNAL MEDICINE

## 2024-03-20 PROCEDURE — 3078F DIAST BP <80 MM HG: CPT | Performed by: INTERNAL MEDICINE

## 2024-03-20 PROCEDURE — 99215 OFFICE O/P EST HI 40 MIN: CPT | Performed by: INTERNAL MEDICINE

## 2024-03-20 PROCEDURE — 1123F ACP DISCUSS/DSCN MKR DOCD: CPT | Performed by: INTERNAL MEDICINE

## 2024-03-20 ASSESSMENT — ENCOUNTER SYMPTOMS
SINUS PRESSURE: 0
DIARRHEA: 0
SORE THROAT: 0
VOICE CHANGE: 0
RHINORRHEA: 0
COUGH: 0
VOMITING: 0
NAUSEA: 0
CHEST TIGHTNESS: 0
SHORTNESS OF BREATH: 1
TROUBLE SWALLOWING: 0
EYE DISCHARGE: 0
EYE ITCHING: 0
WHEEZING: 0
ABDOMINAL PAIN: 0

## 2024-03-20 NOTE — PROGRESS NOTES
follow-up    COMPARISONS: May 16, 2016    FINDINGS:    Two views of the chest are submitted.  The cardiac silhouette is of normal size configuration.  The mediastinum is unremarkable.  Pulmonary vascular is attenuated, lungs are hyperinflated and there is some widening of the AP diameter the chest. Areas atelectasis both bases..  Right sided trachea.  No focal infiltrates.  No effusions.  Pneumothoraces.    Impression  NO ACUTE ACTIVE CARDIOPULMONARY PROCESS.  RADIOGRAPHIC FINDINGS SUGGESTIVE OF COPD.  .  ]  Results for orders placed during the hospital encounter of 01/02/19    XR CHEST PORTABLE    Narrative  Portable chest radiograph    History: Respiratory failure    Technique: AP portable view of the chest obtained.    Comparison: CT from January 3, 2019; chest x-ray from January 3, 2019    Findings:    Endotracheal tube remains, not significantly changed in position. Enteric tube traverses the diaphragm however its distal tip is not visualized. The cardiomediastinal silhouette is within normal limits. No pneumothorax, pleural effusion, or focal  consolidation. Linear left lung base opacity compatible with subsegmental atelectasis is not significantly changed. Lungs are hyperinflated. Coarsening of the pulmonary interstitium. Degenerative changes of the spine.    Impression  No significant interval change in left base subsegmental atelectasis.      XR CHEST PORTABLE    Narrative  EXAMINATION: CHEST PORTABLE VIEW    CLINICAL HISTORY: Intubation    COMPARISONS: January 2, 2018    FINDINGS:    Single  views of the chest is submitted.  There is a endotracheal tube. The tip lies approximately 2.1 cm above the adele. There is a nasogastric tube. The tip is not seen but does lie below the hemidiaphragm. The cardiac silhouette is enlarged.  Unchanged configuration. The mediastinum is unremarkable.  Pulmonary vascular unremarkable.  Right sided trachea.  Left lower lobe infiltrate/consolidation with trace left pleural

## 2024-04-04 DIAGNOSIS — M47.812 CERVICAL SPONDYLOSIS WITHOUT MYELOPATHY: ICD-10-CM

## 2024-04-04 DIAGNOSIS — M47.817 LUMBOSACRAL SPONDYLOSIS WITHOUT MYELOPATHY: ICD-10-CM

## 2024-04-04 DIAGNOSIS — M25.551 PAIN OF RIGHT HIP JOINT: ICD-10-CM

## 2024-04-04 DIAGNOSIS — M25.561 CHRONIC PAIN OF RIGHT KNEE: ICD-10-CM

## 2024-04-04 DIAGNOSIS — M54.16 LUMBAR RADICULOPATHY: ICD-10-CM

## 2024-04-04 DIAGNOSIS — G89.29 CHRONIC PAIN OF RIGHT KNEE: ICD-10-CM

## 2024-04-04 DIAGNOSIS — M17.10 KNEE ARTHROPATHY: ICD-10-CM

## 2024-04-04 RX ORDER — CELECOXIB 100 MG/1
CAPSULE ORAL
Qty: 60 CAPSULE | Refills: 0 | OUTPATIENT
Start: 2024-04-04

## 2024-04-04 RX ORDER — GABAPENTIN 600 MG/1
600 TABLET ORAL 4 TIMES DAILY PRN
Qty: 360 TABLET | Refills: 0 | OUTPATIENT
Start: 2024-04-04 | End: 2024-07-03

## 2024-06-13 DIAGNOSIS — M54.16 LUMBAR RADICULOPATHY: ICD-10-CM

## 2024-06-13 DIAGNOSIS — G89.29 CHRONIC PAIN OF RIGHT KNEE: ICD-10-CM

## 2024-06-13 DIAGNOSIS — M25.551 PAIN OF RIGHT HIP JOINT: ICD-10-CM

## 2024-06-13 DIAGNOSIS — M47.817 LUMBOSACRAL SPONDYLOSIS WITHOUT MYELOPATHY: ICD-10-CM

## 2024-06-13 DIAGNOSIS — M17.10 KNEE ARTHROPATHY: ICD-10-CM

## 2024-06-13 DIAGNOSIS — M25.561 CHRONIC PAIN OF RIGHT KNEE: ICD-10-CM

## 2024-06-13 DIAGNOSIS — M47.812 CERVICAL SPONDYLOSIS WITHOUT MYELOPATHY: ICD-10-CM

## 2024-06-13 NOTE — TELEPHONE ENCOUNTER
Requested Prescriptions     Pending Prescriptions Disp Refills    celecoxib (CELEBREX) 100 MG capsule 60 capsule 0     Sig: Take 1 capsule by mouth daily Take one tab every 1-2 days prn pain. #60 tabs for 90 days.    gabapentin (NEURONTIN) 600 MG tablet 360 tablet 0     Sig: Take 1 tablet by mouth 4 times daily as needed (pain) for up to 90 days.       Patient last seen on:  3/14    Date of last surgery:  n/a     Date of last refill:  3/14    Reason for request:  PT woke up not feeling well, asking for refill until r/sed appointment.    Request date for pharmacy pick-up:  6/13    Next office visit date: 6/20/2024    Glucagon

## 2024-06-14 RX ORDER — CELECOXIB 100 MG/1
100 CAPSULE ORAL DAILY
Qty: 7 CAPSULE | Refills: 0 | Status: SHIPPED | OUTPATIENT
Start: 2024-06-14 | End: 2024-06-21

## 2024-06-14 RX ORDER — GABAPENTIN 600 MG/1
600 TABLET ORAL 4 TIMES DAILY PRN
Qty: 28 TABLET | Refills: 0 | Status: SHIPPED | OUTPATIENT
Start: 2024-06-14 | End: 2024-06-21

## 2024-06-14 RX ORDER — CELECOXIB 100 MG/1
100 CAPSULE ORAL DAILY
Qty: 60 CAPSULE | Refills: 0 | OUTPATIENT
Start: 2024-06-14 | End: 2024-09-12

## 2024-06-14 RX ORDER — GABAPENTIN 600 MG/1
600 TABLET ORAL 4 TIMES DAILY PRN
Qty: 360 TABLET | Refills: 0 | OUTPATIENT
Start: 2024-06-14 | End: 2024-09-12

## 2024-06-14 NOTE — TELEPHONE ENCOUNTER
Pt called requesting these stating she is completely out of her medications. Could a partial refill be sent so she would have enough until her appt on 6/20/24

## 2024-06-18 ENCOUNTER — HOSPITAL ENCOUNTER (OUTPATIENT)
Dept: CT IMAGING | Age: 64
Discharge: HOME | End: 2024-06-20
Attending: INTERNAL MEDICINE
Payer: MEDICARE

## 2024-06-18 DIAGNOSIS — R91.1 NODULE OF RIGHT LUNG: ICD-10-CM

## 2024-06-18 PROCEDURE — 71250 CT THORAX DX C-: CPT

## 2024-06-20 ENCOUNTER — OFFICE VISIT (OUTPATIENT)
Dept: PAIN MANAGEMENT | Age: 64
End: 2024-06-20
Payer: MEDICARE

## 2024-06-20 VITALS
BODY MASS INDEX: 22.47 KG/M2 | WEIGHT: 119 LBS | SYSTOLIC BLOOD PRESSURE: 94 MMHG | TEMPERATURE: 96.7 F | DIASTOLIC BLOOD PRESSURE: 48 MMHG | HEIGHT: 61 IN

## 2024-06-20 DIAGNOSIS — G89.29 CHRONIC PAIN OF RIGHT KNEE: ICD-10-CM

## 2024-06-20 DIAGNOSIS — M17.10 KNEE ARTHROPATHY: ICD-10-CM

## 2024-06-20 DIAGNOSIS — M25.551 PAIN OF RIGHT HIP JOINT: ICD-10-CM

## 2024-06-20 DIAGNOSIS — M25.561 CHRONIC PAIN OF RIGHT KNEE: ICD-10-CM

## 2024-06-20 DIAGNOSIS — M47.817 LUMBOSACRAL SPONDYLOSIS WITHOUT MYELOPATHY: ICD-10-CM

## 2024-06-20 DIAGNOSIS — M54.16 LUMBAR RADICULOPATHY: ICD-10-CM

## 2024-06-20 DIAGNOSIS — M47.812 CERVICAL SPONDYLOSIS WITHOUT MYELOPATHY: ICD-10-CM

## 2024-06-20 PROCEDURE — 1123F ACP DISCUSS/DSCN MKR DOCD: CPT | Performed by: NURSE PRACTITIONER

## 2024-06-20 PROCEDURE — 99213 OFFICE O/P EST LOW 20 MIN: CPT | Performed by: NURSE PRACTITIONER

## 2024-06-20 PROCEDURE — 3078F DIAST BP <80 MM HG: CPT | Performed by: NURSE PRACTITIONER

## 2024-06-20 PROCEDURE — 3074F SYST BP LT 130 MM HG: CPT | Performed by: NURSE PRACTITIONER

## 2024-06-20 RX ORDER — CELECOXIB 100 MG/1
100 CAPSULE ORAL DAILY
Qty: 60 CAPSULE | Refills: 0 | Status: SHIPPED | OUTPATIENT
Start: 2024-06-20 | End: 2024-09-18

## 2024-06-20 RX ORDER — GABAPENTIN 600 MG/1
600 TABLET ORAL 4 TIMES DAILY PRN
Qty: 28 TABLET | Refills: 0 | Status: SHIPPED | OUTPATIENT
Start: 2024-06-20 | End: 2024-06-27

## 2024-06-20 ASSESSMENT — ENCOUNTER SYMPTOMS
BACK PAIN: 1
SHORTNESS OF BREATH: 0
SORE THROAT: 0
ABDOMINAL PAIN: 0

## 2024-06-20 NOTE — PROGRESS NOTES
Mayra Arroyo  (1960)    2024    Subjective:     Mayra Arroyo is 64 y.o. female who complains today of:    Chief Complaint   Patient presents with    Follow-up    Lower Back Pain    Knee Pain     right    Leg Pain     right    Foot Pain     right         Allergies:  Glucagon    Past Medical History:   Diagnosis Date    Arthritis     left hip    COPD (chronic obstructive pulmonary disease) (HCC)     uses inhalers x 4 yrs    Hyperlipidemia     on meds x 10yrs    Hypertension 2018    Lung disease      Past Surgical History:   Procedure Laterality Date    BREAST BIOPSY Right 2003    benign    CERVICAL FUSION  2016    CERVICAL SPINE SURGERY  2004    AVM removal, fusion, embolism removal      SECTION  1979    COLONOSCOPY  2018    HYSTERECTOMY (CERVIX STATUS UNKNOWN)  1997    SD ARTHRP ACETBLR/PROX FEM PROSTC AGRFT/ALGRFT Left 2018    LEFT HIP TOTAL HIP ARTHROPLASTY performed by Cosmo Blankenship MD at [facility] OR     Family History   Problem Relation Age of Onset    Breast Cancer Mother     Cancer Mother         liver ca    No Known Problems Brother     Obesity Son      Social History     Socioeconomic History    Marital status: Single     Spouse name: Not on file    Number of children: Not on file    Years of education: Not on file    Highest education level: Not on file   Occupational History    Not on file   Tobacco Use    Smoking status: Former     Current packs/day: 0.00     Average packs/day: 0.5 packs/day for 1.4 years (0.7 ttl pk-yrs)     Types: Cigarettes     Start date: 2017     Quit date: 2019     Years since quittin.4     Passive exposure: Current (boyfriend smokes)    Smokeless tobacco: Never    Tobacco comments:     smoked at 14yrs old x 30 yrs 0.5ppd    Vaping Use    Vaping Use: Never used   Substance and Sexual Activity    Alcohol use: No     Alcohol/week: 0.0 standard drinks of alcohol    Drug use: No    Sexual activity: Not Currently     Partners: Male   Other

## 2024-06-21 DIAGNOSIS — M47.817 LUMBOSACRAL SPONDYLOSIS WITHOUT MYELOPATHY: ICD-10-CM

## 2024-06-21 DIAGNOSIS — M47.812 CERVICAL SPONDYLOSIS WITHOUT MYELOPATHY: ICD-10-CM

## 2024-06-21 DIAGNOSIS — M54.16 LUMBAR RADICULOPATHY: ICD-10-CM

## 2024-06-21 NOTE — TELEPHONE ENCOUNTER
Requested Prescriptions     Pending Prescriptions Disp Refills    gabapentin (NEURONTIN) 600 MG tablet 360 tablet 0     Sig: Take 1 tablet by mouth 4 times daily as needed (pain) for up to 90 days.       Patient last seen on:  6/20    Date of last surgery:  n/a    Date of last refill:  6/20    Reason for request:  PT was only sent a 7 day supply. She normally gets 3 months. She did  the 7 days but is asking for the 3 months to be sent in.     Request date for pharmacy pick-up:  6/27    Next office visit date: 9/12/2024    Glucagon

## 2024-06-24 RX ORDER — GABAPENTIN 600 MG/1
600 TABLET ORAL 4 TIMES DAILY PRN
Qty: 360 TABLET | Refills: 0 | Status: SHIPPED | OUTPATIENT
Start: 2024-06-27 | End: 2024-09-25

## 2024-08-13 ENCOUNTER — OFFICE VISIT (OUTPATIENT)
Dept: PULMONOLOGY | Age: 64
End: 2024-08-13
Payer: MEDICARE

## 2024-08-13 VITALS — SYSTOLIC BLOOD PRESSURE: 94 MMHG | DIASTOLIC BLOOD PRESSURE: 50 MMHG | OXYGEN SATURATION: 94 % | HEART RATE: 84 BPM

## 2024-08-13 DIAGNOSIS — R60.0 BILATERAL LEG EDEMA: ICD-10-CM

## 2024-08-13 DIAGNOSIS — R91.1 NODULE OF RIGHT LUNG: ICD-10-CM

## 2024-08-13 DIAGNOSIS — J96.11 CHRONIC RESPIRATORY FAILURE WITH HYPOXIA (HCC): ICD-10-CM

## 2024-08-13 DIAGNOSIS — I31.39 PERICARDIAL EFFUSION: ICD-10-CM

## 2024-08-13 DIAGNOSIS — J44.9 CHRONIC OBSTRUCTIVE PULMONARY DISEASE, UNSPECIFIED COPD TYPE (HCC): Primary | ICD-10-CM

## 2024-08-13 PROCEDURE — 99214 OFFICE O/P EST MOD 30 MIN: CPT | Performed by: INTERNAL MEDICINE

## 2024-08-13 PROCEDURE — 3074F SYST BP LT 130 MM HG: CPT | Performed by: INTERNAL MEDICINE

## 2024-08-13 PROCEDURE — 3078F DIAST BP <80 MM HG: CPT | Performed by: INTERNAL MEDICINE

## 2024-08-13 PROCEDURE — 1123F ACP DISCUSS/DSCN MKR DOCD: CPT | Performed by: INTERNAL MEDICINE

## 2024-08-13 RX ORDER — MIRTAZAPINE 15 MG/1
TABLET, FILM COATED ORAL
COMMUNITY
Start: 2024-04-15

## 2024-08-13 RX ORDER — TORSEMIDE 10 MG/1
10 TABLET ORAL
COMMUNITY
Start: 2024-07-08 | End: 2025-07-08

## 2024-08-13 ASSESSMENT — ENCOUNTER SYMPTOMS
VOMITING: 0
DIARRHEA: 0
ABDOMINAL PAIN: 0
COUGH: 1
EYE ITCHING: 0
WHEEZING: 1
SORE THROAT: 0
SHORTNESS OF BREATH: 1
TROUBLE SWALLOWING: 0
EYE DISCHARGE: 0
VOICE CHANGE: 0
RHINORRHEA: 0
NAUSEA: 0
SINUS PRESSURE: 0
CHEST TIGHTNESS: 0

## 2024-08-13 NOTE — PROGRESS NOTES
lobe infiltrate/consolidation with trace left pleural effusion.  No Pneumothoraces.    Impression  LEFT LOWER LOBE INFILTRATE/CONSOLIDATION WITH TRACE LEFT PLEURAL EFFUSION      XR CHEST PORTABLE    Narrative  EXAMINATION: XR CHEST PORTABLE, 1/2/2019 2:15 PM    History: 58-year-old female, shortness of breath, dyspnea    COMPARISON:  October 2, 2018    FINDINGS:  Chest-AP erect portable:  Hyperaeration lung fields. There are chronic interstitial changes. Asymmetric subtle increased density in the left lung base, suggestive of infiltrate. There is blunting of the right costophrenic angle, may represent focal atelectasis versus small  pleural effusion. Cardiac silhouette at the upper limits of normal. Aortic arch calcification. The pulmonary vascularity is normal size. There are degenerative changes of the spine. Monitoring leads project across the thorax.    Impression  1. Asymmetric hazy density in the left lung base suspicious for developing infiltrate.  2. Small right pleural effusion versus atelectasis.  3. Chronic interstitial changes and hyperaeration lung fields, correlate for COPD.  ]  No results found for this or any previous visit.  ]  Results for orders placed during the hospital encounter of 06/18/24    CT CHEST WO CONTRAST    Narrative  EXAMINATION:  CT OF THE CHEST WITHOUT CONTRAST 6/18/2024 4:55 pm    TECHNIQUE:  CT of the chest was performed without the administration of intravenous  contrast. Multiplanar reformatted images are provided for review. Automated  exposure control, iterative reconstruction, and/or weight based adjustment of  the mA/kV was utilized to reduce the radiation dose to as low as reasonably  achievable.    COMPARISON:  None.    HISTORY:  ORDERING SYSTEM PROVIDED HISTORY: Nodule of right lung  TECHNOLOGIST PROVIDED HISTORY:  Reason for exam:->lung nodule  What reading provider will be dictating this exam?->CRC    FINDINGS:  Mediastinum: Thyroid is homogeneous in attenuation. No

## 2024-09-12 ENCOUNTER — OFFICE VISIT (OUTPATIENT)
Age: 64
End: 2024-09-12
Payer: MEDICARE

## 2024-09-12 VITALS
TEMPERATURE: 97.7 F | SYSTOLIC BLOOD PRESSURE: 118 MMHG | WEIGHT: 125 LBS | BODY MASS INDEX: 23.6 KG/M2 | HEIGHT: 61 IN | DIASTOLIC BLOOD PRESSURE: 60 MMHG

## 2024-09-12 DIAGNOSIS — M54.16 LUMBAR RADICULOPATHY: ICD-10-CM

## 2024-09-12 DIAGNOSIS — M17.10 KNEE ARTHROPATHY: ICD-10-CM

## 2024-09-12 DIAGNOSIS — G89.29 CHRONIC PAIN OF RIGHT KNEE: ICD-10-CM

## 2024-09-12 DIAGNOSIS — M25.561 CHRONIC PAIN OF RIGHT KNEE: ICD-10-CM

## 2024-09-12 DIAGNOSIS — M25.551 PAIN OF RIGHT HIP JOINT: ICD-10-CM

## 2024-09-12 DIAGNOSIS — M47.812 CERVICAL SPONDYLOSIS WITHOUT MYELOPATHY: ICD-10-CM

## 2024-09-12 DIAGNOSIS — M47.817 LUMBOSACRAL SPONDYLOSIS WITHOUT MYELOPATHY: ICD-10-CM

## 2024-09-12 PROCEDURE — 99214 OFFICE O/P EST MOD 30 MIN: CPT

## 2024-09-12 PROCEDURE — 99214 OFFICE O/P EST MOD 30 MIN: CPT | Performed by: NURSE PRACTITIONER

## 2024-09-12 PROCEDURE — 3078F DIAST BP <80 MM HG: CPT | Performed by: NURSE PRACTITIONER

## 2024-09-12 PROCEDURE — 1123F ACP DISCUSS/DSCN MKR DOCD: CPT | Performed by: NURSE PRACTITIONER

## 2024-09-12 PROCEDURE — 3074F SYST BP LT 130 MM HG: CPT | Performed by: NURSE PRACTITIONER

## 2024-09-12 RX ORDER — GABAPENTIN 600 MG/1
600 TABLET ORAL 4 TIMES DAILY PRN
Qty: 360 TABLET | Refills: 0 | Status: SHIPPED | OUTPATIENT
Start: 2024-09-12 | End: 2024-12-11

## 2024-09-12 RX ORDER — CELECOXIB 100 MG/1
100 CAPSULE ORAL DAILY
Qty: 60 CAPSULE | Refills: 0 | Status: SHIPPED | OUTPATIENT
Start: 2024-09-12 | End: 2024-12-11

## 2024-09-12 ASSESSMENT — ENCOUNTER SYMPTOMS
BACK PAIN: 1
SHORTNESS OF BREATH: 0
ABDOMINAL PAIN: 0
SORE THROAT: 0

## 2024-09-21 DIAGNOSIS — J44.9 CHRONIC OBSTRUCTIVE PULMONARY DISEASE, UNSPECIFIED COPD TYPE (HCC): ICD-10-CM

## 2024-09-23 RX ORDER — BUDESONIDE AND FORMOTEROL FUMARATE DIHYDRATE 160; 4.5 UG/1; UG/1
AEROSOL RESPIRATORY (INHALATION)
Qty: 30.6 EACH | Refills: 6 | Status: SHIPPED | OUTPATIENT
Start: 2024-09-23

## 2024-12-05 ENCOUNTER — OFFICE VISIT (OUTPATIENT)
Age: 64
End: 2024-12-05
Payer: MEDICARE

## 2024-12-05 VITALS — HEIGHT: 61 IN | WEIGHT: 125 LBS | BODY MASS INDEX: 23.6 KG/M2 | TEMPERATURE: 97.1 F

## 2024-12-05 DIAGNOSIS — M54.16 LUMBAR RADICULOPATHY: ICD-10-CM

## 2024-12-05 DIAGNOSIS — M25.561 CHRONIC PAIN OF RIGHT KNEE: ICD-10-CM

## 2024-12-05 DIAGNOSIS — M25.551 PAIN OF RIGHT HIP JOINT: ICD-10-CM

## 2024-12-05 DIAGNOSIS — M47.812 CERVICAL SPONDYLOSIS WITHOUT MYELOPATHY: ICD-10-CM

## 2024-12-05 DIAGNOSIS — M47.817 LUMBOSACRAL SPONDYLOSIS WITHOUT MYELOPATHY: ICD-10-CM

## 2024-12-05 DIAGNOSIS — G89.29 CHRONIC PAIN OF RIGHT KNEE: ICD-10-CM

## 2024-12-05 DIAGNOSIS — M17.10 KNEE ARTHROPATHY: ICD-10-CM

## 2024-12-05 PROCEDURE — 99214 OFFICE O/P EST MOD 30 MIN: CPT | Performed by: NURSE PRACTITIONER

## 2024-12-05 PROCEDURE — 1159F MED LIST DOCD IN RCRD: CPT | Performed by: NURSE PRACTITIONER

## 2024-12-05 PROCEDURE — 1160F RVW MEDS BY RX/DR IN RCRD: CPT | Performed by: NURSE PRACTITIONER

## 2024-12-05 PROCEDURE — 1125F AMNT PAIN NOTED PAIN PRSNT: CPT | Performed by: NURSE PRACTITIONER

## 2024-12-05 PROCEDURE — 1123F ACP DISCUSS/DSCN MKR DOCD: CPT | Performed by: NURSE PRACTITIONER

## 2024-12-05 RX ORDER — GABAPENTIN 600 MG/1
600 TABLET ORAL 4 TIMES DAILY PRN
Qty: 360 TABLET | Refills: 0 | Status: SHIPPED | OUTPATIENT
Start: 2024-12-05 | End: 2025-03-05

## 2024-12-05 RX ORDER — CELECOXIB 100 MG/1
100 CAPSULE ORAL DAILY
Qty: 60 CAPSULE | Refills: 0 | Status: SHIPPED | OUTPATIENT
Start: 2024-12-05 | End: 2025-03-05

## 2024-12-05 ASSESSMENT — ENCOUNTER SYMPTOMS
SHORTNESS OF BREATH: 0
SORE THROAT: 0
ABDOMINAL PAIN: 0
BACK PAIN: 1

## 2024-12-05 NOTE — PROGRESS NOTES
Mayra Arroyo  (1960)    2024    Subjective:     Mayra Arroyo is 64 y.o. female who complains today of:    Chief Complaint   Patient presents with    Follow-up    Back Pain     lower    Leg Pain     bilateral         Allergies:  Glucagon    Past Medical History:   Diagnosis Date    Arthritis     left hip    COPD (chronic obstructive pulmonary disease) (HCC)     uses inhalers x 4 yrs    Hyperlipidemia     on meds x 10yrs    Hypertension 2018    Lung disease      Past Surgical History:   Procedure Laterality Date    BREAST BIOPSY Right 2003    benign    CERVICAL FUSION  2016    CERVICAL SPINE SURGERY  2004    AVM removal, fusion, embolism removal      SECTION  1979    COLONOSCOPY  2018    HYSTERECTOMY (CERVIX STATUS UNKNOWN)  1997    DC ARTHRP ACETBLR/PROX FEM PROSTC AGRFT/ALGRFT Left 2018    LEFT HIP TOTAL HIP ARTHROPLASTY performed by Cosmo Blankenship MD at [facility] OR     Family History   Problem Relation Age of Onset    Breast Cancer Mother     Cancer Mother         liver ca    No Known Problems Brother     Obesity Son      Social History     Socioeconomic History    Marital status: Single     Spouse name: Not on file    Number of children: Not on file    Years of education: Not on file    Highest education level: Not on file   Occupational History    Not on file   Tobacco Use    Smoking status: Former     Current packs/day: 0.00     Average packs/day: 0.5 packs/day for 1.4 years (0.7 ttl pk-yrs)     Types: Cigarettes     Start date: 2017     Quit date: 2019     Years since quittin.9     Passive exposure: Current (boyfriend smokes)    Smokeless tobacco: Never    Tobacco comments:     smoked at 14yrs old x 30 yrs 0.5ppd    Vaping Use    Vaping status: Never Used   Substance and Sexual Activity    Alcohol use: No     Alcohol/week: 0.0 standard drinks of alcohol    Drug use: No    Sexual activity: Not Currently     Partners: Male   Other Topics Concern    Not on file   Social

## 2024-12-06 DIAGNOSIS — M25.561 CHRONIC PAIN OF RIGHT KNEE: ICD-10-CM

## 2024-12-06 DIAGNOSIS — G89.29 CHRONIC PAIN OF RIGHT KNEE: ICD-10-CM

## 2024-12-06 DIAGNOSIS — M25.551 PAIN OF RIGHT HIP JOINT: ICD-10-CM

## 2024-12-06 DIAGNOSIS — M17.10 KNEE ARTHROPATHY: ICD-10-CM

## 2024-12-06 DIAGNOSIS — M47.812 CERVICAL SPONDYLOSIS WITHOUT MYELOPATHY: ICD-10-CM

## 2024-12-06 DIAGNOSIS — M47.817 LUMBOSACRAL SPONDYLOSIS WITHOUT MYELOPATHY: ICD-10-CM

## 2024-12-06 DIAGNOSIS — M54.16 LUMBAR RADICULOPATHY: ICD-10-CM

## 2024-12-06 RX ORDER — CELECOXIB 100 MG/1
CAPSULE ORAL
Qty: 60 CAPSULE | Refills: 0 | OUTPATIENT
Start: 2024-12-06

## 2025-01-02 ENCOUNTER — TELEMEDICINE (OUTPATIENT)
Dept: PULMONOLOGY | Age: 65
End: 2025-01-02

## 2025-01-02 DIAGNOSIS — M47.817 LUMBOSACRAL SPONDYLOSIS WITHOUT MYELOPATHY: ICD-10-CM

## 2025-01-02 DIAGNOSIS — M54.16 LUMBAR RADICULOPATHY: ICD-10-CM

## 2025-01-02 DIAGNOSIS — R09.02 HYPOXIA: ICD-10-CM

## 2025-01-02 DIAGNOSIS — M17.10 KNEE ARTHROPATHY: ICD-10-CM

## 2025-01-02 DIAGNOSIS — R60.0 BILATERAL LEG EDEMA: ICD-10-CM

## 2025-01-02 DIAGNOSIS — G89.29 CHRONIC PAIN OF RIGHT KNEE: ICD-10-CM

## 2025-01-02 DIAGNOSIS — Z87.891 PERSONAL HISTORY OF SMOKING: ICD-10-CM

## 2025-01-02 DIAGNOSIS — M47.812 CERVICAL SPONDYLOSIS WITHOUT MYELOPATHY: ICD-10-CM

## 2025-01-02 DIAGNOSIS — R91.1 NODULE OF RIGHT LUNG: ICD-10-CM

## 2025-01-02 DIAGNOSIS — I31.39 PERICARDIAL EFFUSION: ICD-10-CM

## 2025-01-02 DIAGNOSIS — M25.551 PAIN OF RIGHT HIP JOINT: ICD-10-CM

## 2025-01-02 DIAGNOSIS — J96.11 CHRONIC RESPIRATORY FAILURE WITH HYPOXIA: Primary | ICD-10-CM

## 2025-01-02 DIAGNOSIS — M25.561 CHRONIC PAIN OF RIGHT KNEE: ICD-10-CM

## 2025-01-02 DIAGNOSIS — J44.9 CHRONIC OBSTRUCTIVE PULMONARY DISEASE, UNSPECIFIED COPD TYPE (HCC): ICD-10-CM

## 2025-01-02 RX ORDER — ALBUTEROL SULFATE 90 UG/1
INHALANT RESPIRATORY (INHALATION)
Qty: 1 EACH | Refills: 3 | Status: SHIPPED | OUTPATIENT
Start: 2025-01-02

## 2025-01-02 ASSESSMENT — ENCOUNTER SYMPTOMS
SORE THROAT: 0
RHINORRHEA: 0
VOICE CHANGE: 0
VOMITING: 0
EYE DISCHARGE: 0
WHEEZING: 0
EYE ITCHING: 0
COUGH: 1
NAUSEA: 0
SHORTNESS OF BREATH: 1
ABDOMINAL PAIN: 0
TROUBLE SWALLOWING: 0
CHEST TIGHTNESS: 0
DIARRHEA: 0
SINUS PRESSURE: 0

## 2025-01-02 NOTE — PROGRESS NOTES
Mayra Arroyo, was evaluated through a synchronous (real-time) telephone encounter. The patient (or guardian if applicable) is aware that this is a billable service, which includes applicable co-pays. This Virtual Visit was conducted with patient's (and/or legal guardian's) consent. Patient identification was verified, and a caregiver was present when appropriate.   The patient was located at Home: [address] [place], [place] [address]dalila Shankar  [facility] [address]  Provider was located at Facility (Appt Dept): [facility]  [address]  [place] [address]  Yes, I confirm.         Subjective     HPI  She is using  2-4  lit O2 with sleep ,  Spo2 95% ,   She is on Symbicort HFA 2 puff QID , albuterol neb prn , proair  HFA 2 puff prn.  C/o shortness of breath with exertion.   No Wheezing. C/o cough with clear mucus.  No Chest tightness.No Chest pain with radiation  or pleuritic pain.  C/o  leg edema right> left . No orthopnea.  No Fever or chills.No Rhinorrhea and postnasal drip.    Current Outpatient Medications on File Prior to Visit   Medication Sig Dispense Refill    gabapentin (NEURONTIN) 600 MG tablet Take 1 tablet by mouth 4 times daily as needed (pain) for up to 90 days. 360 tablet 0    celecoxib (CELEBREX) 100 MG capsule Take 1 capsule by mouth daily Take one tab every 1-2 days prn pain. #60 tabs for 90 days. 60 capsule 0    budesonide-formoterol (SYMBICORT) 160-4.5 MCG/ACT AERO TAKE 2 PUFFS BY MOUTH TWICE A DAY 30.6 each 6    torsemide (DEMADEX) 10 MG tablet Take 1 tablet by mouth Every Day      mirtazapine (REMERON) 15 MG tablet TAKE 0.5 TABLETS BY MOUTH DAILY AT BEDTIME.      OXYGEN Start oxygen therapy 3 lit 24 hour a day , give POC for daytime use       Dx.COPD  send to [facility] 1 Units 0    ketorolac (ACULAR) 0.5 % ophthalmic solution       albuterol (PROVENTIL) (2.5 MG/3ML) 0.083% nebulizer solution Take 3 mLs by nebulization every 6 hours as needed for Wheezing 120 each 3    lidocaine (LMX) 4 % cream Apply a half dollar sized amount to

## 2025-01-03 NOTE — TELEPHONE ENCOUNTER
Requested Prescriptions     Pending Prescriptions Disp Refills    celecoxib (CELEBREX) 100 MG capsule [Pharmacy Med Name: CELECOXIB 100 MG CAPSULE] 60 capsule 0     Sig: TAKE 1 CAPSULE BY MOUTH DAILY . TAKE 1-2 CAPSULES BY MOUTH EVERY 1-2 DAYS AS NEEDED FOR PAIN       Next office visit date: 2/27/2025    Glucagon

## 2025-01-06 RX ORDER — CELECOXIB 100 MG/1
CAPSULE ORAL
Qty: 60 CAPSULE | Refills: 0 | OUTPATIENT
Start: 2025-01-06

## 2025-02-27 ENCOUNTER — TELEMEDICINE (OUTPATIENT)
Age: 65
End: 2025-02-27
Payer: MEDICARE

## 2025-02-27 DIAGNOSIS — M54.16 LUMBAR RADICULOPATHY: ICD-10-CM

## 2025-02-27 DIAGNOSIS — M47.817 LUMBOSACRAL SPONDYLOSIS WITHOUT MYELOPATHY: ICD-10-CM

## 2025-02-27 DIAGNOSIS — M25.561 CHRONIC PAIN OF RIGHT KNEE: ICD-10-CM

## 2025-02-27 DIAGNOSIS — G89.29 CHRONIC PAIN OF RIGHT KNEE: ICD-10-CM

## 2025-02-27 DIAGNOSIS — M17.10 KNEE ARTHROPATHY: ICD-10-CM

## 2025-02-27 DIAGNOSIS — M25.551 PAIN OF RIGHT HIP JOINT: ICD-10-CM

## 2025-02-27 DIAGNOSIS — M47.812 CERVICAL SPONDYLOSIS WITHOUT MYELOPATHY: ICD-10-CM

## 2025-02-27 PROCEDURE — 1159F MED LIST DOCD IN RCRD: CPT | Performed by: NURSE PRACTITIONER

## 2025-02-27 PROCEDURE — 99214 OFFICE O/P EST MOD 30 MIN: CPT | Performed by: NURSE PRACTITIONER

## 2025-02-27 PROCEDURE — 1123F ACP DISCUSS/DSCN MKR DOCD: CPT | Performed by: NURSE PRACTITIONER

## 2025-02-27 PROCEDURE — 1160F RVW MEDS BY RX/DR IN RCRD: CPT | Performed by: NURSE PRACTITIONER

## 2025-02-27 RX ORDER — GABAPENTIN 600 MG/1
600 TABLET ORAL 4 TIMES DAILY PRN
Qty: 240 TABLET | Refills: 0 | Status: SHIPPED | OUTPATIENT
Start: 2025-03-17 | End: 2025-05-16

## 2025-02-27 RX ORDER — RISEDRONATE SODIUM 150 MG/1
150 TABLET, FILM COATED ORAL
COMMUNITY
Start: 2025-01-07

## 2025-02-27 RX ORDER — CELECOXIB 100 MG/1
100 CAPSULE ORAL DAILY
Qty: 60 CAPSULE | Refills: 0 | Status: SHIPPED | OUTPATIENT
Start: 2025-02-27 | End: 2025-05-28

## 2025-02-27 ASSESSMENT — ENCOUNTER SYMPTOMS
SORE THROAT: 0
ABDOMINAL PAIN: 0
SHORTNESS OF BREATH: 0
BACK PAIN: 1

## 2025-02-27 NOTE — PROGRESS NOTES
Not on file   Social History Narrative         Lives With: Significant other    Type of Home: House    Home Layout: One level    Home Access: Stairs to enter with rails    Entrance Stairs - Number of Steps: 6    Home Equipment: Rolling walker, Cane, Wheelchair-manual (BSC, leg )    ADL Assistance: Independent    Ambulation Assistance: Independent (with WW)    Transfer Assistance: Independent    Additional Comments: Multiple neck surgeries with residual spasticity R UE/LE     Social Determinants of Health     Financial Resource Strain: Low Risk  (7/8/2024)    Received from [facility]    Overall Financial Resource Strain (CARDIA)     Difficulty of Paying Living Expenses: Not hard at all   Food Insecurity: No Food Insecurity (7/8/2024)    Received from [facility]    Hunger Vital Sign     Worried About Running Out of Food in the Last Year: Never true     Ran Out of Food in the Last Year: Never true   Transportation Needs: No Transportation Needs (7/8/2024)    Received from [facility]    PRAPARE - Transportation     Lack of Transportation (Medical): No     Lack of Transportation (Non-Medical): No   Physical Activity: Sufficiently Active (7/8/2024)    Received from [facility]    Exercise Vital Sign     Days of Exercise per Week: 5 days     Minutes of Exercise per Session: 30 min   Stress: Stress Concern Present (7/8/2024)    Received from [facility]    [place] [employer] - Occupational Stress Questionnaire     Feeling of Stress : Very much   Social Connections: Socially Isolated (7/8/2024)    Received from [facility]    Social Connection and Isolation Panel [NHANES]     Frequency of Communication with Friends and Family: More than three times a week     Frequency of Social Gatherings with Friends and Family: Twice a week     Attends Worship Services: Never     Active Member of Clubs or Organizations: No     Attends Club or Organization Meetings: Never

## 2025-02-28 NOTE — TELEPHONE ENCOUNTER
PT scheduled.
Please call the patient to offer a follow up with me in pain management if she is interested.
1973

## 2025-03-30 DIAGNOSIS — M54.16 LUMBAR RADICULOPATHY: ICD-10-CM

## 2025-03-30 DIAGNOSIS — M17.10 KNEE ARTHROPATHY: ICD-10-CM

## 2025-03-30 DIAGNOSIS — M25.551 PAIN OF RIGHT HIP JOINT: ICD-10-CM

## 2025-03-30 DIAGNOSIS — M25.561 CHRONIC PAIN OF RIGHT KNEE: ICD-10-CM

## 2025-03-30 DIAGNOSIS — G89.29 CHRONIC PAIN OF RIGHT KNEE: ICD-10-CM

## 2025-03-30 DIAGNOSIS — M47.812 CERVICAL SPONDYLOSIS WITHOUT MYELOPATHY: ICD-10-CM

## 2025-03-30 DIAGNOSIS — M47.817 LUMBOSACRAL SPONDYLOSIS WITHOUT MYELOPATHY: ICD-10-CM

## 2025-04-01 RX ORDER — CELECOXIB 100 MG/1
CAPSULE ORAL
Qty: 60 CAPSULE | Refills: 0 | OUTPATIENT
Start: 2025-04-01

## 2025-05-02 ENCOUNTER — OFFICE VISIT (OUTPATIENT)
Age: 65
End: 2025-05-02

## 2025-05-02 VITALS — DIASTOLIC BLOOD PRESSURE: 52 MMHG | OXYGEN SATURATION: 94 % | HEART RATE: 93 BPM | SYSTOLIC BLOOD PRESSURE: 94 MMHG

## 2025-05-02 DIAGNOSIS — J44.9 CHRONIC OBSTRUCTIVE PULMONARY DISEASE, UNSPECIFIED COPD TYPE (HCC): ICD-10-CM

## 2025-05-02 DIAGNOSIS — R91.1 NODULE OF RIGHT LUNG: ICD-10-CM

## 2025-05-02 DIAGNOSIS — R60.0 BILATERAL LEG EDEMA: ICD-10-CM

## 2025-05-02 DIAGNOSIS — I31.39 PERICARDIAL EFFUSION: ICD-10-CM

## 2025-05-02 DIAGNOSIS — Z87.891 PERSONAL HISTORY OF SMOKING: Primary | ICD-10-CM

## 2025-05-02 DIAGNOSIS — J96.11 CHRONIC RESPIRATORY FAILURE WITH HYPOXIA (HCC): ICD-10-CM

## 2025-05-02 NOTE — PROGRESS NOTES
Subjective:             Mayra Arroyo is a 65 y.o. female who complains today of:     Chief Complaint   Patient presents with    Follow-up     4m f/u on COPD, Chronic respiratory failure with hypoxia        HPI  She is on Symbicort HFA 2 puff QID , albuterol neb prn , proair  HFA 2 puff prn.  She is using  3  lit O2 with sleep , Spo2 94%.C/o shortness of breath with exertion.   No Wheezing. C/o cough with clear mucus.No Chest tightness.No Chest pain with radiation  or pleuritic pain.C/o  leg edema right> left . No orthopnea.  No Fever or chills.  No Rhinorrhea  but postnasal drip + .on flonase , allegra    Allergies:  Glucagon  Past Medical History:   Diagnosis Date    Arthritis     left hip    COPD (chronic obstructive pulmonary disease) (HCC)     uses inhalers x 4 yrs    Hyperlipidemia     on meds x 10yrs    Hypertension 2018    Lung disease      Past Surgical History:   Procedure Laterality Date    BREAST BIOPSY Right 2003    benign    CERVICAL FUSION  2016    CERVICAL SPINE SURGERY  2004    AVM removal, fusion, embolism removal      SECTION  1979    COLONOSCOPY  2018    HYSTERECTOMY (CERVIX STATUS UNKNOWN)  1997    [place] ARTHRP ACETBLR/PROX FEM PROSTC AGRFT/ALGRFT Left 2018    LEFT HIP TOTAL HIP ARTHROPLASTY performed [place] Cosmo Blankenship [place] [facility] [place]     Family History   Problem Relation Age of Onset    Breast Cancer Mother     Cancer Mother         liver ca    No Known Problems Brother     Obesity Son      Social History     Socioeconomic History    Marital status: Single     Spouse name: Not on file    Number of children: Not on file    Years of education: Not on file    Highest education level: Not on file   Occupational History    Not on file   Tobacco Use    Smoking status: Former     Current packs/day: 0.00     Average packs/day: 0.5 packs/day for 1.4 years (0.7 ttl pk-yrs)     Types: Cigarettes     Start date: 2017     Quit date: 2019     Years since quittin.3     Passive

## 2025-05-15 DIAGNOSIS — J44.9 CHRONIC OBSTRUCTIVE PULMONARY DISEASE, UNSPECIFIED COPD TYPE (HCC): ICD-10-CM

## 2025-05-15 RX ORDER — ALBUTEROL SULFATE 0.83 MG/ML
2.5 SOLUTION RESPIRATORY (INHALATION) EVERY 6 HOURS PRN
Qty: 120 EACH | Refills: 3 | Status: SHIPPED | OUTPATIENT
Start: 2025-05-15 | End: 2025-06-14

## 2025-05-15 RX ORDER — ALBUTEROL SULFATE 90 UG/1
INHALANT RESPIRATORY (INHALATION)
Qty: 1 EACH | Refills: 3 | Status: SHIPPED | OUTPATIENT
Start: 2025-05-15

## 2025-05-15 NOTE — TELEPHONE ENCOUNTER
Rx requested:  Requested Prescriptions     Pending Prescriptions Disp Refills    albuterol (PROVENTIL) (2.5 MG/3ML) 0.083% nebulizer solution 120 each 3     Sig: Take 3 mLs by nebulization every 6 hours as needed for Wheezing    albuterol sulfate HFA (PROAIR HFA) 108 (90 Base) MCG/ACT inhaler 1 each 3     Sig: TAKE 2 PUFFS BY MOUTH EVERY 6 HOURS AS NEEDED FOR WHEEZE       Last Office Visit:   5/2/2025      Next Visit Date:  Future Appointments   Date Time Provider Department Center   5/22/2025  2:00 PM Meenakshi Redding APRN - CNP [id number]   6/19/2025 12:00 PM [facility] ROOM 1 [facility] [facility]   6/27/2025 11:45 AM Stephen Blackmon MD [facility]

## 2025-05-20 DIAGNOSIS — M47.812 CERVICAL SPONDYLOSIS WITHOUT MYELOPATHY: ICD-10-CM

## 2025-05-20 DIAGNOSIS — M47.817 LUMBOSACRAL SPONDYLOSIS WITHOUT MYELOPATHY: ICD-10-CM

## 2025-05-20 DIAGNOSIS — M54.16 LUMBAR RADICULOPATHY: ICD-10-CM

## 2025-05-20 RX ORDER — GABAPENTIN 600 MG/1
600 TABLET ORAL 4 TIMES DAILY PRN
Qty: 120 TABLET | Refills: 1 | OUTPATIENT
Start: 2025-05-20 | End: 2025-07-19

## 2025-05-20 NOTE — TELEPHONE ENCOUNTER
Requested Prescriptions     Pending Prescriptions Disp Refills    gabapentin (NEURONTIN) 600 MG tablet [Pharmacy Med Name: GABAPENTIN 600 MG TABLET] 120 tablet 1     Sig: Take 1 tablet by mouth 4 times daily as needed (pain) for up to 60 days.       Patient last seen on:  02/27/2025 V.V    Date of last refill:  03/17/2025    Reason for request:  my chart message request       Next office visit date: 5/22/2025    Glucagon

## 2025-05-22 ENCOUNTER — TELEMEDICINE (OUTPATIENT)
Age: 65
End: 2025-05-22

## 2025-05-22 ENCOUNTER — TELEPHONE (OUTPATIENT)
Age: 65
End: 2025-05-22

## 2025-05-22 DIAGNOSIS — M47.817 LUMBOSACRAL SPONDYLOSIS WITHOUT MYELOPATHY: ICD-10-CM

## 2025-05-22 DIAGNOSIS — G89.29 CHRONIC PAIN OF RIGHT KNEE: ICD-10-CM

## 2025-05-22 DIAGNOSIS — M17.10 KNEE ARTHROPATHY: ICD-10-CM

## 2025-05-22 DIAGNOSIS — M47.812 CERVICAL SPONDYLOSIS WITHOUT MYELOPATHY: ICD-10-CM

## 2025-05-22 DIAGNOSIS — M25.561 CHRONIC PAIN OF RIGHT KNEE: ICD-10-CM

## 2025-05-22 DIAGNOSIS — M54.16 LUMBAR RADICULOPATHY: ICD-10-CM

## 2025-05-22 DIAGNOSIS — M25.551 PAIN OF RIGHT HIP JOINT: ICD-10-CM

## 2025-05-22 RX ORDER — CELECOXIB 100 MG/1
100 CAPSULE ORAL DAILY
Qty: 60 CAPSULE | Refills: 0 | Status: SHIPPED | OUTPATIENT
Start: 2025-05-22 | End: 2025-08-20

## 2025-05-22 RX ORDER — GABAPENTIN 600 MG/1
600 TABLET ORAL 4 TIMES DAILY PRN
Qty: 360 TABLET | Refills: 0 | Status: SHIPPED | OUTPATIENT
Start: 2025-05-22 | End: 2025-08-20

## 2025-05-22 ASSESSMENT — ENCOUNTER SYMPTOMS
ABDOMINAL PAIN: 0
SHORTNESS OF BREATH: 0
BACK PAIN: 1
SORE THROAT: 0

## 2025-05-22 NOTE — TELEPHONE ENCOUNTER
Pt is unable to make it to her appointment today due to her ride cancelled on her. Pt was wondering if Meenakshi would do a virtual with her today. Please Advise.

## 2025-05-22 NOTE — PROGRESS NOTES
month).  All questions were answered.  Pt verbalized understanding and agrees with above plan.      Will continue medications for chronic pain as they help pt function with ADL and improve quality of life.   [place]ARRS was reviewed.     I am the primary provider for this patient's complex chronic pain condition that requires ongoing medical management. The nature of this condition and indicated treatment requires a longitudinal relationship and continual monitoring over time for appropriate safety and response. Shared goals were created and discussed including a reduction in pain intensity and improvement in ability to complete activities of daily living.      Follow up:  Return in about 12 weeks (around 8/14/2025) for review meds and reassess pain.        Mayra Arroyo, was evaluated through a synchronous (real-time) audio-video encounter. The patient (or guardian if applicable) is aware that this is a billable service, which includes applicable co-pays. This Virtual Visit was conducted with patient's (and/or legal guardian's) consent. Patient identification was verified, and a caregiver was present when appropriate.   The patient was located at Home: [address] [place], [place] [id number]  Provider was located at Facility (Appt Dept): [address] [place] [address]  [address]  [place] [address]  Confirm you are appropriately licensed, registered, or certified to deliver care in the state where the patient is located as indicated above. If you are not or unsure, please re-schedule the visit: Yes, I confirm.      Total time spent for this encounter: Not billed by time    --CHIRAG Whitten - CNP on 5/22/2025 at 2:15 PM    An electronic signature was used to authenticate this note.   
hard copy, drawn during this pregnancy

## 2025-06-19 ENCOUNTER — HOSPITAL ENCOUNTER (OUTPATIENT)
Dept: CT IMAGING | Age: 65
Discharge: HOME | End: 2025-06-21
Attending: INTERNAL MEDICINE
Payer: MEDICARE

## 2025-06-19 DIAGNOSIS — Z87.891 PERSONAL HISTORY OF SMOKING: ICD-10-CM

## 2025-06-19 PROCEDURE — 71271 CT THORAX LUNG CANCER SCR C-: CPT

## 2025-08-14 ENCOUNTER — OFFICE VISIT (OUTPATIENT)
Age: 65
End: 2025-08-14
Payer: MEDICARE

## 2025-08-14 VITALS
WEIGHT: 114 LBS | HEIGHT: 61 IN | BODY MASS INDEX: 21.52 KG/M2 | TEMPERATURE: 96.5 F | OXYGEN SATURATION: 92 % | HEART RATE: 70 BPM

## 2025-08-14 DIAGNOSIS — G89.29 CHRONIC PAIN OF RIGHT KNEE: ICD-10-CM

## 2025-08-14 DIAGNOSIS — M25.551 PAIN OF RIGHT HIP JOINT: ICD-10-CM

## 2025-08-14 DIAGNOSIS — M47.812 CERVICAL SPONDYLOSIS WITHOUT MYELOPATHY: ICD-10-CM

## 2025-08-14 DIAGNOSIS — M47.817 LUMBOSACRAL SPONDYLOSIS WITHOUT MYELOPATHY: ICD-10-CM

## 2025-08-14 DIAGNOSIS — M54.16 LUMBAR RADICULOPATHY: ICD-10-CM

## 2025-08-14 DIAGNOSIS — M25.561 CHRONIC PAIN OF RIGHT KNEE: ICD-10-CM

## 2025-08-14 DIAGNOSIS — M17.10 KNEE ARTHROPATHY: ICD-10-CM

## 2025-08-14 PROCEDURE — 1125F AMNT PAIN NOTED PAIN PRSNT: CPT | Performed by: NURSE PRACTITIONER

## 2025-08-14 PROCEDURE — 99214 OFFICE O/P EST MOD 30 MIN: CPT | Performed by: NURSE PRACTITIONER

## 2025-08-14 PROCEDURE — 1159F MED LIST DOCD IN RCRD: CPT | Performed by: NURSE PRACTITIONER

## 2025-08-14 PROCEDURE — 1160F RVW MEDS BY RX/DR IN RCRD: CPT | Performed by: NURSE PRACTITIONER

## 2025-08-14 PROCEDURE — 1123F ACP DISCUSS/DSCN MKR DOCD: CPT | Performed by: NURSE PRACTITIONER

## 2025-08-14 PROCEDURE — G2211 COMPLEX E/M VISIT ADD ON: HCPCS | Performed by: NURSE PRACTITIONER

## 2025-08-14 RX ORDER — GABAPENTIN 600 MG/1
600 TABLET ORAL 4 TIMES DAILY PRN
Qty: 360 TABLET | Refills: 0 | Status: SHIPPED | OUTPATIENT
Start: 2025-08-14 | End: 2025-11-12

## 2025-08-14 RX ORDER — CELECOXIB 100 MG/1
100 CAPSULE ORAL DAILY
Qty: 60 CAPSULE | Refills: 0 | Status: SHIPPED | OUTPATIENT
Start: 2025-08-14 | End: 2025-11-12

## 2025-08-14 ASSESSMENT — ENCOUNTER SYMPTOMS
BACK PAIN: 1
SORE THROAT: 0
SHORTNESS OF BREATH: 0
ABDOMINAL PAIN: 0

## 2025-08-15 ENCOUNTER — OFFICE VISIT (OUTPATIENT)
Age: 65
End: 2025-08-15
Payer: MEDICARE

## 2025-08-15 VITALS — HEART RATE: 85 BPM | SYSTOLIC BLOOD PRESSURE: 100 MMHG | DIASTOLIC BLOOD PRESSURE: 60 MMHG | OXYGEN SATURATION: 95 %

## 2025-08-15 DIAGNOSIS — Z87.891 PERSONAL HISTORY OF SMOKING: ICD-10-CM

## 2025-08-15 DIAGNOSIS — R60.0 BILATERAL LEG EDEMA: ICD-10-CM

## 2025-08-15 DIAGNOSIS — J96.11 CHRONIC RESPIRATORY FAILURE WITH HYPOXIA (HCC): ICD-10-CM

## 2025-08-15 DIAGNOSIS — J44.9 CHRONIC OBSTRUCTIVE PULMONARY DISEASE, UNSPECIFIED COPD TYPE (HCC): Primary | ICD-10-CM

## 2025-08-15 PROCEDURE — 1123F ACP DISCUSS/DSCN MKR DOCD: CPT | Performed by: INTERNAL MEDICINE

## 2025-08-15 PROCEDURE — 1159F MED LIST DOCD IN RCRD: CPT | Performed by: INTERNAL MEDICINE

## 2025-08-15 PROCEDURE — 3074F SYST BP LT 130 MM HG: CPT | Performed by: INTERNAL MEDICINE

## 2025-08-15 PROCEDURE — 99214 OFFICE O/P EST MOD 30 MIN: CPT | Performed by: INTERNAL MEDICINE

## 2025-08-15 PROCEDURE — 3078F DIAST BP <80 MM HG: CPT | Performed by: INTERNAL MEDICINE

## (undated) DEVICE — SUTURE RETRIEVER

## (undated) DEVICE — BIT DRL L30MM DIA3.2MM DISP FOR G7 2 MOBILITY CONSTRUCT

## (undated) DEVICE — SUTURE VCRL SZ 0 L36IN ABSRB UD L36MM CT-1 1/2 CIR J946H

## (undated) DEVICE — SIPS DUAL 2 MINUTE TIP

## (undated) DEVICE — PATIENT RETURN ELECTRODE, SINGLE-USE, CONTACT QUALITY MONITORING, ADULT, WITH 9FT CORD, FOR PATIENTS WEIGING OVER 33LBS. (15KG): Brand: MEGADYNE

## (undated) DEVICE — SUTURE PDS II SZ 1 L96IN ABSRB VLT XLH L70MM 1/2 CIR Z881G

## (undated) DEVICE — SUTURE ETHBND EXCEL SZ 5 L30IN NONABSORBABLE GRN L48MM V-40 MB46G

## (undated) DEVICE — GLOVE SURG SZ 7 L12IN FNGR THK94MIL TRNSLUC YEL LTX HYDRGEL

## (undated) DEVICE — PACK PROCEDURE SURG TOT HIP DRP

## (undated) DEVICE — LABEL MED MINI W/ MARKER

## (undated) DEVICE — BIT DRL L40MM DIA3.2MM DISP FOR G7 2 MOBILITY CONSTRUCT

## (undated) DEVICE — BLADE SAW W098XL276IN THK0025IN CUT THK0039IN REPL SAG

## (undated) DEVICE — Z DISCONTINUED PER MEDLINE USE 2741943 DRESSING AQUACEL 10 IN ALG W9XL25CM SIL CVR WTRPRF VIR BACT BARR ANTIMIC

## (undated) DEVICE — 3M™ STERI-STRIP™ REINFORCED ADHESIVE SKIN CLOSURES, R1547, 1/2 IN X 4 IN (12 MM X 100 MM), 6 STRIPS/ENVELOPE: Brand: 3M™ STERI-STRIP™

## (undated) DEVICE — NEEDLE SPINAL 22GA L3.5IN SPINOCAN

## (undated) DEVICE — CHLORAPREP 26ML ORANGE

## (undated) DEVICE — T4 HOOD

## (undated) DEVICE — GLOVE SURG SZ 8 L12IN FNGR THK13MIL BRN LTX SYN POLYMER W

## (undated) DEVICE — GLOVE ORANGE PI 8   MSG9080

## (undated) DEVICE — SUTURE VCRL SZ 2-0 L36IN ABSRB UD L36MM CT-1 1/2 CIR J945H

## (undated) DEVICE — GLOVE SURG SZ 65 THK91MIL LTX FREE SYN POLYISOPRENE

## (undated) DEVICE — DRAPE,U/ SHT,SPLIT,PLAS,STERIL: Brand: MEDLINE

## (undated) DEVICE — SYRINGE MED 50ML LUERLOCK TIP

## (undated) DEVICE — 3M™ STERI-DRAPE™ U-DRAPE 1015: Brand: STERI-DRAPE™

## (undated) DEVICE — PILLOW ABD SM W12XH6XL18IN LEG FOAM NYLEX CVR W/ STRP DISP

## (undated) DEVICE — SHEET,DRAPE,53X77,STERILE: Brand: MEDLINE

## (undated) DEVICE — 3 BONE CEMENT MIXER WITH SPATULA: Brand: MIXEVAC, ACM